# Patient Record
Sex: MALE | Race: WHITE | Employment: PART TIME | URBAN - METROPOLITAN AREA
[De-identification: names, ages, dates, MRNs, and addresses within clinical notes are randomized per-mention and may not be internally consistent; named-entity substitution may affect disease eponyms.]

---

## 2017-01-02 PROBLEM — R07.9 CHEST PAIN: Status: ACTIVE | Noted: 2017-01-02

## 2017-01-02 PROBLEM — R71.8 MICROCYTOSIS: Status: ACTIVE | Noted: 2017-01-02

## 2017-03-17 ENCOUNTER — OFFICE VISIT (OUTPATIENT)
Dept: FAMILY MEDICINE CLINIC | Age: 52
End: 2017-03-17

## 2017-03-17 VITALS
WEIGHT: 199.38 LBS | HEART RATE: 63 BPM | BODY MASS INDEX: 28.54 KG/M2 | DIASTOLIC BLOOD PRESSURE: 76 MMHG | SYSTOLIC BLOOD PRESSURE: 120 MMHG | OXYGEN SATURATION: 98 % | HEIGHT: 70 IN

## 2017-03-17 DIAGNOSIS — I10 ESSENTIAL HYPERTENSION: Primary | ICD-10-CM

## 2017-03-17 DIAGNOSIS — Z12.5 SCREENING PSA (PROSTATE SPECIFIC ANTIGEN): ICD-10-CM

## 2017-03-17 DIAGNOSIS — E78.2 MIXED HYPERLIPIDEMIA: ICD-10-CM

## 2017-03-17 DIAGNOSIS — B35.4 TINEA CORPORIS: ICD-10-CM

## 2017-03-17 DIAGNOSIS — E61.1 IRON DEFICIENCY: ICD-10-CM

## 2017-03-17 DIAGNOSIS — H40.9 ACUTE GLAUCOMA OF RIGHT EYE: ICD-10-CM

## 2017-03-17 DIAGNOSIS — I25.10 CORONARY ARTERY DISEASE INVOLVING NATIVE CORONARY ARTERY OF NATIVE HEART WITHOUT ANGINA PECTORIS: ICD-10-CM

## 2017-03-17 LAB
A/G RATIO: 1.6 (ref 1.1–2.2)
ALBUMIN SERPL-MCNC: 4.5 G/DL (ref 3.4–5)
ALP BLD-CCNC: 117 U/L (ref 40–129)
ALT SERPL-CCNC: 24 U/L (ref 10–40)
ANION GAP SERPL CALCULATED.3IONS-SCNC: 15 MMOL/L (ref 3–16)
AST SERPL-CCNC: 16 U/L (ref 15–37)
BILIRUB SERPL-MCNC: 0.9 MG/DL (ref 0–1)
BUN BLDV-MCNC: 17 MG/DL (ref 7–20)
CALCIUM SERPL-MCNC: 9.9 MG/DL (ref 8.3–10.6)
CHLORIDE BLD-SCNC: 104 MMOL/L (ref 99–110)
CHOLESTEROL, TOTAL: 135 MG/DL (ref 0–199)
CO2: 21 MMOL/L (ref 21–32)
CREAT SERPL-MCNC: 1 MG/DL (ref 0.9–1.3)
FERRITIN: 29.8 NG/ML (ref 30–400)
GFR AFRICAN AMERICAN: >60
GFR NON-AFRICAN AMERICAN: >60
GLOBULIN: 2.8 G/DL
GLUCOSE BLD-MCNC: 111 MG/DL (ref 70–99)
HDLC SERPL-MCNC: 25 MG/DL (ref 40–60)
LDL CHOLESTEROL CALCULATED: 67 MG/DL
POTASSIUM SERPL-SCNC: 4.6 MMOL/L (ref 3.5–5.1)
PROSTATE SPECIFIC ANTIGEN: 0.49 NG/ML (ref 0–4)
SODIUM BLD-SCNC: 140 MMOL/L (ref 136–145)
TOTAL PROTEIN: 7.3 G/DL (ref 6.4–8.2)
TRIGL SERPL-MCNC: 216 MG/DL (ref 0–150)
VLDLC SERPL CALC-MCNC: 43 MG/DL

## 2017-03-17 PROCEDURE — 36415 COLL VENOUS BLD VENIPUNCTURE: CPT | Performed by: FAMILY MEDICINE

## 2017-03-17 PROCEDURE — 99214 OFFICE O/P EST MOD 30 MIN: CPT | Performed by: FAMILY MEDICINE

## 2017-03-17 RX ORDER — ACETAZOLAMIDE 500 MG/1
CAPSULE, EXTENDED RELEASE ORAL
COMMUNITY
Start: 2017-03-14 | End: 2017-06-01

## 2017-03-17 ASSESSMENT — PATIENT HEALTH QUESTIONNAIRE - PHQ9
2. FEELING DOWN, DEPRESSED OR HOPELESS: 0
SUM OF ALL RESPONSES TO PHQ QUESTIONS 1-9: 0
1. LITTLE INTEREST OR PLEASURE IN DOING THINGS: 0
SUM OF ALL RESPONSES TO PHQ9 QUESTIONS 1 & 2: 0

## 2017-03-29 ENCOUNTER — NURSE ONLY (OUTPATIENT)
Dept: FAMILY MEDICINE CLINIC | Age: 52
End: 2017-03-29

## 2017-03-29 DIAGNOSIS — Z12.11 COLON CANCER SCREENING: Primary | ICD-10-CM

## 2017-03-29 LAB
CONTROL: ABNORMAL
HEMOCCULT STL QL: POSITIVE

## 2017-03-29 PROCEDURE — 82274 ASSAY TEST FOR BLOOD FECAL: CPT | Performed by: FAMILY MEDICINE

## 2017-04-03 DIAGNOSIS — R19.5 HEME POSITIVE STOOL: Primary | ICD-10-CM

## 2017-04-03 RX ORDER — LANOLIN ALCOHOL/MO/W.PET/CERES
325 CREAM (GRAM) TOPICAL
Qty: 30 TABLET | Refills: 3 | Status: SHIPPED | OUTPATIENT
Start: 2017-04-03 | End: 2017-06-01

## 2017-04-12 ENCOUNTER — TELEPHONE (OUTPATIENT)
Dept: FAMILY MEDICINE CLINIC | Age: 52
End: 2017-04-12

## 2017-04-20 RX ORDER — ASPIRIN 325 MG
TABLET, DELAYED RELEASE (ENTERIC COATED) ORAL
Qty: 90 TABLET | Refills: 2 | Status: SHIPPED | OUTPATIENT
Start: 2017-04-20 | End: 2017-12-22

## 2017-04-20 RX ORDER — ATORVASTATIN CALCIUM 20 MG/1
TABLET, FILM COATED ORAL
Qty: 90 TABLET | Refills: 2 | Status: SHIPPED | OUTPATIENT
Start: 2017-04-20 | End: 2017-10-10

## 2017-05-10 ENCOUNTER — TELEPHONE (OUTPATIENT)
Dept: FAMILY MEDICINE CLINIC | Age: 52
End: 2017-05-10

## 2017-06-01 ENCOUNTER — HOSPITAL ENCOUNTER (OUTPATIENT)
Dept: OTHER | Age: 52
Discharge: HOME | End: 2017-06-01
Attending: INTERNAL MEDICINE | Admitting: INTERNAL MEDICINE

## 2017-06-01 VITALS
OXYGEN SATURATION: 94 % | SYSTOLIC BLOOD PRESSURE: 116 MMHG | RESPIRATION RATE: 16 BRPM | TEMPERATURE: 97.7 F | BODY MASS INDEX: 27.22 KG/M2 | HEART RATE: 70 BPM | DIASTOLIC BLOOD PRESSURE: 78 MMHG | WEIGHT: 201 LBS | HEIGHT: 72 IN

## 2017-06-01 RX ORDER — SODIUM CHLORIDE, SODIUM LACTATE, POTASSIUM CHLORIDE, CALCIUM CHLORIDE 600; 310; 30; 20 MG/100ML; MG/100ML; MG/100ML; MG/100ML
INJECTION, SOLUTION INTRAVENOUS ONCE
Status: COMPLETED | OUTPATIENT
Start: 2017-06-01 | End: 2017-06-01

## 2017-06-01 RX ADMIN — SODIUM CHLORIDE, SODIUM LACTATE, POTASSIUM CHLORIDE, CALCIUM CHLORIDE: 600; 310; 30; 20 INJECTION, SOLUTION INTRAVENOUS at 08:07

## 2017-06-01 ASSESSMENT — PAIN - FUNCTIONAL ASSESSMENT: PAIN_FUNCTIONAL_ASSESSMENT: 0-10

## 2017-08-08 RX ORDER — FERROUS SULFATE 325(65) MG
TABLET ORAL
Qty: 30 TABLET | Refills: 3 | Status: SHIPPED | OUTPATIENT
Start: 2017-08-08 | End: 2017-10-10

## 2017-12-29 ENCOUNTER — OFFICE VISIT (OUTPATIENT)
Dept: FAMILY MEDICINE CLINIC | Age: 52
End: 2017-12-29

## 2017-12-29 VITALS
WEIGHT: 207.13 LBS | OXYGEN SATURATION: 95 % | BODY MASS INDEX: 28.09 KG/M2 | SYSTOLIC BLOOD PRESSURE: 132 MMHG | DIASTOLIC BLOOD PRESSURE: 88 MMHG | HEART RATE: 77 BPM

## 2017-12-29 DIAGNOSIS — M25.512 CHRONIC LEFT SHOULDER PAIN: ICD-10-CM

## 2017-12-29 DIAGNOSIS — G89.29 CHRONIC LEFT SHOULDER PAIN: ICD-10-CM

## 2017-12-29 DIAGNOSIS — N63.0 BREAST MASS IN MALE: Primary | ICD-10-CM

## 2017-12-29 DIAGNOSIS — I10 ESSENTIAL HYPERTENSION: ICD-10-CM

## 2017-12-29 LAB
BILIRUBIN, POC: NORMAL
BLOOD URINE, POC: NORMAL
CLARITY, POC: CLEAR
COLOR, POC: YELLOW
GLUCOSE URINE, POC: NORMAL
KETONES, POC: NORMAL
LEUKOCYTE EST, POC: NORMAL
NITRITE, POC: NORMAL
PH, POC: 7
PROTEIN, POC: NORMAL
SPECIFIC GRAVITY, POC: 1.01
UROBILINOGEN, POC: 0.2

## 2017-12-29 PROCEDURE — 3017F COLORECTAL CA SCREEN DOC REV: CPT | Performed by: FAMILY MEDICINE

## 2017-12-29 PROCEDURE — 1036F TOBACCO NON-USER: CPT | Performed by: FAMILY MEDICINE

## 2017-12-29 PROCEDURE — G8419 CALC BMI OUT NRM PARAM NOF/U: HCPCS | Performed by: FAMILY MEDICINE

## 2017-12-29 PROCEDURE — 81003 URINALYSIS AUTO W/O SCOPE: CPT | Performed by: FAMILY MEDICINE

## 2017-12-29 PROCEDURE — G8598 ASA/ANTIPLAT THER USED: HCPCS | Performed by: FAMILY MEDICINE

## 2017-12-29 PROCEDURE — G8427 DOCREV CUR MEDS BY ELIG CLIN: HCPCS | Performed by: FAMILY MEDICINE

## 2017-12-29 PROCEDURE — 99214 OFFICE O/P EST MOD 30 MIN: CPT | Performed by: FAMILY MEDICINE

## 2017-12-29 PROCEDURE — G8484 FLU IMMUNIZE NO ADMIN: HCPCS | Performed by: FAMILY MEDICINE

## 2017-12-29 NOTE — PROGRESS NOTES
Subjective:   He presents for follow up of emergency room visit for shoulder issues and chest mass and problems. He states that about 3 months ago he noticed a lump in his left breast but did not think about it and he is progressively had problems with his left shoulder he states he has very limited range of motion of his left shoulder which has significantly interfered with his ability to work he states he can't even hardly put on a seatbelt now because of severe pain in his left shoulder sometimes the pain shoots into his left neck and left chest wall. ER told him may have rotator cuff issues but they apparently did not check out left chest or breast mass. They did the blood work which I reviewed he states his urine has been kind of dark. He has history of hypertension. He has No Known Allergies. He   reports that he has never smoked. He has never used smokeless tobacco. Review of systems negative for vomiting diarrhea but he does have some abdominal discomfort in the middle. No shortness of breath or wheezing  Objective:   /88 (Site: Right Arm)   Pulse 77   Wt 207 lb 2 oz (94 kg)   SpO2 95%   BMI 28.09 kg/m²   BP Readings from Last 3 Encounters:   12/29/17 132/88   12/23/17 123/84   10/10/17 (!) 150/97     Physical Exam   Constitutional: He appears well-developed and well-nourished. HENT:   Head: Normocephalic. Neck: Neck supple. No thyromegaly present. Cardiovascular: Normal rate, regular rhythm and normal heart sounds. No murmur heard. Pulmonary/Chest: Breath sounds normal. No respiratory distress. He has no wheezes. He has no rales. Abdominal: Soft. There is no tenderness. Lymphadenopathy:     He has no cervical adenopathy. Neurological: He is alert. Psychiatric: He has a normal mood and affect. His behavior is normal.   Vitals reviewed. shoulder very uncomfortable with any range of motion very limited range of motion and tenderness.   Patient has a mass which is slightly tender to palpation in the left breast.   Assessment and Plan:  1. Breast mass in male  LIZZ Digital Diagnostic Unilateral   2. Chronic left shoulder pain     3. Essential hypertension  POCT Urinalysis No Micro (Auto)   Am concerned about the left breast mass which needs to be worked up first.  If this is negative we will get MRI of left shoulder. Will take off of work for now since he is very limited with his shoulder and chest.  Urine dip today was normal.  Recent blood work reviewed  The problems listed in the assessment are stable unless otherwise indicated. He  was instructed to continue their current medications and treatment for the above  problems unless otherwise indicated above. Age-specific preventative medicine recommendations were reviewed with patient today and the Healthy Family Handout was given to patient. Avoid tobacco products exposure. Follow up in 2 weeks. Call or return to office for any problems that develop before the next scheduled follow-up appointment. Garrett Broussard M.D. Parts of this note were completed using voice recognition transcription. Every effort was made to ensure accuracy; however, inadvertent transcription errors may be present.

## 2017-12-29 NOTE — PATIENT INSTRUCTIONS
Please read the healthy family handout that you were given and share it with your family. Please compare this printed medication list with your medications at home to be sure they are the same. If you have any medications that are different please contact us immediately at [phone]. Also review your allergies that we have listed, these may also include medications that you have not been able to tolerate, make sure everything listed is correct. If you have any allergies that are different please contact us immediately at [phone].

## 2018-01-03 ENCOUNTER — HOSPITAL ENCOUNTER (OUTPATIENT)
Dept: MAMMOGRAPHY | Age: 53
Discharge: HOME | End: 2018-01-03
Attending: FAMILY MEDICINE | Admitting: FAMILY MEDICINE

## 2018-01-03 DIAGNOSIS — M25.512 CHRONIC LEFT SHOULDER PAIN: Primary | ICD-10-CM

## 2018-01-03 DIAGNOSIS — N63.20 LEFT BREAST MASS: Primary | ICD-10-CM

## 2018-01-03 DIAGNOSIS — G89.29 CHRONIC LEFT SHOULDER PAIN: Primary | ICD-10-CM

## 2018-01-03 DIAGNOSIS — N63.20 LEFT BREAST MASS: ICD-10-CM

## 2018-01-11 ENCOUNTER — OFFICE VISIT (OUTPATIENT)
Dept: FAMILY MEDICINE CLINIC | Age: 53
End: 2018-01-11

## 2018-01-11 VITALS
DIASTOLIC BLOOD PRESSURE: 99 MMHG | SYSTOLIC BLOOD PRESSURE: 147 MMHG | BODY MASS INDEX: 28.55 KG/M2 | OXYGEN SATURATION: 96 % | HEART RATE: 103 BPM | WEIGHT: 210.5 LBS

## 2018-01-11 DIAGNOSIS — I10 ESSENTIAL HYPERTENSION: ICD-10-CM

## 2018-01-11 DIAGNOSIS — N62 GYNECOMASTIA, MALE: ICD-10-CM

## 2018-01-11 DIAGNOSIS — M25.512 ACUTE PAIN OF LEFT SHOULDER: Primary | ICD-10-CM

## 2018-01-11 PROBLEM — E61.1 IRON DEFICIENCY: Status: RESOLVED | Noted: 2017-03-17 | Resolved: 2018-01-11

## 2018-01-11 PROCEDURE — 1036F TOBACCO NON-USER: CPT | Performed by: FAMILY MEDICINE

## 2018-01-11 PROCEDURE — G8427 DOCREV CUR MEDS BY ELIG CLIN: HCPCS | Performed by: FAMILY MEDICINE

## 2018-01-11 PROCEDURE — G8598 ASA/ANTIPLAT THER USED: HCPCS | Performed by: FAMILY MEDICINE

## 2018-01-11 PROCEDURE — 3017F COLORECTAL CA SCREEN DOC REV: CPT | Performed by: FAMILY MEDICINE

## 2018-01-11 PROCEDURE — 99213 OFFICE O/P EST LOW 20 MIN: CPT | Performed by: FAMILY MEDICINE

## 2018-01-11 PROCEDURE — G8419 CALC BMI OUT NRM PARAM NOF/U: HCPCS | Performed by: FAMILY MEDICINE

## 2018-01-11 PROCEDURE — G8484 FLU IMMUNIZE NO ADMIN: HCPCS | Performed by: FAMILY MEDICINE

## 2018-01-11 RX ORDER — ATORVASTATIN CALCIUM 20 MG/1
TABLET, FILM COATED ORAL
Qty: 30 TABLET | Refills: 5 | Status: SHIPPED | OUTPATIENT
Start: 2018-01-11 | End: 2018-06-24 | Stop reason: SDUPTHER

## 2018-01-15 ENCOUNTER — OFFICE VISIT (OUTPATIENT)
Dept: ORTHOPEDIC SURGERY | Age: 53
End: 2018-01-15

## 2018-01-15 VITALS — WEIGHT: 210.54 LBS | HEIGHT: 72 IN | BODY MASS INDEX: 28.52 KG/M2

## 2018-01-15 DIAGNOSIS — M25.512 LEFT SHOULDER PAIN, UNSPECIFIED CHRONICITY: ICD-10-CM

## 2018-01-15 DIAGNOSIS — M75.02 ADHESIVE CAPSULITIS OF LEFT SHOULDER: Primary | ICD-10-CM

## 2018-01-15 PROCEDURE — G8598 ASA/ANTIPLAT THER USED: HCPCS | Performed by: ORTHOPAEDIC SURGERY

## 2018-01-15 PROCEDURE — G8427 DOCREV CUR MEDS BY ELIG CLIN: HCPCS | Performed by: ORTHOPAEDIC SURGERY

## 2018-01-15 PROCEDURE — G8419 CALC BMI OUT NRM PARAM NOF/U: HCPCS | Performed by: ORTHOPAEDIC SURGERY

## 2018-01-15 PROCEDURE — 1036F TOBACCO NON-USER: CPT | Performed by: ORTHOPAEDIC SURGERY

## 2018-01-15 PROCEDURE — G8484 FLU IMMUNIZE NO ADMIN: HCPCS | Performed by: ORTHOPAEDIC SURGERY

## 2018-01-15 PROCEDURE — 73030 X-RAY EXAM OF SHOULDER: CPT | Performed by: ORTHOPAEDIC SURGERY

## 2018-01-15 PROCEDURE — 3017F COLORECTAL CA SCREEN DOC REV: CPT | Performed by: ORTHOPAEDIC SURGERY

## 2018-01-15 PROCEDURE — 99203 OFFICE O/P NEW LOW 30 MIN: CPT | Performed by: ORTHOPAEDIC SURGERY

## 2018-01-15 RX ORDER — MELOXICAM 15 MG/1
15 TABLET ORAL DAILY
Qty: 30 TABLET | Refills: 2 | Status: SHIPPED | OUTPATIENT
Start: 2018-01-15 | End: 2018-04-04 | Stop reason: SDUPTHER

## 2018-01-17 ENCOUNTER — HOSPITAL ENCOUNTER (OUTPATIENT)
Dept: PHYSICAL THERAPY | Age: 53
Discharge: HOME | End: 2018-01-31
Attending: FAMILY MEDICINE | Admitting: FAMILY MEDICINE

## 2018-01-17 DIAGNOSIS — R07.9 CHEST PAIN: ICD-10-CM

## 2018-01-17 NOTE — FLOWSHEET NOTE
3x10 He forgot these 2 HO     Back pocket shrugs 3x10 in reclined \"                       \"   NV check palpation. Therapeutic Exercise:  30 min    Group Therapy:      Home Exercise Program:  Given HO    Therapeutic Activity:      Neuromuscular Re-education:      Gait:      Manual Therapy:  15 min  g-h distraction, inf glide  IR/ER spins. Canalith Repositioning Procedure:       Modalities:      Timed Code Treatment Minutes:  45    Total Treatment Minutes: 75     Medicare Cap Total YTD:  na    Treatment/Activity Tolerance:    [x] Patient tolerated treatment well [] Patient limited by fatigue   [x] Patient limited by pain [] Patient limited by other medical complications   [] Other:     Prognosis: [x] Good [] Fair  [] Poor    Patient Requires Follow-up:  [x] Yes  [] No    Plan: [] Continue per plan of care [] Alter current plan (see comments)   [x] Plan of care initiated [] Hold pending MD visit [] Discharge    Plan for Next Session:  manual tech for mobiliz, check ex, advance HEP  See Progress Note: [] Yes  [x] No       Next due:    18     Electronically signed by:  Guido Mcdaniel, PT [id number]          Date:  2018    Patient Name:  Duglas Thompson      :  1965    Visit# / total visits:  /    Pain level: /10    Progress Note covers period from:  18  To date on this note. Subjective:         Objective:  Observation:  Test measurements:       Functional Outcome Measure    Measure used:  SPADI  Score:  90  % Disability:  69%         Assessment:  G Code:  Summary:   Patient's response to treatment:      Goals:  G code:GOALS  GCode:   CJ  Short Term Goals (  2  weeks) Long Term Goals (4  weeks)   1). Establish HEp 1). (I) HEP   2). incr left arm use 25% 2). incr left arm use 75%   3). 3).AROM to 130 flx  120 abd   4). 4).strength to 4+   5). 5).return to work soon after d/c   6).  6).        · Progress toward

## 2018-01-18 ENCOUNTER — TELEPHONE (OUTPATIENT)
Dept: FAMILY MEDICINE CLINIC | Age: 53
End: 2018-01-18

## 2018-01-18 NOTE — TELEPHONE ENCOUNTER
Patient is wanting to know if you will give him a release to return to work.   He said he is currently doing therapy twice a week

## 2018-01-19 ENCOUNTER — HOSPITAL ENCOUNTER (OUTPATIENT)
Dept: PHYSICAL THERAPY | Age: 53
Discharge: HOME | End: 2018-01-19
Admitting: FAMILY MEDICINE

## 2018-01-19 DIAGNOSIS — R07.9 CHEST PAIN: ICD-10-CM

## 2018-01-19 NOTE — FLOWSHEET NOTE
Outpatient Physical Therapy     [] Daily Treatment Note   [] Progress Note   [] Discharge Note      Date:  2018    Patient Name:  Malka Nair        :  1965    Restrictions/Precautions:      Diagnosis:  Left shoulder adhesive capsulitis    Treatment Diagnosis:  Same, left shoulder pain    Insurance/Certification information:  [facility]    Referring Physician:  Dr. Delma Ruggiero of care signed (Y/N):      Visit# / total visits:  2     Pain level: /10     Subjective:   18 has had left shoulder problems for  3-4 months. No specific injury. He was taken off work around 17 by Dr. Vinay Raymond and also had a mamogram for left breast mass- this was neg. He saw Dr. Gustavo David on 1/15/18 for his shoulder who also referred him to PT. He is  To f/u with Dr. Gustavo David in 4 weeks. X-rAYS REVEALED MILD DEG CHANGES ( AT [facility])     Pain    Patient describes pain to be CONSTNT LEFT SHOULDER PAIN. States he has tingling in his left hand and fingers with diriving that runs up his arm. Has left cervical tightness/pain when he turns left. Has had brief muscle spasm in the left side of his neck for the past several monthsl  Patient reports  2/10 pain at rest, 5-6 /10 pain with movement. - try ing to hook the car seat for grand kids. Worsened by  - use of his left arm about shoulder level. Improved by - avoidance  Current Functional Limitations:  Not working. Not shoveling the snow,   PLOF:  indep and working  Pt. 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     Patient goal for therapy:   Have less pain, have better use of his left arm. Return to work    Objective:  18    palpation:  tender st the bursa,     mild incr laxity at the Right [facility] jt  Cervical:  decr OA with sidebending left.   decr AA rot bilat    Exercises:   Exercise/Equipment Resistance/Repetitions Other comments   18 explained course and role of PT       All ex  3x day      Table slides Hold

## 2018-01-22 ENCOUNTER — HOSPITAL ENCOUNTER (OUTPATIENT)
Dept: PHYSICAL THERAPY | Age: 53
Discharge: HOME | End: 2018-01-22
Admitting: FAMILY MEDICINE

## 2018-01-22 DIAGNOSIS — R07.9 CHEST PAIN: ICD-10-CM

## 2018-01-24 ENCOUNTER — HOSPITAL ENCOUNTER (OUTPATIENT)
Dept: PHYSICAL THERAPY | Age: 53
Discharge: HOME | End: 2018-01-24
Admitting: FAMILY MEDICINE

## 2018-01-24 DIAGNOSIS — R07.9 CHEST PAIN: ICD-10-CM

## 2018-01-24 NOTE — PROGRESS NOTES
Outpatient Physical Therapy     [x] Daily Treatment Note   [x] Progress Note at the bottom of this page  [] Discharge Note      Date:  2018    Patient Name:  Roland Handy        :  1965    Restrictions/Precautions:      Diagnosis:  Left shoulder adhesive capsulitis    Treatment Diagnosis:  Same, left shoulder pain    Insurance/Certification information:  [facility]    Referring Physician:  Dr. Joselyn Colin of care signed (Y/N):      Visit# / total visits:       Pain level: 0-2/10     Subjective:   18 no new reports since last visit- 50% improved. Feels like he can return to work  18 reports 50% imrovement. Able to shovel back porch off. Able to fasten kids in car seats with 2/10 pain. Going to see Dr. Hever Molina later this week and thinks he can return to work soon   18 has had left shoulder problems for  3-4 months. No specific injury. He was taken off work around 17 by Dr. Hever Molina and also had a mamogram for left breast mass- this was neg. He saw Dr. Merlinda Lakes on 1/15/18 for his shoulder who also referred him to PT. He is  To f/u with Dr. Merlinda Lakes in 4 weeks. X-rAYS REVEALED MILD DEG CHANGES ( AT [facility])     Pain    Patient describes pain to be CONSTNT LEFT SHOULDER PAIN. States he has tingling in his left hand and fingers with diriving that runs up his arm. Has left cervical tightness/pain when he turns left. Has had brief muscle spasm in the left side of his neck for the past several monthsl  Patient reports  2/10 pain at rest, 5-6 /10 pain with movement. - try ing to hook the car seat for grand kids. Worsened by  - use of his left arm about shoulder level. Improved by - avoidance  Current Functional Limitations:  Not working. Not shoveling the snow,   PLOF:  indep and working  Pt. 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     Patient goal for therapy:   Have less pain, have better use of his left arm.   Return to work  Objective:  1/19/18  palpation:  tender st the bursa,     mild incr laxity at the Right [facility] jt  Cervical:  decr OA with sidebending left. decr AA rot bilat      Outpatient Physical Therapy  Progress Note        Progress Note covers period from:  1/17/18 - 1/24/18    Subjective: reports he is about 50% improved. Feels he can return to work     Objective:  Observation:  Test measurements:                                                PROM: 147 flx,      [email or web address] ER,       [email or web address] IR,     102 abd all with discomfort     Functional Outcome Measure     Not tested again on 1/24/18  Measure used:  SPADI  Score:  90  % Disability:  69%       Assessment:  G Code:  Summary: improving rom and pain reports  Patient's response to treatment: pain at the  end of available range      Goals:  G code:GOALS  GCode:   CJ  Short Term Goals (  2  weeks) Long Term Goals (4  weeks)   1). Establish HEp 1). (I) HEP   2). incr left arm use 25% 2). incr left arm use 75%   3). 3).AROM to 130 flx  120 abd   4). 4).strength to 4+   5). 5).return to work soon after d/c   6). 6).        · Progress toward previous goals: STGS met. LTG for flx to 120.      Plan: cont PT 2x wk for 2-4 more visits  ·     Electronically Signed by:  Clifford Patel PT [id number]

## 2018-01-24 NOTE — FLOWSHEET NOTE
Outpatient Physical Therapy     [x] Daily Treatment Note   [x] Progress Note   [] Discharge Note      Date:  2018    Patient Name:  Hellen Carbone        :  1965    Restrictions/Precautions:      Diagnosis:  Left shoulder adhesive capsulitis    Treatment Diagnosis:  Same, left shoulder pain    Insurance/Certification information:  [facility]    Referring Physician:  Dr. Devora Fothergill of care signed (Y/N):      Visit# / total visits:       Pain level: 0-2/10     Subjective:   18 no new reports since last visit  18 reports 50% imrovement. Able to shovel back porch off. Able to fasten kids in car seats with 2/10 pain. Going to see Dr. Jean Carlos Hernandez later this week and thinks he can return to work soon   18 has had left shoulder problems for  3-4 months. No specific injury. He was taken off work around 17 by Dr. Jean Carlos Hernandez and also had a mamogram for left breast mass- this was neg. He saw Dr. Hallie Montes on 1/15/18 for his shoulder who also referred him to PT. He is  To f/u with Dr. Hallie Monets in 4 weeks. X-rAYS REVEALED MILD DEG CHANGES ( AT [facility])     Pain    Patient describes pain to be CONSTNT LEFT SHOULDER PAIN. States he has tingling in his left hand and fingers with diriving that runs up his arm. Has left cervical tightness/pain when he turns left. Has had brief muscle spasm in the left side of his neck for the past several monthsl  Patient reports  2/10 pain at rest, 5-6 /10 pain with movement. - try ing to hook the car seat for grand kids. Worsened by  - use of his left arm about shoulder level. Improved by - avoidance  Current Functional Limitations:  Not working. Not shoveling the snow,   PLOF:  indep and working  Pt. 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     Patient goal for therapy:   Have less pain, have better use of his left arm.   Return to work  Objective:  18  palpation:  tender st the bursa,     mild incr laxity at the Right [facility] jt  Cervical:  decr OA with sidebending left. decr AA rot bilat    Exercises:   Exercise/Equipment Resistance/Repetitions Other comments   1/17/18 explained course and role of PT       All ex  3x day      Table slides flex and abd Hold 10 sec  x5-10 reps     in supine: wand flx 3x10                     Wand ER 3x10   Forehead to pillow      Standing: ext w/ wand 3x10      Scapular sqz  center 3x10 He forgot these 2 HO     Back pocket shrugs 3x10 in reclined \"                       \"   1/19/18 cont above ex       1/22/18 stretches: in doorways for flx and ER Hold 10 sec , repeat 3-5 times 5-6 times a day                                                                               Therapeutic Exercise: 4 min. For review       Group Therapy:      Home Exercise Program:  Given HO    Therapeutic Activity:      Neuromuscular Re-education:      Gait:      Manual Therapy:   30 min  g-h distraction, inf glide  IR/ER spins. Resisted IR x 30 reps, then stretch ER. M-F release to left pectoralis,  Nerve gliding with left arm abd and ext: flx and ext of the wrist with fingers extended.      PROM: 147 flx, [email or web address] ER, [email or web address] IR, 102 abd all with discomfort        Modalities:      Timed Code Treatment Minutes:   34    Total Treatment Minutes:  34    Medicare Cap Total YTD:  na    Treatment/Activity Tolerance:    [x] Patient tolerated treatment well [] Patient limited by fatigue   [x] Patient limited by pain [] Patient limited by other medical complications   [] Other:     Prognosis: [x] Good [] Fair  [] Poor    Patient Requires Follow-up:  [x] Yes  [] No    Plan: [x] Continue per plan of care [] Alter current plan (see comments)   [] Plan of care initiated [] Hold pending MD visit [] Discharge    Plan for Next Session:  manual tech for mobiliz, check ex, advance HEP  See Progress Note: [x] Yes  [] No       Next due:    2/16/18     Electronically signed by:  SN [id number]            Progress Note covers

## 2018-01-25 ENCOUNTER — OFFICE VISIT (OUTPATIENT)
Dept: FAMILY MEDICINE CLINIC | Age: 53
End: 2018-01-25

## 2018-01-25 VITALS
WEIGHT: 210.25 LBS | BODY MASS INDEX: 28.51 KG/M2 | DIASTOLIC BLOOD PRESSURE: 78 MMHG | SYSTOLIC BLOOD PRESSURE: 121 MMHG | HEART RATE: 69 BPM

## 2018-01-25 DIAGNOSIS — M75.02 ADHESIVE CAPSULITIS OF LEFT SHOULDER: ICD-10-CM

## 2018-01-25 DIAGNOSIS — I10 ESSENTIAL HYPERTENSION: Primary | ICD-10-CM

## 2018-01-25 PROCEDURE — 1036F TOBACCO NON-USER: CPT | Performed by: FAMILY MEDICINE

## 2018-01-25 PROCEDURE — G8427 DOCREV CUR MEDS BY ELIG CLIN: HCPCS | Performed by: FAMILY MEDICINE

## 2018-01-25 PROCEDURE — 99213 OFFICE O/P EST LOW 20 MIN: CPT | Performed by: FAMILY MEDICINE

## 2018-01-25 PROCEDURE — G8598 ASA/ANTIPLAT THER USED: HCPCS | Performed by: FAMILY MEDICINE

## 2018-01-25 PROCEDURE — G8419 CALC BMI OUT NRM PARAM NOF/U: HCPCS | Performed by: FAMILY MEDICINE

## 2018-01-25 PROCEDURE — G8484 FLU IMMUNIZE NO ADMIN: HCPCS | Performed by: FAMILY MEDICINE

## 2018-01-25 PROCEDURE — 3017F COLORECTAL CA SCREEN DOC REV: CPT | Performed by: FAMILY MEDICINE

## 2018-01-25 NOTE — PATIENT INSTRUCTIONS
Please read the healthy family handout that you were given and share it with your family. Please compare this printed medication list with your medications at home to be sure they are the same. If you have any medications that are different please contact us immediately at [phone]. Also review your allergies that we have listed, these may also include medications that you have not been able to tolerate, make sure everything listed is correct. If you have any allergies that are different please contact us immediately at [phone].

## 2018-01-25 NOTE — PROGRESS NOTES
any problems that develop before the next scheduled follow-up appointment. Renetta Mcarthur M.D. Parts of this note were completed using voice recognition transcription. Every effort was made to ensure accuracy; however, inadvertent transcription errors may be present.

## 2018-02-01 ENCOUNTER — HOSPITAL ENCOUNTER (OUTPATIENT)
Dept: OTHER | Age: 53
Discharge: HOME | End: 2018-02-14
Attending: FAMILY MEDICINE | Admitting: FAMILY MEDICINE

## 2018-02-06 ENCOUNTER — HOSPITAL ENCOUNTER (OUTPATIENT)
Dept: PHYSICAL THERAPY | Age: 53
Discharge: HOME | End: 2018-02-07
Admitting: FAMILY MEDICINE

## 2018-02-06 DIAGNOSIS — R07.9 CHEST PAIN: ICD-10-CM

## 2018-02-13 ENCOUNTER — HOSPITAL ENCOUNTER (OUTPATIENT)
Dept: PHYSICAL THERAPY | Age: 53
Discharge: HOME | End: 2018-02-14
Admitting: FAMILY MEDICINE

## 2018-02-13 DIAGNOSIS — R07.9 CHEST PAIN: ICD-10-CM

## 2018-02-13 NOTE — DISCHARGE SUMMARY
Outpatient Physical Therapy     [x] Daily Treatment Note   [] Progress Note   [x] Discharge Note 18 bottom of this page. Date:  2018    Patient Name:  Jelly Paredes        :  1965    Restrictions/Precautions:      Diagnosis:  Left shoulder adhesive capsulitis    Treatment Diagnosis:  Same, left shoulder pain    Insurance/Certification information:  [facility]    Referring Physician:  Dr. Nallely Mak of care signed (Y/N):      Visit# / total visits:   6         Pain level: 0-2/10     Subjective:  18 doing well with return to work. 18 doing well with his return to work. States he is using his arm mostly normally. States it is still stiff.   18 no new reports since last visit  18 reports 50% imrovement. Able to shovel back porch off. Able to fasten kids in car seats with 2/10 pain. Going to see Dr. Aquiles Garcia later this week and thinks he can return to work soon   18 has had left shoulder problems for  3-4 months. No specific injury. He was taken off work around 17 by Dr. Auqiles Garcia and also had a mamogram for left breast mass- this was neg. He saw Dr. Merritt Lu on 1/15/18 for his shoulder who also referred him to PT. He is  To f/u with Dr. Merritt Lu in 4 weeks. X-rAYS REVEALED MILD DEG CHANGES ( AT [facility])     Pain    Patient describes pain to be CONSTNT LEFT SHOULDER PAIN. States he has tingling in his left hand and fingers with diriving that runs up his arm. Has left cervical tightness/pain when he turns left. Has had brief muscle spasm in the left side of his neck for the past several monthsl  Patient reports  2/10 pain at rest, 5-6 /10 pain with movement. - try ing to hook the car seat for grand kids. Worsened by  - use of his left arm about shoulder level. Improved by - avoidance  Current Functional Limitations:  Not working.   Not shoveling the snow,   PLOF:  indep and working  Pt. 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     Patient goal for therapy:   Have less pain, have better use of his left arm. Return to work  Objective:  1/19/18  palpation:  tender st the bursa,     mild incr laxity at the Right [facility] jt  Cervical:  decr OA with sidebending left. decr AA rot bilat    Exercises:   Exercise/Equipment Resistance/Repetitions Other comments   1/17/18 explained course and role of PT       All ex  3x day      Table slides flex and abd Hold 10 sec  x5-10 reps     in supine: wand flx 3x10                     Wand ER 3x10   Forehead to pillow      Standing: ext w/ wand 3x10      Scapular sqz  center 3x10 He forgot these 2 HO     Back pocket shrugs 3x10 in reclined \"                       \"   1/19/18 cont above ex       1/22/18 stretches: in doorways for flx and ER Hold 10 sec , repeat 3-5 times 5-6 times a day                                                                               Therapeutic Exercise:       Group Therapy:      Home Exercise Program:  Given HO    Therapeutic Activity:      Neuromuscular Re-education:      Gait:      Manual Therapy:    25 min  g-h distraction, inf glide  IR/ER spins. M-F release to left pectoralis,  Nerve gliding with left arm abd and ext: flx and ext of the wrist with fingers extended. PROM: 160 flx,                                                                                                                 Modalities:      Timed Code Treatment Minutes:   25     Doing very well.   Will consider d/c on his next visit    Total Treatment Minutes:   26    Medicare Cap Total YTD:  na    Treatment/Activity Tolerance:    [x] Patient tolerated treatment well [] Patient limited by fatigue   [] Patient limited by pain- end of   range [] Patient limited by other medical complications   [] Other:     Prognosis: [x] Good [] Fair  [] Poor    Patient Requires Follow-up:  [] Yes  [x] No    Plan: [] Continue per plan of care [] Alter current plan (see comments)   [] Plan of care initiated [] Hold pending MD visit [x] Discharge    Plan for Next Session:     See Progress Note: [x] Yes  [] No       Next due:     At d/c     Electronically signed by:  Aris Lei, PT [id number]    Outpatient Physical Therapy  Progress Note/ d/c note        Progress Note covers period from:  1/17/18 -  2/13/18       Objective:  Observation:  Test measurements:     PROM:  160 flx. AROM:  135 flx    132 abd     ER to T1     IR to T12. MMT:  5/5 left shoulder     Functional Outcome Measure    Measure used:  SPADI  Score:     % Disability:          Assessment:  G Code:  Summary: seen x 6 visits. Patient's response to treatment:  good      Goals:  G code:GOALS  GCode:   CJ  Short Term Goals (  2  weeks) Long Term Goals (4  weeks)   1). Establish HEp 1). (I) HEP   2). incr left arm use 25% 2). incr left arm use 75%   3). 3).AROM to 130 flx  120 abd   4). 4).strength to 4+   5). 5).return to work soon after d/c   6). 6).        · Progress toward previous goals: all goals met. Plan:  He will continue with HEP for strength and stretch.   D/c further PT  ·     Electronically Signed by: Aris Lei, [address] [place] [address] Road

## 2018-04-18 ENCOUNTER — TELEPHONE (OUTPATIENT)
Dept: FAMILY MEDICINE CLINIC | Age: 53
End: 2018-04-18

## 2018-04-18 PROBLEM — R07.9 CHEST PAIN: Status: ACTIVE | Noted: 2018-04-18

## 2018-06-24 DIAGNOSIS — I10 ESSENTIAL HYPERTENSION: ICD-10-CM

## 2018-06-26 RX ORDER — ATORVASTATIN CALCIUM 20 MG/1
TABLET, FILM COATED ORAL
Qty: 30 TABLET | Refills: 5 | Status: SHIPPED | OUTPATIENT
Start: 2018-06-26 | End: 2018-12-22 | Stop reason: SDUPTHER

## 2018-10-02 ENCOUNTER — OFFICE VISIT (OUTPATIENT)
Dept: FAMILY MEDICINE CLINIC | Age: 53
End: 2018-10-02
Payer: COMMERCIAL

## 2018-10-02 VITALS
SYSTOLIC BLOOD PRESSURE: 130 MMHG | HEART RATE: 52 BPM | WEIGHT: 209.8 LBS | BODY MASS INDEX: 28.45 KG/M2 | OXYGEN SATURATION: 96 % | DIASTOLIC BLOOD PRESSURE: 70 MMHG

## 2018-10-02 DIAGNOSIS — Z23 NEED FOR IMMUNIZATION AGAINST INFLUENZA: ICD-10-CM

## 2018-10-02 DIAGNOSIS — Z11.4 SCREENING FOR HUMAN IMMUNODEFICIENCY VIRUS WITHOUT PRESENCE OF RISK FACTORS: ICD-10-CM

## 2018-10-02 DIAGNOSIS — Z11.59 ENCOUNTER FOR HCV SCREENING TEST FOR LOW RISK PATIENT: ICD-10-CM

## 2018-10-02 DIAGNOSIS — R10.9 FLANK PAIN: ICD-10-CM

## 2018-10-02 DIAGNOSIS — I10 ESSENTIAL HYPERTENSION: ICD-10-CM

## 2018-10-02 DIAGNOSIS — Z13.1 SCREENING FOR DIABETES MELLITUS (DM): ICD-10-CM

## 2018-10-02 DIAGNOSIS — Z95.1 S/P CABG (CORONARY ARTERY BYPASS GRAFT): ICD-10-CM

## 2018-10-02 DIAGNOSIS — E78.2 MIXED HYPERLIPIDEMIA: ICD-10-CM

## 2018-10-02 DIAGNOSIS — I25.10 CORONARY ARTERY DISEASE INVOLVING NATIVE CORONARY ARTERY OF NATIVE HEART WITHOUT ANGINA PECTORIS: Primary | ICD-10-CM

## 2018-10-02 LAB
BILIRUBIN, POC: NORMAL
BLOOD URINE, POC: NORMAL
CLARITY, POC: CLEAR
COLOR, POC: CLEAR
GLUCOSE URINE, POC: NORMAL
KETONES, POC: NORMAL
LEUKOCYTE EST, POC: NORMAL
NITRITE, POC: NORMAL
PH, POC: 6
PROTEIN, POC: NORMAL
SPECIFIC GRAVITY, POC: 1.02
UROBILINOGEN, POC: 0.2

## 2018-10-02 PROCEDURE — G8484 FLU IMMUNIZE NO ADMIN: HCPCS | Performed by: NURSE PRACTITIONER

## 2018-10-02 PROCEDURE — 81002 URINALYSIS NONAUTO W/O SCOPE: CPT | Performed by: NURSE PRACTITIONER

## 2018-10-02 PROCEDURE — 1036F TOBACCO NON-USER: CPT | Performed by: NURSE PRACTITIONER

## 2018-10-02 PROCEDURE — 3017F COLORECTAL CA SCREEN DOC REV: CPT | Performed by: NURSE PRACTITIONER

## 2018-10-02 PROCEDURE — G8598 ASA/ANTIPLAT THER USED: HCPCS | Performed by: NURSE PRACTITIONER

## 2018-10-02 PROCEDURE — G8419 CALC BMI OUT NRM PARAM NOF/U: HCPCS | Performed by: NURSE PRACTITIONER

## 2018-10-02 PROCEDURE — G8427 DOCREV CUR MEDS BY ELIG CLIN: HCPCS | Performed by: NURSE PRACTITIONER

## 2018-10-02 PROCEDURE — 99214 OFFICE O/P EST MOD 30 MIN: CPT | Performed by: NURSE PRACTITIONER

## 2018-10-02 ASSESSMENT — PATIENT HEALTH QUESTIONNAIRE - PHQ9
2. FEELING DOWN, DEPRESSED OR HOPELESS: 0
SUM OF ALL RESPONSES TO PHQ QUESTIONS 1-9: 0
SUM OF ALL RESPONSES TO PHQ9 QUESTIONS 1 & 2: 0
1. LITTLE INTEREST OR PLEASURE IN DOING THINGS: 0
SUM OF ALL RESPONSES TO PHQ QUESTIONS 1-9: 0

## 2018-10-02 ASSESSMENT — ENCOUNTER SYMPTOMS
ALLERGIC/IMMUNOLOGIC NEGATIVE: 1
GASTROINTESTINAL NEGATIVE: 1
RESPIRATORY NEGATIVE: 1

## 2018-10-02 NOTE — PATIENT INSTRUCTIONS
of fruit each day. A serving is 1 medium-sized piece of fruit, ½ cup chopped or canned fruit, 1/4 cup dried fruit, or 4 ounces (½ cup) of fruit juice. Choose fruit more often than fruit juice. · Eat 4 to 5 servings of vegetables each day. A serving is 1 cup of lettuce or raw leafy vegetables, ½ cup of chopped or cooked vegetables, or 4 ounces (½ cup) of vegetable juice. Choose vegetables more often than vegetable juice. · Get 2 to 3 servings of low-fat and fat-free dairy each day. A serving is 8 ounces of milk, 1 cup of yogurt, or 1 ½ ounces of cheese. · Eat 6 to 8 servings of grains each day. A serving is 1 slice of bread, 1 ounce of dry cereal, or ½ cup of cooked rice, pasta, or cooked cereal. Try to choose whole-grain products as much as possible. · Limit lean meat, poultry, and fish to 2 servings each day. A serving is 3 ounces, about the size of a deck of cards. · Eat 4 to 5 servings of nuts, seeds, and legumes (cooked dried beans, lentils, and split peas) each week. A serving is 1/3 cup of nuts, 2 tablespoons of seeds, or ½ cup of cooked beans or peas. · Limit fats and oils to 2 to 3 servings each day. A serving is 1 teaspoon of vegetable oil or 2 tablespoons of salad dressing. · Limit sweets and added sugars to 5 servings or less a week. A serving is 1 tablespoon jelly or jam, ½ cup sorbet, or 1 cup of lemonade. · Eat less than 2,300 milligrams (mg) of sodium a day. If you limit your sodium to 1,500 mg a day, you can lower your blood pressure even more. Tips for success  · Start small. Do not try to make dramatic changes to your diet all at once. You might feel that you are missing out on your favorite foods and then be more likely to not follow the plan. Make small changes, and stick with them. Once those changes become habit, add a few more changes. · Try some of the following:  ¨ Make it a goal to eat a fruit or vegetable at every meal and at snacks.  This will make it easy to get the recommended 7802-5177, Fluzone Quadrivalent 8701-0618, Fluzone Quadrivalent Intradermal 5538-5320  What is the most important information I should know about this vaccine? The injectable influenza virus vaccine (flu shot) is a \"killed virus\" vaccine. Influenza virus vaccine is also available in a nasal spray form, which is a \"live virus\" vaccine. This medication guide addresses only the injectable form of this vaccine. Becoming infected with influenza is much more dangerous to your health than receiving this vaccine. However, like any medicine, this vaccine can cause side effects but the risk of serious side effects is extremely low. What is influenza virus vaccine? Influenza virus (commonly known as \"the flu\") is a serious disease caused by a virus. Influenza virus can spread from one person to another through small droplets of saliva that are expelled into the air when an infected person coughs or sneezes. The virus can also be passed through contact with objects the infected person has touched, such as a door handle or other surfaces. Influenza virus vaccine is used to prevent infection caused by influenza virus. The vaccine is redeveloped each year to contain specific strains of inactivated (killed) flu virus that are recommended by public health officials for that year. The injectable influenza virus vaccine (flu shot) is a \"killed virus\" vaccine. Influenza virus vaccine is also available in a nasal spray form, which is a \"live virus\" vaccine. Influenza virus vaccine works by exposing you to a small dose of the virus, which helps your body to develop immunity to the disease. Influenza virus vaccine will not treat an active infection that has already developed in the body. Influenza virus vaccine is for use in adults and children who are at least 7 months old. Becoming infected with influenza is much more dangerous to your health than receiving this vaccine.  Influenza causes thousands of deaths each year, and hundreds of thousands of hospitalizations. However, like any medicine, this vaccine can cause side effects but the risk of serious side effects is extremely low. Like any vaccine, influenza virus vaccine may not provide protection from disease in every person. This vaccine will not prevent illness caused by charisma flu (\"bird flu\"). What should I discuss with my healthcare provider before receiving this vaccine? You may not be able to receive this vaccine if you are allergic to eggs, or if you have:  · a history of severe allergic reaction to a flu vaccine; or  · a history of Guillain-Brentwood syndrome (within 6 weeks after receiving a flu vaccine). To make sure this vaccine is safe for you, tell your doctor if you have ever had:  · a bleeding or blood clotting disorder such as hemophilia or easy bruising;  · a neurologic disorder or disease affecting the brain (or if this was a reaction to a previous vaccine);  · seizures;  · a weak immune system caused by disease, bone marrow transplant, or by using certain medicines or receiving cancer treatments; or  · if you are allergic to latex rubber. You can still receive a vaccine if you have a minor cold. If you have a severe illness with a fever or any type of infection, wait until you get better before receiving this vaccine. The Centers for Disease Control and Prevention recommends that pregnant women get a flu shot during any trimester of pregnancy to protect themselves and their  babies from flu. The nasal spray form of influenza vaccine is not recommended for use in pregnant women. It is not known whether influenza virus vaccine passes into breast milk or if it could harm a nursing baby. Do not receive this vaccine without telling your doctor if you are breast-feeding a baby. This vaccine should not be given to a child younger than 7 months old. How is this vaccine given? Some brands of this vaccine are made for use in adults and not in children.  Your foods.  · Choose low-sodium versions of canned goods (such as soups, vegetables, and beans). Limit sugar  · Limit drinks and foods with added sugar. These include candy, desserts, and soda pop. Limit alcohol  · Limit alcohol to no more than 2 drinks a day for men and 1 drink a day for women. Too much alcohol can cause health problems. When should you call for help? Watch closely for changes in your health, and be sure to contact your doctor if:    · You would like help planning heart-healthy meals. Where can you learn more? Go to https://[email or web address].[email or web address]. org and sign in to your [employer] account. Enter V137 in the [employer] box to learn more about \"Heart-Healthy Diet: Care Instructions. \"     If you do not have an account, please click on the \"Sign Up Now\" link. Current as of: December 6, 2017  Content Version: 11.7  © 9409-3850 [employer]. Care instructions adapted under license by [facility] ([facility]). If you have questions about a medical condition or this instruction, always ask your healthcare professional. [place] [address] any warranty or liability for your use of this information. Patient Education        Frequent Urination: Care Instructions  Your Care Instructions  An urge to urinate frequently but usually passing only small amounts of urine is a common symptom of urinary problems, such as urinary tract infections. The bladder may become inflamed. This can cause the urge to urinate. You may try to urinate more often than usual to try to soothe that urge. Frequent urination also may be caused by sexually transmitted infections (STIs) or kidney stones. Or it may happen when something irritates the tube that carries urine from the bladder to the outside of the body (urethra). It may also be a sign of diabetes. The cause may be hard to find. You may need tests. Follow-up care is a key part of your treatment and safety.  Be sure to make and go to

## 2018-10-03 ENCOUNTER — NURSE ONLY (OUTPATIENT)
Dept: FAMILY MEDICINE CLINIC | Age: 53
End: 2018-10-03
Payer: COMMERCIAL

## 2018-10-03 DIAGNOSIS — I10 ESSENTIAL HYPERTENSION: ICD-10-CM

## 2018-10-03 DIAGNOSIS — Z13.1 SCREENING FOR DIABETES MELLITUS (DM): ICD-10-CM

## 2018-10-03 DIAGNOSIS — E78.2 MIXED HYPERLIPIDEMIA: ICD-10-CM

## 2018-10-03 DIAGNOSIS — Z11.4 SCREENING FOR HUMAN IMMUNODEFICIENCY VIRUS WITHOUT PRESENCE OF RISK FACTORS: ICD-10-CM

## 2018-10-03 DIAGNOSIS — Z11.59 ENCOUNTER FOR HCV SCREENING TEST FOR LOW RISK PATIENT: ICD-10-CM

## 2018-10-03 LAB
A/G RATIO: 1.7 (ref 1.1–2.2)
ALBUMIN SERPL-MCNC: 4 G/DL (ref 3.4–5)
ALP BLD-CCNC: 106 U/L (ref 40–129)
ALT SERPL-CCNC: 47 U/L (ref 10–40)
ANION GAP SERPL CALCULATED.3IONS-SCNC: 13 MMOL/L (ref 3–16)
AST SERPL-CCNC: 34 U/L (ref 15–37)
BILIRUB SERPL-MCNC: 0.5 MG/DL (ref 0–1)
BUN BLDV-MCNC: 12 MG/DL (ref 7–20)
CALCIUM SERPL-MCNC: 9.2 MG/DL (ref 8.3–10.6)
CHLORIDE BLD-SCNC: 104 MMOL/L (ref 99–110)
CHOLESTEROL, TOTAL: 113 MG/DL (ref 0–199)
CO2: 25 MMOL/L (ref 21–32)
CREAT SERPL-MCNC: 0.7 MG/DL (ref 0.9–1.3)
GFR AFRICAN AMERICAN: >60
GFR NON-AFRICAN AMERICAN: >60
GLOBULIN: 2.4 G/DL
GLUCOSE BLD-MCNC: 111 MG/DL (ref 70–99)
HDLC SERPL-MCNC: 31 MG/DL (ref 40–60)
HEPATITIS C ANTIBODY INTERPRETATION: NORMAL
LDL CHOLESTEROL CALCULATED: 62 MG/DL
POTASSIUM SERPL-SCNC: 5 MMOL/L (ref 3.5–5.1)
SODIUM BLD-SCNC: 142 MMOL/L (ref 136–145)
TOTAL PROTEIN: 6.4 G/DL (ref 6.4–8.2)
TRIGL SERPL-MCNC: 99 MG/DL (ref 0–150)
VLDLC SERPL CALC-MCNC: 20 MG/DL

## 2018-10-03 PROCEDURE — 36415 COLL VENOUS BLD VENIPUNCTURE: CPT | Performed by: NURSE PRACTITIONER

## 2018-10-04 LAB
ESTIMATED AVERAGE GLUCOSE: 145.6 MG/DL
HBA1C MFR BLD: 6.7 %
HIV AG/AB: NORMAL
HIV ANTIGEN: NORMAL
HIV-1 ANTIBODY: NORMAL
HIV-2 AB: NORMAL
URINE CULTURE, ROUTINE: NORMAL

## 2018-10-06 ENCOUNTER — APPOINTMENT (OUTPATIENT)
Dept: CT IMAGING | Age: 53
End: 2018-10-06
Payer: COMMERCIAL

## 2018-10-06 ENCOUNTER — HOSPITAL ENCOUNTER (EMERGENCY)
Age: 53
Discharge: HOME | End: 2018-10-06
Attending: EMERGENCY MEDICINE
Payer: COMMERCIAL

## 2018-10-06 VITALS
HEART RATE: 69 BPM | WEIGHT: 208 LBS | TEMPERATURE: 98.6 F | DIASTOLIC BLOOD PRESSURE: 93 MMHG | HEIGHT: 72 IN | OXYGEN SATURATION: 95 % | RESPIRATION RATE: 17 BRPM | SYSTOLIC BLOOD PRESSURE: 121 MMHG | BODY MASS INDEX: 28.17 KG/M2

## 2018-10-06 DIAGNOSIS — R10.32 ABDOMINAL PAIN, LEFT LOWER QUADRANT: ICD-10-CM

## 2018-10-06 DIAGNOSIS — K40.90 LEFT INGUINAL HERNIA: ICD-10-CM

## 2018-10-06 DIAGNOSIS — R10.9 LEFT FLANK PAIN: Primary | ICD-10-CM

## 2018-10-06 LAB
A/G RATIO: 1.4 (ref 1.1–2.2)
ALBUMIN SERPL-MCNC: 4.1 G/DL (ref 3.4–5)
ALP BLD-CCNC: 108 U/L (ref 40–129)
ALT SERPL-CCNC: 46 U/L (ref 10–40)
ANION GAP SERPL CALCULATED.3IONS-SCNC: 11 MMOL/L (ref 3–16)
AST SERPL-CCNC: 41 U/L (ref 15–37)
BASOPHILS ABSOLUTE: 0.2 K/UL (ref 0–0.2)
BASOPHILS RELATIVE PERCENT: 1.5 %
BILIRUB SERPL-MCNC: 0.8 MG/DL (ref 0–1)
BILIRUBIN URINE: NEGATIVE
BLOOD, URINE: NEGATIVE
BUN BLDV-MCNC: 14 MG/DL (ref 7–20)
CALCIUM SERPL-MCNC: 9.1 MG/DL (ref 8.3–10.6)
CHLORIDE BLD-SCNC: 101 MMOL/L (ref 99–110)
CLARITY: CLEAR
CO2: 25 MMOL/L (ref 21–32)
COLOR: YELLOW
CREAT SERPL-MCNC: 0.7 MG/DL (ref 0.9–1.3)
EOSINOPHILS ABSOLUTE: 0.5 K/UL (ref 0–0.6)
EOSINOPHILS RELATIVE PERCENT: 4.3 %
GFR AFRICAN AMERICAN: >60
GFR NON-AFRICAN AMERICAN: >60
GLOBULIN: 3 G/DL
GLUCOSE BLD-MCNC: 133 MG/DL (ref 70–99)
GLUCOSE URINE: NEGATIVE MG/DL
HCT VFR BLD CALC: 44.7 % (ref 40.5–52.5)
HEMOGLOBIN: 15.4 G/DL (ref 13.5–17.5)
KETONES, URINE: NEGATIVE MG/DL
LEUKOCYTE ESTERASE, URINE: NEGATIVE
LYMPHOCYTES ABSOLUTE: 2.7 K/UL (ref 1–5.1)
LYMPHOCYTES RELATIVE PERCENT: 25.7 %
MCH RBC QN AUTO: 29.7 PG (ref 26–34)
MCHC RBC AUTO-ENTMCNC: 34.4 G/DL (ref 31–36)
MCV RBC AUTO: 86.5 FL (ref 80–100)
MICROSCOPIC EXAMINATION: NORMAL
MONOCYTES ABSOLUTE: 0.9 K/UL (ref 0–1.3)
MONOCYTES RELATIVE PERCENT: 8.1 %
NEUTROPHILS ABSOLUTE: 6.4 K/UL (ref 1.7–7.7)
NEUTROPHILS RELATIVE PERCENT: 60.4 %
NITRITE, URINE: NEGATIVE
PDW BLD-RTO: 14.7 % (ref 12.4–15.4)
PH UA: 5.5
PLATELET # BLD: 238 K/UL (ref 135–450)
PMV BLD AUTO: 8.6 FL (ref 5–10.5)
POTASSIUM SERPL-SCNC: 4.4 MMOL/L (ref 3.5–5.1)
PROTEIN UA: NEGATIVE MG/DL
RBC # BLD: 5.17 M/UL (ref 4.2–5.9)
SODIUM BLD-SCNC: 137 MMOL/L (ref 136–145)
SPECIFIC GRAVITY UA: >=1.03
TOTAL PROTEIN: 7.1 G/DL (ref 6.4–8.2)
URINE TYPE: NORMAL
UROBILINOGEN, URINE: 0.2 E.U./DL
WBC # BLD: 10.6 K/UL (ref 4–11)

## 2018-10-06 PROCEDURE — 74177 CT ABD & PELVIS W/CONTRAST: CPT

## 2018-10-06 PROCEDURE — 81003 URINALYSIS AUTO W/O SCOPE: CPT

## 2018-10-06 PROCEDURE — 80053 COMPREHEN METABOLIC PANEL: CPT

## 2018-10-06 PROCEDURE — 6360000004 HC RX CONTRAST MEDICATION: Performed by: EMERGENCY MEDICINE

## 2018-10-06 PROCEDURE — 99284 EMERGENCY DEPT VISIT MOD MDM: CPT

## 2018-10-06 PROCEDURE — 85025 COMPLETE CBC W/AUTO DIFF WBC: CPT

## 2018-10-06 PROCEDURE — 96375 TX/PRO/DX INJ NEW DRUG ADDON: CPT

## 2018-10-06 PROCEDURE — 96374 THER/PROPH/DIAG INJ IV PUSH: CPT

## 2018-10-06 PROCEDURE — 6360000002 HC RX W HCPCS: Performed by: EMERGENCY MEDICINE

## 2018-10-06 RX ORDER — METHOCARBAMOL 500 MG/1
500 TABLET, FILM COATED ORAL 3 TIMES DAILY
Qty: 15 TABLET | Refills: 0 | Status: SHIPPED | OUTPATIENT
Start: 2018-10-06 | End: 2018-10-11

## 2018-10-06 RX ORDER — KETOROLAC TROMETHAMINE 30 MG/ML
30 INJECTION, SOLUTION INTRAMUSCULAR; INTRAVENOUS ONCE
Status: COMPLETED | OUTPATIENT
Start: 2018-10-06 | End: 2018-10-06

## 2018-10-06 RX ORDER — DICYCLOMINE HCL 20 MG
20 TABLET ORAL EVERY 6 HOURS PRN
Qty: 12 TABLET | Refills: 0 | Status: SHIPPED | OUTPATIENT
Start: 2018-10-06 | End: 2019-02-22

## 2018-10-06 RX ORDER — ONDANSETRON 2 MG/ML
4 INJECTION INTRAMUSCULAR; INTRAVENOUS ONCE
Status: COMPLETED | OUTPATIENT
Start: 2018-10-06 | End: 2018-10-06

## 2018-10-06 RX ORDER — ONDANSETRON 4 MG/1
4 TABLET, FILM COATED ORAL EVERY 8 HOURS PRN
Qty: 15 TABLET | Refills: 0 | Status: SHIPPED | OUTPATIENT
Start: 2018-10-06 | End: 2019-02-22

## 2018-10-06 RX ADMIN — IOPAMIDOL 75 ML: 755 INJECTION, SOLUTION INTRAVENOUS at 08:55

## 2018-10-06 RX ADMIN — KETOROLAC TROMETHAMINE 30 MG: 30 INJECTION, SOLUTION INTRAMUSCULAR at 08:27

## 2018-10-06 RX ADMIN — ONDANSETRON 4 MG: 2 INJECTION INTRAMUSCULAR; INTRAVENOUS at 08:27

## 2018-10-06 ASSESSMENT — PAIN SCALES - GENERAL
PAINLEVEL_OUTOF10: 5
PAINLEVEL_OUTOF10: 5

## 2018-10-06 ASSESSMENT — PAIN DESCRIPTION - ORIENTATION: ORIENTATION: LEFT

## 2018-10-06 ASSESSMENT — PAIN DESCRIPTION - LOCATION: LOCATION: FLANK

## 2018-10-06 NOTE — LETTER
[facility] PHYSICAL [facility] ED  [address] [address]  Phone: [phone]               October 6, 2018    Patient: Roxana Sanz   YOB: 1965   Date of Visit: 10/6/2018       To Whom It May Concern:    Roxana Sanz was seen and treated in our emergency department on 10/6/2018. He may return to work on 10/8/2018.       Sincerely,       Deyvi Mendoza RN         Signature:__________________________________

## 2018-11-19 ENCOUNTER — HOSPITAL ENCOUNTER (EMERGENCY)
Age: 53
Discharge: HOME | End: 2018-11-19
Attending: EMERGENCY MEDICINE
Payer: COMMERCIAL

## 2018-11-19 VITALS
HEART RATE: 64 BPM | SYSTOLIC BLOOD PRESSURE: 145 MMHG | BODY MASS INDEX: 27.77 KG/M2 | OXYGEN SATURATION: 99 % | HEIGHT: 72 IN | TEMPERATURE: 97.9 F | DIASTOLIC BLOOD PRESSURE: 95 MMHG | RESPIRATION RATE: 16 BRPM | WEIGHT: 205 LBS

## 2018-11-19 DIAGNOSIS — H40.051 RAISED INTRAOCULAR PRESSURE OF RIGHT EYE: Primary | ICD-10-CM

## 2018-11-19 DIAGNOSIS — H57.11 PAIN AROUND RIGHT EYE: ICD-10-CM

## 2018-11-19 PROCEDURE — 6370000000 HC RX 637 (ALT 250 FOR IP): Performed by: EMERGENCY MEDICINE

## 2018-11-19 PROCEDURE — 99283 EMERGENCY DEPT VISIT LOW MDM: CPT

## 2018-11-19 RX ORDER — GLIMEPIRIDE 2 MG/1
1 TABLET ORAL ONCE
Status: COMPLETED | OUTPATIENT
Start: 2018-11-19 | End: 2018-11-19

## 2018-11-19 RX ORDER — BRIMONIDINE TARTRATE, TIMOLOL MALEATE 2; 5 MG/ML; MG/ML
1 SOLUTION/ DROPS OPHTHALMIC EVERY 12 HOURS
Qty: 1 BOTTLE | Refills: 0 | Status: SHIPPED | OUTPATIENT
Start: 2018-11-19 | End: 2019-02-22

## 2018-11-19 RX ORDER — BRIMONIDINE TARTRATE 2 MG/ML
1 SOLUTION/ DROPS OPHTHALMIC ONCE
Status: DISCONTINUED | OUTPATIENT
Start: 2018-11-19 | End: 2018-11-19 | Stop reason: HOSPADM

## 2018-11-19 RX ADMIN — TIMOLOL MALEATE 1 DROP: 2.5 SOLUTION OPHTHALMIC at 09:58

## 2018-11-19 ASSESSMENT — PAIN DESCRIPTION - FREQUENCY
FREQUENCY: INTERMITTENT
FREQUENCY: CONTINUOUS

## 2018-11-19 ASSESSMENT — VISUAL ACUITY
OU: 20/30
OD: 20/70
OS: 20/30

## 2018-11-19 ASSESSMENT — PAIN DESCRIPTION - PAIN TYPE
TYPE: ACUTE PAIN
TYPE: ACUTE PAIN

## 2018-11-19 ASSESSMENT — PAIN DESCRIPTION - LOCATION
LOCATION: EYE
LOCATION: EYE

## 2018-11-19 ASSESSMENT — PAIN SCALES - GENERAL
PAINLEVEL_OUTOF10: 6
PAINLEVEL_OUTOF10: 5

## 2018-11-19 ASSESSMENT — PAIN DESCRIPTION - DESCRIPTORS
DESCRIPTORS: PRESSURE
DESCRIPTORS: ACHING;PRESSURE

## 2018-11-19 ASSESSMENT — PAIN DESCRIPTION - ORIENTATION: ORIENTATION: RIGHT

## 2018-11-19 NOTE — LETTER
[address] [facility] Emergency Department  [address] [place] [address]  Phone: [phone]               November 19, 2018    Patient: Tom Mcdonald   YOB: 1965   Date of Visit: 11/19/2018       To Whom It May Concern:    Tom Mcdonald was seen and treated in our emergency department on 11/19/2018. He may return to work on 11/20/18.       Sincerely,       Evi Escobar RN         Signature:__________________________________

## 2018-11-19 NOTE — ED PROVIDER NOTES
membranes are moist.  Pharynx without erythema or exudates. NECK: Nontender and supple. No cervical adenopathy. CHEST:  Clear to auscultation bilaterally. No rales, rhonchi, or wheezing. HEART:  Regular rate and regular rhythm. No murmurs. Strong and equal pulses in the upper and lower extremities. ABDOMEN: Soft,  nondistended, positive bowel sounds. abdomen is nontender. No rebound. no guarding. MUSCULOSKELETAL: The calves are nontender to palpation. Active range of motion of the upper and lower extremities. No edema. NEUROLOGICAL: Awake, alert and oriented x 3. Power intact in the upper and lower extremities. Sensation is intact to light touch in the upper and lower extremities. Cranial Nerves 2-12 are intact. No truncal ataxia. No dysarthria or aphasia. Normal finger to nose. DERMATOLOGIC: No petechiae, rashes, or ecchymoses. No erythema. PSYCH: normal mood and affect. Normal thought content. ED COURSE AND MEDICAL DECISION MAKING:  Labs:  No results found for this visit on 11/19/18. Treatment in the department:  Patient received the following while in the ED. Medications   timolol (TIMOPTIC) 0.25 % ophthalmic solution 1 drop (1 drop Right Eye Given 11/19/18 0958)         Repeat exam at 11:18 AM shows tonopen right 21/22    Medical decision making:  Discussed case with dr Josue Garcia, [facility] ophthalmology. He would like him restarted on drops and seen in urgent clinic today. We were able to make an appt for him at 130pm today. Patient agrees to go. I estimate there is LOW risk for a CORNEAL or LID FOREIGN BODY or ULCERATION, DEEP SPACE INFECTION (e.g., ORBITAL CELLULITIS OR ABSCESS), GLAUCOMA, MENINGITIS, PENETRATING GLOBE INJURY, or RETINAL DETACHMENT, thus I consider the discharge disposition reasonable. Also, there is no evidence or peritonitis, sepsis, or toxicity.  Ryan Barth and I have discussed the diagnosis and risks, and we agree with discharging home to follow-up with their primary

## 2018-12-18 RX ORDER — BRIMONIDINE TARTRATE/TIMOLOL 0.2%-0.5%
DROPS OPHTHALMIC (EYE)
Qty: 5 ML | Refills: 0 | OUTPATIENT
Start: 2018-12-18

## 2018-12-22 DIAGNOSIS — I10 ESSENTIAL HYPERTENSION: ICD-10-CM

## 2018-12-24 RX ORDER — ATORVASTATIN CALCIUM 20 MG/1
TABLET, FILM COATED ORAL
Qty: 30 TABLET | Refills: 5 | Status: SHIPPED | OUTPATIENT
Start: 2018-12-24 | End: 2019-09-10 | Stop reason: SDUPTHER

## 2019-02-22 ENCOUNTER — APPOINTMENT (OUTPATIENT)
Dept: NUCLEAR MEDICINE | Age: 54
End: 2019-02-22
Payer: COMMERCIAL

## 2019-02-22 ENCOUNTER — HOSPITAL ENCOUNTER (OUTPATIENT)
Age: 54
Setting detail: OBSERVATION
Discharge: HOME | End: 2019-02-22
Attending: EMERGENCY MEDICINE | Admitting: INTERNAL MEDICINE
Payer: COMMERCIAL

## 2019-02-22 ENCOUNTER — APPOINTMENT (OUTPATIENT)
Dept: GENERAL RADIOLOGY | Age: 54
End: 2019-02-22
Payer: COMMERCIAL

## 2019-02-22 VITALS
TEMPERATURE: 98.5 F | BODY MASS INDEX: 27.11 KG/M2 | SYSTOLIC BLOOD PRESSURE: 124 MMHG | WEIGHT: 200.13 LBS | RESPIRATION RATE: 18 BRPM | DIASTOLIC BLOOD PRESSURE: 76 MMHG | HEIGHT: 72 IN | OXYGEN SATURATION: 95 % | HEART RATE: 65 BPM

## 2019-02-22 DIAGNOSIS — R07.9 CHEST PAIN, UNSPECIFIED TYPE: Primary | ICD-10-CM

## 2019-02-22 LAB
A/G RATIO: 1.3 (ref 1.1–2.2)
ALBUMIN SERPL-MCNC: 3.9 G/DL (ref 3.4–5)
ALP BLD-CCNC: 100 U/L (ref 40–129)
ALT SERPL-CCNC: 28 U/L (ref 10–40)
ANION GAP SERPL CALCULATED.3IONS-SCNC: 9 MMOL/L (ref 3–16)
AST SERPL-CCNC: 21 U/L (ref 15–37)
BASOPHILS ABSOLUTE: 0.1 K/UL (ref 0–0.2)
BASOPHILS RELATIVE PERCENT: 0.6 %
BILIRUB SERPL-MCNC: 0.5 MG/DL (ref 0–1)
BUN BLDV-MCNC: 13 MG/DL (ref 7–20)
CALCIUM SERPL-MCNC: 9 MG/DL (ref 8.3–10.6)
CHLORIDE BLD-SCNC: 107 MMOL/L (ref 99–110)
CO2: 25 MMOL/L (ref 21–32)
CREAT SERPL-MCNC: 0.8 MG/DL (ref 0.9–1.3)
EKG ATRIAL RATE: 52 BPM
EKG DIAGNOSIS: NORMAL
EKG P AXIS: 24 DEGREES
EKG P-R INTERVAL: 158 MS
EKG Q-T INTERVAL: 420 MS
EKG QRS DURATION: 146 MS
EKG QTC CALCULATION (BAZETT): 390 MS
EKG R AXIS: -22 DEGREES
EKG T AXIS: 3 DEGREES
EKG VENTRICULAR RATE: 52 BPM
EOSINOPHILS ABSOLUTE: 0.5 K/UL (ref 0–0.6)
EOSINOPHILS RELATIVE PERCENT: 3.9 %
GFR AFRICAN AMERICAN: >60
GFR NON-AFRICAN AMERICAN: >60
GLOBULIN: 3.1 G/DL
GLUCOSE BLD-MCNC: 135 MG/DL (ref 70–99)
HCT VFR BLD CALC: 42.7 % (ref 40.5–52.5)
HEMOGLOBIN: 14.5 G/DL (ref 13.5–17.5)
LV EF: 68 %
LVEF MODALITY: NORMAL
LYMPHOCYTES ABSOLUTE: 3.1 K/UL (ref 1–5.1)
LYMPHOCYTES RELATIVE PERCENT: 26.4 %
MCH RBC QN AUTO: 29.3 PG (ref 26–34)
MCHC RBC AUTO-ENTMCNC: 33.9 G/DL (ref 31–36)
MCV RBC AUTO: 86.3 FL (ref 80–100)
MONOCYTES ABSOLUTE: 0.9 K/UL (ref 0–1.3)
MONOCYTES RELATIVE PERCENT: 7.5 %
NEUTROPHILS ABSOLUTE: 7.3 K/UL (ref 1.7–7.7)
NEUTROPHILS RELATIVE PERCENT: 61.6 %
PDW BLD-RTO: 15.1 % (ref 12.4–15.4)
PLATELET # BLD: 255 K/UL (ref 135–450)
PMV BLD AUTO: 8.1 FL (ref 5–10.5)
POTASSIUM SERPL-SCNC: 4.1 MMOL/L (ref 3.5–5.1)
RBC # BLD: 4.95 M/UL (ref 4.2–5.9)
SODIUM BLD-SCNC: 141 MMOL/L (ref 136–145)
TOTAL PROTEIN: 7 G/DL (ref 6.4–8.2)
TROPONIN: <0.01 NG/ML
WBC # BLD: 11.9 K/UL (ref 4–11)

## 2019-02-22 PROCEDURE — 85025 COMPLETE CBC W/AUTO DIFF WBC: CPT

## 2019-02-22 PROCEDURE — G0378 HOSPITAL OBSERVATION PER HR: HCPCS

## 2019-02-22 PROCEDURE — A9502 TC99M TETROFOSMIN: HCPCS | Performed by: INTERNAL MEDICINE

## 2019-02-22 PROCEDURE — 6370000000 HC RX 637 (ALT 250 FOR IP): Performed by: PHYSICIAN ASSISTANT

## 2019-02-22 PROCEDURE — 93005 ELECTROCARDIOGRAM TRACING: CPT | Performed by: EMERGENCY MEDICINE

## 2019-02-22 PROCEDURE — 3430000000 HC RX DIAGNOSTIC RADIOPHARMACEUTICAL: Performed by: INTERNAL MEDICINE

## 2019-02-22 PROCEDURE — 99244 OFF/OP CNSLTJ NEW/EST MOD 40: CPT | Performed by: INTERNAL MEDICINE

## 2019-02-22 PROCEDURE — 6360000002 HC RX W HCPCS: Performed by: PHYSICIAN ASSISTANT

## 2019-02-22 PROCEDURE — 93010 ELECTROCARDIOGRAM REPORT: CPT | Performed by: INTERNAL MEDICINE

## 2019-02-22 PROCEDURE — 80053 COMPREHEN METABOLIC PANEL: CPT

## 2019-02-22 PROCEDURE — 36415 COLL VENOUS BLD VENIPUNCTURE: CPT

## 2019-02-22 PROCEDURE — 6370000000 HC RX 637 (ALT 250 FOR IP): Performed by: EMERGENCY MEDICINE

## 2019-02-22 PROCEDURE — 71045 X-RAY EXAM CHEST 1 VIEW: CPT

## 2019-02-22 PROCEDURE — 99234 HOSP IP/OBS SM DT SF/LOW 45: CPT | Performed by: INTERNAL MEDICINE

## 2019-02-22 PROCEDURE — 78452 HT MUSCLE IMAGE SPECT MULT: CPT

## 2019-02-22 PROCEDURE — 84484 ASSAY OF TROPONIN QUANT: CPT

## 2019-02-22 PROCEDURE — 96372 THER/PROPH/DIAG INJ SC/IM: CPT

## 2019-02-22 PROCEDURE — 99285 EMERGENCY DEPT VISIT HI MDM: CPT

## 2019-02-22 PROCEDURE — 93017 CV STRESS TEST TRACING ONLY: CPT

## 2019-02-22 RX ORDER — OMEPRAZOLE 20 MG/1
20 CAPSULE, DELAYED RELEASE ORAL DAILY
Qty: 30 CAPSULE | Refills: 0 | Status: SHIPPED | OUTPATIENT
Start: 2019-02-22 | End: 2019-04-11

## 2019-02-22 RX ORDER — NITROGLYCERIN 0.4 MG/1
0.4 TABLET SUBLINGUAL EVERY 5 MIN PRN
Status: DISCONTINUED | OUTPATIENT
Start: 2019-02-22 | End: 2019-02-22

## 2019-02-22 RX ORDER — ACETAMINOPHEN 325 MG/1
650 TABLET ORAL EVERY 4 HOURS PRN
Status: DISCONTINUED | OUTPATIENT
Start: 2019-02-22 | End: 2019-02-22 | Stop reason: HOSPADM

## 2019-02-22 RX ORDER — ASPIRIN 81 MG/1
81 TABLET, CHEWABLE ORAL DAILY
Status: DISCONTINUED | OUTPATIENT
Start: 2019-02-23 | End: 2019-02-22 | Stop reason: HOSPADM

## 2019-02-22 RX ORDER — SODIUM CHLORIDE 0.9 % (FLUSH) 0.9 %
10 SYRINGE (ML) INJECTION EVERY 12 HOURS SCHEDULED
Status: DISCONTINUED | OUTPATIENT
Start: 2019-02-22 | End: 2019-02-22 | Stop reason: HOSPADM

## 2019-02-22 RX ORDER — ONDANSETRON 2 MG/ML
4 INJECTION INTRAMUSCULAR; INTRAVENOUS EVERY 6 HOURS PRN
Status: DISCONTINUED | OUTPATIENT
Start: 2019-02-22 | End: 2019-02-22 | Stop reason: HOSPADM

## 2019-02-22 RX ORDER — ASPIRIN 81 MG/1
324 TABLET, CHEWABLE ORAL ONCE
Status: COMPLETED | OUTPATIENT
Start: 2019-02-22 | End: 2019-02-22

## 2019-02-22 RX ORDER — ATORVASTATIN CALCIUM 10 MG/1
20 TABLET, FILM COATED ORAL NIGHTLY
Status: DISCONTINUED | OUTPATIENT
Start: 2019-02-22 | End: 2019-02-22 | Stop reason: HOSPADM

## 2019-02-22 RX ORDER — SODIUM CHLORIDE 0.9 % (FLUSH) 0.9 %
10 SYRINGE (ML) INJECTION PRN
Status: DISCONTINUED | OUTPATIENT
Start: 2019-02-22 | End: 2019-02-22 | Stop reason: HOSPADM

## 2019-02-22 RX ADMIN — TETROFOSMIN 32.5 MILLICURIE: 0.23 INJECTION, POWDER, LYOPHILIZED, FOR SOLUTION INTRAVENOUS at 11:05

## 2019-02-22 RX ADMIN — ASPIRIN 81 MG 324 MG: 81 TABLET ORAL at 06:36

## 2019-02-22 RX ADMIN — NITROGLYCERIN 0.4 MG: 0.4 TABLET SUBLINGUAL at 06:37

## 2019-02-22 RX ADMIN — METOPROLOL TARTRATE 25 MG: 25 TABLET ORAL at 13:41

## 2019-02-22 RX ADMIN — NITROGLYCERIN 0.4 MG: 0.4 TABLET SUBLINGUAL at 06:46

## 2019-02-22 RX ADMIN — TETROFOSMIN 11.3 MILLICURIE: 0.23 INJECTION, POWDER, LYOPHILIZED, FOR SOLUTION INTRAVENOUS at 10:17

## 2019-02-22 RX ADMIN — ENOXAPARIN SODIUM 40 MG: 40 INJECTION SUBCUTANEOUS at 13:41

## 2019-02-22 ASSESSMENT — HEART SCORE
ECG: 0
ECG: 1

## 2019-02-22 ASSESSMENT — PAIN DESCRIPTION - LOCATION
LOCATION: CHEST
LOCATION: CHEST

## 2019-02-22 ASSESSMENT — PAIN SCALES - GENERAL
PAINLEVEL_OUTOF10: 4
PAINLEVEL_OUTOF10: 4
PAINLEVEL_OUTOF10: 3
PAINLEVEL_OUTOF10: 4
PAINLEVEL_OUTOF10: 4

## 2019-02-22 ASSESSMENT — PAIN DESCRIPTION - PAIN TYPE: TYPE: ACUTE PAIN

## 2019-02-22 ASSESSMENT — PAIN - FUNCTIONAL ASSESSMENT: PAIN_FUNCTIONAL_ASSESSMENT: ACTIVITIES ARE NOT PREVENTED

## 2019-02-25 ENCOUNTER — TELEPHONE (OUTPATIENT)
Dept: FAMILY MEDICINE CLINIC | Age: 54
End: 2019-02-25

## 2019-03-27 ENCOUNTER — APPOINTMENT (OUTPATIENT)
Dept: CT IMAGING | Age: 54
DRG: 284 | End: 2019-03-27
Payer: COMMERCIAL

## 2019-03-27 ENCOUNTER — HOSPITAL ENCOUNTER (INPATIENT)
Age: 54
LOS: 2 days | Discharge: HOME | DRG: 284 | End: 2019-03-29
Attending: EMERGENCY MEDICINE | Admitting: SURGERY
Payer: COMMERCIAL

## 2019-03-27 DIAGNOSIS — K80.00 ACUTE CALCULOUS CHOLECYSTITIS: Primary | ICD-10-CM

## 2019-03-27 PROBLEM — K81.0 ACUTE CHOLECYSTITIS: Status: ACTIVE | Noted: 2019-03-27

## 2019-03-27 LAB
A/G RATIO: 0.9 (ref 1.1–2.2)
ALBUMIN SERPL-MCNC: 3.7 G/DL (ref 3.4–5)
ALP BLD-CCNC: 97 U/L (ref 40–129)
ALT SERPL-CCNC: 26 U/L (ref 10–40)
AMORPHOUS: ABNORMAL /HPF
ANION GAP SERPL CALCULATED.3IONS-SCNC: 13 MMOL/L (ref 3–16)
AST SERPL-CCNC: 22 U/L (ref 15–37)
BACTERIA: ABNORMAL /HPF
BASOPHILS ABSOLUTE: 0.1 K/UL (ref 0–0.2)
BASOPHILS RELATIVE PERCENT: 0.5 %
BILIRUB SERPL-MCNC: 2 MG/DL (ref 0–1)
BILIRUBIN URINE: ABNORMAL
BLOOD, URINE: ABNORMAL
BUN BLDV-MCNC: 20 MG/DL (ref 7–20)
CALCIUM SERPL-MCNC: 9.3 MG/DL (ref 8.3–10.6)
CHLORIDE BLD-SCNC: 98 MMOL/L (ref 99–110)
CLARITY: CLEAR
CO2: 25 MMOL/L (ref 21–32)
COLOR: YELLOW
CREAT SERPL-MCNC: 0.9 MG/DL (ref 0.9–1.3)
EOSINOPHILS ABSOLUTE: 0.2 K/UL (ref 0–0.6)
EOSINOPHILS RELATIVE PERCENT: 0.8 %
EPITHELIAL CELLS, UA: ABNORMAL /HPF
GFR AFRICAN AMERICAN: >60
GFR NON-AFRICAN AMERICAN: >60
GLOBULIN: 4.3 G/DL
GLUCOSE BLD-MCNC: 116 MG/DL (ref 70–99)
GLUCOSE URINE: NEGATIVE MG/DL
HCT VFR BLD CALC: 40.9 % (ref 40.5–52.5)
HEMOGLOBIN: 14.2 G/DL (ref 13.5–17.5)
KETONES, URINE: 15 MG/DL
LEUKOCYTE ESTERASE, URINE: NEGATIVE
LIPASE: 22 U/L (ref 13–60)
LYMPHOCYTES ABSOLUTE: 2.3 K/UL (ref 1–5.1)
LYMPHOCYTES RELATIVE PERCENT: 11.6 %
MCH RBC QN AUTO: 30 PG (ref 26–34)
MCHC RBC AUTO-ENTMCNC: 34.7 G/DL (ref 31–36)
MCV RBC AUTO: 86.3 FL (ref 80–100)
MICROSCOPIC EXAMINATION: YES
MONOCYTES ABSOLUTE: 1.8 K/UL (ref 0–1.3)
MONOCYTES RELATIVE PERCENT: 9.2 %
MUCUS: ABNORMAL /LPF
NEUTROPHILS ABSOLUTE: 15.3 K/UL (ref 1.7–7.7)
NEUTROPHILS RELATIVE PERCENT: 77.9 %
NITRITE, URINE: NEGATIVE
PDW BLD-RTO: 14.4 % (ref 12.4–15.4)
PH UA: 5.5 (ref 5–8)
PLATELET # BLD: 320 K/UL (ref 135–450)
PMV BLD AUTO: 8.4 FL (ref 5–10.5)
POTASSIUM SERPL-SCNC: 4 MMOL/L (ref 3.5–5.1)
PROTEIN UA: 100 MG/DL
RBC # BLD: 4.73 M/UL (ref 4.2–5.9)
RBC UA: ABNORMAL /HPF (ref 0–2)
SODIUM BLD-SCNC: 136 MMOL/L (ref 136–145)
SPECIFIC GRAVITY UA: >=1.03 (ref 1–1.03)
TOTAL PROTEIN: 8 G/DL (ref 6.4–8.2)
URINE TYPE: ABNORMAL
UROBILINOGEN, URINE: >=8 E.U./DL
WBC # BLD: 19.7 K/UL (ref 4–11)
WBC UA: ABNORMAL /HPF (ref 0–5)

## 2019-03-27 PROCEDURE — 1200000000 HC SEMI PRIVATE

## 2019-03-27 PROCEDURE — 80053 COMPREHEN METABOLIC PANEL: CPT

## 2019-03-27 PROCEDURE — 2500000003 HC RX 250 WO HCPCS: Performed by: SURGERY

## 2019-03-27 PROCEDURE — G0378 HOSPITAL OBSERVATION PER HR: HCPCS

## 2019-03-27 PROCEDURE — 81001 URINALYSIS AUTO W/SCOPE: CPT

## 2019-03-27 PROCEDURE — 6370000000 HC RX 637 (ALT 250 FOR IP): Performed by: SURGERY

## 2019-03-27 PROCEDURE — 83690 ASSAY OF LIPASE: CPT

## 2019-03-27 PROCEDURE — 99285 EMERGENCY DEPT VISIT HI MDM: CPT

## 2019-03-27 PROCEDURE — 6360000004 HC RX CONTRAST MEDICATION: Performed by: EMERGENCY MEDICINE

## 2019-03-27 PROCEDURE — 96365 THER/PROPH/DIAG IV INF INIT: CPT

## 2019-03-27 PROCEDURE — 85025 COMPLETE CBC W/AUTO DIFF WBC: CPT

## 2019-03-27 PROCEDURE — 2580000003 HC RX 258: Performed by: SURGERY

## 2019-03-27 PROCEDURE — 96361 HYDRATE IV INFUSION ADD-ON: CPT

## 2019-03-27 PROCEDURE — 96375 TX/PRO/DX INJ NEW DRUG ADDON: CPT

## 2019-03-27 PROCEDURE — 6360000002 HC RX W HCPCS: Performed by: EMERGENCY MEDICINE

## 2019-03-27 PROCEDURE — 74177 CT ABD & PELVIS W/CONTRAST: CPT

## 2019-03-27 PROCEDURE — 2580000003 HC RX 258: Performed by: EMERGENCY MEDICINE

## 2019-03-27 PROCEDURE — 96366 THER/PROPH/DIAG IV INF ADDON: CPT

## 2019-03-27 RX ORDER — SODIUM CHLORIDE 0.9 % (FLUSH) 0.9 %
10 SYRINGE (ML) INJECTION EVERY 12 HOURS SCHEDULED
Status: DISCONTINUED | OUTPATIENT
Start: 2019-03-27 | End: 2019-03-29 | Stop reason: HOSPADM

## 2019-03-27 RX ORDER — MORPHINE SULFATE 4 MG/ML
4 INJECTION, SOLUTION INTRAMUSCULAR; INTRAVENOUS ONCE
Status: COMPLETED | OUTPATIENT
Start: 2019-03-27 | End: 2019-03-27

## 2019-03-27 RX ORDER — ONDANSETRON 2 MG/ML
4 INJECTION INTRAMUSCULAR; INTRAVENOUS EVERY 6 HOURS PRN
Status: DISCONTINUED | OUTPATIENT
Start: 2019-03-27 | End: 2019-03-29 | Stop reason: HOSPADM

## 2019-03-27 RX ORDER — MORPHINE SULFATE 2 MG/ML
4 INJECTION, SOLUTION INTRAMUSCULAR; INTRAVENOUS
Status: DISCONTINUED | OUTPATIENT
Start: 2019-03-27 | End: 2019-03-29 | Stop reason: HOSPADM

## 2019-03-27 RX ORDER — ONDANSETRON 2 MG/ML
4 INJECTION INTRAMUSCULAR; INTRAVENOUS ONCE
Status: COMPLETED | OUTPATIENT
Start: 2019-03-27 | End: 2019-03-27

## 2019-03-27 RX ORDER — SODIUM CHLORIDE 9 MG/ML
INJECTION, SOLUTION INTRAVENOUS CONTINUOUS
Status: DISCONTINUED | OUTPATIENT
Start: 2019-03-27 | End: 2019-03-29 | Stop reason: HOSPADM

## 2019-03-27 RX ORDER — MORPHINE SULFATE 2 MG/ML
2 INJECTION, SOLUTION INTRAMUSCULAR; INTRAVENOUS
Status: DISCONTINUED | OUTPATIENT
Start: 2019-03-27 | End: 2019-03-29 | Stop reason: HOSPADM

## 2019-03-27 RX ORDER — 0.9 % SODIUM CHLORIDE 0.9 %
1000 INTRAVENOUS SOLUTION INTRAVENOUS ONCE
Status: COMPLETED | OUTPATIENT
Start: 2019-03-27 | End: 2019-03-27

## 2019-03-27 RX ORDER — ATORVASTATIN CALCIUM 10 MG/1
20 TABLET, FILM COATED ORAL NIGHTLY
Status: DISCONTINUED | OUTPATIENT
Start: 2019-03-27 | End: 2019-03-29 | Stop reason: HOSPADM

## 2019-03-27 RX ORDER — SODIUM CHLORIDE 0.9 % (FLUSH) 0.9 %
10 SYRINGE (ML) INJECTION PRN
Status: DISCONTINUED | OUTPATIENT
Start: 2019-03-27 | End: 2019-03-29 | Stop reason: HOSPADM

## 2019-03-27 RX ADMIN — SODIUM CHLORIDE 1000 ML: 9 INJECTION, SOLUTION INTRAVENOUS at 16:14

## 2019-03-27 RX ADMIN — PIPERACILLIN SODIUM,TAZOBACTAM SODIUM 4.5 G: 4; .5 INJECTION, POWDER, FOR SOLUTION INTRAVENOUS at 18:27

## 2019-03-27 RX ADMIN — IOPAMIDOL 75 ML: 755 INJECTION, SOLUTION INTRAVENOUS at 16:29

## 2019-03-27 RX ADMIN — ATORVASTATIN CALCIUM 20 MG: 10 TABLET, FILM COATED ORAL at 23:03

## 2019-03-27 RX ADMIN — SODIUM CHLORIDE, PRESERVATIVE FREE 10 ML: 5 INJECTION INTRAVENOUS at 23:03

## 2019-03-27 RX ADMIN — ONDANSETRON 4 MG: 2 INJECTION INTRAMUSCULAR; INTRAVENOUS at 16:14

## 2019-03-27 RX ADMIN — FAMOTIDINE 20 MG: 10 INJECTION, SOLUTION INTRAVENOUS at 23:03

## 2019-03-27 RX ADMIN — SODIUM CHLORIDE 1000 ML: 9 INJECTION, SOLUTION INTRAVENOUS at 18:27

## 2019-03-27 RX ADMIN — MORPHINE SULFATE 4 MG: 4 INJECTION INTRAVENOUS at 16:15

## 2019-03-27 RX ADMIN — SODIUM CHLORIDE: 9 INJECTION, SOLUTION INTRAVENOUS at 23:04

## 2019-03-27 ASSESSMENT — PAIN SCALES - GENERAL
PAINLEVEL_OUTOF10: 3
PAINLEVEL_OUTOF10: 2
PAINLEVEL_OUTOF10: 4

## 2019-03-27 NOTE — ED PROVIDER NOTES
[facility] Emergency Department    CHIEF COMPLAINT  Abdominal Pain (Pt reports abdominal pain and vomiting since friday night, unable to keep anything down. Pt also reports soft stools. ) and Emesis      HISTORY OF PRESENT ILLNESS  Carola Najjar is a 46 y.o. male  presenting to the ER with upper abdominal pain since Friday. He describes it as an aching pain and it does get worse with eating. He has associated nausea with vomiting. No other complaints, modifying factors or associated symptoms. I have reviewed the following from the nursing documentation.     Past Medical History:   Diagnosis Date    CAD (coronary artery disease) 2016    Hyperlipidemia     Hypertension     No history of procedure 06/01/2017    colonoscopy     Past Surgical History:   Procedure Laterality Date    COLONOSCOPY  06/01/2017    Colonic polyp    CORONARY ARTERY BYPASS GRAFT  09/20/2016    x5    EYE SURGERY Right     drain placed behind eye    HERNIA REPAIR  2005     Family History   Problem Relation Age of Onset    Heart Disease Father     Cancer Mother      Social History     Socioeconomic History    Marital status:      Spouse name: Not on file    Number of children: Not on file    Years of education: Not on file    Highest education level: Not on file   Occupational History    Not on file   Social Needs    Financial resource strain: Not on file    Food insecurity:     Worry: Not on file     Inability: Not on file    Transportation needs:     Medical: Not on file     Non-medical: Not on file   Tobacco Use    Smoking status: Never Smoker    Smokeless tobacco: Never Used   Substance and Sexual Activity    Alcohol use: No    Drug use: No    Sexual activity: Yes     Partners: Female   Lifestyle    Physical activity:     Days per week: Not on file     Minutes per session: Not on file    Stress: Not on file   Relationships    Social connections:     Talks on phone: Not on file     Gets ED COURSE/MDM  Patient seen and evaluated. Old records reviewed. Labs and imaging reviewed and results discussed with patient. This is a 50-year-old male presenting with right upper quadrant pain nausea and vomiting found to have acute cholecystitis on CT scan. Patient was admitted to the general surgeon and was given IV antibiotics and pain medication and IV fluid with nausea medication. CLINICAL IMPRESSION  1. Acute calculous cholecystitis        Blood pressure 123/82, pulse 86, temperature 98.8 °F (37.1 °C), temperature source Oral, resp. rate 16, height 6' (1.829 m), weight 195 lb (88.5 kg), SpO2 97 %. DISPOSITION  Gregg Caicedo was admitted in fair condition.          Hiro Joseph DO  03/27/19 2010

## 2019-03-28 PROBLEM — K80.00 ACUTE CALCULOUS CHOLECYSTITIS: Status: ACTIVE | Noted: 2019-03-27

## 2019-03-28 LAB
ANION GAP SERPL CALCULATED.3IONS-SCNC: 10 MMOL/L (ref 3–16)
BASOPHILS ABSOLUTE: 0.1 K/UL (ref 0–0.2)
BASOPHILS RELATIVE PERCENT: 0.5 %
BUN BLDV-MCNC: 18 MG/DL (ref 7–20)
CALCIUM SERPL-MCNC: 8.2 MG/DL (ref 8.3–10.6)
CHLORIDE BLD-SCNC: 106 MMOL/L (ref 99–110)
CO2: 23 MMOL/L (ref 21–32)
CREAT SERPL-MCNC: 0.8 MG/DL (ref 0.9–1.3)
EOSINOPHILS ABSOLUTE: 0.4 K/UL (ref 0–0.6)
EOSINOPHILS RELATIVE PERCENT: 2.5 %
GFR AFRICAN AMERICAN: >60
GFR NON-AFRICAN AMERICAN: >60
GLUCOSE BLD-MCNC: 92 MG/DL (ref 70–99)
HCT VFR BLD CALC: 34.6 % (ref 40.5–52.5)
HEMOGLOBIN: 11.7 G/DL (ref 13.5–17.5)
LYMPHOCYTES ABSOLUTE: 2.2 K/UL (ref 1–5.1)
LYMPHOCYTES RELATIVE PERCENT: 14.6 %
MCH RBC QN AUTO: 29.8 PG (ref 26–34)
MCHC RBC AUTO-ENTMCNC: 33.9 G/DL (ref 31–36)
MCV RBC AUTO: 87.9 FL (ref 80–100)
MONOCYTES ABSOLUTE: 1.3 K/UL (ref 0–1.3)
MONOCYTES RELATIVE PERCENT: 8.3 %
NEUTROPHILS ABSOLUTE: 11.3 K/UL (ref 1.7–7.7)
NEUTROPHILS RELATIVE PERCENT: 74.1 %
PDW BLD-RTO: 14.3 % (ref 12.4–15.4)
PLATELET # BLD: 261 K/UL (ref 135–450)
PMV BLD AUTO: 8.3 FL (ref 5–10.5)
POTASSIUM SERPL-SCNC: 3.8 MMOL/L (ref 3.5–5.1)
RBC # BLD: 3.94 M/UL (ref 4.2–5.9)
SODIUM BLD-SCNC: 139 MMOL/L (ref 136–145)
WBC # BLD: 15.3 K/UL (ref 4–11)

## 2019-03-28 PROCEDURE — 36415 COLL VENOUS BLD VENIPUNCTURE: CPT

## 2019-03-28 PROCEDURE — 6360000002 HC RX W HCPCS: Performed by: SURGERY

## 2019-03-28 PROCEDURE — 96366 THER/PROPH/DIAG IV INF ADDON: CPT

## 2019-03-28 PROCEDURE — 96376 TX/PRO/DX INJ SAME DRUG ADON: CPT

## 2019-03-28 PROCEDURE — 2580000003 HC RX 258: Performed by: SURGERY

## 2019-03-28 PROCEDURE — 80048 BASIC METABOLIC PNL TOTAL CA: CPT

## 2019-03-28 PROCEDURE — 1200000000 HC SEMI PRIVATE

## 2019-03-28 PROCEDURE — 2500000003 HC RX 250 WO HCPCS: Performed by: SURGERY

## 2019-03-28 PROCEDURE — G0378 HOSPITAL OBSERVATION PER HR: HCPCS

## 2019-03-28 PROCEDURE — 6370000000 HC RX 637 (ALT 250 FOR IP): Performed by: SURGERY

## 2019-03-28 PROCEDURE — 99223 1ST HOSP IP/OBS HIGH 75: CPT | Performed by: SURGERY

## 2019-03-28 PROCEDURE — APPSS60 APP SPLIT SHARED TIME 46-60 MINUTES: Performed by: CLINICAL NURSE SPECIALIST

## 2019-03-28 PROCEDURE — 85025 COMPLETE CBC W/AUTO DIFF WBC: CPT

## 2019-03-28 PROCEDURE — 96372 THER/PROPH/DIAG INJ SC/IM: CPT

## 2019-03-28 RX ORDER — 0.9 % SODIUM CHLORIDE 0.9 %
500 INTRAVENOUS SOLUTION INTRAVENOUS ONCE
Status: COMPLETED | OUTPATIENT
Start: 2019-03-28 | End: 2019-03-28

## 2019-03-28 RX ADMIN — FAMOTIDINE 20 MG: 10 INJECTION, SOLUTION INTRAVENOUS at 20:02

## 2019-03-28 RX ADMIN — SODIUM CHLORIDE 500 ML: 9 INJECTION, SOLUTION INTRAVENOUS at 02:21

## 2019-03-28 RX ADMIN — SODIUM CHLORIDE, PRESERVATIVE FREE 10 ML: 5 INJECTION INTRAVENOUS at 08:45

## 2019-03-28 RX ADMIN — PIPERACILLIN SODIUM,TAZOBACTAM SODIUM 3.38 G: 3; .375 INJECTION, POWDER, FOR SOLUTION INTRAVENOUS at 02:28

## 2019-03-28 RX ADMIN — PIPERACILLIN SODIUM,TAZOBACTAM SODIUM 3.38 G: 3; .375 INJECTION, POWDER, FOR SOLUTION INTRAVENOUS at 18:33

## 2019-03-28 RX ADMIN — ENOXAPARIN SODIUM 40 MG: 40 INJECTION SUBCUTANEOUS at 08:45

## 2019-03-28 RX ADMIN — PIPERACILLIN SODIUM,TAZOBACTAM SODIUM 3.38 G: 3; .375 INJECTION, POWDER, FOR SOLUTION INTRAVENOUS at 10:37

## 2019-03-28 RX ADMIN — MORPHINE SULFATE 2 MG: 2 INJECTION, SOLUTION INTRAMUSCULAR; INTRAVENOUS at 06:26

## 2019-03-28 RX ADMIN — ATORVASTATIN CALCIUM 20 MG: 10 TABLET, FILM COATED ORAL at 20:02

## 2019-03-28 RX ADMIN — FAMOTIDINE 20 MG: 10 INJECTION, SOLUTION INTRAVENOUS at 08:45

## 2019-03-28 RX ADMIN — SODIUM CHLORIDE, PRESERVATIVE FREE 10 ML: 5 INJECTION INTRAVENOUS at 20:02

## 2019-03-28 RX ADMIN — SODIUM CHLORIDE: 9 INJECTION, SOLUTION INTRAVENOUS at 18:33

## 2019-03-28 ASSESSMENT — PAIN DESCRIPTION - ORIENTATION
ORIENTATION: RIGHT
ORIENTATION: RIGHT

## 2019-03-28 ASSESSMENT — PAIN DESCRIPTION - DESCRIPTORS
DESCRIPTORS: DULL
DESCRIPTORS: DULL

## 2019-03-28 ASSESSMENT — PAIN SCALES - GENERAL
PAINLEVEL_OUTOF10: 4
PAINLEVEL_OUTOF10: 0
PAINLEVEL_OUTOF10: 3
PAINLEVEL_OUTOF10: 0

## 2019-03-28 ASSESSMENT — PAIN DESCRIPTION - LOCATION
LOCATION: ABDOMEN
LOCATION: ABDOMEN

## 2019-03-28 ASSESSMENT — PAIN DESCRIPTION - PAIN TYPE
TYPE: ACUTE PAIN
TYPE: ACUTE PAIN

## 2019-03-28 ASSESSMENT — PAIN DESCRIPTION - FREQUENCY
FREQUENCY: CONTINUOUS
FREQUENCY: CONTINUOUS

## 2019-03-28 ASSESSMENT — PAIN DESCRIPTION - ONSET
ONSET: ON-GOING
ONSET: ON-GOING

## 2019-03-28 NOTE — H&P
tobacco.  ETOH:   reports that he does not drink alcohol. DRUGS:   reports that he does not use drugs. Family History:       Problem Relation Age of Onset    Heart Disease Father     Cancer Mother        REVIEW OF SYSTEMS:    CONSTITUTIONAL:  negative  HEENT:  Negative  RESPIRATORY:  negative  CARDIOVASCULAR:  negative  GASTROINTESTINAL:  positive for nausea, vomiting and abdominal pain  GENITOURINARY:  negative  HEMATOLOGIC/LYMPHATIC:  negative  ENDOCRINE:  Negative  NEUROLOGICAL:  Negative  * All other ROS reviewed and negative. PHYSICAL EXAM:    VITALS:  BP (!) 95/58   Pulse 69   Temp 98.5 °F (36.9 °C) (Oral)   Resp 14   Ht 6' (1.829 m)   Wt 195 lb (88.5 kg)   SpO2 94%   BMI 26.45 kg/m²   INTAKE/OUTPUT:   No intake/output data recorded. No intake/output data recorded. CONSTITUTIONAL:  awake, alert, no apparent distress and normal weight  ENT:  normocepalic, without obvious abnormality  NECK:  supple, symmetrical, trachea midline   LUNGS:  no crackles or wheezing  CARDIOVASCULAR:  regular rate and rhythm   ABDOMEN:   hypoactive bowel sounds, soft, mild distention, tenderness noted in the right upper quadrant Clark's sign is present,   MUSCULOSKELETAL:  0+ pitting edema lower extremities  NEUROLOGIC:  Mental Status Exam:  Level of Alertness:   awake  Orientation:   person, place, time  SKIN:  no jaundice      DATA:  CBC:   Recent Labs     03/27/19  1340 03/28/19  0555   WBC 19.7* 15.3*   HGB 14.2 11.7*   HCT 40.9 34.6*    261     BMP:    Recent Labs     03/27/19  1340 03/28/19  0555    139   K 4.0 3.8   CL 98* 106   CO2 25 23   BUN 20 18   CREATININE 0.9 0.8*   GLUCOSE 116* 92     Hepatic:   Recent Labs     03/27/19  1340   AST 22   ALT 26   BILITOT 2.0*   ALKPHOS 97     Mag:    No results for input(s): MG in the last 72 hours. Phos:   No results for input(s): PHOS in the last 72 hours. INR: No results for input(s): INR in the last 72 hours.     Radiology Review: Images personally reviewed by me. EXAMINATION:  CT OF THE ABDOMEN AND PELVIS WITH CONTRAST 3/27/2019 4:14 pm    TECHNIQUE:  CT of the abdomen and pelvis was performed with the administration of  intravenous contrast. Multiplanar reformatted images are provided for review. Dose modulation, iterative reconstruction, and/or weight based adjustment of  the mA/kV was utilized to reduce the radiation dose to as low as reasonably  achievable. COMPARISON:  10/06/2018    HISTORY:  ORDERING SYSTEM PROVIDED HISTORY: ABDOMINAL PAIN  TECHNOLOGIST PROVIDED HISTORY:  Additional Contrast?->None  Ordering Physician Provided Reason for Exam: Abd pain and emesis since Friday  night. Acuity: Acute  Type of Exam: Initial  Relevant Medical/Surgical History: Hx hernia repair    FINDINGS:  Lower Chest: Cardiomegaly.  Bibasilar atelectasis    Organs: Calcified stone in the neck of the gallbladder.  Mild gallbladder  distention with mucosal enhancement and pericholecystic stranding.  Bile  ducts are normal in caliber remaining solid organs unremarkable    GI/Bowel: No gastrointestinal abnormality demonstrated. Pelvis: Urinary bladder unremarkable.  No pelvic fluid    Peritoneum/Retroperitoneum: No ascites or pneumoperitoneum.  Aorta normal in  caliber    Bones/Soft Tissues: Status post bilateral inguinal hernia repairs.  No acute  bony abnormality   Impression:     Cholelithiasis with acute cholecystitis           ASSESSMENT AND PLAN:  Acute calculous cholecystitis:     Continue with IV antibiotics, GI rest and IVF. If he shows continued improvement tomorrow, then will trial clear liquids. If he does improve with conservative management, then will anticipate discharge to complete course of oral antibiotics and have pt return for elective cholecystectomy; however, if his symptoms do not continue to improve and/or he is unable to tolerate diet advancement, then would need to perform cholecystectomy during this admission.      Elevated bilirubin: repeat labs in AM.     HTN: on home meds - monitor    H/O CABG x5: CVD status stable    Discussed at length with pt and family in room. PRACTITIONER CERTIFICATION   I certify that Korin Hawthorne is expected to be hospitalized for > 2 days based on the above assessment and plan. Electronically signed by Donte Ramirez, APRN - [address] [facility]  [id number]    Patient seen and agree with above. RUQ pain since Friday with some nausea and emesis. No fevers or chills. Improved overnight and wbc improved.   Continue npo today, iv abx    Kaz Christie MD

## 2019-03-28 NOTE — PLAN OF CARE
Pt instructed to use call light for assistance. Bed in low position, 2/4 side rails up, and call light within reach.

## 2019-03-28 NOTE — PROGRESS NOTES
4 Eyes Skin Assessment     The patient is being assess for   Admission    I agree that 2 RN's have performed a thorough Head to Toe Skin Assessment on the patient. ALL assessment sites listed below have been assessed. Areas assessed by both nurses:   [x]   Head, Face, and Ears   [x]   Shoulders, Back, and Chest, Abdomen  [x]   Arms, Elbows, and Hands   [x]   Coccyx, Sacrum, and Ischium  [x]   Legs, Feet, and Heels            **SHARE this note so that the co-signing nurse is able to place an eSignature**    Co-signer eSignature: Electronically signed by Marco Antonio Montilla RN on 3/28/19 at 3:46 AM    Does the Patient have Skin Breakdown?   No          Meño Prevention initiated:  No   Wound Care Orders initiated: No      [facility] nurse consulted for Pressure Injury (Stage 3,4, Unstageable, DTI, NWPT, Complex wounds)and New or Established Ostomies:  No      Primary Nurse eSignature: Electronically signed by Mariia Maguire RN on 3/28/19 at 3:45 AM

## 2019-03-28 NOTE — CARE COORDINATION
Chart reviewed for possible needs at DC. Per chart, pt appears ambulatory at baseline. He has no PT/OT ordered at this time. Pt being followed by General Surgery for acute cholecystitis. Pt does not have payor source listed. CM contacted CHARY Jackson in financial who states that she will see pt during hospital stay. No immediate DC needs identified at this time. Please consult CM should needs arise.      Kelly River RN

## 2019-03-28 NOTE — PROGRESS NOTES
Nutrition Assessment    Type and Reason for Visit: Initial, Positive Nutrition Screen(decreased appetite, weight loss)    Nutrition Recommendations:   1. Advance as tolerated per general surgery  2. Low fat diet education provided to patient and wife for when solid foods appropriate  3. Will monitor nutritional adequacy, nutrition-related labs, weights, BMs, and clinical progress     Nutrition Assessment: Nutritional status at risk for decline with increased nutrient needs, recent weight loss, and limited access to nutrition. General surgery noted possible initiation of clear liquids on 3/29. Will continue to monitor RD provided patient and wife information on low fat diet once diet progressed. Patient and wife verbalized understanding and did not have any questions at this time. Malnutrition Assessment:  · Malnutrition Status: At risk for malnutrition  · Context:    · Findings of the 6 clinical characteristics of malnutrition (Minimum of 2 out of 6 clinical characteristics is required to make the diagnosis of moderate or severe Protein Calorie Malnutrition based on AND/ASPEN Guidelines):  1. Energy Intake-Less than or equal to 75% of estimated energy requirement, Greater than or equal to 5 days    2. Weight Loss-2% loss or greater, in 1 month  3. Fat Loss-No significant subcutaneous fat loss,    4. Muscle Loss-No significant muscle mass loss,    5. Fluid Accumulation-No significant fluid accumulation, Extremities  6.  Strength-Not measured    Nutrition Risk Level:  Moderate    Nutrient Needs:  · Estimated Daily Total Kcal: 4325-5879  · Estimated Daily Protein (g): 105-121  · Estimated Daily Total Fluid (ml/day): 1 kcal/ml    Nutrition Diagnosis:   · Problem: Inadequate oral intake  · Etiology: related to Increased demand for energy/nutrients, Insufficient energy/nutrient consumption, Lack or limited access to food     Signs and symptoms:  as evidenced by NPO status due to medical condition    Objective Information:  · Nutrition-Focused Physical Findings: BM x 1 on 3/28  · Wound Type: (scattered abrasions)  · Current Nutrition Therapies:  · Oral Diet Orders: NPO   · Oral Diet intake: NPO  · Oral Nutrition Supplement (ONS) Orders: None  · ONS intake: NPO  · Anthropometric Measures:  · Ht: 6' (182.9 cm)   · Current Body Wt: 195 lb (88.5 kg)  · % Weight Change:  ,  Patient reported losing 10 lbs in the past few days from vomiting; EMR shows a 5 lb weight loss in the past month  · Ideal Body Wt: 178 lb (80.7 kg), % Ideal Body    · BMI Classification: BMI 25.0 - 29.9 Overweight    Nutrition Interventions:   Continue NPO  Education Completed, Continued Inpatient Monitoring    Nutrition Evaluation:   · Evaluation: Goals set   · Goals: Patient will eat 50% or greater of meals and supplements without GI distress      · Monitoring: Supplement Intake, Meal Intake, Pertinent Labs      Electronically signed by Mary Reyes RD, LD on 3/28/19 at 3:26 PM    Contact Number: Office: [phone]; [address]: [phone]

## 2019-03-29 VITALS
TEMPERATURE: 98.4 F | RESPIRATION RATE: 14 BRPM | HEIGHT: 72 IN | HEART RATE: 57 BPM | DIASTOLIC BLOOD PRESSURE: 73 MMHG | SYSTOLIC BLOOD PRESSURE: 138 MMHG | BODY MASS INDEX: 26.41 KG/M2 | OXYGEN SATURATION: 94 % | WEIGHT: 195 LBS

## 2019-03-29 LAB
A/G RATIO: 0.9 (ref 1.1–2.2)
ALBUMIN SERPL-MCNC: 2.9 G/DL (ref 3.4–5)
ALP BLD-CCNC: 86 U/L (ref 40–129)
ALT SERPL-CCNC: 25 U/L (ref 10–40)
ANION GAP SERPL CALCULATED.3IONS-SCNC: 13 MMOL/L (ref 3–16)
AST SERPL-CCNC: 24 U/L (ref 15–37)
BASOPHILS ABSOLUTE: 0.1 K/UL (ref 0–0.2)
BASOPHILS RELATIVE PERCENT: 0.8 %
BILIRUB SERPL-MCNC: 0.7 MG/DL (ref 0–1)
BUN BLDV-MCNC: 15 MG/DL (ref 7–20)
CALCIUM SERPL-MCNC: 8.6 MG/DL (ref 8.3–10.6)
CHLORIDE BLD-SCNC: 105 MMOL/L (ref 99–110)
CO2: 22 MMOL/L (ref 21–32)
CREAT SERPL-MCNC: 0.8 MG/DL (ref 0.9–1.3)
EOSINOPHILS ABSOLUTE: 0.5 K/UL (ref 0–0.6)
EOSINOPHILS RELATIVE PERCENT: 3.9 %
GFR AFRICAN AMERICAN: >60
GFR NON-AFRICAN AMERICAN: >60
GLOBULIN: 3.3 G/DL
GLUCOSE BLD-MCNC: 81 MG/DL (ref 70–99)
HCT VFR BLD CALC: 34.9 % (ref 40.5–52.5)
HEMOGLOBIN: 11.9 G/DL (ref 13.5–17.5)
LYMPHOCYTES ABSOLUTE: 2.6 K/UL (ref 1–5.1)
LYMPHOCYTES RELATIVE PERCENT: 19.1 %
MCH RBC QN AUTO: 30 PG (ref 26–34)
MCHC RBC AUTO-ENTMCNC: 34.2 G/DL (ref 31–36)
MCV RBC AUTO: 87.7 FL (ref 80–100)
MONOCYTES ABSOLUTE: 1.1 K/UL (ref 0–1.3)
MONOCYTES RELATIVE PERCENT: 7.8 %
NEUTROPHILS ABSOLUTE: 9.3 K/UL (ref 1.7–7.7)
NEUTROPHILS RELATIVE PERCENT: 68.4 %
PDW BLD-RTO: 14.3 % (ref 12.4–15.4)
PLATELET # BLD: 273 K/UL (ref 135–450)
PMV BLD AUTO: 7.9 FL (ref 5–10.5)
POTASSIUM SERPL-SCNC: 4 MMOL/L (ref 3.5–5.1)
RBC # BLD: 3.98 M/UL (ref 4.2–5.9)
SODIUM BLD-SCNC: 140 MMOL/L (ref 136–145)
TOTAL PROTEIN: 6.2 G/DL (ref 6.4–8.2)
WBC # BLD: 13.6 K/UL (ref 4–11)

## 2019-03-29 PROCEDURE — 80053 COMPREHEN METABOLIC PANEL: CPT

## 2019-03-29 PROCEDURE — 2580000003 HC RX 258: Performed by: SURGERY

## 2019-03-29 PROCEDURE — 85025 COMPLETE CBC W/AUTO DIFF WBC: CPT

## 2019-03-29 PROCEDURE — 6370000000 HC RX 637 (ALT 250 FOR IP): Performed by: SURGERY

## 2019-03-29 PROCEDURE — 36415 COLL VENOUS BLD VENIPUNCTURE: CPT

## 2019-03-29 PROCEDURE — 96372 THER/PROPH/DIAG INJ SC/IM: CPT

## 2019-03-29 PROCEDURE — 99238 HOSP IP/OBS DSCHRG MGMT 30/<: CPT | Performed by: SURGERY

## 2019-03-29 PROCEDURE — 6360000002 HC RX W HCPCS: Performed by: SURGERY

## 2019-03-29 PROCEDURE — 96366 THER/PROPH/DIAG IV INF ADDON: CPT

## 2019-03-29 PROCEDURE — 2500000003 HC RX 250 WO HCPCS: Performed by: SURGERY

## 2019-03-29 PROCEDURE — 96376 TX/PRO/DX INJ SAME DRUG ADON: CPT

## 2019-03-29 PROCEDURE — G0378 HOSPITAL OBSERVATION PER HR: HCPCS

## 2019-03-29 RX ORDER — AMOXICILLIN AND CLAVULANATE POTASSIUM 875; 125 MG/1; MG/1
1 TABLET, FILM COATED ORAL 2 TIMES DAILY
Qty: 20 TABLET | Refills: 0 | Status: SHIPPED | OUTPATIENT
Start: 2019-03-29 | End: 2019-04-08

## 2019-03-29 RX ORDER — OXYCODONE HYDROCHLORIDE AND ACETAMINOPHEN 5; 325 MG/1; MG/1
2 TABLET ORAL EVERY 4 HOURS PRN
Status: DISCONTINUED | OUTPATIENT
Start: 2019-03-29 | End: 2019-03-29 | Stop reason: HOSPADM

## 2019-03-29 RX ORDER — OXYCODONE HYDROCHLORIDE AND ACETAMINOPHEN 5; 325 MG/1; MG/1
1 TABLET ORAL EVERY 4 HOURS PRN
Status: DISCONTINUED | OUTPATIENT
Start: 2019-03-29 | End: 2019-03-29 | Stop reason: HOSPADM

## 2019-03-29 RX ADMIN — PIPERACILLIN SODIUM,TAZOBACTAM SODIUM 3.38 G: 3; .375 INJECTION, POWDER, FOR SOLUTION INTRAVENOUS at 12:04

## 2019-03-29 RX ADMIN — PIPERACILLIN SODIUM,TAZOBACTAM SODIUM 3.38 G: 3; .375 INJECTION, POWDER, FOR SOLUTION INTRAVENOUS at 02:22

## 2019-03-29 RX ADMIN — ENOXAPARIN SODIUM 40 MG: 40 INJECTION SUBCUTANEOUS at 08:28

## 2019-03-29 RX ADMIN — FAMOTIDINE 20 MG: 10 INJECTION, SOLUTION INTRAVENOUS at 08:28

## 2019-03-29 RX ADMIN — METOPROLOL TARTRATE 25 MG: 25 TABLET ORAL at 08:28

## 2019-03-29 NOTE — PLAN OF CARE
Pt A&Ox4 lying in bed. Pt orientated to room and call light. Bed is in lowest position with wheels locked. 2/4 siderails raised. Non-slip socks are on. Room is well lit. Call light within reach, will continue to monitor.

## 2019-03-29 NOTE — PROGRESS NOTES
[facility] SURGERY    PATIENT NAME: Kacy Kirkland     TODAY'S DATE: 3/29/2019    CHIEF COMPLAINT: none    INTERVAL HISTORY/HPI:    Pt without pain or nausea, no confusion this morning, no fevers or chills. REVIEW OF SYSTEMS:  Pertinent positives and negatives as per interval history section    OBJECTIVE:  VITALS:  /74   Pulse 67   Temp 98.6 °F (37 °C) (Oral)   Resp 14   Ht 6' (1.829 m)   Wt 195 lb (88.5 kg)   SpO2 94%   BMI 26.45 kg/m²     INTAKE/OUTPUT:    I/O last 3 completed shifts: In: 1956.3 [I.V.:1956.3]  Out: -   No intake/output data recorded. CONSTITUTIONAL:  awake and alert  LUNGS:  Respirations easy and unlabored, no crackles or wheezing  CARD:  regular rate and rhythm  ABDOMEN:  normal bowel sounds, soft, non-distended, non-tender     Data:  CBC:   Recent Labs     03/27/19  1340 03/28/19  0555 03/29/19  0536   WBC 19.7* 15.3* 13.6*   HGB 14.2 11.7* 11.9*   HCT 40.9 34.6* 34.9*    261 273     BMP:    Recent Labs     03/27/19  1340 03/28/19  0555 03/29/19  0536    139 140   K 4.0 3.8 4.0   CL 98* 106 105   CO2 25 23 22   BUN 20 18 15   CREATININE 0.9 0.8* 0.8*   GLUCOSE 116* 92 81     Hepatic:   Recent Labs     03/27/19  1340 03/29/19  0536   AST 22 24   ALT 26 25   BILITOT 2.0* 0.7   ALKPHOS 97 86     Mag:    No results for input(s): MG in the last 72 hours. Phos:   No results for input(s): PHOS in the last 72 hours. INR: No results for input(s): INR in the last 72 hours. Radiology Review:  *Imaging personally reviewed by me. NGHIA      ASSESSMENT AND PLAN:  47 yo with acute cholecystitis  1. Symptoms resolved and no confusion this morning  2. Advance diet as tolerated  3.   If tolerates diet then possible discharge later today     Electronically signed by Alec Mercado, [id number]

## 2019-03-29 NOTE — PROGRESS NOTES
Pt called out due IV beeping. When stepped into room pt asked \"where am I? \" Pt A&O otherwise, and confusion only lasted a few seconds. Neuro assessment normal and VSS. NIHSS performed and scored a 1 due to baseline blindness in R eye. R pupil non-reactive per baseline due to blindness from glaucoma. Per pt this same \"thing\" happened to him 2 years ago after his CABG and lasted for a few minutes. Message sent to MD on the case to update on moment of confusion. Bed alarm placed on pt in case has a repeat incident, and educated to call with needs. Will continue to monitor.

## 2019-04-01 ENCOUNTER — TELEPHONE (OUTPATIENT)
Dept: FAMILY MEDICINE CLINIC | Age: 54
End: 2019-04-01

## 2019-04-01 NOTE — TELEPHONE ENCOUNTER
[facility] Initial Follow Up Call    Outreach made within 2 business days of discharge: Yes    Patient: Korin Hawthorne Patient : 1965   MRN: [id number]  Reason for Admission: There are no discharge diagnoses documented for the most recent discharge.   Discharge Date: 3/29/19       Spoke with: left a message for the patient to call back (called 3 times)  Marium Arreola MA

## 2019-04-03 ENCOUNTER — OFFICE VISIT (OUTPATIENT)
Dept: FAMILY MEDICINE CLINIC | Age: 54
End: 2019-04-03
Payer: COMMERCIAL

## 2019-04-03 VITALS
HEART RATE: 72 BPM | SYSTOLIC BLOOD PRESSURE: 118 MMHG | BODY MASS INDEX: 26.85 KG/M2 | WEIGHT: 198 LBS | OXYGEN SATURATION: 92 % | DIASTOLIC BLOOD PRESSURE: 80 MMHG

## 2019-04-03 DIAGNOSIS — I25.10 CORONARY ARTERY DISEASE INVOLVING NATIVE CORONARY ARTERY OF NATIVE HEART WITHOUT ANGINA PECTORIS: Primary | ICD-10-CM

## 2019-04-03 DIAGNOSIS — Z95.1 S/P CABG (CORONARY ARTERY BYPASS GRAFT): ICD-10-CM

## 2019-04-03 DIAGNOSIS — E11.9 NEW ONSET TYPE 2 DIABETES MELLITUS (HCC): ICD-10-CM

## 2019-04-03 DIAGNOSIS — E78.2 MIXED HYPERLIPIDEMIA: ICD-10-CM

## 2019-04-03 DIAGNOSIS — I10 BENIGN ESSENTIAL HTN: ICD-10-CM

## 2019-04-03 LAB
A/G RATIO: 1 (ref 1.1–2.2)
ALBUMIN SERPL-MCNC: 3.9 G/DL (ref 3.4–5)
ALP BLD-CCNC: 120 U/L (ref 40–129)
ALT SERPL-CCNC: 75 U/L (ref 10–40)
ANION GAP SERPL CALCULATED.3IONS-SCNC: 12 MMOL/L (ref 3–16)
AST SERPL-CCNC: 41 U/L (ref 15–37)
BILIRUB SERPL-MCNC: 0.3 MG/DL (ref 0–1)
BUN BLDV-MCNC: 12 MG/DL (ref 7–20)
CALCIUM SERPL-MCNC: 9.6 MG/DL (ref 8.3–10.6)
CHLORIDE BLD-SCNC: 100 MMOL/L (ref 99–110)
CO2: 27 MMOL/L (ref 21–32)
CREAT SERPL-MCNC: 0.7 MG/DL (ref 0.9–1.3)
CREATININE URINE POCT: 200
GFR AFRICAN AMERICAN: >60
GFR NON-AFRICAN AMERICAN: >60
GLOBULIN: 3.9 G/DL
GLUCOSE BLD-MCNC: 106 MG/DL (ref 70–99)
HBA1C MFR BLD: 6.7 %
MICROALBUMIN/CREAT 24H UR: 80 MG/G{CREAT}
MICROALBUMIN/CREAT UR-RTO: ABNORMAL
POTASSIUM SERPL-SCNC: 5 MMOL/L (ref 3.5–5.1)
SODIUM BLD-SCNC: 139 MMOL/L (ref 136–145)
TOTAL PROTEIN: 7.8 G/DL (ref 6.4–8.2)
TSH REFLEX: 3.91 UIU/ML (ref 0.27–4.2)

## 2019-04-03 PROCEDURE — 83036 HEMOGLOBIN GLYCOSYLATED A1C: CPT | Performed by: NURSE PRACTITIONER

## 2019-04-03 PROCEDURE — 82044 UR ALBUMIN SEMIQUANTITATIVE: CPT | Performed by: NURSE PRACTITIONER

## 2019-04-03 PROCEDURE — 36415 COLL VENOUS BLD VENIPUNCTURE: CPT | Performed by: NURSE PRACTITIONER

## 2019-04-03 PROCEDURE — 99214 OFFICE O/P EST MOD 30 MIN: CPT | Performed by: NURSE PRACTITIONER

## 2019-04-03 ASSESSMENT — PATIENT HEALTH QUESTIONNAIRE - PHQ9
1. LITTLE INTEREST OR PLEASURE IN DOING THINGS: 0
2. FEELING DOWN, DEPRESSED OR HOPELESS: 0
SUM OF ALL RESPONSES TO PHQ QUESTIONS 1-9: 0
SUM OF ALL RESPONSES TO PHQ9 QUESTIONS 1 & 2: 0
SUM OF ALL RESPONSES TO PHQ QUESTIONS 1-9: 0

## 2019-04-03 ASSESSMENT — ENCOUNTER SYMPTOMS
RESPIRATORY NEGATIVE: 1
ABDOMINAL PAIN: 1
EYES NEGATIVE: 1

## 2019-04-03 NOTE — PROGRESS NOTES
Subjective:      Patient ID: Michael Morris is a 46 y.o. male. HPI    Chief Complaint   Patient presents with    Check-Up     Hypertension:  Home blood pressure monitoring: No.  He is not adherent to a low sodium diet. Patient denies chest pain, shortness of breath, headache, lightheadedness, blurred vision, peripheral edema, palpitations, dry cough and fatigue. Antihypertensive medication side effects: no medication side effects noted. Use of agents associated with hypertension: none. Sodium (mmol/L)   Date Value   03/29/2019 140    BUN (mg/dL)   Date Value   03/29/2019 15    Glucose (mg/dL)   Date Value   03/29/2019 81      Potassium (mmol/L)   Date Value   03/29/2019 4.0     Potassium reflex Magnesium (mmol/L)   Date Value   04/19/2018 4.5    CREATININE (mg/dL)   Date Value   03/29/2019 0.8 (L)         A1C 6.7 last check and again today A1C 6.7. New Onset diabetes. Review of Systems   Constitutional: Negative for appetite change, chills and fever. HENT: Negative. Eyes: Negative. Respiratory: Negative. Cardiovascular: Negative. Gastrointestinal: Positive for abdominal pain (mild - currently being treated for cholecystitis and then will be scheudled for cholecystectomy). Endocrine: Negative. Genitourinary: Negative. Musculoskeletal: Negative. Skin: Negative. Neurological: Negative. Hematological: Negative. Psychiatric/Behavioral: Negative.         Patient Active Problem List   Diagnosis    Family history of early CAD    S/P CABG (coronary artery bypass graft)    Benign essential HTN    Mixed hyperlipidemia    Coronary artery disease involving native heart without angina pectoris    Microcytosis    Acute glaucoma of right eye    Chest pain    Paresthesia    Acute calculous cholecystitis       Outpatient Medications Marked as Taking for the 4/3/19 encounter (Office Visit) with DONALDO Valdez CNP   Medication Sig Assessment/Plan:   1. Coronary artery disease involving native coronary artery of native heart without angina pectoris  Reason today to follow-up on chronic conditions including coronary artery disease, status post CABG, hypertension, hyperlipidemia and new onset of type 2 diabetes. Patient denies any headaches, shortness of breath, chest pain, blurred vision, peripheral edema, palpitations or fatigue. Patient reports he has tolerating medications without any side effects. Patient reports he has not followed up with cardiology as recommended. Advised patient to make a follow-up appointment with Dr. Jah Rhoades. Also discussed importance of tight glycemic control to reduce cardiovascular risk. Patient verbalized understanding and agreeable to plan. Also see patient instructions. 2. S/P CABG (coronary artery bypass graft)  See note above. 3. Benign essential HTN  Blood pressure has been well controlled with current treatment. Recommend repeat labs as below. Advised patient to continue with current medication regimen and follow-up in office in 3 months. - Comprehensive Metabolic Panel    4. Mixed hyperlipidemia  Reviewed recent lipids which indicate good control with current treatment. Advised patient to continue with atorvastatin as ordered as patient is tolerating without side effects. 5. New onset type 2 diabetes mellitus ([facility] [address]  Discussed new onset of diabetes including importance of healthy diet, routine exercise and tight glycemic control. Advised patient to monitor blood sugars at least 3 times weekly, fasting. Foot exam completed today with no sensory deficits noted. Discussed importance of daily foot care/monitoring. Provided patient with written and verbal instructions on diet. At least 25 minutes of face-to-face time with patient/wife and more than 50% of this time was dedicated to counseling patient on new onset of diabetes, heart disease and treatment/management.   Patient is currently on Augmentin for cholecystitis and will have cholecystectomy after completion of antibiotics. We discussed medication options however will hold off for now as A1c is 6.7 today. Recommend patient follow up in office in 3 months or sooner with problems or concerns. Also see patient instructions. Patient verbalized understanding and agreeable to plan.   - POCT glycosylated hemoglobin (Hb A1C)  - POCT microalbumin  - TSH with Reflex  - Diabetic Foot Exam

## 2019-04-03 NOTE — PATIENT INSTRUCTIONS
Please read the healthy family handout that you were given and share it with your family. Please compare this printed medication list with your medications at home to be sure they are the same. If you have any medications that are different please contact us immediately at [phone]. Also review your allergies that we have listed, these may also include medications that you have not been able to tolerate, make sure everything listed is correct. If you have any allergies that are different please contact us immediately at [phone]. Patient Education        Learning About Meal Planning for Diabetes  Why plan your meals? Meal planning can be a key part of managing diabetes. Planning meals and snacks with the right balance of carbohydrate, protein, and fat can help you keep your blood sugar at the target level you set with your doctor. You don't have to eat special foods. You can eat what your family eats, including sweets once in a while. But you do have to pay attention to how often you eat and how much you eat of certain foods. You may want to work with a dietitian or a certified diabetes educator. He or she can give you tips and meal ideas and can answer your questions about meal planning. This health professional can also help you reach a healthy weight if that is one of your goals. What plan is right for you? Your dietitian or diabetes educator may suggest that you start with the plate format or carbohydrate counting. The plate format  The plate format is a simple way to help you manage how you eat. You plan meals by learning how much space each food should take on a plate. Using the plate format helps you spread carbohydrate throughout the day. It can make it easier to keep your blood sugar level within your target range. It also helps you see if you're eating healthy portion sizes. To use the plate format, you put non-starchy vegetables on half your plate.  Add meat or meat substitutes on one-quarter of the plate. Put a grain or starchy vegetable (such as brown rice or a potato) on the final quarter of the plate. You can add a small piece of fruit and some low-fat or fat-free milk or yogurt, depending on your carbohydrate goal for each meal.  Here are some tips for using the plate format:  · Make sure that you are not using an oversized plate. A 9-inch plate is best. Many restaurants use larger plates. · Get used to using the plate format at home. Then you can use it when you eat out. · Write down your questions about using the plate format. Talk to your doctor, a dietitian, or a diabetes educator about your concerns. Carbohydrate counting  With carbohydrate counting, you plan meals based on the amount of carbohydrate in each food. Carbohydrate raises blood sugar higher and more quickly than any other nutrient. It is found in desserts, breads and cereals, and fruit. It's also found in starchy vegetables such as potatoes and corn, grains such as rice and pasta, and milk and yogurt. Spreading carbohydrate throughout the day helps keep your blood sugar levels within your target range. Your daily amount depends on several things, including your weight, how active you are, which diabetes medicines you take, and what your goals are for your blood sugar levels. A registered dietitian or diabetes educator can help you plan how much carbohydrate to include in each meal and snack. A guideline for your daily amount of carbohydrate is:  · 45 to 60 grams at each meal. That's about the same as 3 to 4 carbohydrate servings. · 15 to 20 grams at each snack. That's about the same as 1 carbohydrate serving. The Nutrition Facts label on packaged foods tells you how much carbohydrate is in a serving of the food. First, look at the serving size on the food label. Is that the amount you eat in a serving? All of the nutrition information on a food label is based on that serving size.  So if you eat more or less than that, you'll need to adjust the other numbers. Total carbohydrate is the next thing you need to look for on the label. If you count carbohydrate servings, one serving of carbohydrate is 15 grams. For foods that don't come with labels, such as fresh fruits and vegetables, you'll need a guide that lists carbohydrate in these foods. Ask your doctor, dietitian, or diabetes educator about books or other nutrition guides you can use. If you take insulin, you need to know how many grams of carbohydrate are in a meal. This lets you know how much rapid-acting insulin to take before you eat. If you use an insulin pump, you get a constant rate of insulin during the day. So the pump must be programmed at meals to give you extra insulin to cover the rise in blood sugar after meals. When you know how much carbohydrate you will eat, you can take the right amount of insulin. Or, if you always use the same amount of insulin, you need to make sure that you eat the same amount of carbohydrate at meals. If you need more help to understand carbohydrate counting and food labels, ask your doctor, dietitian, or diabetes educator. How do you get started with meal planning? Here are some tips to get started:  · Plan your meals a week at a time. Don't forget to include snacks too. · Use cookbooks or online recipes to plan several main meals. Plan some quick meals for busy nights. You also can double some recipes that freeze well. Then you can save half for other busy nights when you don't have time to cook. · Make sure you have the ingredients you need for your recipes. If you're running low on basic items, put these items on your shopping list too. · List foods that you use to make breakfasts, lunches, and snacks. List plenty of fruits and vegetables. · Post this list on the refrigerator. Add to it as you think of more things you need. · Take the list to the store to do your weekly shopping.   Follow-up care is a key part of your have 15 grams of carbs in a serving. A serving is 1 small fresh fruit, such as an apple or orange; ½ of a banana; ½ cup of cooked or canned fruit; ½ cup of fruit juice; 1 cup of melon or raspberries; or 2 tablespoons of dried fruit. ? Milk and no-sugar-added yogurt have 15 grams of carbs in a serving. A serving is 1 cup of milk or 2/3 cup of no-sugar-added yogurt. ? Starchy vegetables have 15 grams of carbs in a serving. A serving is ½ cup of mashed potatoes or sweet potato; 1 cup winter squash; ½ of a small baked potato; ½ cup of cooked beans; or ½ cup cooked corn or green peas. · Learn how much carbs to eat each day and at each meal. A dietitian or CDE can teach you how to keep track of the amount of carbs you eat. This is called carbohydrate counting. · If you are not sure how to count carbohydrate grams, use the Plate Method to plan meals. It is a good, quick way to make sure that you have a balanced meal. It also helps you spread carbs throughout the day. ? Divide your plate by types of foods. Put non-starchy vegetables on half the plate, meat or other protein food on one-quarter of the plate, and a grain or starchy vegetable in the final quarter of the plate. To this you can add a small piece of fruit and 1 cup of milk or yogurt, depending on how many carbs you are supposed to eat at a meal.  · Try to eat about the same amount of carbs at each meal. Do not \"save up\" your daily allowance of carbs to eat at one meal.  · Proteins have very little or no carbs per serving. Examples of proteins are beef, chicken, turkey, fish, eggs, tofu, cheese, cottage cheese, and peanut butter. A serving size of meat is 3 ounces, which is about the size of a deck of cards. Examples of meat substitute serving sizes (equal to 1 ounce of meat) are 1/4 cup of cottage cheese, 1 egg, 1 tablespoon of peanut butter, and ½ cup of tofu. How can you eat out and still eat healthy?   · Learn to estimate the serving sizes of foods that have carbohydrate. If you measure food at home, it will be easier to estimate the amount in a serving of restaurant food. · If the meal you order has too much carbohydrate (such as potatoes, corn, or baked beans), ask to have a low-carbohydrate food instead. Ask for a salad or green vegetables. · If you use insulin, check your blood sugar before and after eating out to help you plan how much to eat in the future. · If you eat more carbohydrate at a meal than you had planned, take a walk or do other exercise. This will help lower your blood sugar. What else should you know? · Limit saturated fat, such as the fat from meat and dairy products. This is a healthy choice because people who have diabetes are at higher risk of heart disease. So choose lean cuts of meat and nonfat or low-fat dairy products. Use olive or canola oil instead of butter or shortening when cooking. · Don't skip meals. Your blood sugar may drop too low if you skip meals and take insulin or certain medicines for diabetes. · Check with your doctor before you drink alcohol. Alcohol can cause your blood sugar to drop too low. Alcohol can also cause a bad reaction if you take certain diabetes medicines. Follow-up care is a key part of your treatment and safety. Be sure to make and go to all appointments, and call your doctor if you are having problems. It's also a good idea to know your test results and keep a list of the medicines you take. Where can you learn more? Go to https://Skyhooddanay.CareKinesis. org and sign in to your Candid io account. Enter X391 in the [facility] box to learn more about \"Learning About Diabetes Food Guidelines. \"     If you do not have an account, please click on the \"Sign Up Now\" link. Current as of: July 25, 2018  Content Version: 11.9  © 8758-7983 [employer], Incorporated. Care instructions adapted under license by [facility] ([facility]).  If you have questions about a medical condition or this instruction, always ask your healthcare professional. [place] [address] any warranty or liability for your use of this information. Patient Education        Learning About Gallstones  What are gallstones? Gallstones are stones that form in the gallbladder. The gallbladder is a small sac located just under the liver. It stores bile released by the liver. Bile helps you digest fats. Gallstones can be smaller than a grain of sand or as large as a golf ball. Gallstones form when cholesterol and other things found in bile make stones. They can also form if the gallbladder doesn't empty as it should. Gallstones can also form in the common bile duct or cystic duct. These tubes carry bile from the gallbladder and the liver to the small intestine. What happens when you have gallstones? Gallstones can cause many different problems, such as:  · A blockage in the common bile duct. · Inflammation or infection of the gallbladder (acute cholecystitis). This can happen when a gallstone blocks the cystic duct. · Inflammation or infection of the common bile duct (cholangitis). This can happen when gallstones get stuck in the common bile duct. In rare cases, this can damage the liver or spread infection. · Pancreatitis, an inflammation of the pancreas. What are the symptoms? · Most people who have gallstones don't have symptoms. · When symptoms occur, they can include:  ? Pain in the pit of your stomach or in the upper right part of your belly. It may spread to your right upper back or shoulder blade area. ? Pain that may come and go or be steady. It may get worse when you eat. ? Fever and chills, if a gallstone is blocking a bile duct and causing an infection. ? Yellowing of your skin and the whites of your eyes. How can you prevent gallstones? There is no sure way to prevent gallstones. But you can reduce your risk of forming gallstones that can cause symptoms. · Stay at a healthy weight. If you need to lose weight, do so slowly and sensibly. · Eat regular, balanced meals. · Be active, and exercise regularly. How are gallstones treated? · If you don't have symptoms, you probably don't need treatment. · For mild symptoms, your doctor may have you take pain medicine and wait to see if the pain goes away. · For severe pain or infection, or if you have more than one gallstone attack, your doctor may suggest surgery to have your gallbladder removed. The body works fine without a gallbladder. Follow-up care is a key part of your treatment and safety. Be sure to make and go to all appointments, and call your doctor if you are having problems. It's also a good idea to know your test results and keep a list of the medicines you take. Where can you learn more? Go to https://Rocket Internetpeaamireb.Gotta'go Personal Care Device. org and sign in to your [employer] account. Enter Q729 in the [facility] box to learn more about \"Learning About Gallstones. \"     If you do not have an account, please click on the \"Sign Up Now\" link. Current as of: March 27, 2018  Content Version: 11.9  © 5314-1897 [employer], Incorporated. Care instructions adapted under license by [facility] ([facility]). If you have questions about a medical condition or this instruction, always ask your healthcare professional. [address] any warranty or liability for your use of this information.

## 2019-04-10 ENCOUNTER — TELEPHONE (OUTPATIENT)
Dept: SURGERY | Age: 54
End: 2019-04-10

## 2019-04-11 ENCOUNTER — OFFICE VISIT (OUTPATIENT)
Dept: SURGERY | Age: 54
End: 2019-04-11
Payer: COMMERCIAL

## 2019-04-11 ENCOUNTER — TELEPHONE (OUTPATIENT)
Dept: SURGERY | Age: 54
End: 2019-04-11

## 2019-04-11 ENCOUNTER — TELEPHONE (OUTPATIENT)
Dept: CARDIOLOGY CLINIC | Age: 54
End: 2019-04-11

## 2019-04-11 ENCOUNTER — TELEPHONE (OUTPATIENT)
Dept: FAMILY MEDICINE CLINIC | Age: 54
End: 2019-04-11

## 2019-04-11 VITALS
HEART RATE: 97 BPM | BODY MASS INDEX: 26.41 KG/M2 | SYSTOLIC BLOOD PRESSURE: 121 MMHG | WEIGHT: 195 LBS | HEIGHT: 72 IN | DIASTOLIC BLOOD PRESSURE: 90 MMHG

## 2019-04-11 DIAGNOSIS — K81.0 ACUTE CHOLECYSTITIS: Primary | ICD-10-CM

## 2019-04-11 PROCEDURE — 99212 OFFICE O/P EST SF 10 MIN: CPT | Performed by: SURGERY

## 2019-04-11 NOTE — TELEPHONE ENCOUNTER
Advised patient his gallbladder surgery is scheduled on Wednesday, April 17, 2019 at [facility]. Arrive at 9:30 a.m., surgery at 11:30 a.m.  NPO after midnight. He will get cardiac clearance from Dr. Ernesto Shirley and check about stopping his aspirin 5 days prior to surgery.   Charles Tenorio NP, to do H&P.

## 2019-04-11 NOTE — TELEPHONE ENCOUNTER
LMOM for pt to contact the office, will also need a fax number of where he wants letter sent. Placed letter in chart.

## 2019-04-11 NOTE — TELEPHONE ENCOUNTER
Patient was seen last week for check up appt He is going to have surgery by dr. Yasmeen Church on 4-17-19. Can you do his H&P for surgery from his last visit.

## 2019-04-11 NOTE — TELEPHONE ENCOUNTER
VSP is pt okay to stop aspirin 5 days prior to his gallbladder surgery? Pt said he see's you for his follow ups.

## 2019-04-11 NOTE — LETTER
Ludinlen Yeh are scheduled to have surgery with Dr. Chelsea Koroma     AT:  [facility] (Main Entrance)     ON:  Wednesday, April 17, 2019    ARRIVE AT: 9:30 a.m. YOUR SURGERY WILL BEGIN AT: 11:30 a.m. You need Cardiac Clearance and check with Dr. Osito Kinney about stopping your aspirin 5 day prior to surgery. You should have nothing by mouth after midnight the evening before your  surgery,  No food or water. Otherwise, Your Surgery Will Be Cancelled. You will need a History and Physical prior to your surgery by your family doctor. The hospital will contact you if any testing is needed. You will need someone available to drive you home after the surgery and be with   you the rest of the day. Contact our office if you have a Pacemaker or Defibrillator. If you are allergic to   Latex Gloves,  We need to know prior to surgery. If you have any questions, please feel free to contact Priya at [phone].

## 2019-04-11 NOTE — TELEPHONE ENCOUNTER
Pt stopped by the office and is going to have his galbladder removed sometime next week. Pt stated tht he needs to see VSP for clx before proceeding with surg. No openings next week? VSP okay to add Monday at 7:30?

## 2019-04-12 NOTE — PROGRESS NOTES
HPI: Nursing notes reviewed. Patient without pain or nausea. No fevers. ROS:  10 point review of systems performed with pertinent positives in HPI    Phys:     Abd - soft, nontender, nondistended       Assesment: 45 yo with acute cholecystitis    Plan: 1. No pain or fevers   2. Finished abx   3.   Plan for lap shanelle soon

## 2019-04-16 ENCOUNTER — OFFICE VISIT (OUTPATIENT)
Dept: FAMILY MEDICINE CLINIC | Age: 54
End: 2019-04-16
Payer: COMMERCIAL

## 2019-04-16 ENCOUNTER — ANESTHESIA EVENT (OUTPATIENT)
Dept: OPERATING ROOM | Age: 54
End: 2019-04-16
Payer: COMMERCIAL

## 2019-04-16 VITALS
BODY MASS INDEX: 27.04 KG/M2 | DIASTOLIC BLOOD PRESSURE: 80 MMHG | TEMPERATURE: 98 F | SYSTOLIC BLOOD PRESSURE: 122 MMHG | WEIGHT: 199.4 LBS | OXYGEN SATURATION: 97 % | HEART RATE: 64 BPM

## 2019-04-16 DIAGNOSIS — K80.00 ACUTE CALCULOUS CHOLECYSTITIS: ICD-10-CM

## 2019-04-16 DIAGNOSIS — Z01.818 PREOP EXAMINATION: Primary | ICD-10-CM

## 2019-04-16 PROCEDURE — 99214 OFFICE O/P EST MOD 30 MIN: CPT | Performed by: NURSE PRACTITIONER

## 2019-04-16 NOTE — PROGRESS NOTES
Subjective:    Patient:  Anahi Vivar   YOB: 1965     Patient presents today for preoperative history and physical. Patient is scheduled for cholecystectomy with Dr. Mitzi Ashford at [facility]. There is no significant history of abnormal bleeding or anesthesia complications. Past Medical History:   Diagnosis Date    CAD (coronary artery disease) 2016    Diabetes mellitus ([facility] [address]     Hyperlipidemia     Hypertension     No history of procedure 06/01/2017    colonoscopy        Past Surgical History:   Procedure Laterality Date    COLONOSCOPY  06/01/2017    Colonic polyp    CORONARY ARTERY BYPASS GRAFT  09/20/2016    x5    EYE SURGERY Right     drain placed behind eye    HERNIA REPAIR Bilateral 2005    bilateral inguinal hernia repair        Outpatient Medications Marked as Taking for the 4/16/19 encounter (Office Visit) with Scherrie Gosselin, APRN - CNP   Medication Sig Dispense Refill    atorvastatin (LIPITOR) 20 MG tablet TAKE 1 TABLET BY MOUTH NIGHTLY 30 tablet 5    metoprolol tartrate (LOPRESSOR) 25 MG tablet TAKE 1 TABLET BY MOUTH 2 TIMES DAILY 60 tablet 5    meloxicam (MOBIC) 15 MG tablet TAKE 1 TABLET BY MOUTH DAILY 30 tablet 5    aspirin 81 MG tablet Take 81 mg by mouth daily          No Known Allergies     Family History   Problem Relation Age of Onset    Heart Disease Father     Diabetes type 2  Father     Cancer Mother         Social History     Tobacco Use    Smoking status: Never Smoker    Smokeless tobacco: Never Used   Substance Use Topics    Alcohol use: No         There is no immunization history for the selected administration types on file for this patient.     Review of systems:  Constitutional:     Unexplained weight loss - no     Fever - no  Skin:     Rash - no     Itching - no  ENT:     Head congestion - no     Hearing loss - no  Eye:     Blurred vision - no     Eye pain - no  Cardiac:     Chest pain or discomfort - no     Orthopnea or PND - no  Respiratory     Cough - no     Wheeze - no     Dyspnea on exertion - no  Gastrointestinal     Frequent heartburn - no     Blood in stool - no  Urologic     Dysuria - no     Hematuria - no  Neurologic     Dizziness - no     Syncope - no  Psychiatric     Suicidal ideation - no     Anxiety - no    Objective:  /80   Pulse 64   Temp 98 °F (36.7 °C) (Oral)   Wt 199 lb 6.4 oz (90.4 kg)   SpO2 97%   BMI 27.04 kg/m²   Patient is alert, oriented, and in no acute distress.   Eyes:  Conjunctivae and lids are clear             Pupils are equal, round, and reactive, irises nondeformed  Ears:  External ears and nose unremarkable, canals are clear, TMs clear   Mouth:  Lips, gums, tongue, oral mucosa unremarkable  Throat: Palates unremarkable               Pharynx clear  Nose: clear  Neck:  No masses, trachea midline, phonation normal, thyroid unremarkable              No significant cervical or supraclavicular adenopathy  Chest:  No deformity of thorax              Respirations easy and unlabored with equal breath sounds              Lungs clear  Heart:  Rhythm - regular               Murmurs - none              Gallop - none               JVD - none              Pretibial edema - none  Abdomen:  Soft  with no masses noted                     Hernia - none                    Liver and spleen not palpably enlarged                    Bowel sounds are normally active                    Tenderness - none                    Rebound or rididity - none  Neurologic:  Cranial nerves 2-12 are intact                      No ataxia                     No focal motor deficits found                        Reflexes in upper extremities are intact and symmetrical                      Reflexes in lower extremities are intact and symmetrical  Skin: Warm and dry, turgor normal           No rash            No lesion  Psychiatric: mood and affect intact                     speech and thought processes seem appropriate     Assessment and Plan:   Diagnosis Orders   1. Preop examination  Patient presents today for preoperative history and physical in preparation for cholecystectomy with Dr. Beryle Gutter on 4/17/19. Exam is essentially benign today. Patient with known history of hypertension, hyperlipidemia, diabetes, coronary artery disease, status post CABG. Patient had recent CMP and CBC which were essentially normal.  Patient also had a stress test on 2/22/19 which showed no ischemia and no significant abnormalities with an ejection fraction of 68%. Patient did receive cardiac clearance per Dr. Flavia Naqvi. Most recent A1c was 6.7 on 4/3/19 and blood pressure has been well controlled with current treatment. Patient is in satisfactory condition to proceed with anesthesia as planned. If you have any questions please contact our office at [phone].    2. Acute calculous cholecystitis             Wonda Bound, CNP    The note was completed using Dragon voice recognition transcription. Every effort was made to ensure accuracy; however, inadvertent  transcription errors may be present despite my best efforts to edit errors.

## 2019-04-16 NOTE — PATIENT INSTRUCTIONS
Please read the healthy family handout that you were given and share it with your family. Please compare this printed medication list with your medications at home to be sure they are the same. If you have any medications that are different please contact us immediately at [phone]. Also review your allergies that we have listed, these may also include medications that you have not been able to tolerate, make sure everything listed is correct. If you have any allergies that are different please contact us immediately at [phone]. Patient Education        Cholecystectomy: Before Your Surgery  What is cholecystectomy? Cholecystectomy (es-aoc-ogk-DIASY-tuh-joslyn) is a type of surgery. It removes a diseased gallbladder. This surgery is usually done as a laparoscopic surgery. The doctor puts a lighted tube and other surgical tools through small cuts (incisions) in your belly. The tube is called a scope. It lets your doctor see your organs so he or she can do the surgery. The incisions leave scars that fade with time. Most people go home the same day. You probably will feel better each day. Most people have only a small amount of pain after 1 week. If you have a desk job, you can probably go back to work in 1 to 2 weeks. If you lift heavy objects or have a very active job, it may take up to 4 weeks. In some cases, open surgery is the best choice. Your doctor may choose open surgery in advance. Or he or she may choose it in the middle of laparoscopic surgery. In open surgery, the doctor makes a larger incision in your upper belly. If you have open surgery, you will probably stay in the hospital for 2 to 4 days. And it may take 4 to 6 weeks to get back to your normal routine. Follow-up care is a key part of your treatment and safety. Be sure to make and go to all appointments, and call your doctor if you are having problems.  It's also a good idea to know your test results and keep a list of the medicines you take.  What happens before surgery?   Surgery can be stressful. This information will help you understand what you can expect. And it will help you safely prepare for surgery.   Preparing for surgery    · Understand exactly what surgery is planned, along with the risks, benefits, and other options. · Tell your doctors ALL the medicines, vitamins, supplements, and herbal remedies you take. Some of these can increase the risk of bleeding or interact with anesthesia.     · If you take blood thinners, such as warfarin (Coumadin), clopidogrel (Plavix), or aspirin, be sure to talk to your doctor. He or she will tell you if you should stop taking these medicines before your surgery. Make sure that you understand exactly what your doctor wants you to do.     · Your doctor will tell you which medicines to take or stop before your surgery. You may need to stop taking certain medicines a week or more before surgery. So talk to your doctor as soon as you can.     · If you have an advance directive, let your doctor know. It may include a living will and a durable power of  for health care. Bring a copy to the hospital. If you don't have one, you may want to prepare one. It lets your doctor and loved ones know your health care wishes. Doctors advise that everyone prepare these papers before any type of surgery or procedure.     · You may need to empty your colon with an enema or laxative. Your doctor will tell you how to do this. What happens on the day of surgery? · Follow the instructions exactly about when to stop eating and drinking. If you don't, your surgery may be canceled. If your doctor told you to take your medicines on the day of surgery, take them with only a sip of water.     · Take a bath or shower before you come in for your surgery. Do not apply lotions, perfumes, deodorants, or nail polish.     · Do not shave the surgical site yourself.     · Take off all jewelry and piercings.  And take out contact lenses, if you wear them.    At the hospital or surgery center   · Bring a picture ID.     · The area for surgery is often marked to make sure there are no errors.     · You will be kept comfortable and safe by your anesthesia provider. You will be asleep during the surgery.     · The surgery usually takes 1 to 2 hours. Going home   · Be sure you have someone to drive you home. Anesthesia and pain medicine make it unsafe for you to drive.     · You will be given more specific instructions about recovering from your surgery. They will cover things like diet, wound care, follow-up care, driving, and getting back to your normal routine. When should you call your doctor? · You have questions or concerns.     · You don't understand how to prepare for your surgery.     · You become ill before the surgery (such as fever, flu, or a cold).     · You need to reschedule or have changed your mind about having the surgery. Where can you learn more? Go to [email or web address] org and sign in to your [employer] ([employer]) account. Enter W881 in the [employer] box to learn more about \"Cholecystectomy: Before Your Surgery. \"     If you do not have an account, please click on the \"Sign Up Now\" link. Current as of: March 27, 2018  Content Version: 11.9  © 0226-0933 [employer]. Care instructions adapted under license by [facility] ([facility]). If you have questions about a medical condition or this instruction, always ask your healthcare professional. [place] [address] any warranty or liability for your use of this information.

## 2019-04-17 ENCOUNTER — HOSPITAL ENCOUNTER (OUTPATIENT)
Age: 54
Setting detail: OUTPATIENT SURGERY
Discharge: HOME | End: 2019-04-17
Attending: SURGERY | Admitting: SURGERY
Payer: COMMERCIAL

## 2019-04-17 ENCOUNTER — ANESTHESIA (OUTPATIENT)
Dept: OPERATING ROOM | Age: 54
End: 2019-04-17
Payer: COMMERCIAL

## 2019-04-17 ENCOUNTER — HOSPITAL ENCOUNTER (OUTPATIENT)
Dept: GENERAL RADIOLOGY | Age: 54
Discharge: HOME | End: 2019-04-17
Payer: COMMERCIAL

## 2019-04-17 VITALS
HEIGHT: 72 IN | WEIGHT: 191.5 LBS | TEMPERATURE: 98.6 F | BODY MASS INDEX: 25.94 KG/M2 | RESPIRATION RATE: 12 BRPM | OXYGEN SATURATION: 96 % | HEART RATE: 75 BPM | DIASTOLIC BLOOD PRESSURE: 80 MMHG | SYSTOLIC BLOOD PRESSURE: 143 MMHG

## 2019-04-17 VITALS
RESPIRATION RATE: 22 BRPM | DIASTOLIC BLOOD PRESSURE: 66 MMHG | TEMPERATURE: 97.4 F | SYSTOLIC BLOOD PRESSURE: 109 MMHG | OXYGEN SATURATION: 99 %

## 2019-04-17 DIAGNOSIS — K80.00 CALCULUS OF GALLBLADDER WITH ACUTE CHOLECYSTITIS WITHOUT OBSTRUCTION: ICD-10-CM

## 2019-04-17 DIAGNOSIS — Z90.49 S/P LAPAROSCOPIC CHOLECYSTECTOMY: Primary | ICD-10-CM

## 2019-04-17 DIAGNOSIS — K80.20 GALLSTONES: ICD-10-CM

## 2019-04-17 LAB
GLUCOSE BLD-MCNC: 116 MG/DL (ref 70–99)
GLUCOSE BLD-MCNC: 146 MG/DL (ref 70–99)
PERFORMED ON: ABNORMAL
PERFORMED ON: ABNORMAL

## 2019-04-17 PROCEDURE — 47563 LAPARO CHOLECYSTECTOMY/GRAPH: CPT | Performed by: SURGERY

## 2019-04-17 PROCEDURE — 7100000001 HC PACU RECOVERY - ADDTL 15 MIN: Performed by: SURGERY

## 2019-04-17 PROCEDURE — 6360000002 HC RX W HCPCS: Performed by: NURSE ANESTHETIST, CERTIFIED REGISTERED

## 2019-04-17 PROCEDURE — 6360000002 HC RX W HCPCS: Performed by: SURGERY

## 2019-04-17 PROCEDURE — 7100000010 HC PHASE II RECOVERY - FIRST 15 MIN: Performed by: SURGERY

## 2019-04-17 PROCEDURE — 2580000003 HC RX 258: Performed by: ANESTHESIOLOGY

## 2019-04-17 PROCEDURE — 3600000004 HC SURGERY LEVEL 4 BASE: Performed by: SURGERY

## 2019-04-17 PROCEDURE — 6360000002 HC RX W HCPCS: Performed by: ANESTHESIOLOGY

## 2019-04-17 PROCEDURE — 2500000003 HC RX 250 WO HCPCS: Performed by: ANESTHESIOLOGY

## 2019-04-17 PROCEDURE — 7100000000 HC PACU RECOVERY - FIRST 15 MIN: Performed by: SURGERY

## 2019-04-17 PROCEDURE — 3700000001 HC ADD 15 MINUTES (ANESTHESIA): Performed by: SURGERY

## 2019-04-17 PROCEDURE — 2500000003 HC RX 250 WO HCPCS: Performed by: SURGERY

## 2019-04-17 PROCEDURE — 6360000004 HC RX CONTRAST MEDICATION: Performed by: SURGERY

## 2019-04-17 PROCEDURE — 2709999900 HC NON-CHARGEABLE SUPPLY: Performed by: SURGERY

## 2019-04-17 PROCEDURE — 88304 TISSUE EXAM BY PATHOLOGIST: CPT

## 2019-04-17 PROCEDURE — 2580000003 HC RX 258: Performed by: SURGERY

## 2019-04-17 PROCEDURE — 74300 X-RAY BILE DUCTS/PANCREAS: CPT

## 2019-04-17 PROCEDURE — 2500000003 HC RX 250 WO HCPCS: Performed by: NURSE ANESTHETIST, CERTIFIED REGISTERED

## 2019-04-17 PROCEDURE — 7100000011 HC PHASE II RECOVERY - ADDTL 15 MIN: Performed by: SURGERY

## 2019-04-17 PROCEDURE — 3700000000 HC ANESTHESIA ATTENDED CARE: Performed by: SURGERY

## 2019-04-17 PROCEDURE — 3600000014 HC SURGERY LEVEL 4 ADDTL 15MIN: Performed by: SURGERY

## 2019-04-17 RX ORDER — OXYCODONE HYDROCHLORIDE AND ACETAMINOPHEN 5; 325 MG/1; MG/1
2 TABLET ORAL PRN
Status: DISCONTINUED | OUTPATIENT
Start: 2019-04-17 | End: 2019-04-17 | Stop reason: HOSPADM

## 2019-04-17 RX ORDER — SODIUM CHLORIDE, SODIUM LACTATE, POTASSIUM CHLORIDE, CALCIUM CHLORIDE 600; 310; 30; 20 MG/100ML; MG/100ML; MG/100ML; MG/100ML
INJECTION, SOLUTION INTRAVENOUS CONTINUOUS PRN
Status: COMPLETED | OUTPATIENT
Start: 2019-04-17 | End: 2019-04-17

## 2019-04-17 RX ORDER — MEPERIDINE HYDROCHLORIDE 50 MG/ML
12.5 INJECTION INTRAMUSCULAR; INTRAVENOUS; SUBCUTANEOUS EVERY 5 MIN PRN
Status: DISCONTINUED | OUTPATIENT
Start: 2019-04-17 | End: 2019-04-17 | Stop reason: HOSPADM

## 2019-04-17 RX ORDER — ROCURONIUM BROMIDE 10 MG/ML
INJECTION, SOLUTION INTRAVENOUS PRN
Status: DISCONTINUED | OUTPATIENT
Start: 2019-04-17 | End: 2019-04-17 | Stop reason: SDUPTHER

## 2019-04-17 RX ORDER — HYDRALAZINE HYDROCHLORIDE 20 MG/ML
5 INJECTION INTRAMUSCULAR; INTRAVENOUS EVERY 10 MIN PRN
Status: DISCONTINUED | OUTPATIENT
Start: 2019-04-17 | End: 2019-04-17 | Stop reason: HOSPADM

## 2019-04-17 RX ORDER — ONDANSETRON 2 MG/ML
4 INJECTION INTRAMUSCULAR; INTRAVENOUS EVERY 10 MIN PRN
Status: DISCONTINUED | OUTPATIENT
Start: 2019-04-17 | End: 2019-04-17 | Stop reason: HOSPADM

## 2019-04-17 RX ORDER — SODIUM CHLORIDE 0.9 % (FLUSH) 0.9 %
10 SYRINGE (ML) INJECTION EVERY 12 HOURS SCHEDULED
Status: DISCONTINUED | OUTPATIENT
Start: 2019-04-17 | End: 2019-04-17 | Stop reason: HOSPADM

## 2019-04-17 RX ORDER — PROPOFOL 10 MG/ML
INJECTION, EMULSION INTRAVENOUS PRN
Status: DISCONTINUED | OUTPATIENT
Start: 2019-04-17 | End: 2019-04-17 | Stop reason: SDUPTHER

## 2019-04-17 RX ORDER — SODIUM CHLORIDE 0.9 % (FLUSH) 0.9 %
10 SYRINGE (ML) INJECTION PRN
Status: DISCONTINUED | OUTPATIENT
Start: 2019-04-17 | End: 2019-04-17 | Stop reason: HOSPADM

## 2019-04-17 RX ORDER — FENTANYL CITRATE 50 UG/ML
INJECTION, SOLUTION INTRAMUSCULAR; INTRAVENOUS PRN
Status: DISCONTINUED | OUTPATIENT
Start: 2019-04-17 | End: 2019-04-17 | Stop reason: SDUPTHER

## 2019-04-17 RX ORDER — MIDAZOLAM HYDROCHLORIDE 1 MG/ML
INJECTION INTRAMUSCULAR; INTRAVENOUS PRN
Status: DISCONTINUED | OUTPATIENT
Start: 2019-04-17 | End: 2019-04-17 | Stop reason: SDUPTHER

## 2019-04-17 RX ORDER — OXYCODONE HYDROCHLORIDE AND ACETAMINOPHEN 5; 325 MG/1; MG/1
1 TABLET ORAL PRN
Status: DISCONTINUED | OUTPATIENT
Start: 2019-04-17 | End: 2019-04-17 | Stop reason: HOSPADM

## 2019-04-17 RX ORDER — BUPIVACAINE HYDROCHLORIDE AND EPINEPHRINE 5; 5 MG/ML; UG/ML
INJECTION, SOLUTION PERINEURAL PRN
Status: DISCONTINUED | OUTPATIENT
Start: 2019-04-17 | End: 2019-04-17 | Stop reason: ALTCHOICE

## 2019-04-17 RX ORDER — LABETALOL HYDROCHLORIDE 5 MG/ML
5 INJECTION, SOLUTION INTRAVENOUS EVERY 10 MIN PRN
Status: DISCONTINUED | OUTPATIENT
Start: 2019-04-17 | End: 2019-04-17 | Stop reason: HOSPADM

## 2019-04-17 RX ORDER — LIDOCAINE HYDROCHLORIDE 10 MG/ML
0.3 INJECTION, SOLUTION EPIDURAL; INFILTRATION; INTRACAUDAL; PERINEURAL
Status: COMPLETED | OUTPATIENT
Start: 2019-04-17 | End: 2019-04-17

## 2019-04-17 RX ORDER — ONDANSETRON 2 MG/ML
INJECTION INTRAMUSCULAR; INTRAVENOUS PRN
Status: DISCONTINUED | OUTPATIENT
Start: 2019-04-17 | End: 2019-04-17 | Stop reason: SDUPTHER

## 2019-04-17 RX ORDER — SODIUM CHLORIDE, SODIUM LACTATE, POTASSIUM CHLORIDE, CALCIUM CHLORIDE 600; 310; 30; 20 MG/100ML; MG/100ML; MG/100ML; MG/100ML
INJECTION, SOLUTION INTRAVENOUS CONTINUOUS
Status: DISCONTINUED | OUTPATIENT
Start: 2019-04-17 | End: 2019-04-17 | Stop reason: HOSPADM

## 2019-04-17 RX ORDER — LIDOCAINE HYDROCHLORIDE 20 MG/ML
INJECTION, SOLUTION INFILTRATION; PERINEURAL PRN
Status: DISCONTINUED | OUTPATIENT
Start: 2019-04-17 | End: 2019-04-17 | Stop reason: SDUPTHER

## 2019-04-17 RX ORDER — OXYCODONE HYDROCHLORIDE AND ACETAMINOPHEN 5; 325 MG/1; MG/1
1-2 TABLET ORAL EVERY 6 HOURS PRN
Qty: 28 TABLET | Refills: 0 | Status: SHIPPED | OUTPATIENT
Start: 2019-04-17 | End: 2019-04-24

## 2019-04-17 RX ORDER — DEXAMETHASONE SODIUM PHOSPHATE 4 MG/ML
INJECTION, SOLUTION INTRA-ARTICULAR; INTRALESIONAL; INTRAMUSCULAR; INTRAVENOUS; SOFT TISSUE PRN
Status: DISCONTINUED | OUTPATIENT
Start: 2019-04-17 | End: 2019-04-17 | Stop reason: SDUPTHER

## 2019-04-17 RX ADMIN — HYDROMORPHONE HYDROCHLORIDE 0.5 MG: 1 INJECTION, SOLUTION INTRAMUSCULAR; INTRAVENOUS; SUBCUTANEOUS at 12:46

## 2019-04-17 RX ADMIN — ONDANSETRON 4 MG: 2 INJECTION INTRAMUSCULAR; INTRAVENOUS at 11:57

## 2019-04-17 RX ADMIN — ROCURONIUM BROMIDE 50 MG: 10 SOLUTION INTRAVENOUS at 10:44

## 2019-04-17 RX ADMIN — PROPOFOL 170 MG: 10 INJECTION, EMULSION INTRAVENOUS at 10:44

## 2019-04-17 RX ADMIN — ONDANSETRON 4 MG: 2 INJECTION INTRAMUSCULAR; INTRAVENOUS at 12:27

## 2019-04-17 RX ADMIN — FENTANYL CITRATE 50 MCG: 50 INJECTION INTRAMUSCULAR; INTRAVENOUS at 11:58

## 2019-04-17 RX ADMIN — Medication 100 MCG: at 11:06

## 2019-04-17 RX ADMIN — MIDAZOLAM HYDROCHLORIDE 2 MG: 2 INJECTION, SOLUTION INTRAMUSCULAR; INTRAVENOUS at 10:37

## 2019-04-17 RX ADMIN — SODIUM CHLORIDE, POTASSIUM CHLORIDE, SODIUM LACTATE AND CALCIUM CHLORIDE: 600; 310; 30; 20 INJECTION, SOLUTION INTRAVENOUS at 11:19

## 2019-04-17 RX ADMIN — FENTANYL CITRATE 50 MCG: 50 INJECTION INTRAMUSCULAR; INTRAVENOUS at 10:44

## 2019-04-17 RX ADMIN — ROCURONIUM BROMIDE 10 MG: 10 SOLUTION INTRAVENOUS at 11:43

## 2019-04-17 RX ADMIN — LIDOCAINE HYDROCHLORIDE 0.3 ML: 10 INJECTION, SOLUTION EPIDURAL; INFILTRATION; INTRACAUDAL; PERINEURAL at 09:43

## 2019-04-17 RX ADMIN — ONDANSETRON 4 MG: 2 INJECTION INTRAMUSCULAR; INTRAVENOUS at 10:59

## 2019-04-17 RX ADMIN — Medication 100 MCG: at 10:57

## 2019-04-17 RX ADMIN — SODIUM CHLORIDE, POTASSIUM CHLORIDE, SODIUM LACTATE AND CALCIUM CHLORIDE: 600; 310; 30; 20 INJECTION, SOLUTION INTRAVENOUS at 10:39

## 2019-04-17 RX ADMIN — SUGAMMADEX 200 MG: 100 INJECTION, SOLUTION INTRAVENOUS at 12:03

## 2019-04-17 RX ADMIN — Medication 100 MCG: at 10:54

## 2019-04-17 RX ADMIN — LIDOCAINE HYDROCHLORIDE 60 MG: 20 INJECTION, SOLUTION INFILTRATION; PERINEURAL at 10:44

## 2019-04-17 RX ADMIN — SODIUM CHLORIDE, POTASSIUM CHLORIDE, SODIUM LACTATE AND CALCIUM CHLORIDE: 600; 310; 30; 20 INJECTION, SOLUTION INTRAVENOUS at 09:42

## 2019-04-17 RX ADMIN — DEXAMETHASONE SODIUM PHOSPHATE 8 MG: 4 INJECTION, SOLUTION INTRAMUSCULAR; INTRAVENOUS at 10:59

## 2019-04-17 RX ADMIN — Medication 2 G: at 10:39

## 2019-04-17 ASSESSMENT — PULMONARY FUNCTION TESTS
PIF_VALUE: 31
PIF_VALUE: 30
PIF_VALUE: 3
PIF_VALUE: 31
PIF_VALUE: 24
PIF_VALUE: 32
PIF_VALUE: 20
PIF_VALUE: 29
PIF_VALUE: 31
PIF_VALUE: 21
PIF_VALUE: 24
PIF_VALUE: 31
PIF_VALUE: 3
PIF_VALUE: 24
PIF_VALUE: 22
PIF_VALUE: 23
PIF_VALUE: 1
PIF_VALUE: 32
PIF_VALUE: 23
PIF_VALUE: 22
PIF_VALUE: 21
PIF_VALUE: 32
PIF_VALUE: 32
PIF_VALUE: 11
PIF_VALUE: 4
PIF_VALUE: 30
PIF_VALUE: 31
PIF_VALUE: 1
PIF_VALUE: 21
PIF_VALUE: 20
PIF_VALUE: 21
PIF_VALUE: 1
PIF_VALUE: 6
PIF_VALUE: 32
PIF_VALUE: 24
PIF_VALUE: 19
PIF_VALUE: 31
PIF_VALUE: 22
PIF_VALUE: 29
PIF_VALUE: 31
PIF_VALUE: 29
PIF_VALUE: 30
PIF_VALUE: 21
PIF_VALUE: 31
PIF_VALUE: 32
PIF_VALUE: 32
PIF_VALUE: 31
PIF_VALUE: 33
PIF_VALUE: 21
PIF_VALUE: 3
PIF_VALUE: 27
PIF_VALUE: 21
PIF_VALUE: 32
PIF_VALUE: 6
PIF_VALUE: 31
PIF_VALUE: 30
PIF_VALUE: 24
PIF_VALUE: 0
PIF_VALUE: 0
PIF_VALUE: 32
PIF_VALUE: 21
PIF_VALUE: 20
PIF_VALUE: 30
PIF_VALUE: 31
PIF_VALUE: 33
PIF_VALUE: 23
PIF_VALUE: 22
PIF_VALUE: 21
PIF_VALUE: 21
PIF_VALUE: 31
PIF_VALUE: 26
PIF_VALUE: 31
PIF_VALUE: 22
PIF_VALUE: 23
PIF_VALUE: 18
PIF_VALUE: 29
PIF_VALUE: 29
PIF_VALUE: 2
PIF_VALUE: 29
PIF_VALUE: 29
PIF_VALUE: 21
PIF_VALUE: 25
PIF_VALUE: 21
PIF_VALUE: 20
PIF_VALUE: 21
PIF_VALUE: 23
PIF_VALUE: 31
PIF_VALUE: 20
PIF_VALUE: 0

## 2019-04-17 ASSESSMENT — PAIN DESCRIPTION - LOCATION
LOCATION: ABDOMEN
LOCATION: ABDOMEN

## 2019-04-17 ASSESSMENT — PAIN SCALES - GENERAL
PAINLEVEL_OUTOF10: 4
PAINLEVEL_OUTOF10: 3
PAINLEVEL_OUTOF10: 7
PAINLEVEL_OUTOF10: 4

## 2019-04-17 ASSESSMENT — PAIN DESCRIPTION - PAIN TYPE
TYPE: SURGICAL PAIN
TYPE: SURGICAL PAIN

## 2019-04-17 ASSESSMENT — PAIN - FUNCTIONAL ASSESSMENT: PAIN_FUNCTIONAL_ASSESSMENT: 0-10

## 2019-04-17 ASSESSMENT — PAIN DESCRIPTION - ORIENTATION: ORIENTATION: MID

## 2019-04-17 NOTE — PROGRESS NOTES
Pt arrived to [facility] in stable condition. Pt states pain is tolerable at 4 out of 10 scale. Pt tolerating light snack and beverage. Family at bedside.

## 2019-04-17 NOTE — H&P
I have reviewed the history and physical and examined the patient. I find no relevant changes. I have reviewed with the patient and/or family members, during the preoperative office visit the risks, benefits, and alternatives to the procedure.     Hellen Rash

## 2019-04-17 NOTE — OP NOTE
It was then removed. Two hemolock were placed on the distal cystic duct and it was completely divided. The cystic artery was then circumferentially dissected with two clips being placed proximal and one distal.  It was then divided. A smaller branch was similarly controlled. Using electrocautery the gallbladder was dissected free of the liver bed, placed into an endoretrieval bag, removed from the abdomen, and sent off as specimen. The liver bed was then inspected and made hemostatic with electrocautery. The area was copiously irrigated with all fluid suctioned off. The cystic duct and artery had clips intact without signs of leak of bleeding. Under direct visualization the trocars were removed and without bleeding at their insertion sites. The abdomen was allowed to desufflate. The subxiphoid trocar site had its fascia closed with 0-0 vicryl figure of eight stitch. All of the trocar sites had the epidermis closed with 4-0 monocryl in the subcuticular plane. Sterile dressing placed. All suture, sponge and instrument count correct times two at end of case. Transferred to PACU in stable condition.     Karyle Formosa MD

## 2019-04-17 NOTE — ANESTHESIA PRE PROCEDURE
Department of Anesthesiology  Preprocedure Note       Name:  Sue Gibson   Age:  46 y.o.  :  1965                                          MRN:  [id number]         Date:  2019      Surgeon: Chele Reece):  Sanjuanita Gardner MD    Procedure: LAPAROSCOPIC CHOLECYSTECTOMY WITH INTRAOPERATIVE CHOLANGIOGRAM, POSSIBLE OPEN PROCEDURE (N/A )    Medications prior to admission:   Prior to Admission medications    Medication Sig Start Date End Date Taking? Authorizing Provider   atorvastatin (LIPITOR) 20 MG tablet TAKE 1 TABLET BY MOUTH NIGHTLY 18  Yes Helmetta Batch, APRN - CNP   metoprolol tartrate (LOPRESSOR) 25 MG tablet TAKE 1 TABLET BY MOUTH 2 TIMES DAILY 18  Yes Babs Batch, APRN - CNP   meloxicam (MOBIC) 15 MG tablet TAKE 1 TABLET BY MOUTH DAILY 18  Yes Moraima Partida PA-C   aspirin 81 MG tablet Take 81 mg by mouth daily   Yes Historical Provider, MD       Current medications:    No current facility-administered medications for this encounter.       Current Outpatient Medications   Medication Sig Dispense Refill    atorvastatin (LIPITOR) 20 MG tablet TAKE 1 TABLET BY MOUTH NIGHTLY 30 tablet 5    metoprolol tartrate (LOPRESSOR) 25 MG tablet TAKE 1 TABLET BY MOUTH 2 TIMES DAILY 60 tablet 5    meloxicam (MOBIC) 15 MG tablet TAKE 1 TABLET BY MOUTH DAILY 30 tablet 5    aspirin 81 MG tablet Take 81 mg by mouth daily         Allergies:  No Known Allergies    Problem List:    Patient Active Problem List   Diagnosis Code    Family history of early CAD Z80.55    S/P CABG (coronary artery bypass graft) Z95.1    Benign essential HTN I10    Mixed hyperlipidemia E78.2    Coronary artery disease involving native heart without angina pectoris I25.10    Microcytosis R71.8    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    Chest pain R07.9    Paresthesia R20.2    Acute calculous cholecystitis K80.00    New onset type 2 diabetes mellitus ([facility] [address] E11.9       Past Medical History: Diagnosis Date    CAD (coronary artery disease) 2016    Diabetes mellitus ([facility] [address]     Hyperlipidemia     Hypertension     No history of procedure 06/01/2017    colonoscopy       Past Surgical History:        Procedure Laterality Date    COLONOSCOPY  06/01/2017    Colonic polyp    CORONARY ARTERY BYPASS GRAFT  09/20/2016    x5    EYE SURGERY Right     drain placed behind eye    HERNIA REPAIR Bilateral 2005    bilateral inguinal hernia repair       Social History:    Social History     Tobacco Use    Smoking status: Never Smoker    Smokeless tobacco: Never Used   Substance Use Topics    Alcohol use: No                                Counseling given: Not Answered      Vital Signs (Current):   Vitals:    04/11/19 1558   Weight: 195 lb (88.5 kg)   Height: 6' (1.829 m)                                              BP Readings from Last 3 Encounters:   04/16/19 122/80   04/11/19 (!) 121/90   04/03/19 118/80       NPO Status:                                                                                 BMI:   Wt Readings from Last 3 Encounters:   04/16/19 199 lb 6.4 oz (90.4 kg)   04/11/19 195 lb (88.5 kg)   04/03/19 198 lb (89.8 kg)     Body mass index is 26.45 kg/m².     CBC:   Lab Results   Component Value Date    WBC 13.6 03/29/2019    RBC 3.98 03/29/2019    HGB 11.9 03/29/2019    HCT 34.9 03/29/2019    MCV 87.7 03/29/2019    RDW 14.3 03/29/2019     03/29/2019       CMP:   Lab Results   Component Value Date     04/03/2019    K 5.0 04/03/2019    K 4.5 04/19/2018     04/03/2019    CO2 27 04/03/2019    BUN 12 04/03/2019    CREATININE 0.7 04/03/2019    GFRAA >60 04/03/2019    AGRATIO 1.0 04/03/2019    LABGLOM >60 04/03/2019    GLUCOSE 106 04/03/2019    PROT 7.8 04/03/2019    CALCIUM 9.6 04/03/2019    BILITOT 0.3 04/03/2019    ALKPHOS 120 04/03/2019    AST 41 04/03/2019    ALT 75 04/03/2019       POC Tests: No results for input(s): POCGLU, POCNA, POCK, POCCL, POCBUN, POCHEMO, POCHCT in the last 72 hours. Coags:   Lab Results   Component Value Date    PROTIME 13.0 04/18/2018    INR 1.15 04/18/2018    APTT 28.4 09/20/2016       HCG (If Applicable): No results found for: PREGTESTUR, PREGSERUM, HCG, HCGQUANT     ABGs:   Lab Results   Component Value Date    PHART 7.356 09/20/2016    PO2ART 107 09/20/2016    FDN5XJG 45 09/20/2016    WAF2PTS 25.2 09/20/2016    BEART 0 09/20/2016    O8KWPUIO 98 09/20/2016        Type & Screen (If Applicable):  No results found for: LABABO, [address]    Anesthesia Evaluation  Patient summary reviewed no history of anesthetic complications:   Airway: Mallampati: II  TM distance: >3 FB   Neck ROM: full  Mouth opening: > = 3 FB Dental: normal exam         Pulmonary:Negative Pulmonary ROS                              Cardiovascular:    (+) hypertension:, CAD: no interval change, CABG/stent (CABG): no interval change,                   Neuro/Psych:   Negative Neuro/Psych ROS              GI/Hepatic/Renal: Neg GI/Hepatic/Renal ROS       (-) GERD, liver disease and no renal disease       Endo/Other:    (+) DiabetesType II DM, , .                 Abdominal:           Vascular: negative vascular ROS. Anesthesia Plan      general     ASA 3       Induction: intravenous. MIPS: Postoperative opioids intended and Prophylactic antiemetics administered. Anesthetic plan and risks discussed with patient, spouse and child/children. Plan discussed with CRNA. All questions answered and agrees with plan.         Bailee Sánchez MD   4/16/2019

## 2019-04-17 NOTE — ANESTHESIA POSTPROCEDURE EVALUATION
Department of Anesthesiology  Postprocedure Note    Patient: Sue Gibson  MRN: [id number]  YOB: 1965  Date of evaluation: 4/17/2019  Time:  3:18 PM     Procedure Summary     Date:  04/17/19 Room / Location:  [facility] OR 06 / [facility] OR    Anesthesia Start:  7350 Anesthesia Stop:  8284    Procedure:  LAPAROSCOPIC CHOLECYSTECTOMY WITH INTRAOPERATIVE CHOLANGIOGRAM (N/A ) Diagnosis:       Calculus of gallbladder with acute cholecystitis without obstruction      (ACUTE CHOLECYSTITIS WITH CHOLELITHIASIS)    Surgeon:  Sanjuanita Gardner MD Responsible Provider:  Zaheer Nava MD    Anesthesia Type:  general ASA Status:  3          Anesthesia Type: general    Soco Phase I: Soco Score: 10    Soco Phase II:      Last vitals: Reviewed and per EMR flowsheets.        Anesthesia Post Evaluation    Patient location during evaluation: PACU  Level of consciousness: awake  Airway patency: patent  Nausea & Vomiting: no nausea  Complications: no  Cardiovascular status: blood pressure returned to baseline  Respiratory status: acceptable  Hydration status: euvolemic

## 2019-05-02 ENCOUNTER — OFFICE VISIT (OUTPATIENT)
Dept: SURGERY | Age: 54
End: 2019-05-02

## 2019-05-02 VITALS
SYSTOLIC BLOOD PRESSURE: 138 MMHG | HEIGHT: 72 IN | BODY MASS INDEX: 26.9 KG/M2 | DIASTOLIC BLOOD PRESSURE: 81 MMHG | WEIGHT: 198.6 LBS | HEART RATE: 56 BPM

## 2019-05-02 DIAGNOSIS — Z90.49 S/P LAPAROSCOPIC CHOLECYSTECTOMY: Primary | ICD-10-CM

## 2019-05-02 PROCEDURE — 99024 POSTOP FOLLOW-UP VISIT: CPT | Performed by: SURGERY

## 2019-05-12 ENCOUNTER — APPOINTMENT (OUTPATIENT)
Dept: GENERAL RADIOLOGY | Age: 54
End: 2019-05-12
Payer: COMMERCIAL

## 2019-05-12 ENCOUNTER — HOSPITAL ENCOUNTER (EMERGENCY)
Age: 54
Discharge: LEFT BEFORE DISCHARGE | End: 2019-05-12
Attending: EMERGENCY MEDICINE
Payer: COMMERCIAL

## 2019-05-12 ENCOUNTER — APPOINTMENT (OUTPATIENT)
Dept: ULTRASOUND IMAGING | Age: 54
End: 2019-05-12
Payer: COMMERCIAL

## 2019-05-12 VITALS
BODY MASS INDEX: 26.85 KG/M2 | WEIGHT: 198 LBS | SYSTOLIC BLOOD PRESSURE: 125 MMHG | RESPIRATION RATE: 17 BRPM | HEART RATE: 55 BPM | DIASTOLIC BLOOD PRESSURE: 88 MMHG | TEMPERATURE: 98.7 F | OXYGEN SATURATION: 96 %

## 2019-05-12 DIAGNOSIS — I25.119 CORONARY ARTERY DISEASE INVOLVING NATIVE HEART WITH ANGINA PECTORIS, UNSPECIFIED VESSEL OR LESION TYPE (HCC): ICD-10-CM

## 2019-05-12 DIAGNOSIS — R07.9 CHEST PAIN, UNSPECIFIED TYPE: Primary | ICD-10-CM

## 2019-05-12 LAB
A/G RATIO: 1.2 (ref 1.1–2.2)
ALBUMIN SERPL-MCNC: 4 G/DL (ref 3.4–5)
ALP BLD-CCNC: 99 U/L (ref 40–129)
ALT SERPL-CCNC: 44 U/L (ref 10–40)
ANION GAP SERPL CALCULATED.3IONS-SCNC: 10 MMOL/L (ref 3–16)
AST SERPL-CCNC: 34 U/L (ref 15–37)
BASOPHILS ABSOLUTE: 0.1 K/UL (ref 0–0.2)
BASOPHILS RELATIVE PERCENT: 0.9 %
BILIRUB SERPL-MCNC: 0.5 MG/DL (ref 0–1)
BUN BLDV-MCNC: 12 MG/DL (ref 7–20)
CALCIUM SERPL-MCNC: 9.3 MG/DL (ref 8.3–10.6)
CHLORIDE BLD-SCNC: 100 MMOL/L (ref 99–110)
CO2: 26 MMOL/L (ref 21–32)
CREAT SERPL-MCNC: 0.9 MG/DL (ref 0.9–1.3)
D DIMER: <200 NG/ML DDU (ref 0–229)
EKG ATRIAL RATE: 54 BPM
EKG ATRIAL RATE: 56 BPM
EKG DIAGNOSIS: NORMAL
EKG DIAGNOSIS: NORMAL
EKG P AXIS: 18 DEGREES
EKG P AXIS: 28 DEGREES
EKG P-R INTERVAL: 176 MS
EKG P-R INTERVAL: 186 MS
EKG Q-T INTERVAL: 434 MS
EKG Q-T INTERVAL: 440 MS
EKG QRS DURATION: 140 MS
EKG QRS DURATION: 146 MS
EKG QTC CALCULATION (BAZETT): 417 MS
EKG QTC CALCULATION (BAZETT): 418 MS
EKG R AXIS: -23 DEGREES
EKG R AXIS: -25 DEGREES
EKG T AXIS: -11 DEGREES
EKG T AXIS: -19 DEGREES
EKG VENTRICULAR RATE: 54 BPM
EKG VENTRICULAR RATE: 56 BPM
EOSINOPHILS ABSOLUTE: 0.5 K/UL (ref 0–0.6)
EOSINOPHILS RELATIVE PERCENT: 4.5 %
GFR AFRICAN AMERICAN: >60
GFR NON-AFRICAN AMERICAN: >60
GLOBULIN: 3.3 G/DL
GLUCOSE BLD-MCNC: 152 MG/DL (ref 70–99)
HCT VFR BLD CALC: 42.4 % (ref 40.5–52.5)
HEMOGLOBIN: 14.4 G/DL (ref 13.5–17.5)
LIPASE: 41 U/L (ref 13–60)
LYMPHOCYTES ABSOLUTE: 3.5 K/UL (ref 1–5.1)
LYMPHOCYTES RELATIVE PERCENT: 33.7 %
MCH RBC QN AUTO: 29.2 PG (ref 26–34)
MCHC RBC AUTO-ENTMCNC: 34 G/DL (ref 31–36)
MCV RBC AUTO: 85.8 FL (ref 80–100)
MONOCYTES ABSOLUTE: 1 K/UL (ref 0–1.3)
MONOCYTES RELATIVE PERCENT: 9.7 %
NEUTROPHILS ABSOLUTE: 5.3 K/UL (ref 1.7–7.7)
NEUTROPHILS RELATIVE PERCENT: 51.2 %
PDW BLD-RTO: 15 % (ref 12.4–15.4)
PLATELET # BLD: 275 K/UL (ref 135–450)
PMV BLD AUTO: 7.6 FL (ref 5–10.5)
POTASSIUM REFLEX MAGNESIUM: 4.4 MMOL/L (ref 3.5–5.1)
PRO-BNP: 20 PG/ML (ref 0–124)
RBC # BLD: 4.94 M/UL (ref 4.2–5.9)
SODIUM BLD-SCNC: 136 MMOL/L (ref 136–145)
TOTAL PROTEIN: 7.3 G/DL (ref 6.4–8.2)
TROPONIN: <0.01 NG/ML
TROPONIN: <0.01 NG/ML
WBC # BLD: 10.3 K/UL (ref 4–11)

## 2019-05-12 PROCEDURE — 80053 COMPREHEN METABOLIC PANEL: CPT

## 2019-05-12 PROCEDURE — 93005 ELECTROCARDIOGRAM TRACING: CPT | Performed by: PHYSICIAN ASSISTANT

## 2019-05-12 PROCEDURE — 84484 ASSAY OF TROPONIN QUANT: CPT

## 2019-05-12 PROCEDURE — 83880 ASSAY OF NATRIURETIC PEPTIDE: CPT

## 2019-05-12 PROCEDURE — 83690 ASSAY OF LIPASE: CPT

## 2019-05-12 PROCEDURE — 76705 ECHO EXAM OF ABDOMEN: CPT

## 2019-05-12 PROCEDURE — 71046 X-RAY EXAM CHEST 2 VIEWS: CPT

## 2019-05-12 PROCEDURE — 99285 EMERGENCY DEPT VISIT HI MDM: CPT

## 2019-05-12 PROCEDURE — 6370000000 HC RX 637 (ALT 250 FOR IP): Performed by: PHYSICIAN ASSISTANT

## 2019-05-12 PROCEDURE — 93010 ELECTROCARDIOGRAM REPORT: CPT | Performed by: INTERNAL MEDICINE

## 2019-05-12 PROCEDURE — 85379 FIBRIN DEGRADATION QUANT: CPT

## 2019-05-12 PROCEDURE — 85025 COMPLETE CBC W/AUTO DIFF WBC: CPT

## 2019-05-12 RX ORDER — ASPIRIN 325 MG
325 TABLET ORAL ONCE
Status: COMPLETED | OUTPATIENT
Start: 2019-05-12 | End: 2019-05-12

## 2019-05-12 RX ADMIN — ASPIRIN 325 MG: 325 TABLET, COATED ORAL at 18:38

## 2019-05-12 RX ADMIN — NITROGLYCERIN 0.5 INCH: 20 OINTMENT TOPICAL at 22:41

## 2019-05-12 ASSESSMENT — HEART SCORE: ECG: 1

## 2019-05-12 ASSESSMENT — PAIN SCALES - GENERAL
PAINLEVEL_OUTOF10: 6
PAINLEVEL_OUTOF10: 3
PAINLEVEL_OUTOF10: 1

## 2019-05-12 ASSESSMENT — PAIN DESCRIPTION - LOCATION
LOCATION: CHEST

## 2019-05-12 ASSESSMENT — PAIN DESCRIPTION - ORIENTATION: ORIENTATION: MID

## 2019-05-12 ASSESSMENT — PAIN DESCRIPTION - FREQUENCY: FREQUENCY: CONTINUOUS

## 2019-05-12 ASSESSMENT — PAIN DESCRIPTION - PROGRESSION: CLINICAL_PROGRESSION: NOT CHANGED

## 2019-05-12 ASSESSMENT — PAIN DESCRIPTION - DESCRIPTORS
DESCRIPTORS: TIGHTNESS
DESCRIPTORS: TIGHTNESS

## 2019-05-12 ASSESSMENT — PAIN DESCRIPTION - PAIN TYPE: TYPE: ACUTE PAIN

## 2019-05-12 NOTE — ED PROVIDER NOTES
CHIEFCOMPLAINT   Chest Pain (tightness in chest.  started 2 hours ago while watching tv.  got diaphoretic.  bipass in 16.)      PATIENT INFORMATION  Carrol Bowers is a 46 y.o. male who presents to the ED for evaluation of sudden onset of 6 out of 10 chest tightness midsternal.  No radiation. Accompanied by diaphoresis. And nausea. Patient denies any shortness of breath. Patient sees [facility]. Indicates that he had CABG, two years ago. +DM, +HTN, + hyperlipidemia, patient states that his pain feels today the same as when he had a CABG. S/P cholecystectomy 04/11/2019. Nuclear med stress test 2/22/2019. I have reviewed the following from the nursing documentation, and I have confirmed the past medical history, medications, allergies, social history and family history with the patient.     Past Medical History:   Diagnosis Date    CAD (coronary artery disease) 2016    Diabetes mellitus ([facility] [address]     Hyperlipidemia     Hypertension     MI (myocardial infarction) ([facility] [address] [facility]     No history of procedure 06/01/2017    colonoscopy     Past Surgical History:   Procedure Laterality Date    CHOLECYSTECTOMY, LAPAROSCOPIC N/A 4/17/2019    LAPAROSCOPIC CHOLECYSTECTOMY WITH INTRAOPERATIVE CHOLANGIOGRAM performed by Raquel Dickson MD at [facility]  06/01/2017    Colonic polyp    CORONARY ARTERY BYPASS GRAFT  09/20/2016    LIMA- LAD, Reverse SVG- Rosalba, reverse SVG- PDA, Reverse SVG- OM seq- Diag    EYE SURGERY Right     drain placed behind eye    HERNIA REPAIR Bilateral 2005    bilateral inguinal hernia repair     Family History   Problem Relation Age of Onset    Heart Disease Father     Diabetes type 2  Father     Cancer Mother      Social History     Socioeconomic History    Marital status:      Spouse name: Not on file    Number of children: Not on file    Years of education: Not on file    Highest education level: Not on file   Occupational History    Not on file Social Needs    Financial resource strain: Not on file    Food insecurity:     Worry: Not on file     Inability: Not on file    Transportation needs:     Medical: Not on file     Non-medical: Not on file   Tobacco Use    Smoking status: Never Smoker    Smokeless tobacco: Never Used   Substance and Sexual Activity    Alcohol use: No    Drug use: No    Sexual activity: Yes     Partners: Female   Lifestyle    Physical activity:     Days per week: Not on file     Minutes per session: Not on file    Stress: Not on file   Relationships    Social connections:     Talks on phone: Not on file     Gets together: Not on file     Attends Worship service: Not on file     Active member of club or organization: Not on file     Attends meetings of clubs or organizations: Not on file     Relationship status: Not on file    Intimate partner violence:     Fear of current or ex partner: Not on file     Emotionally abused: Not on file     Physically abused: Not on file     Forced sexual activity: Not on file   Other Topics Concern    Not on file   Social History Narrative    Not on file     No current facility-administered medications for this encounter. No current outpatient medications on file.      Facility-Administered Medications Ordered in Other Encounters   Medication Dose Route Frequency Provider Last Rate Last Dose    aspirin chewable tablet 81 mg  81 mg Oral Daily Bia Rowe MD   81 mg at 05/13/19 0846    atorvastatin (LIPITOR) tablet 20 mg  20 mg Oral Daily Bia Rowe MD   20 mg at 05/13/19 0847    metoprolol tartrate (LOPRESSOR) tablet 25 mg  25 mg Oral BID Bia Rowe MD   25 mg at 05/13/19 2021    magnesium hydroxide (MILK OF MAGNESIA) 400 MG/5ML suspension 30 mL  30 mL Oral Daily PRN Bia Rowe MD        ondansetron (ZOFRAN) injection 4 mg  4 mg Intravenous Q6H PRN Bia Rowe MD        enoxaparin (LOVENOX) injection 40 mg  40 mg Subcutaneous Daily Reena Barber MD   Stopped at 05/13/19 0747    0.9 % sodium chloride infusion   Intravenous Continuous Reena Barber MD 75 mL/hr at 05/13/19 2313      acetaminophen (TYLENOL) tablet 650 mg  650 mg Oral Q4H PRN Reena Barber MD        nitroGLYCERIN (NITROSTAT) SL tablet 0.4 mg  0.4 mg Sublingual Q5 Min PRN Reena Barber MD        morphine (PF) injection 2 mg  2 mg Intravenous Q4H PRN Reena Barber MD   2 mg at 05/13/19 0115    sodium chloride flush 0.9 % injection 10 mL  10 mL Intravenous 2 times per day Premium MD Rosy   10 mL at 05/13/19 0847    sodium chloride flush 0.9 % injection 10 mL  10 mL Intravenous PRN Premium MD Rosy        perflutren lipid microspheres (DEFINITY) injection 1.65 mg  1.5 mL Intravenous ONCE PRN Premium MD Rosy        insulin lispro (HUMALOG) injection vial 0-6 Units  0-6 Units Subcutaneous TID WC Amber Cameron MD        insulin lispro (HUMALOG) injection vial 0-3 Units  0-3 Units Subcutaneous Nightly Amber Cameron MD   Stopped at 05/13/19 2029      No Known Allergies    REVIEW OF SYSTEMS  Review of Systems  10 systems reviewed, pertinent positives per HPI otherwise noted to be negative. PHYSICAL EXAM  /88   Pulse 55   Temp 98.7 °F (37.1 °C) (Oral)   Resp 17   Wt 198 lb (89.8 kg)   SpO2 96%   BMI 26.85 kg/m²  on room air  GENERALAPPEARANCE: Awake and alert. Cooperative. Non toxic, +diaphoresis, obesity  HEAD: Normocephalic. Atraumatic. EYES: PERRL. EOM's grossly intact. No scleral injection or icterus. ENT: Mucous membranes are moist.   NECK: Supple. No tracheal deviation. HEART: RRR. Cell healed midline scar. LUNGS: Respirations unlabored. CTAB. Good air exchange. Speaking comfortably in full sentences. ABDOMEN: Soft. Non-distended. Non-tender. No guarding or rebound. Normal bowel sounds. EXTREMITIES: No peripheral edema. Moves all extremities equally. All extremities neurovascularly intact.    SKIN: Warm and dry. No acute rashes. NEUROLOGICAL: Alert and oriented. No gross facial drooping. Strength 5/5, sensation intact. Normal coordination. Gait is steady. PSYCHIATRIC: Normal mood and affect. LABS  I have reviewed all labs for this visit.    Results for orders placed or performed during the hospital encounter of 05/12/19   CBC Auto Differential   Result Value Ref Range    WBC 10.3 4.0 - 11.0 K/uL    RBC 4.94 4.20 - 5.90 M/uL    Hemoglobin 14.4 13.5 - 17.5 g/dL    Hematocrit 42.4 40.5 - 52.5 %    MCV 85.8 80.0 - 100.0 fL    MCH 29.2 26.0 - 34.0 pg    MCHC 34.0 31.0 - 36.0 g/dL    RDW 15.0 12.4 - 15.4 %    Platelets 617 079 - 916 K/uL    MPV 7.6 5.0 - 10.5 fL    Neutrophils % 51.2 %    Lymphocytes % 33.7 %    Monocytes % 9.7 %    Eosinophils % 4.5 %    Basophils % 0.9 %    Neutrophils # 5.3 1.7 - 7.7 K/uL    Lymphocytes # 3.5 1.0 - 5.1 K/uL    Monocytes # 1.0 0.0 - 1.3 K/uL    Eosinophils # 0.5 0.0 - 0.6 K/uL    Basophils # 0.1 0.0 - 0.2 K/uL   Comprehensive Metabolic Panel w/ Reflex to MG   Result Value Ref Range    Sodium 136 136 - 145 mmol/L    Potassium reflex Magnesium 4.4 3.5 - 5.1 mmol/L    Chloride 100 99 - 110 mmol/L    CO2 26 21 - 32 mmol/L    Anion Gap 10 3 - 16    Glucose 152 (H) 70 - 99 mg/dL    BUN 12 7 - 20 mg/dL    CREATININE 0.9 0.9 - 1.3 mg/dL    GFR Non-African American >60 >60    GFR African American >60 >60    Calcium 9.3 8.3 - 10.6 mg/dL    Total Protein 7.3 6.4 - 8.2 g/dL    Alb 4.0 3.4 - 5.0 g/dL    Albumin/Globulin Ratio 1.2 1.1 - 2.2    Total Bilirubin 0.5 0.0 - 1.0 mg/dL    Alkaline Phosphatase 99 40 - 129 U/L    ALT 44 (H) 10 - 40 U/L    AST 34 15 - 37 U/L    Globulin 3.3 g/dL   Troponin   Result Value Ref Range    Troponin <0.01 <0.01 ng/mL   Brain Natriuretic Peptide   Result Value Ref Range    Pro-BNP 20 0 - 124 pg/mL   Lipase   Result Value Ref Range    Lipase 41.0 13.0 - 60.0 U/L   D-Dimer, Quantitative   Result Value Ref Range    D-Dimer, Quant <200 0 - 229 ng/mL DDU   Troponin Result Value Ref Range    Troponin <0.01 <0.01 ng/mL   EKG 12 Lead   Result Value Ref Range    Ventricular Rate 54 BPM    Atrial Rate 54 BPM    P-R Interval 176 ms    QRS Duration 146 ms    Q-T Interval 440 ms    QTc Calculation (Bazett) 417 ms    P Axis 18 degrees    R Axis -25 degrees    T Axis -19 degrees    Diagnosis       Sinus bradycardiaRight bundle branch blockMinimal voltage criteria for LVH, may be normal variantAbnormal ECGConfirmed by Faiza Santiago MD, [facility] ([id number]) on 5/12/2019 8:29:10 PM   EKG 12 Lead   Result Value Ref Range    Ventricular Rate 56 BPM    Atrial Rate 56 BPM    P-R Interval 186 ms    QRS Duration 140 ms    Q-T Interval 434 ms    QTc Calculation (Bazett) 418 ms    P Axis 28 degrees    R Axis -23 degrees    T Axis -11 degrees    Diagnosis       Sinus bradycardiaRight bundle branch blockMinimal voltage criteria for LVH, may be normal variantAbnormal ECGConfirmed by Faiza Santiago MD, [address] ([id number]) on 5/12/2019 8:36:46 PM       RADIOLOGY     XR CHEST STANDARD (2 VW) (Final result)   Result time 05/12/19 18:59:58   Final result by Miladys Flowers MD (05/12/19 18:59:58)                Impression:    No acute cardiopulmonary disease on this study obtained at low lung volumes. Left basilar chronic atelectasis/fibrosis.             Narrative:    EXAMINATION:  TWO XRAY VIEWS OF THE CHEST    5/12/2019 6:25 pm    COMPARISON:  02/22/2019    HISTORY:  ORDERING SYSTEM PROVIDED HISTORY: other  TECHNOLOGIST PROVIDED HISTORY:  Reason for exam:->other  Ordering Physician Provided Reason for Exam: hx of by-pass, central chest  pain today  Additional signs and symptoms: pt unable to take deeper breath than imaged    FINDINGS:  Study was obtained at low lung volumes.  There is left basilar chronic  atelectasis/fibrosis with blunting of the costophrenic angle.  No acute  infiltrate, pneumothorax or pleural fluid.  Prior sternal splitting  procedure.  The heart size is within normal limits.  No acute bone finding. Thoracic spondylosis.  Right upper quadrant surgical clips.                    CONSULTATIONS  Cardiology, Dr Andrea Briceño hospitalist, indicates she will admit, when seen in [facility] ED and then consult called. [facility] ED attending, dr Jorden Garcia spoke to. Reviewed nuclear stress test from February 2019 which revealed;       Summary    Normal LV size and systolic function. Left ventricular ejection fraction of    68%. Normal wall motion.    There is normal isotope uptake at stress and rest. There is no evidence of    myocardial ischemia or scar.         ED COURSE/MDM; Afebrile, stable, patient presents to the ED for evaluation. Seen in conjunction with attending ED provider who agrees with assessment and plan, Dr Jorden Garcia  Patients PO2 is 96% on room air they are not hypoxic, provided with ASA and nitropaste. Mild improvement in symptoms. Cardiac workup ordered. U/S of abdomen to r/o retained stone from recent cholecystectomy. Negative d-dimer. No elevation in troponin. EKG shows, sinus bradycardia right bundle branch block and no acute changes from past ekg. Labs evaluated reveal a leukocytosis no anemia no electrolyte abnormality. No elevation in troponin her BNP patient is kept for serial troponins due to patient's past history consulted cardiology who was not helpful in obtaining a close follow-up appointment therefore think it is in the patient's best interest with his past history to admit him until he can be seen by cardiology. Consult to the hospitalist [facility] who I spoke with him about admission however patient is indicating at this time and he refuses to be admitted to [facility] can drive himself from our facility to their facility. I discussed this may attending provider we are both in agreement the patient has a capacity to make this change make this decision.   However he both feel it is very dangerous and not a good decision we will have the patient sign out against my medical advice if he is going to drive himself from our facility there is we did take every accommodation possible and suggestion of possible to facilitate a transfer via EMS and patient refused. I verified the patient did arrive at [facility] safely and was admitted in stable condition to telemetry. Patient was given the following medications in the ED:  Medications   aspirin tablet 325 mg (325 mg Oral Given 5/12/19 1558)   nitroglycerin (NITRO-BID) 2 % ointment 0.5 inch (0.5 inches Topical Patch Removed 5/12/19 3870)     At this time, patient is ready for signed out [facility]  1. Chest pain, unspecified type    2. Coronary artery disease involving native heart with angina pectoris, unspecified vessel or lesion type ([facility])        Blood pressure 125/88, pulse 55, temperature 98.7 °F (37.1 °C), temperature source Oral, resp. rate 17, weight 198 lb (89.8 kg), SpO2 96 %. DISPOSITION  Carrol Bowers is in stable condition upon AMA.           Zackery Westfall PA-C  05/14/19 0033       Yaa Tejeda PA-C  05/14/19 9610

## 2019-05-13 ENCOUNTER — HOSPITAL ENCOUNTER (INPATIENT)
Age: 54
LOS: 1 days | Discharge: HOME | DRG: 191 | End: 2019-05-14
Attending: EMERGENCY MEDICINE | Admitting: INTERNAL MEDICINE
Payer: COMMERCIAL

## 2019-05-13 DIAGNOSIS — R07.9 CHEST PAIN, UNSPECIFIED TYPE: Primary | ICD-10-CM

## 2019-05-13 LAB
CHOLESTEROL, TOTAL: 134 MG/DL (ref 0–199)
GLUCOSE BLD-MCNC: 101 MG/DL (ref 70–99)
GLUCOSE BLD-MCNC: 114 MG/DL (ref 70–99)
GLUCOSE BLD-MCNC: 82 MG/DL (ref 70–99)
GLUCOSE BLD-MCNC: 93 MG/DL (ref 70–99)
HDLC SERPL-MCNC: 26 MG/DL (ref 40–60)
LDL CHOLESTEROL CALCULATED: 76 MG/DL
LV EF: 55 %
LVEF MODALITY: NORMAL
PERFORMED ON: ABNORMAL
PERFORMED ON: ABNORMAL
PERFORMED ON: NORMAL
PERFORMED ON: NORMAL
TRIGL SERPL-MCNC: 160 MG/DL (ref 0–150)
TROPONIN: <0.01 NG/ML
TROPONIN: <0.01 NG/ML
VLDLC SERPL CALC-MCNC: 32 MG/DL

## 2019-05-13 PROCEDURE — 6370000000 HC RX 637 (ALT 250 FOR IP): Performed by: INTERNAL MEDICINE

## 2019-05-13 PROCEDURE — 4A023N7 MEASUREMENT OF CARDIAC SAMPLING AND PRESSURE, LEFT HEART, PERCUTANEOUS APPROACH: ICD-10-PCS | Performed by: INTERNAL MEDICINE

## 2019-05-13 PROCEDURE — B2111ZZ FLUOROSCOPY OF MULTIPLE CORONARY ARTERIES USING LOW OSMOLAR CONTRAST: ICD-10-PCS | Performed by: INTERNAL MEDICINE

## 2019-05-13 PROCEDURE — 2580000003 HC RX 258: Performed by: INTERNAL MEDICINE

## 2019-05-13 PROCEDURE — 6370000000 HC RX 637 (ALT 250 FOR IP): Performed by: EMERGENCY MEDICINE

## 2019-05-13 PROCEDURE — 1200000000 HC SEMI PRIVATE

## 2019-05-13 PROCEDURE — 93459 L HRT ART/GRFT ANGIO: CPT

## 2019-05-13 PROCEDURE — 93458 L HRT ARTERY/VENTRICLE ANGIO: CPT | Performed by: INTERNAL MEDICINE

## 2019-05-13 PROCEDURE — 6360000002 HC RX W HCPCS: Performed by: INTERNAL MEDICINE

## 2019-05-13 PROCEDURE — 36415 COLL VENOUS BLD VENIPUNCTURE: CPT

## 2019-05-13 PROCEDURE — 2709999900 HC NON-CHARGEABLE SUPPLY

## 2019-05-13 PROCEDURE — 2500000003 HC RX 250 WO HCPCS

## 2019-05-13 PROCEDURE — 6360000002 HC RX W HCPCS

## 2019-05-13 PROCEDURE — 99285 EMERGENCY DEPT VISIT HI MDM: CPT

## 2019-05-13 PROCEDURE — 93005 ELECTROCARDIOGRAM TRACING: CPT | Performed by: EMERGENCY MEDICINE

## 2019-05-13 PROCEDURE — B2151ZZ FLUOROSCOPY OF LEFT HEART USING LOW OSMOLAR CONTRAST: ICD-10-PCS | Performed by: INTERNAL MEDICINE

## 2019-05-13 PROCEDURE — 6360000004 HC RX CONTRAST MEDICATION

## 2019-05-13 PROCEDURE — 84484 ASSAY OF TROPONIN QUANT: CPT

## 2019-05-13 PROCEDURE — 80061 LIPID PANEL: CPT

## 2019-05-13 PROCEDURE — 83036 HEMOGLOBIN GLYCOSYLATED A1C: CPT

## 2019-05-13 PROCEDURE — B2131ZZ FLUOROSCOPY OF MULTIPLE CORONARY ARTERY BYPASS GRAFTS USING LOW OSMOLAR CONTRAST: ICD-10-PCS | Performed by: INTERNAL MEDICINE

## 2019-05-13 PROCEDURE — 99255 IP/OBS CONSLTJ NEW/EST HI 80: CPT | Performed by: INTERNAL MEDICINE

## 2019-05-13 PROCEDURE — 93306 TTE W/DOPPLER COMPLETE: CPT

## 2019-05-13 PROCEDURE — 99152 MOD SED SAME PHYS/QHP 5/>YRS: CPT

## 2019-05-13 PROCEDURE — C1894 INTRO/SHEATH, NON-LASER: HCPCS

## 2019-05-13 PROCEDURE — 99153 MOD SED SAME PHYS/QHP EA: CPT

## 2019-05-13 PROCEDURE — C1769 GUIDE WIRE: HCPCS

## 2019-05-13 PROCEDURE — 96374 THER/PROPH/DIAG INJ IV PUSH: CPT

## 2019-05-13 RX ORDER — ACETAMINOPHEN 325 MG/1
650 TABLET ORAL EVERY 4 HOURS PRN
Status: DISCONTINUED | OUTPATIENT
Start: 2019-05-13 | End: 2019-05-14 | Stop reason: HOSPADM

## 2019-05-13 RX ORDER — SODIUM CHLORIDE 0.9 % (FLUSH) 0.9 %
10 SYRINGE (ML) INJECTION PRN
Status: DISCONTINUED | OUTPATIENT
Start: 2019-05-13 | End: 2019-05-14 | Stop reason: HOSPADM

## 2019-05-13 RX ORDER — SODIUM CHLORIDE 9 MG/ML
INJECTION, SOLUTION INTRAVENOUS CONTINUOUS
Status: DISCONTINUED | OUTPATIENT
Start: 2019-05-13 | End: 2019-05-13

## 2019-05-13 RX ORDER — SODIUM CHLORIDE 0.9 % (FLUSH) 0.9 %
10 SYRINGE (ML) INJECTION PRN
Status: DISCONTINUED | OUTPATIENT
Start: 2019-05-13 | End: 2019-05-13 | Stop reason: SDUPTHER

## 2019-05-13 RX ORDER — SODIUM CHLORIDE 9 MG/ML
INJECTION, SOLUTION INTRAVENOUS CONTINUOUS
Status: DISCONTINUED | OUTPATIENT
Start: 2019-05-13 | End: 2019-05-14 | Stop reason: HOSPADM

## 2019-05-13 RX ORDER — NITROGLYCERIN 0.4 MG/1
0.4 TABLET SUBLINGUAL EVERY 5 MIN PRN
Status: DISCONTINUED | OUTPATIENT
Start: 2019-05-13 | End: 2019-05-14 | Stop reason: HOSPADM

## 2019-05-13 RX ORDER — FENTANYL CITRATE 50 UG/ML
25 INJECTION, SOLUTION INTRAMUSCULAR; INTRAVENOUS ONCE
Status: COMPLETED | OUTPATIENT
Start: 2019-05-13 | End: 2019-05-13

## 2019-05-13 RX ORDER — SODIUM CHLORIDE 0.9 % (FLUSH) 0.9 %
10 SYRINGE (ML) INJECTION EVERY 12 HOURS SCHEDULED
Status: DISCONTINUED | OUTPATIENT
Start: 2019-05-13 | End: 2019-05-13 | Stop reason: SDUPTHER

## 2019-05-13 RX ORDER — ONDANSETRON 2 MG/ML
4 INJECTION INTRAMUSCULAR; INTRAVENOUS EVERY 6 HOURS PRN
Status: DISCONTINUED | OUTPATIENT
Start: 2019-05-13 | End: 2019-05-14 | Stop reason: HOSPADM

## 2019-05-13 RX ORDER — ASPIRIN 81 MG/1
81 TABLET, CHEWABLE ORAL DAILY
Status: DISCONTINUED | OUTPATIENT
Start: 2019-05-13 | End: 2019-05-14 | Stop reason: HOSPADM

## 2019-05-13 RX ORDER — MIDAZOLAM HYDROCHLORIDE 5 MG/ML
2 INJECTION INTRAMUSCULAR; INTRAVENOUS ONCE
Status: COMPLETED | OUTPATIENT
Start: 2019-05-13 | End: 2019-05-13

## 2019-05-13 RX ORDER — MORPHINE SULFATE 2 MG/ML
2 INJECTION, SOLUTION INTRAMUSCULAR; INTRAVENOUS EVERY 4 HOURS PRN
Status: DISCONTINUED | OUTPATIENT
Start: 2019-05-13 | End: 2019-05-14 | Stop reason: HOSPADM

## 2019-05-13 RX ORDER — ATORVASTATIN CALCIUM 40 MG/1
20 TABLET, FILM COATED ORAL DAILY
Status: DISCONTINUED | OUTPATIENT
Start: 2019-05-13 | End: 2019-05-14 | Stop reason: HOSPADM

## 2019-05-13 RX ORDER — SODIUM CHLORIDE 0.9 % (FLUSH) 0.9 %
10 SYRINGE (ML) INJECTION EVERY 12 HOURS SCHEDULED
Status: DISCONTINUED | OUTPATIENT
Start: 2019-05-13 | End: 2019-05-14 | Stop reason: HOSPADM

## 2019-05-13 RX ADMIN — METOPROLOL TARTRATE 25 MG: 25 TABLET ORAL at 20:21

## 2019-05-13 RX ADMIN — SODIUM CHLORIDE: 9 INJECTION, SOLUTION INTRAVENOUS at 23:13

## 2019-05-13 RX ADMIN — FENTANYL CITRATE 25 MCG: 50 INJECTION INTRAMUSCULAR; INTRAVENOUS at 15:50

## 2019-05-13 RX ADMIN — ATORVASTATIN CALCIUM 20 MG: 40 TABLET, FILM COATED ORAL at 08:47

## 2019-05-13 RX ADMIN — SODIUM CHLORIDE: 9 INJECTION, SOLUTION INTRAVENOUS at 05:17

## 2019-05-13 RX ADMIN — MORPHINE SULFATE 2 MG: 2 INJECTION, SOLUTION INTRAMUSCULAR; INTRAVENOUS at 01:15

## 2019-05-13 RX ADMIN — SODIUM CHLORIDE, PRESERVATIVE FREE 10 ML: 5 INJECTION INTRAVENOUS at 08:47

## 2019-05-13 RX ADMIN — ASPIRIN 81 MG 81 MG: 81 TABLET ORAL at 08:46

## 2019-05-13 RX ADMIN — NITROGLYCERIN 1 INCH: 20 OINTMENT TOPICAL at 01:15

## 2019-05-13 RX ADMIN — MIDAZOLAM HYDROCHLORIDE 2 MG: 5 INJECTION, SOLUTION INTRAMUSCULAR; INTRAVENOUS at 15:50

## 2019-05-13 ASSESSMENT — PAIN - FUNCTIONAL ASSESSMENT: PAIN_FUNCTIONAL_ASSESSMENT: ACTIVITIES ARE NOT PREVENTED

## 2019-05-13 ASSESSMENT — PAIN SCALES - GENERAL
PAINLEVEL_OUTOF10: 0
PAINLEVEL_OUTOF10: 0
PAINLEVEL_OUTOF10: 3
PAINLEVEL_OUTOF10: 0
PAINLEVEL_OUTOF10: 2
PAINLEVEL_OUTOF10: 2
PAINLEVEL_OUTOF10: 0

## 2019-05-13 ASSESSMENT — PAIN DESCRIPTION - PROGRESSION: CLINICAL_PROGRESSION: GRADUALLY IMPROVING

## 2019-05-13 ASSESSMENT — PAIN DESCRIPTION - ORIENTATION: ORIENTATION: MID

## 2019-05-13 ASSESSMENT — PAIN DESCRIPTION - PAIN TYPE
TYPE: ACUTE PAIN
TYPE: ACUTE PAIN

## 2019-05-13 ASSESSMENT — PAIN DESCRIPTION - DESCRIPTORS: DESCRIPTORS: TIGHTNESS

## 2019-05-13 ASSESSMENT — PAIN DESCRIPTION - LOCATION
LOCATION: CHEST
LOCATION: CHEST

## 2019-05-13 ASSESSMENT — PAIN DESCRIPTION - FREQUENCY: FREQUENCY: INTERMITTENT

## 2019-05-13 NOTE — H&P
home    TOBACCO:   reports that he has never smoked. He has never used smokeless tobacco.  ETOH:   reports that he does not drink alcohol. Family History:      Reviewed in detail and  Positive as follows:        Problem Relation Age of Onset    Heart Disease Father     Diabetes type 2  Father     Cancer Mother        REVIEW OF SYSTEMS:   All twelve systems reviewed and negative except for noted in HPI. PHYSICAL EXAM PERFORMED:    /87   Pulse 69   Temp 97.8 °F (36.6 °C) (Oral)   Resp 16   Ht 6' (1.829 m)   Wt 198 lb (89.8 kg)   SpO2 96%   BMI 26.85 kg/m²     General appearance:  No apparent distress, appears stated age and cooperative. HEENT:  Normal cephalic, atraumatic without obvious deformity. Pupils equal, round, and reactive to light. Extra ocular muscles intact. Conjunctivae/corneas clear. Neck: Supple, with full range of motion. No jugular venous distention. Trachea midline. Respiratory:  Normal respiratory effort. Clear to auscultation, bilaterally without Rales/Wheezes/Rhonchi. Cardiovascular:  Regular rate and rhythm with normal S1/S2 without murmurs, rubs or gallops. Abdomen: Soft, non-tender, non-distended with normal bowel sounds. Musculoskeletal: No clubbing, cyanosis or edema bilaterally. Full range of motion without deformity. Peripheral Pulses: +2 palpable, equal bilaterally   Skin: Skin color, texture, turgor normal.  No rashes or lesions. Neurologic:  Neurovascularly intact without any focal sensory/motor deficits.  Cranial nerves: II-XII intact, grossly non-focal.  Psychiatric:  Alert and oriented, thought content appropriate, normal insight  Capillary Refill: Brisk,< 3 seconds         Labs:     Recent Labs     05/12/19  1832   WBC 10.3   HGB 14.4   HCT 42.4        Recent Labs     05/12/19  1832      K 4.4      CO2 26   BUN 12   CREATININE 0.9   CALCIUM 9.3     Recent Labs     05/12/19  1832   AST 34   ALT 44*   BILITOT 0.5   ALKPHOS 99     No results for input(s): INR in the last 72 hours. Recent Labs     05/12/19  1832 05/12/19  2130   TROPONINI <0.01 <0.01       Urinalysis:      Lab Results   Component Value Date    NITRU Negative 03/27/2019    WBCUA 0-2 03/27/2019    BACTERIA 1+ 03/27/2019    RBCUA 3-5 03/27/2019    BLOODU SMALL 03/27/2019    SPECGRAV >=1.030 03/27/2019    GLUCOSEU Negative 03/27/2019       Radiology:     CXR: I have reviewed the CXR with the following interpretation: as below  EKG:  I have reviewed the EKG with the following interpretation:  No ST elevation or Depression    No orders to display       ASSESSMENT:  PLAN:    Chest pain in pt with multiple cardiac risk factors, s/p CABG 2 years . Pt will be transferred to telemetry floor for further management and cardiology consult will be obtained  in AM        DVT Prophylaxis: Lovenox  Diet:  Cardiac , ADA  Code Status:  Full         Electronically signed by Giovanni Vivas MD on 5/13/2019 at 12:49 AM    Thank you DONALDO Enriquez CNP for the opportunity to be involved in this patient's care.  If you have any questions or concerns please feel free to contact me

## 2019-05-13 NOTE — BRIEF OP NOTE
Brief Postoperative Note  ______________________________________________________________    Patient: James Coburn  YOB: 1965  MRN: [id number]      Brief Postoperative Note  Pre-operative Diagnosis: Chest pain  Post-operative Diagnosis: Same  Procedure:   LHC  LVG  Cors  Graft angio x 3  Anesthesia: Moderate Sedation  Surgeons/Assistants: Jessica Cuadra MD, Jarred Patikarin  Estimated Blood Loss: less than 50   Complications: None  Specimens: Was Not Obtained    Findings:     LEFT HEART CATH  LM: calcified,    LAD: calcified  LCX: calcified, prox 80-90%   OM1- small, ostial 80% (too small for PCI)   OM2- tiny vessel   OM3- larger vessel, acute takeoff angle, ostial 90%. OM4- distal 80%  RCA: dominant, moderate diffuse disease. prox 50%, mid 60%, distal 70%   R->Lt collaterals to Lcx   PDA: 100% ostial with L->R collaterals   PLV: luminals    LIMA-LAD: patent with  Touchdown to mid LAD, mid 40-50% in native LAD  SVG-->RT RV Marginal patent with retrograde flow up to RCA  SVG-->Diag: patent. Flow down LAd and up to LCx   - Torsades with injection requiring Defib  No other grafts and no sequential limbs seen      LVEDP: 4  LVEF: 55%      Assessment  1. Severe CAD as above. Patent grafts x3 but unable to find grafts or \"sequentials\" to OM or PDa   - will need to get Cardiac CTA to eval further  2.  May need to consider PCi to LCX but would likely jeopardize OM1-3.              Mookie Romero MD  Date: 5/13/2019  Time: 4:51 PM

## 2019-05-13 NOTE — CONSULTS
[address] [place] [address]  [phone]      Attending Physician: Wisam Carpenter MD  Reason for Consultation/Chief Complaint:  Chest pain, diaphoresis    Subjective   History of Present Illness:  Reagan Jimenez is a 46 y.o. patient who presented to the hospital with complaints of chest pain and sweating that came on suddenly while watching tv yesterday. He came to ER and was admitted to hospital, had troponin that was negative x2. Pt feels better today. He says current symptoms are distinct from prior symptoms of fatigue which prompted cad eval and cabg. He did recently have cholecystitis in march/april 2019 and had shanelle surgery w/ dr Roderick Lomas on 4/17/19 and has done well after that w/ no complications. He has not had recent med changes, he has been compliant with meds. His cv hx dates back to 2016, and had cath for cp/sob/fatigue. He was in [facility] in 9/2016 and had cath w/ dr Rangel Montano and then had cabgx5 on 9/20/16 w/ dr Dia Nye, anatomy is as follows per OP note:    1. LIMA to LAD. 2. Reverse greater saphenous vein graft to acute marginal  3. Reverse greater saphenous vein graft to PDA sequentiall. 4. Reverse greater saphenous vein graft to OM sequential  5. Reverse greater sVG to diagonal sequential.    He chronically has had htn, hchol on metoprolol and statin. In 2/2019, he was admitted w/ cp, had negative stress test at that time. In 4/2018, he was in hosp w/ cp and  negative stress test at that time. Past Medical History:   has a past medical history of CAD (coronary artery disease), Diabetes mellitus ([facility] [address] Hyperlipidemia, Hypertension, and No history of procedure. Surgical History:   has a past surgical history that includes Coronary artery bypass graft (09/20/2016); Eye surgery (Right); Colonoscopy (06/01/2017); hernia repair (Bilateral, 2005); and Cholecystectomy, laparoscopic (N/A, 4/17/2019). Social History:   reports that he has never smoked.  He has never used smokeless tobacco. He reports that he does not drink alcohol or use drugs. Family History:  family history includes Cancer in his mother; Diabetes type 2  in his father; Heart Disease in his father. f had cabg in his 46s      Home Medications:  Were reviewed and are listed in nursing record and/or below  Prior to Admission medications    Medication Sig Start Date End Date Taking? Authorizing Provider   atorvastatin (LIPITOR) 20 MG tablet TAKE 1 TABLET BY MOUTH NIGHTLY 12/24/18  Yes DONALDO Mcneill CNP   metoprolol tartrate (LOPRESSOR) 25 MG tablet TAKE 1 TABLET BY MOUTH 2 TIMES DAILY 12/24/18  Yes DONALDO Mcneill CNP   aspirin 81 MG tablet Take 81 mg by mouth daily   Yes Historical Provider, MD        CURRENT Medications:    aspirin chewable tablet 81 mg Daily   atorvastatin (LIPITOR) tablet 20 mg Daily   metoprolol tartrate (LOPRESSOR) tablet 25 mg BID   sodium chloride flush 0.9 % injection 10 mL 2 times per day   sodium chloride flush 0.9 % injection 10 mL PRN   magnesium hydroxide (MILK OF MAGNESIA) 400 MG/5ML suspension 30 mL Daily PRN   ondansetron (ZOFRAN) injection 4 mg Q6H PRN   enoxaparin (LOVENOX) injection 40 mg Daily   0.9 % sodium chloride infusion Continuous   acetaminophen (TYLENOL) tablet 650 mg Q4H PRN   nitroGLYCERIN (NITROSTAT) SL tablet 0.4 mg Q5 Min PRN   morphine (PF) injection 2 mg Q4H PRN   insulin lispro (HUMALOG) injection vial 0-12 Units TID WC   insulin lispro (HUMALOG) injection vial 0-6 Units Nightly       Allergies:  Patient has no known allergies. Review of Systems:   A 14 point review of symptoms completed. Pertinent positives identified in the HPI, all other review of symptoms negative as below.       Objective   PHYSICAL EXAM:    Vitals:    05/13/19 0711   BP: 103/65   Pulse: 90   Resp: 16   Temp: 98.1 °F (36.7 °C)   SpO2: 93%    Weight: 203 lb 1.6 oz (92.1 kg)         General Appearance:  Alert, cooperative, no distress, appears stated age   Head:  Normocephalic, without obvious abnormality, atraumatic   Eyes:  PERRL, conjunctiva/corneas clear   Nose: Nares normal, no drainage or sinus tenderness   Throat: Lips, mucosa, and tongue normal   Neck: Supple, symmetrical, trachea midline, no adenopathy, thyroid: not enlarged, symmetric, no tenderness/mass/nodules, no carotid bruit or JVD   Lungs:   Clear to auscultation bilaterally, respirations unlabored   Chest Wall:  No deformity or tenderness   Heart:  Regular rate and rhythm, S1, S2 normal, no murmur, rub or gallop   Abdomen:   Soft, non-tender, bowel sounds active all four quadrants,  no masses, no organomegaly   Extremities: Extremities normal, atraumatic, no cyanosis or edema   Pulses: 2+ and symmetric   Skin: Skin color, texture, turgor normal, no rashes or lesions   Pysch: Normal mood and affect   Neurologic: Normal gross motor and sensory exam.         Labs   CBC: Lab Results   Component Value Date    WBC 10.3 05/12/2019    RBC 4.94 05/12/2019    HGB 14.4 05/12/2019    HCT 42.4 05/12/2019    MCV 85.8 05/12/2019    RDW 15.0 05/12/2019     05/12/2019     CMP:  Lab Results   Component Value Date     05/12/2019    K 4.4 05/12/2019     05/12/2019    CO2 26 05/12/2019    BUN 12 05/12/2019    CREATININE 0.9 05/12/2019    GFRAA >60 05/12/2019    AGRATIO 1.2 05/12/2019    LABGLOM >60 05/12/2019    GLUCOSE 152 05/12/2019    PROT 7.3 05/12/2019    CALCIUM 9.3 05/12/2019    BILITOT 0.5 05/12/2019    ALKPHOS 99 05/12/2019    AST 34 05/12/2019    ALT 44 05/12/2019     PT/INR:  No results found for: PTINR  HgBA1c:  Lab Results   Component Value Date    LABA1C 6.7 04/03/2019     Lab Results   Component Value Date    TROPONINI <0.01 05/13/2019         Cardiac Data     Last EKG:     nsr rbbb infer infarct, similar to prev    Echo:    Stress Test:    2/2019    Conclusions        Summary    Normal LV size and systolic function. Left ventricular ejection fraction of    68%. Normal wall motion.  There is normal isotope uptake at stress and rest. There is no evidence of    myocardial ischemia or scar.             Cath:    Studies:     Cxr:       FINDINGS:   Study was obtained at low lung volumes.  There is left basilar chronic   atelectasis/fibrosis with blunting of the costophrenic angle.  No acute   infiltrate, pneumothorax or pleural fluid.  Prior sternal splitting   procedure.  The heart size is within normal limits.  No acute bone finding. Thoracic spondylosis.  Right upper quadrant surgical clips.           Impression   No acute cardiopulmonary disease on this study obtained at low lung volumes.       Left basilar chronic atelectasis/fibrosis. I have reviewed labs and imaging/xray/diagnostic testing in this note. Assessment and Plan        Patient Active Problem List   Diagnosis    Family history of early CAD    S/P CABG (coronary artery bypass graft)    Benign essential HTN    Mixed hyperlipidemia    Coronary artery disease involving native heart without angina pectoris    Microcytosis    Acute glaucoma of right eye    Chest pain    Paresthesia    Acute calculous cholecystitis    New onset type 2 diabetes mellitus ([facility] [address]    S/P laparoscopic cholecystectomy       Chest pain, concern for [place], neg stress test in 2018 and 2019, options d/w pt and family, rec: ht cath, they understand r/b/a/o and wish to proceed      DISCUSSION OF CARDIAC CATHETERIZATION PROCEDURES: The procedures, indications, risks and alternatives have been discussed with the patient and, as appropriate, with the patient's guardian . Risks discussed included, but are not limited to, bleeding, development of blood clots/emboli, damage to blood vessels, renal failure, malignant cardiac arrhythmias, stroke, heart attack, emergent coronary bypass surgery, death, dye allergy. The patient (and guardian as appropriate) expressed understanding of the aforementioned and wished to proceed.     Get echo    On aspirin, bbyvonne, statin      Thank you for allowing us to participate in the care of Buzz Goins. Please call me with any questions [phone].     Tye Haider MD, [facility]   Interventional Cardiologist  [address]  [phone] [facility]  [phone] [address] [place] [address]  5/13/2019 8:09 AM

## 2019-05-13 NOTE — CARE COORDINATION
CASE MANAGEMENT INITIAL ASSESSMENT      Reviewed chart and met with patient today, re: Possible discharge needs. Explained Case Management role/services. yes    Family present: girlfriend at bedside. Primary contact information: Sergio castaneda [phone]    Admit date/status: 5/13/19 IP  Diagnosis: Chest pain    Insurance: [address] [employer] required for SNF - Y        3 night stay required -  N    Living arrangements, Adls, care needs, prior to admission: lives with GF in a one story house, independent and drives. Transportation: private    Emergent One at home: None    Services in the home and/or outpatient, prior to admission: None    Dialysis Facility (if applicable) N/A  · Name:  · Address:  · Dialysis Schedule:  · Phone:  · Fax:    PT/OT recs: None seen at this time. Hospital Exemption Notification (HEN): Needed for snf, not initiated. Barriers to discharge: none    Plan/comments: CM met with pt a bedside for initial assessment. Pt is independent and drives. Plans to discharge home with girlfriend and denies needs from DCP at this time. If any need or concerns should arise please advise.      ECOC on chart for MD signature

## 2019-05-13 NOTE — ED NOTES
Patient did not wish to be transferred to [facility] via squad. Wished to go via private vehicle. AMA papers signed. Patient instructed to go to [facility] [place] ED. IV removed from left forearm, tip intact, gauze pressure dressing applied.  Nitro paste removed from left upper chest.      Cheri Fay RN  05/12/19 [id number]

## 2019-05-13 NOTE — ED PROVIDER NOTES
[facility] Emergency Department    CHIEF COMPLAINT  Chest Pain (was seen at [place] was suppose to get admitted tonight. did not want to come by squad. drove self here to be admitted )      HISTORY OF PRESENT ILLNESS  Syed Merino is a 46 y.o. male  presenting to the ER with chest pain. Chest pain started around 4 PM associated with some shortness of breath and nausea. He initially presented to [facility].  They did recommend that he be admitted to [facility] where I am currently working. They were recommending that he be directly admitted but the patient did not want to travel by ambulance. He was concerned about leaving his wife at the other hospital and he was concerned about leaving his car there. Patient decided to check out of the other hospital and presents here for admission directly to the ER. Chest pain gets worse with exertion. No other complaints, modifying factors or associated symptoms. I have reviewed the following from the nursing documentation.     Past Medical History:   Diagnosis Date    CAD (coronary artery disease) 2016    Diabetes mellitus ([address]     Hyperlipidemia     Hypertension     No history of procedure 06/01/2017    colonoscopy     Past Surgical History:   Procedure Laterality Date    CHOLECYSTECTOMY, LAPAROSCOPIC N/A 4/17/2019    LAPAROSCOPIC CHOLECYSTECTOMY WITH INTRAOPERATIVE CHOLANGIOGRAM performed by Jf Novak MD at [facility]  06/01/2017    Colonic polyp    CORONARY ARTERY BYPASS GRAFT  09/20/2016    x5    EYE SURGERY Right     drain placed behind eye    HERNIA REPAIR Bilateral 2005    bilateral inguinal hernia repair     Family History   Problem Relation Age of Onset    Heart Disease Father     Diabetes type 2  Father     Cancer Mother      Social History     Socioeconomic History    Marital status:      Spouse name: Not on file    Number of children: Not on file    Years of education: Not on file    Highest education level: Not on file   Occupational History    Not on file   Social Needs    Financial resource strain: Not on file    Food insecurity:     Worry: Not on file     Inability: Not on file    Transportation needs:     Medical: Not on file     Non-medical: Not on file   Tobacco Use    Smoking status: Never Smoker    Smokeless tobacco: Never Used   Substance and Sexual Activity    Alcohol use: No    Drug use: No    Sexual activity: Yes     Partners: Female   Lifestyle    Physical activity:     Days per week: Not on file     Minutes per session: Not on file    Stress: Not on file   Relationships    Social connections:     Talks on phone: Not on file     Gets together: Not on file     Attends Scientologist service: Not on file     Active member of club or organization: Not on file     Attends meetings of clubs or organizations: Not on file     Relationship status: Not on file    Intimate partner violence:     Fear of current or ex partner: Not on file     Emotionally abused: Not on file     Physically abused: Not on file     Forced sexual activity: Not on file   Other Topics Concern    Not on file   Social History Narrative    Not on file     Current Facility-Administered Medications   Medication Dose Route Frequency Provider Last Rate Last Dose    aspirin chewable tablet 81 mg  81 mg Oral Daily Sigrid Stephens MD        atorvastatin (LIPITOR) tablet 20 mg  20 mg Oral Daily Sigrid Stephens MD        metoprolol tartrate (LOPRESSOR) tablet 25 mg  25 mg Oral BID Sigrid Stephens MD   Stopped at 05/13/19 0247    sodium chloride flush 0.9 % injection 10 mL  10 mL Intravenous 2 times per day Sigrid Stephens MD        sodium chloride flush 0.9 % injection 10 mL  10 mL Intravenous PRN Sigrid Stephens MD        magnesium hydroxide (MILK OF MAGNESIA) 400 MG/5ML suspension 30 mL  30 mL Oral Daily PRN Sigrid Stephens MD        ondansetron (Kendal See) injection 4 mg  4 mg Intravenous Q6H PRN Cipriano Keane MD        enoxaparin (LOVENOX) injection 40 mg  40 mg Subcutaneous Daily Cipriano Keane MD        0.9 % sodium chloride infusion   Intravenous Continuous Cipriano Keane MD 75 mL/hr at 05/13/19 0517      acetaminophen (TYLENOL) tablet 650 mg  650 mg Oral Q4H PRN Cipriano Keane MD        nitroGLYCERIN (NITROSTAT) SL tablet 0.4 mg  0.4 mg Sublingual Q5 Min PRN Cipriano Keane MD        morphine (PF) injection 2 mg  2 mg Intravenous Q4H PRN Cipriano Keane MD   2 mg at 05/13/19 0115    insulin lispro (HUMALOG) injection vial 0-12 Units  0-12 Units Subcutaneous TID WC Cipriano Keane MD        insulin lispro (HUMALOG) injection vial 0-6 Units  0-6 Units Subcutaneous Nightly Cipriano Keane MD   Stopped at 05/13/19 0159     No Known Allergies    REVIEW OF SYSTEMS  10 systems reviewed, pertinent positives per HPI otherwise noted to be negative. PHYSICAL EXAM  /63   Pulse 77   Temp 98 °F (36.7 °C) (Oral)   Resp 20   Ht 6' (1.829 m)   Wt 203 lb 1.6 oz (92.1 kg)   SpO2 94%   BMI 27.55 kg/m²   GENERAL APPEARANCE: Awake and alert. Cooperative. HEAD: Normocephalic. Atraumatic. EYES: PERRL. EOM's grossly intact. ENT: Mucous membranes are moist.   NECK: Supple. Non-tender  HEART: RRR. LUNGS: Respirations unlabored. CTAB. Good air exchange. Speaking comfortably in full sentences. ABDOMEN: Soft. Non-distended. Non-tender. No masses. No organomegaly. No guarding or rebound. BACK:  No midline Tenderness. EXTREMITIES: No peripheral edema. Moves all extremities equally. All extremities neurovascularly intact. SKIN: Warm and dry. No acute rashes. NEUROLOGICAL: Alert and oriented. CN's 2-12 intact. No gross facial drooping. Strength 5/5, sensation intact. 2 plus DTR's in lower extremity bilaterally. Gait normal.   PSYCHIATRIC: Normal mood and affect. LABS  I have reviewed all labs for this visit.    Results for orders placed or performed during the hospital encounter of 05/13/19   Troponin   Result Value Ref Range    Troponin <0.01 <0.01 ng/mL   EKG 12 Lead   Result Value Ref Range    Ventricular Rate 56 BPM    Atrial Rate 56 BPM    P-R Interval 168 ms    QRS Duration 122 ms    Q-T Interval 434 ms    QTc Calculation (Bazett) 418 ms    P Axis 27 degrees    R Axis -30 degrees    T Axis -12 degrees    Diagnosis       Sinus bradycardiaLeft axis deviationRight bundle branch blockMinimal voltage criteria for LVH, may be normal variantInferior infarct , age undeterminedAbnormal ECGWhen compared with ECG of 12-MAY-2019 20:16,QRS duration has decreasedInferior infarct is now Present       The Ekg interpreted by me shows  sinus bradycardia, rate=56 with a rate of 56  Axis is   Left axis deviation  QTc is  normal  Right bundle-branch block     ST Segments: nonspecific changes        RADIOLOGY    Xr Chest Standard (2 Vw)    Result Date: 5/12/2019  EXAMINATION: TWO XRAY VIEWS OF THE CHEST 5/12/2019 6:25 pm COMPARISON: 02/22/2019 HISTORY: ORDERING SYSTEM PROVIDED HISTORY: other TECHNOLOGIST PROVIDED HISTORY: Reason for exam:->other Ordering Physician Provided Reason for Exam: hx of by-pass, central chest pain today Additional signs and symptoms: pt unable to take deeper breath than imaged FINDINGS: Study was obtained at low lung volumes. There is left basilar chronic atelectasis/fibrosis with blunting of the costophrenic angle. No acute infiltrate, pneumothorax or pleural fluid. Prior sternal splitting procedure. The heart size is within normal limits. No acute bone finding. Thoracic spondylosis. Right upper quadrant surgical clips. No acute cardiopulmonary disease on this study obtained at low lung volumes. Left basilar chronic atelectasis/fibrosis. Us Gallbladder Ruq    Result Date: 5/12/2019  EXAMINATION: RIGHT UPPER QUADRANT ULTRASOUND 5/12/2019 8:33 pm COMPARISON: None.  HISTORY: ORDERING SYSTEM PROVIDED HISTORY: r/o retained stone FINDINGS: LIVER:  The liver demonstrates normal echogenicity without evidence of intrahepatic biliary ductal dilatation. BILIARY SYSTEM:  The gallbladder is surgically absent. Common bile duct is within normal limits measuring 2 mm. RIGHT KIDNEY: The right kidney is grossly unremarkable without evidence of hydronephrosis. PANCREAS:  Visualized portions of the pancreas are unremarkable. OTHER: No evidence of right upper quadrant ascites. Unremarkable right upper quadrant ultrasound status post cholecystectomy. Fl Cholangiogram Or    Result Date: 4/17/2019  EXAMINATION: SPOT IMAGES FROM AN INTRAOPERATIVE CHOLANGIOGRAM 4/17/2019 10:56 am COMPARISON: 03/27/2019 HISTORY: ORDERING SYSTEM PROVIDED HISTORY: Gallstones FLUOROSCOPY DOSE AND TYPE OR TIME AND EXPOSURES: 1 image at 17 seconds FINDINGS: Single fluoroscopic image of the right upper quadrant was obtained intraoperatively after injection of contrast through the cystic duct. Status post cholecystectomy. There is no ductal dilatation or filling defect. There is a small amount of extraluminal contrast.     1. Small amount of extraluminal contrast either due to contrast administration or bile leak. ED COURSE/MDM  Patient seen and evaluated. Old records reviewed. Labs and imaging reviewed and results discussed with patient. This is a 56-year-old male presenting to the ER with chest pain. He was previously evaluated at [facility] and I did review their workup. Patient had another troponin on arrival here an EKG and was admitted for further workup. Their initial plan at [facility] was to admit for chest pain workup        CLINICAL IMPRESSION  1. Chest pain, unspecified type        Blood pressure 133/63, pulse 77, temperature 98 °F (36.7 °C), temperature source Oral, resp. rate 20, height 6' (1.829 m), weight 203 lb 1.6 oz (92.1 kg), SpO2 94 %. DISPOSITION  Vivian Nagy was admitted in fair condition.          Gerardo Garcia, DO  05/13/19 1265

## 2019-05-13 NOTE — ED PROVIDER NOTES
I independently examined and evaluated Emiliano Taylor. In brief, patient presenting for evaluation of chest discomfort that he describes more of a squeezing type discomfort associated with diaphoresis. He has a significant cardiac history and this does feel similar. He follows with Dr Farnaz Peña for his cardiac care. .    Focused exam revealed patient is in no acute distress. Heart is noted to be regular. Lungs grossly clear. Abdomen is rotund but soft and nontender. .    The Ekg interpreted by me shows  sinus bradycardia, rate=54   Axis is   Left axis deviation  QTc is  normal  Intervals and Durations are unremarkable. ST Segments: nonspecific changes. RBBB. No significant change from prior EKG dated 2/22/19    Imaging:  Xr Chest Standard (2 Vw)    Result Date: 5/12/2019  EXAMINATION: TWO XRAY VIEWS OF THE CHEST 5/12/2019 6:25 pm COMPARISON: 02/22/2019 HISTORY: ORDERING SYSTEM PROVIDED HISTORY: other TECHNOLOGIST PROVIDED HISTORY: Reason for exam:->other Ordering Physician Provided Reason for Exam: hx of by-pass, central chest pain today Additional signs and symptoms: pt unable to take deeper breath than imaged FINDINGS: Study was obtained at low lung volumes. There is left basilar chronic atelectasis/fibrosis with blunting of the costophrenic angle. No acute infiltrate, pneumothorax or pleural fluid. Prior sternal splitting procedure. The heart size is within normal limits. No acute bone finding. Thoracic spondylosis. Right upper quadrant surgical clips. No acute cardiopulmonary disease on this study obtained at low lung volumes. Left basilar chronic atelectasis/fibrosis. Us Gallbladder Ruq    Result Date: 5/12/2019  EXAMINATION: RIGHT UPPER QUADRANT ULTRASOUND 5/12/2019 8:33 pm COMPARISON: None. HISTORY: ORDERING SYSTEM PROVIDED HISTORY: r/o retained stone FINDINGS: LIVER:  The liver demonstrates normal echogenicity without evidence of intrahepatic biliary ductal dilatation.  BILIARY SYSTEM:

## 2019-05-13 NOTE — PROCEDURES
Brief Pre-Op Note/Sedation Assessment      Kandi Younger  1965  [id number]  [id number]  3:12 PM    Planned Procedure: Cardiac Catheterization Procedure    Post Procedure Plan: Return to same level of care    Consent: I have discussed with the patient and/or the patient representative the indication, alternatives, and the possible risks and/or complications of the planned procedure and the anesthesia methods. The patient and/or patient representative appear to understand and agree to proceed.     Chief Complaint: Chest Pain/Pressure      Indications for the Procedure:   CAD Presentation:  ACS > 24 hrs  Anginal Classification within 2 weeks:  CCS IV - Inability to perform any activity without angina or angina at rest, i.e., severe limitation  NYHA Heart Failure Class within 2 weeks: No symptoms      Anti- Anginal Meds within 2 weeks:   ANTI-ANGINAL MEDS: Yes: Beta Blockers, Aspirin and Statin (Any)      Stress or Imaging Studies Performed:  Stress Test with SPECT Result: Negative Risk/Extent of Ischemia:  Low     Vital Signs:  /69   Pulse 70   Temp 98.7 °F (37.1 °C) (Oral)   Resp 16   Ht 6' (1.829 m)   Wt 203 lb 1.6 oz (92.1 kg)   SpO2 93%   BMI 27.55 kg/m²     Allergies:  No Known Allergies    Past Medical History:  Past Medical History:   Diagnosis Date    CAD (coronary artery disease) 2016    Diabetes mellitus ([facility] [address]     Hyperlipidemia     Hypertension     MI (myocardial infarction) ([facility] [address]     No history of procedure 06/01/2017    colonoscopy         Surgical History:  Past Surgical History:   Procedure Laterality Date    CHOLECYSTECTOMY, LAPAROSCOPIC N/A 4/17/2019    LAPAROSCOPIC CHOLECYSTECTOMY WITH INTRAOPERATIVE CHOLANGIOGRAM performed by Percy Moyer MD at [address] [place] [address]  06/01/2017    Colonic polyp    CORONARY ARTERY BYPASS GRAFT  09/20/2016    LIMA- LAD, Reverse SVG- [place], reverse SVG- PDA, Reverse SVG- OM seq- Diag    EYE SURGERY Right     drain placed

## 2019-05-13 NOTE — PROGRESS NOTES
[facility] notified of patient transferring to Cath lab. Will notified Esperanza Alcala when patient returns.

## 2019-05-14 ENCOUNTER — APPOINTMENT (OUTPATIENT)
Dept: CT IMAGING | Age: 54
DRG: 191 | End: 2019-05-14
Payer: COMMERCIAL

## 2019-05-14 VITALS
HEART RATE: 61 BPM | OXYGEN SATURATION: 95 % | WEIGHT: 203.1 LBS | BODY MASS INDEX: 27.51 KG/M2 | TEMPERATURE: 98.9 F | DIASTOLIC BLOOD PRESSURE: 85 MMHG | SYSTOLIC BLOOD PRESSURE: 129 MMHG | RESPIRATION RATE: 16 BRPM | HEIGHT: 72 IN

## 2019-05-14 PROBLEM — E11.9 DM (DIABETES MELLITUS) (HCC): Status: ACTIVE | Noted: 2019-05-14

## 2019-05-14 LAB
ANION GAP SERPL CALCULATED.3IONS-SCNC: 9 MMOL/L (ref 3–16)
BUN BLDV-MCNC: 11 MG/DL (ref 7–20)
CALCIUM SERPL-MCNC: 9 MG/DL (ref 8.3–10.6)
CHLORIDE BLD-SCNC: 106 MMOL/L (ref 99–110)
CO2: 28 MMOL/L (ref 21–32)
CREAT SERPL-MCNC: 0.8 MG/DL (ref 0.9–1.3)
EKG ATRIAL RATE: 56 BPM
EKG DIAGNOSIS: NORMAL
EKG P AXIS: 27 DEGREES
EKG P-R INTERVAL: 168 MS
EKG Q-T INTERVAL: 434 MS
EKG QRS DURATION: 122 MS
EKG QTC CALCULATION (BAZETT): 418 MS
EKG R AXIS: -30 DEGREES
EKG T AXIS: -12 DEGREES
EKG VENTRICULAR RATE: 56 BPM
ESTIMATED AVERAGE GLUCOSE: 151.3 MG/DL
GFR AFRICAN AMERICAN: >60
GFR NON-AFRICAN AMERICAN: >60
GLUCOSE BLD-MCNC: 102 MG/DL (ref 70–99)
GLUCOSE BLD-MCNC: 82 MG/DL (ref 70–99)
GLUCOSE BLD-MCNC: 93 MG/DL (ref 70–99)
HBA1C MFR BLD: 6.9 %
PERFORMED ON: ABNORMAL
PERFORMED ON: NORMAL
POTASSIUM SERPL-SCNC: 4.5 MMOL/L (ref 3.5–5.1)
SODIUM BLD-SCNC: 143 MMOL/L (ref 136–145)

## 2019-05-14 PROCEDURE — 75574 CT ANGIO HRT W/3D IMAGE: CPT

## 2019-05-14 PROCEDURE — 93010 ELECTROCARDIOGRAM REPORT: CPT | Performed by: INTERNAL MEDICINE

## 2019-05-14 PROCEDURE — 80048 BASIC METABOLIC PNL TOTAL CA: CPT

## 2019-05-14 PROCEDURE — 6370000000 HC RX 637 (ALT 250 FOR IP): Performed by: INTERNAL MEDICINE

## 2019-05-14 PROCEDURE — 99233 SBSQ HOSP IP/OBS HIGH 50: CPT | Performed by: INTERNAL MEDICINE

## 2019-05-14 PROCEDURE — 36415 COLL VENOUS BLD VENIPUNCTURE: CPT

## 2019-05-14 PROCEDURE — 6360000004 HC RX CONTRAST MEDICATION: Performed by: INTERNAL MEDICINE

## 2019-05-14 RX ORDER — NITROGLYCERIN 0.4 MG/1
TABLET SUBLINGUAL
Qty: 25 TABLET | Refills: 0 | Status: SHIPPED | OUTPATIENT
Start: 2019-05-14 | End: 2019-06-11 | Stop reason: SDUPTHER

## 2019-05-14 RX ADMIN — ATORVASTATIN CALCIUM 20 MG: 40 TABLET, FILM COATED ORAL at 14:06

## 2019-05-14 RX ADMIN — ASPIRIN 81 MG 81 MG: 81 TABLET ORAL at 14:06

## 2019-05-14 RX ADMIN — IOPAMIDOL 100 ML: 755 INJECTION, SOLUTION INTRAVENOUS at 12:32

## 2019-05-14 RX ADMIN — METOPROLOL TARTRATE 25 MG: 25 TABLET ORAL at 09:07

## 2019-05-14 ASSESSMENT — PAIN SCALES - GENERAL: PAINLEVEL_OUTOF10: 0

## 2019-05-14 NOTE — PROGRESS NOTES
Pt ok for discharge per MD. Discharge instructions and script given. IV removed. No questions or concerns at this time. Patient ambulated to personal car with spouse.

## 2019-05-14 NOTE — DISCHARGE SUMMARY
glasses  Neck: Supple, with full range of motion. No jugular venous distention. Trachea midline. Respiratory:  Normal respiratory effort. Clear to auscultation, bilaterally without Rales/Wheezes/Rhonchi. Easy and nonlabored  Cardiovascular:  Regular rate and rhythm with normal S1/S2 without murmurs, rubs or gallops. Cardiac cath site clean dry and intact. No bleeding or ecchymosis. Pulse+2, and easily palpable  Abdomen: Soft, non-tender, non-distended with normal bowel sounds. Musculoskeletal:  No clubbing, cyanosis or edema bilaterally. Full range of motion without deformity. Skin: Skin color, texture, turgor normal.  No rashes or lesions. Neurologic:  Neurovascularly intact without any focal sensory/motor deficits. Cranial nerves: II-XII intact, grossly non-focal.  Psychiatric:  Alert and oriented, thought content appropriate, normal insight  Capillary Refill: Brisk,< 3 seconds   Peripheral Pulses: +2 palpable, equal bilaterally       Labs: For convenience and continuity at follow-up the following most recent labs are provided:      CBC:    Lab Results   Component Value Date    WBC 10.3 05/12/2019    HGB 14.4 05/12/2019    HCT 42.4 05/12/2019     05/12/2019       Renal:    Lab Results   Component Value Date     05/14/2019    K 4.5 05/14/2019    K 4.4 05/12/2019     05/14/2019    CO2 28 05/14/2019    BUN 11 05/14/2019    CREATININE 0.8 05/14/2019    CALCIUM 9.0 05/14/2019         Significant Diagnostic Studies    Radiology:   CTA CARDIAC W C STRC MORP W CONTRAST   Final Result   Only 3 bypass grafts can be visualized. The insertion sites of the left internal mammary branch is poorly visualized   secondary to phase misregistration artifact. The graft appears patent. A 2nd bypass graft terminates in the region of a diagonal branch. Graft   appears patent.   Insertion site is poorly visualized secondary to artifact      A 3rd bypass graft appears small distally, terminating in the region of the   acute marginal.            Chest pain, UNstable Angina, in the setting of known coronary artery disease status post CABG 2 years prior in setting of known essential hypertension and hyperlipidemia: Hemodynamically stable and pain has resolved  -Cardiology consultation completed. Plan is to follow up CT cardiac in the office.  -Left heart cath 5/13/2019: Difficulty visualizing grafts according to dictation of cardiothoracic surgical bypass. Decision to undergo CTA cardiac to further visualize graft sites. Cardiology recommending to follow-up in the office for further evaluation.  -Medications: Aspirin, Lipitor, metoprolol  -Labs: Cholesterol testing  -Diet cardiac heart healthy  -No activity restrictions  -Cardiac teaching and education completed in the hospital    Diabetes Type II: Stable and diet controlled. HGBA1C 6.9  -Diet: Cardiac heart healthy and low carb  -Patient to follow-up with PCP with close monitoring of hemoglobin A1c    Consults:     IP CONSULT TO HOSPITALIST  IP CONSULT TO CARDIOLOGY  IP CONSULT TO CARDIAC REHAB    Disposition:  Home    Condition at Discharge: Stable    Discharge Instructions/Follow-up:  Cardiology 2 weeks    Code Status:  Prior full    Activity: activity as tolerated    Diet: cardiac diet      Discharge Medications:     Discharge Medication List as of 5/14/2019  3:19 PM           Details   nitroGLYCERIN (NITROSTAT) 0.4 MG SL tablet up to max of 3 total doses.  If no relief after 1 dose, call 911., Disp-25 tablet, R-0Print              Details   atorvastatin (LIPITOR) 20 MG tablet TAKE 1 TABLET BY MOUTH NIGHTLY, Disp-30 tablet, R-5Normal      metoprolol tartrate (LOPRESSOR) 25 MG tablet TAKE 1 TABLET BY MOUTH 2 TIMES DAILY, Disp-60 tablet, R-5Normal      aspirin 81 MG tablet Take 81 mg by mouth dailyHistorical Med             Time Spent on discharge is more than 45 minutes in the examination, evaluation, counseling and review of medications and discharge plan.      Signed:    Latesha Avalos MD   5/14/2019      Thank you DONALDO Sofia CNP for the opportunity to be involved in this patient's care. If you have any questions or concerns please feel free to contact me at [phone].

## 2019-05-14 NOTE — DISCHARGE INSTR - COC
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    Chest pain R07.9    Paresthesia R20.2    Acute calculous cholecystitis K80.00    New onset type 2 diabetes mellitus (HCC) E11.9    S/P laparoscopic cholecystectomy Z90.49    DM (diabetes mellitus) (HCC) E11.9       Isolation/Infection:   Isolation          No Isolation            Nurse Assessment:  Last Vital Signs: /85   Pulse 61   Temp 98.9 °F (37.2 °C) (Oral)   Resp 16   Ht 6' (1.829 m)   Wt 203 lb 1.6 oz (92.1 kg)   SpO2 95%   BMI 27.55 kg/m²     Last documented pain score (0-10 scale): Pain Level: 0  Last Weight:   Wt Readings from Last 1 Encounters:   05/13/19 203 lb 1.6 oz (92.1 kg)     Mental Status:  {IP PT MENTAL STATUS:34543}    IV Access:  { EARL IV ACCESS:129846212}    Nursing Mobility/ADLs:  Walking   {P DME RUFV:449283113}  Transfer  {[facility] DME FQGC:762670598}  Bathing  {P DME JYBC:989828448}  Dressing  {P DME CLLP:513843923}  Toileting  {P DME BQAY:223309162}  Feeding  {[facility] DME MZFE:490984165}  Med Admin  {P DME VENN:196935182}  Med Delivery   { EARL MED Delivery:555806730}    Wound Care Documentation and Therapy:        Elimination:  Continence:   · Bowel: {YES / HN:43525}  · Bladder: {YES / SW:91025}  Urinary Catheter: {Urinary Catheter:489578205}   Colostomy/Ileostomy/Ileal Conduit: {YES / XK:48510}       Date of Last BM: ***    Intake/Output Summary (Last 24 hours) at 5/14/2019 1519  Last data filed at 5/14/2019 0848  Gross per 24 hour   Intake 0 ml   Output 450 ml   Net -450 ml     I/O last 3 completed shifts:   In: 0   Out: 450 [Urine:450]    Safety Concerns:     [employer] Safety Concerns:720292707}    Impairments/Disabilities:      [employer] Impairments/Disabilities:468179971}    Nutrition Therapy:  Current Nutrition Therapy:   [address] Diet List:367075195}    Routes of Feeding: {CHP DME Other Feedings:391194094}  Liquids: {Slp liquid thickness:40463}  Daily Fluid Restriction: {P DME Yes amt example:715325017}  Last Modified Barium Swallow with Video (Video Swallowing Test): {Done Not Done DQPY:247076065}    Treatments at the Time of Hospital Discharge:   Respiratory Treatments: ***  Oxygen Therapy:  {Therapy; copd oxygen:72830}  Ventilator:    {[facility] Vent KAKO:874901875}    Rehab Therapies: {THERAPEUTIC INTERVENTION:0147278726}  Weight Bearing Status/Restrictions: {[facility] Weight Bearin}  Other Medical Equipment (for information only, NOT a DME order):  {EQUIPMENT:146451705}  Other Treatments: ***    Patient's personal belongings (please select all that are sent with patient):  {[facility] DME Belongings:555762643}    RN SIGNATURE:  {Esignature:931630365}    CASE MANAGEMENT/SOCIAL WORK SECTION    Inpatient Status Date: ***    Readmission Risk Assessment Score:  Readmission Risk              Risk of Unplanned Readmission:        13           Discharging to Facility/ Agency   · Name:   · Address:  · Phone:  · Fax:    Dialysis Facility (if applicable)   · Name:  · Address:  · Dialysis Schedule:  · Phone:  · Fax:    / signature: {Esignature:306208552}    PHYSICIAN SECTION    Prognosis: {Prognosis:4754020036}    Condition at Discharge: [address] [place] [address] Patient Condition:780330990}    Rehab Potential (if transferring to Rehab): {Prognosis:6197884972}    Recommended Labs or Other Treatments After Discharge: ***    Physician Certification: I certify the above information and transfer of Xochitl Smith  is necessary for the continuing treatment of the diagnosis listed and that he requires {Admit to Appropriate Level of Care:39656} for {GREATER/LESS:800661417} 30 days.      Update Admission H&P: {CHP DME Changes in RBBDU:052852162}    PHYSICIAN SIGNATURE:  {Esignature:996730594}

## 2019-05-14 NOTE — PLAN OF CARE
Problem: Activity:  Goal: Risk for activity intolerance will decrease  Description  Risk for activity intolerance will decrease  Outcome: Ongoing   Pt taken off of bedrest at 10pm. Able to ambulate to bathroom.

## 2019-05-14 NOTE — PROGRESS NOTES
Hospitalist Progress Note      PCP: Cami Broussard, APRN - CNP    Date of Admission: 5/13/2019    Chief Complaint on Admission: Chest pain       History Of Present Illness: 46 y.o. male with multiple cardiac risk factors, s/p CABG 2 years ago  who [facility] to [facility] for [facility] of chest pain with some SOB .     Initially, he was seen at [facility] , was suppose to get admitted tonight. did not want to come by  [address] . But  drove self to our ER  to be admitted       Currently , pt is chest pain free.       Pt will be transferred to telemetry floor for further management and cardiology consult will be obtained  in AM        Pt Seen/Examined and Chart Reviewed. Admitting dx ***    SUBJECTIVE: ***    OBJECTIVE:     Allergies  Patient has no known allergies. Medications      Scheduled Meds:   aspirin  81 mg Oral Daily    atorvastatin  20 mg Oral Daily    metoprolol tartrate  25 mg Oral BID    enoxaparin  40 mg Subcutaneous Daily    sodium chloride flush  10 mL Intravenous 2 times per day    insulin lispro  0-6 Units Subcutaneous TID WC    insulin lispro  0-3 Units Subcutaneous Nightly       Infusions:   sodium chloride 75 mL/hr at 05/13/19 2313       PRN Meds:  magnesium hydroxide, ondansetron, acetaminophen, nitroGLYCERIN, morphine, sodium chloride flush, perflutren lipid microspheres    Intake and Output     Intake/Output Summary (Last 24 hours) at 5/14/2019 0901  Last data filed at 5/14/2019 0848  Gross per 24 hour   Intake 0 ml   Output 450 ml   Net -450 ml       Vitals    BP (!) 139/91   Pulse 69   Temp 98.2 °F (36.8 °C) (Oral)   Resp 14   Ht 6' (1.829 m)   Wt 203 lb 1.6 oz (92.1 kg)   SpO2 97%   BMI 27.55 kg/m²     Exam:  General appearance:  No apparent distress, appears stated age and cooperative. HEENT:  Normal cephalic, atraumatic without obvious deformity. Pupils equal, round, and reactive to light. Extra ocular muscles intact.  Conjunctivae/corneas clear.  Neck: Supple, with full range of motion. No jugular venous distention. Trachea midline. Respiratory:  Normal respiratory effort. Clear to auscultation, bilaterally without Rales/Wheezes/Rhonchi. Cardiovascular:  Regular rate and rhythm with normal S1/S2 without murmurs, rubs or gallops. Abdomen: Soft, non-tender, non-distended with normal bowel sounds. Musculoskeletal: No clubbing, cyanosis or edema bilaterally. Full range of motion without deformity. Peripheral Pulses: +2 palpable, equal bilaterally   Skin: Skin color, texture, turgor normal.  No rashes or lesions. Neurologic:  Neurovascularly intact without any focal sensory/motor deficits. Cranial nerves: II-XII intact, grossly non-focal.  Psychiatric:  Alert and oriented, thought content appropriate, normal insight  Capillary Refill: Brisk,< 3 seconds         Data    Recent Labs     05/12/19  1832   WBC 10.3   HGB 14.4   HCT 42.4         Recent Labs     05/12/19  1832 05/14/19  0625    143   K 4.4 4.5    106   CO2 26 28   BUN 12 11   CREATININE 0.9 0.8*     Recent Labs     05/12/19  1832   AST 34   ALT 44*   BILITOT 0.5   ALKPHOS 99     No results for input(s): INR in the last 72 hours. Recent Labs     05/12/19  2130 05/13/19  0146 05/13/19  1033   TROPONINI <0.01 <0.01 <0.01       Consults:     IP CONSULT TO HOSPITALIST  IP CONSULT TO CARDIOLOGY  IP CONSULT TO CARDIAC REHAB    ASSESSMENT AND PLAN      Active Hospital Problems    Diagnosis Date Noted    DM (diabetes mellitus) ([facility] [E11.9] 05/14/2019    Chest pain [R07.9] 04/18/2018    Benign essential HTN [I10]     Mixed hyperlipidemia [E78.2]     S/P CABG (coronary artery bypass graft) [Z95.1]            ASSESSMENT:  PLAN:     Chest pain in pt with multiple cardiac risk factors, s/p CABG 2 years .   Pt will be transferred to telemetry floor for further management and cardiology consult will be obtained  in AM           DVT Prophylaxis: Lovenox  Diet:  Cardiac , ADA  Code Status:  Full               Ammon Ferguson MD

## 2019-05-14 NOTE — CARE COORDINATION
CM notes discharge order reviewed pt's chart, and spoke to [employer] [facility]. There are no DCP needs at this time. If any needs should arise please notify.

## 2019-05-14 NOTE — PROGRESS NOTES
[address]   Daily Cardiovascular Progress Note    Admit Date: 5/13/2019    Chief complaint: CP  HPI: resolved      Medications/Labs all Reviewed:  Patient Active Problem List   Diagnosis    Family history of early CAD    S/P CABG (coronary artery bypass graft)    Benign essential HTN    Mixed hyperlipidemia    Coronary artery disease involving native heart without angina pectoris    Microcytosis    Acute glaucoma of right eye    Chest pain    Paresthesia    Acute calculous cholecystitis    New onset type 2 diabetes mellitus ([facility]    S/P laparoscopic cholecystectomy    DM (diabetes mellitus) ([facility]       Medications:    aspirin chewable tablet 81 mg Daily   atorvastatin (LIPITOR) tablet 20 mg Daily   metoprolol tartrate (LOPRESSOR) tablet 25 mg BID   magnesium hydroxide (MILK OF MAGNESIA) 400 MG/5ML suspension 30 mL Daily PRN   ondansetron (ZOFRAN) injection 4 mg Q6H PRN   enoxaparin (LOVENOX) injection 40 mg Daily   0.9 % sodium chloride infusion Continuous   acetaminophen (TYLENOL) tablet 650 mg Q4H PRN   nitroGLYCERIN (NITROSTAT) SL tablet 0.4 mg Q5 Min PRN   morphine (PF) injection 2 mg Q4H PRN   sodium chloride flush 0.9 % injection 10 mL 2 times per day   sodium chloride flush 0.9 % injection 10 mL PRN   perflutren lipid microspheres (DEFINITY) injection 1.65 mg ONCE PRN   insulin lispro (HUMALOG) injection vial 0-6 Units TID WC   insulin lispro (HUMALOG) injection vial 0-3 Units Nightly          PHYSICAL EXAM   BP (!) 139/91   Pulse 69   Temp 98.2 °F (36.8 °C) (Oral)   Resp 14   Ht 6' (1.829 m)   Wt 203 lb 1.6 oz (92.1 kg)   SpO2 97%   BMI 27.55 kg/m²    Vitals:    05/13/19 2015 05/13/19 2251 05/14/19 0330 05/14/19 0746   BP: 119/75 131/76 125/81 (!) 139/91   Pulse: 79 74 69 69   Resp: 16 18 16 14   Temp:  99.4 °F (37.4 °C) 98.3 °F (36.8 °C) 98.2 °F (36.8 °C)   TempSrc:  Oral Oral Oral   SpO2: 95% 93% 95% 97%   Weight:       Height:             Intake/Output Summary (Last 24 hours) at 5/14/2019 0903  Last data filed at 5/14/2019 0848  Gross per 24 hour   Intake 0 ml   Output 450 ml   Net -450 ml     Wt Readings from Last 3 Encounters:   05/13/19 203 lb 1.6 oz (92.1 kg)   05/12/19 198 lb (89.8 kg)   05/02/19 198 lb 9.6 oz (90.1 kg)         Gen: Patient in NAD, resting comfortably  Neck: No JVD or bruits  Respiratory: CTAB no WRR  Chest: normal without deformity  Cardiovascular:RRR, S1S2, no mrg, normal PMI  Abdomen: Soft, NTND, Normal BS  Extremities: No clubbing, cyanosis, or edema, rt groin c/d/i  Neurological/Psychiatric: AxO x4, No gross motors/sensory deficits  Skin:  Warm and dry      Labs:  CBC: Recent Labs     05/12/19  1832   WBC 10.3   HGB 14.4   HCT 42.4   MCV 85.8        BMP: Recent Labs     05/12/19  1832 05/14/19  0625    143   K 4.4 4.5    106   CO2 26 28   BUN 12 11   CREATININE 0.9 0.8*     MG:  No results for input(s): MG in the last 72 hours. PT/INR: No results for input(s): PROTIME, INR in the last 72 hours. APTT: No results for input(s): APTT in the last 72 hours. Cardiac Enzymes: Recent Labs     05/12/19  2130 05/13/19  0146 05/13/19  1033   TROPONINI <0.01 <0.01 <0.01       Cardiac Studies:    ECHO     Normal left ventricle systolic function with an estimated ejection fraction of 55%.   No regional wall motion abnormalities are seen.   Normal left ventricular diastolic filling pressure.   Systolic pulmonary artery pressure (SPAP) is normal and estimated at 26 mmHg   (right atrial pressure 3 mmHg). Assessment and Plan   1. Chest pain: c/w Unstable angina  2. CAD   -s/p CABGx5 with Dr. Ulysses Duane 9/20/2016  3. HTN: controlled  4. HLD: controlled      PLAN  1. Cardiac CTA to delineate graft anatomy and course as surgical OP note is not clear   - Grafts to PDA and OM not seen and LCx is likely ischemic if grafts are down.   2. Pt ok for d/c home with NTG and will d/w him as outpt      Patient Active Problem List   Diagnosis    Family

## 2019-05-15 ENCOUNTER — TELEPHONE (OUTPATIENT)
Dept: FAMILY MEDICINE CLINIC | Age: 54
End: 2019-05-15

## 2019-05-21 ENCOUNTER — OFFICE VISIT (OUTPATIENT)
Dept: FAMILY MEDICINE CLINIC | Age: 54
End: 2019-05-21
Payer: COMMERCIAL

## 2019-05-21 VITALS
BODY MASS INDEX: 27.34 KG/M2 | DIASTOLIC BLOOD PRESSURE: 84 MMHG | OXYGEN SATURATION: 97 % | HEART RATE: 56 BPM | WEIGHT: 201.6 LBS | SYSTOLIC BLOOD PRESSURE: 128 MMHG

## 2019-05-21 DIAGNOSIS — R07.9 CHEST PAIN, UNSPECIFIED TYPE: Primary | ICD-10-CM

## 2019-05-21 DIAGNOSIS — E11.59 TYPE 2 DIABETES MELLITUS WITH OTHER CIRCULATORY COMPLICATION, WITHOUT LONG-TERM CURRENT USE OF INSULIN (HCC): ICD-10-CM

## 2019-05-21 LAB
A/G RATIO: 1.6 (ref 1.1–2.2)
ALBUMIN SERPL-MCNC: 4.7 G/DL (ref 3.4–5)
ALP BLD-CCNC: 112 U/L (ref 40–129)
ALT SERPL-CCNC: 39 U/L (ref 10–40)
ANION GAP SERPL CALCULATED.3IONS-SCNC: 13 MMOL/L (ref 3–16)
AST SERPL-CCNC: 25 U/L (ref 15–37)
BILIRUB SERPL-MCNC: 0.7 MG/DL (ref 0–1)
BUN BLDV-MCNC: 13 MG/DL (ref 7–20)
CALCIUM SERPL-MCNC: 9.8 MG/DL (ref 8.3–10.6)
CHLORIDE BLD-SCNC: 101 MMOL/L (ref 99–110)
CO2: 26 MMOL/L (ref 21–32)
CREAT SERPL-MCNC: 1 MG/DL (ref 0.9–1.3)
GFR AFRICAN AMERICAN: >60
GFR NON-AFRICAN AMERICAN: >60
GLOBULIN: 2.9 G/DL
GLUCOSE BLD-MCNC: 114 MG/DL (ref 70–99)
POTASSIUM SERPL-SCNC: 4.7 MMOL/L (ref 3.5–5.1)
SODIUM BLD-SCNC: 140 MMOL/L (ref 136–145)
TOTAL PROTEIN: 7.6 G/DL (ref 6.4–8.2)

## 2019-05-21 PROCEDURE — 36415 COLL VENOUS BLD VENIPUNCTURE: CPT | Performed by: NURSE PRACTITIONER

## 2019-05-21 PROCEDURE — 1111F DSCHRG MED/CURRENT MED MERGE: CPT | Performed by: NURSE PRACTITIONER

## 2019-05-21 PROCEDURE — 99215 OFFICE O/P EST HI 40 MIN: CPT | Performed by: NURSE PRACTITIONER

## 2019-05-21 RX ORDER — LANCETS 28 GAUGE
1 EACH MISCELLANEOUS DAILY
Qty: 100 EACH | Refills: 3 | Status: SHIPPED | OUTPATIENT
Start: 2019-05-21 | End: 2022-09-14

## 2019-05-21 RX ORDER — BLOOD-GLUCOSE METER
1 KIT MISCELLANEOUS DAILY
Qty: 1 KIT | Refills: 0 | Status: SHIPPED | OUTPATIENT
Start: 2019-05-21 | End: 2022-09-14

## 2019-05-21 ASSESSMENT — ENCOUNTER SYMPTOMS
SHORTNESS OF BREATH: 0
EYES NEGATIVE: 1
ALLERGIC/IMMUNOLOGIC NEGATIVE: 1
RESPIRATORY NEGATIVE: 1
GASTROINTESTINAL NEGATIVE: 1

## 2019-05-21 NOTE — PROGRESS NOTES
doses. If no relief after 1 dose, call 911. 25 tablet 0    atorvastatin (LIPITOR) 20 MG tablet TAKE 1 TABLET BY MOUTH NIGHTLY 30 tablet 5    metoprolol tartrate (LOPRESSOR) 25 MG tablet TAKE 1 TABLET BY MOUTH 2 TIMES DAILY 60 tablet 5    aspirin 81 MG tablet Take 81 mg by mouth daily          Medications patient taking as of now reconciled against medications ordered at time of hospital discharge: Yes    Chief Complaint   Patient presents with    Follow-Up from Hospital     chest pain       HPI    Inpatient course: Discharge summary reviewed- see chart. Interval history/Current status: Patient presents today post hospital stay for unstable angina. Patient had angiogram with no blockages found. Patient reports no chest pain since discharged from the hospital. Patient states he has been active and feels well. Patient denies sob, dizziness, orthopnea, peripheral edema, arm pain or palpitations. Patient is scheduled to follow up with Dr. Aniceto Garcia on June 4, 2019. Reviewed medical record including hospital stay, labs, CTA cardiac etc. A1C 6.9 which indicates diabetes thus increasing cardiovascular risks. Review of Systems   Constitutional: Negative for appetite change, chills and fever. HENT: Negative. Eyes: Negative. Respiratory: Negative. Negative for shortness of breath. Cardiovascular: Negative. Negative for chest pain, palpitations and leg swelling. Gastrointestinal: Negative. Endocrine: Negative. Genitourinary: Negative. Musculoskeletal: Negative. Skin: Negative. Allergic/Immunologic: Negative. Neurological: Negative. Negative for dizziness. Hematological: Negative. Psychiatric/Behavioral: Negative. Vitals:    05/21/19 0945   BP: 128/84   Pulse: 56   SpO2: 97%   Weight: 201 lb 9.6 oz (91.4 kg)     Body mass index is 27.34 kg/m².    Wt Readings from Last 3 Encounters:   05/21/19 201 lb 9.6 oz (91.4 kg)   05/13/19 203 lb 1.6 oz (92.1 kg)   05/12/19 198 lb (89.8 kg)     BP Readings from Last 3 Encounters:   05/21/19 128/84   05/14/19 129/85   05/12/19 125/88       Physical Exam   Constitutional: He is oriented to person, place, and time. He appears well-developed and well-nourished. HENT:   Head: Normocephalic and atraumatic. Eyes: Conjunctivae and EOM are normal.   Neck: Neck supple. Cardiovascular: Normal rate, regular rhythm, normal heart sounds and intact distal pulses. Pulmonary/Chest: Effort normal and breath sounds normal.   Abdominal: Soft. Bowel sounds are normal.   Neurological: He is alert and oriented to person, place, and time. Skin: Skin is warm and dry. No rash noted. Psychiatric: He has a normal mood and affect. Assessment/Plan:  1. Chest pain, unspecified type  Patient presents today to follow-up post hospital stay. Patient was hospitalized from 5/13/19 through 5/14/19. This was admitted for chest pain. Patient with a well known cardiac history including coronary artery disease with status post CABG approximately 2 years ago. During the hospital stay patient had a cardiac CTA and angiogram which showed no blockages. Patient reports since discharge from the hospital he has had no further chest pain and denies shortness of breath, palpitations, orthopnea, peripheral edema, arm pain etc.  Patient reports he feels well and is back to his typical activities. Exam is essentially benign. I reviewed hospital medical records including labs and imaging. Noted A1c of 6.9. Discussed new onset of type 2 diabetes with patient/spouse and discussed starting metformin 500 mg by mouth daily. Also discussed importance of healthy diet and routine exercise. Patient is scheduled to follow up with cardiology June 4, 2019. I recommend patient attend the diabetic education classes, monitor fasting blood sugar daily, follow-up with cardiology as recommended in follow-up with PCP in 3 months or sooner with problems or concerns.   Patient verbalized understanding and agreeable to plan. Also see patient instructions.  - OK DISCHARGE MEDS RECONCILED W/ CURRENT OUTPATIENT MED LIST    2. Type 2 diabetes mellitus with other circulatory complication, without long-term current use of insulin ([facility] [address]  See note above. - metFORMIN (GLUCOPHAGE) 500 MG tablet; Take 1 tablet by mouth daily (with breakfast)  Dispense: 30 tablet; Refill: 5  - Comprehensive Metabolic Panel  - glucose monitoring kit (FREESTYLE) monitoring kit; 1 kit by Does not apply route daily Monitor glucose daily. Diagnosis: diabetes mellitus Type 2  Dispense: 1 kit; Refill: 0  - FREESTYLE LANCETS MISC; 1 each by Does not apply route daily  Dispense: 100 each; Refill: 3  - blood glucose test strips (FREESTYLE TEST STRIPS) strip; 1 each by In Vitro route daily As needed. Dispense: 100 each;  Refill: 3        Medical Decision Making: moderate complexity

## 2019-05-21 NOTE — PATIENT INSTRUCTIONS
Please read the healthy family handout that you were given and share it with your family. Please compare this printed medication list with your medications at home to be sure they are the same. If you have any medications that are different please contact us immediately at [phone]. Also review your allergies that we have listed, these may also include medications that you have not been able to tolerate, make sure everything listed is correct. If you have any allergies that are different please contact us immediately at [phone]. Patient Education        Learning About Diabetes Food Guidelines  Your Care Instructions    Meal planning is important to manage diabetes. It helps keep your blood sugar at a target level (which you set with your doctor). You don't have to eat special foods. You can eat what your family eats, including sweets once in a while. But you do have to pay attention to how often you eat and how much you eat of certain foods. You may want to work with a dietitian or a certified diabetes educator (CDE) to help you plan meals and snacks. A dietitian or CDE can also help you lose weight if that is one of your goals. What should you know about eating carbs? Managing the amount of carbohydrate (carbs) you eat is an important part of healthy meals when you have diabetes. Carbohydrate is found in many foods. · Learn which foods have carbs. And learn the amounts of carbs in different foods. ? Bread, cereal, pasta, and rice have about 15 grams of carbs in a serving. A serving is 1 slice of bread (1 ounce), ½ cup of cooked cereal, or 1/3 cup of cooked pasta or rice. ? Fruits have 15 grams of carbs in a serving. A serving is 1 small fresh fruit, such as an apple or orange; ½ of a banana; ½ cup of cooked or canned fruit; ½ cup of fruit juice; 1 cup of melon or raspberries; or 2 tablespoons of dried fruit. ? Milk and no-sugar-added yogurt have 15 grams of carbs in a serving.  A serving is 1 cup of milk or 2/3 cup of no-sugar-added yogurt. ? Starchy vegetables have 15 grams of carbs in a serving. A serving is ½ cup of mashed potatoes or sweet potato; 1 cup winter squash; ½ of a small baked potato; ½ cup of cooked beans; or ½ cup cooked corn or green peas. · Learn how much carbs to eat each day and at each meal. A dietitian or CDE can teach you how to keep track of the amount of carbs you eat. This is called carbohydrate counting. · If you are not sure how to count carbohydrate grams, use the Plate Method to plan meals. It is a good, quick way to make sure that you have a balanced meal. It also helps you spread carbs throughout the day. ? Divide your plate by types of foods. Put non-starchy vegetables on half the plate, meat or other protein food on one-quarter of the plate, and a grain or starchy vegetable in the final quarter of the plate. To this you can add a small piece of fruit and 1 cup of milk or yogurt, depending on how many carbs you are supposed to eat at a meal.  · Try to eat about the same amount of carbs at each meal. Do not \"save up\" your daily allowance of carbs to eat at one meal.  · Proteins have very little or no carbs per serving. Examples of proteins are beef, chicken, turkey, fish, eggs, tofu, cheese, cottage cheese, and peanut butter. A serving size of meat is 3 ounces, which is about the size of a deck of cards. Examples of meat substitute serving sizes (equal to 1 ounce of meat) are 1/4 cup of cottage cheese, 1 egg, 1 tablespoon of peanut butter, and ½ cup of tofu. How can you eat out and still eat healthy? · Learn to estimate the serving sizes of foods that have carbohydrate. If you measure food at home, it will be easier to estimate the amount in a serving of restaurant food. · If the meal you order has too much carbohydrate (such as potatoes, corn, or baked beans), ask to have a low-carbohydrate food instead. Ask for a salad or green vegetables.   · If you use insulin, check your blood sugar before and after eating out to help you plan how much to eat in the future. · If you eat more carbohydrate at a meal than you had planned, take a walk or do other exercise. This will help lower your blood sugar. What else should you know? · Limit saturated fat, such as the fat from meat and dairy products. This is a healthy choice because people who have diabetes are at higher risk of heart disease. So choose lean cuts of meat and nonfat or low-fat dairy products. Use olive or canola oil instead of butter or shortening when cooking. · Don't skip meals. Your blood sugar may drop too low if you skip meals and take insulin or certain medicines for diabetes. · Check with your doctor before you drink alcohol. Alcohol can cause your blood sugar to drop too low. Alcohol can also cause a bad reaction if you take certain diabetes medicines. Follow-up care is a key part of your treatment and safety. Be sure to make and go to all appointments, and call your doctor if you are having problems. It's also a good idea to know your test results and keep a list of the medicines you take. Where can you learn more? Go to [email or web address]://[email or web address].[email or web address].Sophir[employer] [email or web address] org and sign in to your [employer] account. Enter Y578 in the [facility] box to learn more about \"Learning About Diabetes Food Guidelines. \"     If you do not have an account, please click on the \"Sign Up Now\" link. Current as of: July 25, 2018  Content Version: 12.0  © 8273-6620 Healthwise, [employer]. Care instructions adapted under license by [facility] ([facility]). If you have questions about a medical condition or this instruction, always ask your healthcare professional. [place] [address] any warranty or liability for your use of this information. Patient Education        Diabetes Foot Health: Care Instructions  Your Care Instructions    When you have diabetes, your feet need extra care and attention.  Diabetes can damage the nerve endings and blood vessels in your feet, making you less likely to notice when your feet are injured. Diabetes also limits your body's ability to fight infection and get blood to areas that need it. If you get a minor foot injury, it could become an ulcer or a serious infection. With good foot care, you can prevent most of these problems. Caring for your feet can be quick and easy. Most of the care can be done when you are bathing or getting ready for bed. Follow-up care is a key part of your treatment and safety. Be sure to make and go to all appointments, and call your doctor if you are having problems. It's also a good idea to know your test results and keep a list of the medicines you take. How can you care for yourself at home? · Keep your blood sugar close to normal by watching what and how much you eat, monitoring blood sugar, taking medicines if prescribed, and getting regular exercise. · Do not smoke. Smoking affects blood flow and can make foot problems worse. If you need help quitting, talk to your doctor about stop-smoking programs and medicines. These can increase your chances of quitting for good. · Eat a diet that is low in fats. High fat intake can cause fat to build up in your blood vessels and decrease blood flow. · Inspect your feet daily for blisters, cuts, cracks, or sores. If you cannot see well, use a mirror or have someone help you. · Take care of your feet:  ? Wash your feet every day. Use warm (not hot) water. Check the water temperature with your wrists or other part of your body, not your feet. ? Dry your feet well. Pat them dry. Do not rub the skin on your feet too hard. Dry well between your toes. If the skin on your feet stays moist, bacteria or a fungus can grow, which can lead to infection. ? Keep your skin soft. Use moisturizing skin cream to keep the skin on your feet soft and prevent calluses and cracks.  But do not put the cream between your toes, and stop using any cream that causes a rash. ? Clean underneath your toenails carefully. Do not use a sharp object to clean underneath your toenails. Use the blunt end of a nail file or other rounded tool. ? Trim and file your toenails straight across to prevent ingrown toenails. Use a nail clipper, not scissors. Use an emery board to smooth the edges. · Change socks daily. Socks without seams are best, because seams often rub the feet. You can find socks for people with diabetes from specialty catalogs. · Look inside your shoes every day for things like gravel or torn linings, which could cause blisters or sores. · Buy shoes that fit well:  ? Look for shoes that have plenty of space around the toes. This helps prevent bunions and blisters. ? Try on shoes while wearing the kind of socks you will usually wear with the shoes. ? Avoid plastic shoes. They may rub your feet and cause blisters. Good shoes should be made of materials that are flexible and breathable, such as leather or cloth. ? Break in new shoes slowly by wearing them for no more than an hour a day for several days. Take extra time to check your feet for red areas, blisters, or other problems after you wear new shoes. · Do not go barefoot. Do not wear sandals, and do not wear shoes with very thin soles. Thin soles are easy to puncture. They also do not protect your feet from hot pavement or cold weather. · Have your doctor check your feet during each visit. If you have a foot problem, see your doctor. Do not try to treat an early foot problem at home. Home remedies or treatments that you can buy without a prescription (such as corn removers) can be harmful. · Always get early treatment for foot problems. A minor irritation can lead to a major problem if not properly cared for early. When should you call for help?   Call your doctor now or seek immediate medical care if:    · You have a foot sore, an ulcer or break in the skin that is not healing stop-smoking programs and medicines. These can increase your chances of quitting for good. · Your doctor may talk with you about taking medicines for your heart. For example, your doctor may suggest taking a statin or daily aspirin. Where can you learn more? Go to https://danyel.JDLab. org and sign in to your [employer] account. Enter Z229 in the [employer] box to learn more about \"Learning About Diabetes and Coronary Artery Disease. \"     If you do not have an account, please click on the \"Sign Up Now\" link. Current as of: July 25, 2018  Content Version: 12.0  © 6114-9386 [employer]. Care instructions adapted under license by [facility] ([facility]). If you have questions about a medical condition or this instruction, always ask your healthcare professional. [address] any warranty or liability for your use of this information. Patient Education        metformin  Pronunciation:  met FOR min  Brand:  Fortamet, Glucophage, Glucophage XR, Glumetza, Riomet  What is the most important information I should know about metformin? You should not use this medicine if you have severe kidney disease, metabolic acidosis, or diabetic ketoacidosis (call your doctor for treatment). If you need to have any type of x-ray or CT scan using a dye that is injected into your veins, you will need to temporarily stop taking metformin. You may develop lactic acidosis, a dangerous build-up of lactic acid in your blood. Call your doctor or get emergency medical help if you have unusual muscle pain, trouble breathing, stomach pain, dizziness, feeling cold, or feeling very weak or tired. What is metformin? Metformin is used together with diet and exercise to improve blood sugar control in adults with type 2 diabetes mellitus. Metformin is sometimes used together with insulin or other medications, but metformin is not for treating type 1 diabetes.   Metformin may also be used for purposes not listed in this medication guide. What should I discuss with my healthcare provider before taking metformin? You should not use metformin if you are allergic to it, or if you have:  · severe kidney disease; or  · metabolic acidosis or diabetic ketoacidosis (call your doctor for treatment). If you need to have surgery or any type of x-ray or CT scan using a dye that is injected into your veins, you will need to temporarily stop taking metformin. Be sure your caregivers know ahead of time that you are using this medication. Tell your doctor if you have ever had:  · kidney disease (your kidney function may need to be checked before you take this medicine);  · high ketone levels in your blood or urine;  · heart disease, congestive heart failure;  · liver disease; or  · if you also use insulin, or other oral diabetes medications. You may develop lactic acidosis, a dangerous build-up of lactic acid in your blood. This may be more likely if you have other medical conditions, a severe infection, chronic alcoholism, or if you are 72 or older. Ask your doctor about your risk. Follow your doctor's instructions about using this medicine if you are pregnant. Blood sugar control is very important during pregnancy, and your dose needs may be different during each trimester of pregnancy. Tell your doctor if you become pregnant while taking metformin. Metformin may stimulate ovulation in a premenopausal woman and may increase the risk of unintended pregnancy. Talk to your doctor about your risk. You should not breast-feed while using this medicine. Metformin should not be given to a child younger than 8years old. Some forms of metformin are not approved for use by anyone younger than 25years old. How should I take metformin? Follow all directions on your prescription label and read all medication guides or instruction sheets. Your doctor may occasionally change your dose.  Use the medicine exactly as directed. Take metformin with a meal, unless your doctor tells you otherwise. Some forms of metformin are taken only once daily with the evening meal. Follow your doctor's instructions. Do not crush, chew, or break an extended-release tablet. Swallow it whole. Measure liquid medicine carefully. Use the dosing syringe provided, or use a medicine dose-measuring device (not a kitchen spoon). Some tablets are made with a shell that is not absorbed or melted in the body. Part of this shell may appear in your stool. This is normal and will not make the medicine less effective. Low blood sugar (hypoglycemia) can happen to everyone who has diabetes. Symptoms include headache, hunger, sweating, irritability, dizziness, nausea, fast heart rate, and feeling anxious or shaky. To quickly treat low blood sugar, always keep a fast-acting source of sugar with you such as fruit juice, hard candy, crackers, raisins, or non-diet soda. Your doctor can prescribe a glucagon emergency injection kit to use in case you have severe hypoglycemia and cannot eat or drink. Be sure your family and close friends know how to give you this injection in an emergency. Blood sugar levels can be affected by stress, illness, surgery, exercise, alcohol use, or skipping meals. Ask your doctor before changing your dose or medication schedule. Metformin is only part of a complete treatment program that may also include diet, exercise, weight control, regular blood sugar testing, and special medical care. Follow your doctor's instructions very closely. Your doctor may have you take extra vitamin B12 while you are taking metformin. Take only the amount of vitamin B12 that your doctor has prescribed. Store at room temperature away from moisture, heat, and light. What happens if I miss a dose? Take the medicine as soon as you can, but skip the missed dose if it is almost time for your next dose. Do not take two doses at one time.   What happens if I overdose? Seek emergency medical attention or call the Poison Help line at 1-243.677.6567. An overdose can cause severe hypoglycemia or lactic acidosis. What should I avoid while taking metformin? Avoid drinking alcohol. It lowers blood sugar and may increase your risk of lactic acidosis. What are the possible side effects of metformin? Get emergency medical help if you have signs of an allergic reaction:  hives; difficult breathing; swelling of your face, lips, tongue, or throat. Some people using metformin develop lactic acidosis, which can be fatal. Get emergency medical help if you have even mild symptoms such as:  · unusual muscle pain;  · feeling cold;  · trouble breathing;  · feeling dizzy, light-headed, tired, or very weak;  · stomach pain, vomiting; or  · slow or irregular heart rate. Common side effects may include:  · low blood sugar;  · nausea, upset stomach; or  · diarrhea. This is not a complete list of side effects and others may occur. Call your doctor for medical advice about side effects. You may report side effects to FDA at 0-692-ZNW-7242. What other drugs will affect metformin? Many drugs can affect metformin, making this medicine less effective or increasing your risk of lactic acidosis. This includes prescription and over-the-counter medicines, vitamins, and herbal products. Not all possible interactions are listed here. Tell your doctor about all your current medicines and any medicine you start or stop using. Where can I get more information? Your pharmacist can provide more information about metformin. Remember, keep this and all other medicines out of the reach of children, never share your medicines with others, and use this medication only for the indication prescribed. Every effort has been made to ensure that the information provided by [address] is accurate, up-to-date, and complete, but no guarantee is made to that effect.  Drug information contained herein may be time sensitive. [employer] information has been compiled for use by healthcare practitioners and consumers in the [place] and therefore [employer] does not warrant that uses outside of the [place] are appropriate, unless specifically indicated otherwise. [employer] drug information does not endorse drugs, diagnose patients or recommend therapy. [employer] drug information is an informational resource designed to assist licensed healthcare practitioners in caring for their patients and/or to serve consumers viewing this service as a supplement to, and not a substitute for, the expertise, skill, knowledge and judgment of healthcare practitioners. The absence of a warning for a given drug or drug combination in no way should be construed to indicate that the drug or drug combination is safe, effective or appropriate for any given patient. [employer] does not assume any responsibility for any aspect of healthcare administered with the aid of information [employer] provides. The information contained herein is not intended to cover all possible uses, directions, precautions, warnings, drug interactions, allergic reactions, or adverse effects. If you have questions about the drugs you are taking, check with your doctor, nurse or pharmacist.  Copyright 6114-3076 [address] Version: 16.01. Revision date: 11/13/2018. Care instructions adapted under license by [facility] ([facility]). If you have questions about a medical condition or this instruction, always ask your healthcare professional. [place] [address] any warranty or liability for your use of this information. Patient Education        Chest Pain: Care Instructions  Your Care Instructions    There are many things that can cause chest pain. Some are not serious and will get better on their own in a few days. But some kinds of chest pain need more testing and treatment.  Your doctor may have recommended a follow-up visit in the next 8 to 12

## 2019-05-28 ENCOUNTER — TELEPHONE (OUTPATIENT)
Dept: FAMILY MEDICINE CLINIC | Age: 54
End: 2019-05-28

## 2019-05-28 NOTE — TELEPHONE ENCOUNTER
Patient was seen last week and started on Metformin. He started having vomiting and diarrhea on Friday. He said the vomiting was better after a few days. Still has some diarrhea but it is a little better than it was.   He wanted to know if you thought this was side effect of medicine and if you think symptoms will improve

## 2019-05-28 NOTE — TELEPHONE ENCOUNTER
Yes it is side effect, do not increase dose until symptoms improve. Call with any problems or concerns.

## 2019-06-03 NOTE — PROGRESS NOTES
[address] [facility]   Cardiovascular Evaluation    PATIENT: Korin Hawthorne  DATE: 2019  MRN: [id number]  CSN: [id number]  : 1965      Primary Care Doctor: DONALDO Bills - CNP  Reason for evaluation:   Coronary Artery Disease      Subjective:   History of present illness on initial date of evaluation:   Korin Hawthorne is a 46 y.o. patient who presents for hospital follow up, s/p [facility] 19. He presented to the ED on 19 w/ complaints of chest pain and sweating. PMH: CAD, CABG x 5 (). Today he reports he only has chest pain when coughing or taking a deep breath. States any type of exertion or physical activity does not bother him. He is taking his medications as prescribed. Patient denies sob, palpitations, dizziness or syncope. Patient Active Problem List   Diagnosis    Family history of early CAD    S/P CABG (coronary artery bypass graft)    Benign essential HTN    Mixed hyperlipidemia    Coronary artery disease involving native coronary artery of native heart with angina pectoris ([address]    Microcytosis    Acute glaucoma of right eye    Chest pain    Paresthesia    Acute calculous cholecystitis    New onset type 2 diabetes mellitus ([address]    S/P laparoscopic cholecystectomy    DM (diabetes mellitus) ([address]         Past Medical History:   has a past medical history of CAD (coronary artery disease), Diabetes mellitus ([address] Hyperlipidemia, Hypertension, MI (myocardial infarction) ([address] and No history of procedure. Surgical History:   has a past surgical history that includes Coronary artery bypass graft (2016); Eye surgery (Right); Colonoscopy (2017); hernia repair (Bilateral, ); and Cholecystectomy, laparoscopic (N/A, 2019). Social History:   reports that he has never smoked. He has never used smokeless tobacco. He reports that he does not drink alcohol or use drugs.      Family History:  No evidence for sudden cardiac death or premature CAD    Home Medications:  Reviewed and are listed in nursing record. and/or listed below  Current Outpatient Medications   Medication Sig Dispense Refill    metFORMIN (GLUCOPHAGE) 500 MG tablet Take 1 tablet by mouth daily (with breakfast) 30 tablet 5    glucose monitoring kit (FREESTYLE) monitoring kit 1 kit by Does not apply route daily Monitor glucose daily. Diagnosis: diabetes mellitus Type 2 1 kit 0    FREESTYLE LANCETS MISC 1 each by Does not apply route daily 100 each 3    blood glucose test strips (FREESTYLE TEST STRIPS) strip 1 each by In Vitro route daily As needed. 100 each 3    nitroGLYCERIN (NITROSTAT) 0.4 MG SL tablet up to max of 3 total doses. If no relief after 1 dose, call 911. 25 tablet 0    atorvastatin (LIPITOR) 20 MG tablet TAKE 1 TABLET BY MOUTH NIGHTLY 30 tablet 5    metoprolol tartrate (LOPRESSOR) 25 MG tablet TAKE 1 TABLET BY MOUTH 2 TIMES DAILY 60 tablet 5    aspirin 81 MG tablet Take 81 mg by mouth daily       No current facility-administered medications for this visit. Allergies:  Patient has no known allergies. Review of Systems:   A 14 point review of symptoms completed. Pertinent positives identified in the HPI, all other review of symptoms negative as below.     Objective:   PHYSICAL EXAM:    Vitals:    06/04/19 0854   BP: 112/68   Pulse: 65   SpO2: 98%    Weight: 201 lb (91.2 kg)     Wt Readings from Last 3 Encounters:   06/04/19 201 lb (91.2 kg)   05/21/19 201 lb 9.6 oz (91.4 kg)   05/13/19 203 lb 1.6 oz (92.1 kg)         General Appearance:  Alert, cooperative, no distress, appears stated age   Head:  Normocephalic, atraumatic   Eyes:  PERRL, conjunctiva/corneas clear   Nose: Nares normal, no drainage or sinus tenderness   Throat: Lips, mucosa, and tongue normal   Neck: Supple, symmetrical, trachea midline, NL thyroid no carotid bruit or JVD   Lungs:   CTAB, respirations unlabored   Chest Wall:  No tenderness or deformity   Heart:  Regular rhythm heavy calcification burden. Mid 70%  OM1- small, moderate dz, ostial 80%  OM2- moderate in size, mild diffuse disease  OM3- large, 90% ostial PLOM  RCA: Dominant, moderate diffuse disease, prox 60%, mid 70%, distal 90%  Grade 3 L-->R collaterals  LVEDP: 10  LVEF: 65%  PLAN  1. 3 vessel CAD with preserved LVEF  - refer to CT surgery for CABG  - check Hemoglobin A1c and TSH  2. Start ASA, and statin. Holding BB for bradycardia and pauses seen at [address] CTA 5/14/19  Only 3 bypass grafts can be visualized. The insertion sites of the left internal mammary branch is poorly visualized secondary to phase misregistration artifact. The graft appears patent. A 2nd bypass graft terminates in the region of a diagonal branch. Graft appears patent. Insertion site is poorly visualized secondary to artifact   A 3rd bypass graft appears small distally, terminating in the region of the acute marginal     VASCULAR/OTHER IMAGING:      Assessment and Plan   Carrol Bowers is a 46 y.o. male who presents today for the following problems:      1. Chest pain: greatly improved   - used push mower 4 hr yeterday w/o issues  2. CAD              -s/p CABGx5 with Dr. Laxmi France 9/20/2016  3. HTN: controlled  4. HLD: controlled        MD PLAN  1. PT surgical report for graft anatomy confusing and not see on cath per se. Cardiac CTA and cath bascialy showing pt has LIMA-LAD, SVG-Diag, SVG-rit RV marginal. Feel jump grafts closed and currently no grafts to LCX. depsite this pt now doing well on meds so suspect collaterals forming to rerout blodd flow   - suggest cardiac rehab to encourage collaterals  2. Cont current meds   - education on NTG and call if any CP returning   - ok to hold cath for now  3.  Of not, pt only with brief VT during cath graft injection     Patient Active Problem List   Diagnosis    Family history of early CAD    S/P CABG (coronary artery bypass graft)    Benign essential HTN    Mixed hyperlipidemia    Coronary artery disease involving native coronary artery of native heart with angina pectoris (HCC)    Microcytosis    Acute glaucoma of right eye    Chest pain    Paresthesia    Acute calculous cholecystitis    New onset type 2 diabetes mellitus ([facility]    S/P laparoscopic cholecystectomy    DM (diabetes mellitus) ([facility]       Patient Plan:  1. Recommend Cardiac Rehab  2. Continue current medications - no changes for now  3. No cardiac testing at this time  4. If your chest pain returns call our office  5. The proper use and anticipated side effects of nitroglycerine has been carefully explained. If a single episode of chest pain is not relieved by one tablet, the patient will try another within 5 minutes; and if this doesn't relieve the pain, the patient is instructed to call 911 for transportation to an emergency department. 6. Follow up in 3 months        It is a pleasure to assist in the care of Cathy Mcwilliams. Please call with any questions. This note was scribed in the presence of Sadia Carcamo MD by Sarahi Miranda RN. The scribes documentation has been prepared under my direction and personally reviewed by me in its entirety. I confirm that the note above accurately reflects all work, treatment, procedures, and medical decision making performed by me. I, Dr. Emperatriz Styles MD, personally performed the services described in this documentation as scribed by Arthur Mcdonald in my presence, and it is both accurate and complete to the best of our ability.        Emperatriz Styles MD, [facility]  [address]  [phone] [facility]  [phone] [address] [place] [address]

## 2019-06-04 ENCOUNTER — OFFICE VISIT (OUTPATIENT)
Dept: CARDIOLOGY CLINIC | Age: 54
End: 2019-06-04
Payer: COMMERCIAL

## 2019-06-04 VITALS
SYSTOLIC BLOOD PRESSURE: 112 MMHG | BODY MASS INDEX: 27.22 KG/M2 | HEART RATE: 65 BPM | HEIGHT: 72 IN | DIASTOLIC BLOOD PRESSURE: 68 MMHG | WEIGHT: 201 LBS | OXYGEN SATURATION: 98 %

## 2019-06-04 DIAGNOSIS — I25.119 CORONARY ARTERY DISEASE INVOLVING NATIVE CORONARY ARTERY OF NATIVE HEART WITH ANGINA PECTORIS (HCC): ICD-10-CM

## 2019-06-04 DIAGNOSIS — E78.01 FAMILIAL HYPERCHOLESTEROLEMIA: ICD-10-CM

## 2019-06-04 DIAGNOSIS — Z95.1 S/P CABG (CORONARY ARTERY BYPASS GRAFT): ICD-10-CM

## 2019-06-04 DIAGNOSIS — I10 ESSENTIAL HYPERTENSION: ICD-10-CM

## 2019-06-04 DIAGNOSIS — I25.9 CHEST PAIN DUE TO MYOCARDIAL ISCHEMIA, UNSPECIFIED ISCHEMIC CHEST PAIN TYPE: Primary | ICD-10-CM

## 2019-06-04 PROCEDURE — 1036F TOBACCO NON-USER: CPT | Performed by: INTERNAL MEDICINE

## 2019-06-04 PROCEDURE — G8427 DOCREV CUR MEDS BY ELIG CLIN: HCPCS | Performed by: INTERNAL MEDICINE

## 2019-06-04 PROCEDURE — 1111F DSCHRG MED/CURRENT MED MERGE: CPT | Performed by: INTERNAL MEDICINE

## 2019-06-04 PROCEDURE — G8419 CALC BMI OUT NRM PARAM NOF/U: HCPCS | Performed by: INTERNAL MEDICINE

## 2019-06-04 PROCEDURE — 99214 OFFICE O/P EST MOD 30 MIN: CPT | Performed by: INTERNAL MEDICINE

## 2019-06-04 PROCEDURE — G8598 ASA/ANTIPLAT THER USED: HCPCS | Performed by: INTERNAL MEDICINE

## 2019-06-04 PROCEDURE — 3017F COLORECTAL CA SCREEN DOC REV: CPT | Performed by: INTERNAL MEDICINE

## 2019-06-05 ENCOUNTER — TELEPHONE (OUTPATIENT)
Dept: CARDIOLOGY CLINIC | Age: 54
End: 2019-06-05

## 2019-06-06 RX ORDER — MELOXICAM 15 MG/1
15 TABLET ORAL DAILY PRN
Qty: 90 TABLET | Refills: 3 | Status: ON HOLD | OUTPATIENT
Start: 2019-06-06 | End: 2019-08-06 | Stop reason: HOSPADM

## 2019-06-11 RX ORDER — NITROGLYCERIN 0.4 MG/1
TABLET SUBLINGUAL
Qty: 25 TABLET | Refills: 0 | Status: SHIPPED | OUTPATIENT
Start: 2019-06-11 | End: 2019-07-12 | Stop reason: SDUPTHER

## 2019-06-15 ENCOUNTER — APPOINTMENT (OUTPATIENT)
Dept: GENERAL RADIOLOGY | Age: 54
End: 2019-06-15
Payer: COMMERCIAL

## 2019-06-15 ENCOUNTER — HOSPITAL ENCOUNTER (EMERGENCY)
Age: 54
Discharge: HOME | End: 2019-06-15
Attending: EMERGENCY MEDICINE | Admitting: INTERNAL MEDICINE
Payer: COMMERCIAL

## 2019-06-15 VITALS
WEIGHT: 201 LBS | TEMPERATURE: 98.2 F | SYSTOLIC BLOOD PRESSURE: 93 MMHG | RESPIRATION RATE: 16 BRPM | OXYGEN SATURATION: 96 % | HEIGHT: 72 IN | BODY MASS INDEX: 27.22 KG/M2 | HEART RATE: 66 BPM | DIASTOLIC BLOOD PRESSURE: 61 MMHG

## 2019-06-15 DIAGNOSIS — R07.9 CHEST PAIN, UNSPECIFIED TYPE: Primary | ICD-10-CM

## 2019-06-15 LAB
A/G RATIO: 1.3 (ref 1.1–2.2)
ALBUMIN SERPL-MCNC: 4.3 G/DL (ref 3.4–5)
ALP BLD-CCNC: 104 U/L (ref 40–129)
ALT SERPL-CCNC: 31 U/L (ref 10–40)
ANION GAP SERPL CALCULATED.3IONS-SCNC: 14 MMOL/L (ref 3–16)
AST SERPL-CCNC: 25 U/L (ref 15–37)
BASOPHILS ABSOLUTE: 0.2 K/UL (ref 0–0.2)
BASOPHILS RELATIVE PERCENT: 2.4 %
BILIRUB SERPL-MCNC: 0.6 MG/DL (ref 0–1)
BUN BLDV-MCNC: 11 MG/DL (ref 7–20)
CALCIUM SERPL-MCNC: 9.8 MG/DL (ref 8.3–10.6)
CHLORIDE BLD-SCNC: 102 MMOL/L (ref 99–110)
CO2: 24 MMOL/L (ref 21–32)
CREAT SERPL-MCNC: 0.8 MG/DL (ref 0.9–1.3)
D DIMER: <200 NG/ML DDU (ref 0–229)
EKG ATRIAL RATE: 57 BPM
EKG DIAGNOSIS: NORMAL
EKG P AXIS: 15 DEGREES
EKG P-R INTERVAL: 164 MS
EKG Q-T INTERVAL: 420 MS
EKG QRS DURATION: 144 MS
EKG QTC CALCULATION (BAZETT): 408 MS
EKG R AXIS: -21 DEGREES
EKG T AXIS: -7 DEGREES
EKG VENTRICULAR RATE: 57 BPM
EOSINOPHILS ABSOLUTE: 0.7 K/UL (ref 0–0.6)
EOSINOPHILS RELATIVE PERCENT: 6.5 %
GFR AFRICAN AMERICAN: >60
GFR NON-AFRICAN AMERICAN: >60
GLOBULIN: 3.3 G/DL
GLUCOSE BLD-MCNC: 136 MG/DL (ref 70–99)
HCT VFR BLD CALC: 45.3 % (ref 40.5–52.5)
HEMOGLOBIN: 15.2 G/DL (ref 13.5–17.5)
LYMPHOCYTES ABSOLUTE: 3.1 K/UL (ref 1–5.1)
LYMPHOCYTES RELATIVE PERCENT: 30.7 %
MCH RBC QN AUTO: 29 PG (ref 26–34)
MCHC RBC AUTO-ENTMCNC: 33.7 G/DL (ref 31–36)
MCV RBC AUTO: 86.2 FL (ref 80–100)
MONOCYTES ABSOLUTE: 0.8 K/UL (ref 0–1.3)
MONOCYTES RELATIVE PERCENT: 8.3 %
NEUTROPHILS ABSOLUTE: 5.2 K/UL (ref 1.7–7.7)
NEUTROPHILS RELATIVE PERCENT: 52.1 %
PDW BLD-RTO: 15.5 % (ref 12.4–15.4)
PLATELET # BLD: 301 K/UL (ref 135–450)
PMV BLD AUTO: 7.8 FL (ref 5–10.5)
POTASSIUM SERPL-SCNC: 4.3 MMOL/L (ref 3.5–5.1)
RBC # BLD: 5.25 M/UL (ref 4.2–5.9)
SODIUM BLD-SCNC: 140 MMOL/L (ref 136–145)
TOTAL PROTEIN: 7.6 G/DL (ref 6.4–8.2)
TROPONIN: <0.01 NG/ML
TROPONIN: <0.01 NG/ML
WBC # BLD: 10.1 K/UL (ref 4–11)

## 2019-06-15 PROCEDURE — 99284 EMERGENCY DEPT VISIT MOD MDM: CPT | Performed by: INTERNAL MEDICINE

## 2019-06-15 PROCEDURE — 96374 THER/PROPH/DIAG INJ IV PUSH: CPT

## 2019-06-15 PROCEDURE — 85025 COMPLETE CBC W/AUTO DIFF WBC: CPT

## 2019-06-15 PROCEDURE — 85379 FIBRIN DEGRADATION QUANT: CPT

## 2019-06-15 PROCEDURE — 71046 X-RAY EXAM CHEST 2 VIEWS: CPT

## 2019-06-15 PROCEDURE — 93010 ELECTROCARDIOGRAM REPORT: CPT | Performed by: INTERNAL MEDICINE

## 2019-06-15 PROCEDURE — 99285 EMERGENCY DEPT VISIT HI MDM: CPT

## 2019-06-15 PROCEDURE — 36415 COLL VENOUS BLD VENIPUNCTURE: CPT

## 2019-06-15 PROCEDURE — 80053 COMPREHEN METABOLIC PANEL: CPT

## 2019-06-15 PROCEDURE — 6360000002 HC RX W HCPCS: Performed by: NURSE PRACTITIONER

## 2019-06-15 PROCEDURE — 84484 ASSAY OF TROPONIN QUANT: CPT

## 2019-06-15 PROCEDURE — 6370000000 HC RX 637 (ALT 250 FOR IP): Performed by: NURSE PRACTITIONER

## 2019-06-15 PROCEDURE — 93005 ELECTROCARDIOGRAM TRACING: CPT | Performed by: EMERGENCY MEDICINE

## 2019-06-15 RX ORDER — ASPIRIN 81 MG/1
324 TABLET, CHEWABLE ORAL ONCE
Status: COMPLETED | OUTPATIENT
Start: 2019-06-15 | End: 2019-06-15

## 2019-06-15 RX ORDER — ACETAMINOPHEN 500 MG
1000 TABLET ORAL ONCE
Status: COMPLETED | OUTPATIENT
Start: 2019-06-15 | End: 2019-06-15

## 2019-06-15 RX ORDER — NAPROXEN 500 MG/1
500 TABLET ORAL 2 TIMES DAILY
Qty: 20 TABLET | Refills: 0 | Status: ON HOLD | OUTPATIENT
Start: 2019-06-15 | End: 2019-08-06 | Stop reason: HOSPADM

## 2019-06-15 RX ORDER — ISOSORBIDE MONONITRATE 30 MG/1
30 TABLET, EXTENDED RELEASE ORAL DAILY
Qty: 30 TABLET | Refills: 0 | Status: SHIPPED | OUTPATIENT
Start: 2019-06-15 | End: 2019-12-31 | Stop reason: ALTCHOICE

## 2019-06-15 RX ORDER — ATROPINE SULFATE 0.1 MG/ML
0.5 INJECTION INTRAVENOUS ONCE
Status: DISCONTINUED | OUTPATIENT
Start: 2019-06-15 | End: 2019-06-15

## 2019-06-15 RX ORDER — KETOROLAC TROMETHAMINE 30 MG/ML
30 INJECTION, SOLUTION INTRAMUSCULAR; INTRAVENOUS ONCE
Status: COMPLETED | OUTPATIENT
Start: 2019-06-15 | End: 2019-06-15

## 2019-06-15 RX ADMIN — NITROGLYCERIN 0.5 INCH: 20 OINTMENT TOPICAL at 09:00

## 2019-06-15 RX ADMIN — ASPIRIN 81 MG 324 MG: 81 TABLET ORAL at 08:59

## 2019-06-15 RX ADMIN — ACETAMINOPHEN 1000 MG: 500 TABLET ORAL at 09:00

## 2019-06-15 RX ADMIN — KETOROLAC TROMETHAMINE 30 MG: 30 INJECTION, SOLUTION INTRAMUSCULAR at 11:11

## 2019-06-15 ASSESSMENT — PAIN DESCRIPTION - LOCATION
LOCATION: CHEST
LOCATION: CHEST

## 2019-06-15 ASSESSMENT — PAIN SCALES - GENERAL
PAINLEVEL_OUTOF10: 4
PAINLEVEL_OUTOF10: 2
PAINLEVEL_OUTOF10: 4
PAINLEVEL_OUTOF10: 4
PAINLEVEL_OUTOF10: 3

## 2019-06-15 ASSESSMENT — PAIN DESCRIPTION - DESCRIPTORS: DESCRIPTORS: TIGHTNESS

## 2019-06-15 ASSESSMENT — PAIN DESCRIPTION - PAIN TYPE
TYPE: ACUTE PAIN
TYPE: ACUTE PAIN

## 2019-06-15 ASSESSMENT — PAIN DESCRIPTION - ORIENTATION: ORIENTATION: LEFT

## 2019-06-15 NOTE — ED NOTES
Second troponin drawn and sent to lab. Pt medicated per order. Pt in bed resting. Pt denies further needs at this time. Pt remains on cardiac monitor.       Suzy Christianson RN  06/15/19 2307

## 2019-06-15 NOTE — CONSULTS
[address] [facility]   Cardiovascular Evaluation    PATIENT: Xochitl Smith  DATE: 6/15/2019  MRN: [id number]  CSN: [id number]  : 1965    Primary Care Doctor/Referring provider: DONALDO Martines CNP    Reason for evaluation/Chief complaint:   Chest Pain (Began about 0200 this morning. Pt has cardiac hx from . Pt also c/o SOB. Pt had a cardiac cath in May. Pt saw cardiologist about a week ago and was told to take 3 nitro if pain came back. Pt tok 3 nitro today, denies relief. )      Subjective:    History of present illness on initial date of evaluation:   Xochitl Smith is a 46 y.o. patient who presents with onset of new chest pain. The patient states that they have had a mild pain located left side of their chest. The pain started 1 days ago and has been resolved since onset. The pain did not radiate to the left arm or jaw area. The pain is not associated with exertion. The pain resolved with nothing. The pain was not associated with diaphoresis or co-existent SOB. Previously the patient had more typical angina with central location. Todays pain is worse with palpation and inspiration. The patient was and evaluated in the ER. Patient Active Problem List   Diagnosis    Family history of early CAD    S/P CABG (coronary artery bypass graft)    Benign essential HTN    Mixed hyperlipidemia    Coronary artery disease involving native coronary artery of native heart with angina pectoris ([address]    Microcytosis    Acute glaucoma of right eye    Chest pain    Paresthesia    Acute calculous cholecystitis    New onset type 2 diabetes mellitus ([address]    S/P laparoscopic cholecystectomy    DM (diabetes mellitus) ([address]         Cardiac Testing: I have reviewed the findings below.   EKG:  ECHO:   STRESS TEST:  CATH:  BYPASS:  VASCULAR:    Past Medical History:   has a past medical history of CAD (coronary artery disease), Diabetes mellitus ([address] Hyperlipidemia, Hypertension, MI (myocardial infarction) ([facility] and No history of procedure. Surgical History:   has a past surgical history that includes Coronary artery bypass graft (09/20/2016); Eye surgery (Right); Colonoscopy (06/01/2017); hernia repair (Bilateral, 2005); and Cholecystectomy, laparoscopic (N/A, 4/17/2019). Social History:   reports that he has never smoked. He has never used smokeless tobacco. He reports that he does not drink alcohol or use drugs. Family History:  No evidence for sudden cardiac death or premature CAD    Medications:  Reviewed and are listed in nursing record. and/or listed below  Outpatient Medications:  Prior to Admission medications    Medication Sig Start Date End Date Taking? Authorizing Provider   nitroGLYCERIN (NITROSTAT) 0.4 MG SL tablet PLACE 1 TABLET UNDER THE TONGUE AS NEEDED, MAX OF 3 DOSES, IF NO RELIEF CALL 911 6/11/19   DONALDO Martines CNP   meloxicam (MOBIC) 15 MG tablet Take 1 tablet by mouth daily as needed for Pain 6/6/19   Cherri Trevino PA-C   metFORMIN (GLUCOPHAGE) 500 MG tablet Take 1 tablet by mouth daily (with breakfast) 5/21/19   DONALDO Martines CNP   glucose monitoring kit (FREESTYLE) monitoring kit 1 kit by Does not apply route daily Monitor glucose daily. Diagnosis: diabetes mellitus Type 2 5/21/19   DONALDO Martines CNP   FREESTYLE LANCETS MISC 1 each by Does not apply route daily 5/21/19   DONALDO Martines CNP   blood glucose test strips (FREESTYLE TEST STRIPS) strip 1 each by In Vitro route daily As needed.  5/21/19   DONALDO Martines CNP   atorvastatin (LIPITOR) 20 MG tablet TAKE 1 TABLET BY MOUTH NIGHTLY 12/24/18   DONALDO Martines CNP   metoprolol tartrate (LOPRESSOR) 25 MG tablet TAKE 1 TABLET BY MOUTH 2 TIMES DAILY 12/24/18   DONALDO Martines CNP   aspirin 81 MG tablet Take 81 mg by mouth daily    Historical Provider, MD       In-patient schedule medications:        Infusion Medications: Allergies:  Patient has no known allergies. Review of Systems:   All 14 point review of symptoms completed. Pertinent positives identified in the HPI, all other review of symptoms findings as below.      Review of Systems - History obtained from the patient  General ROS: negative for - chills, fever or night sweats  Psychological ROS: negative for - disorientation or hallucinations  Ophthalmic ROS: negative for - dry eyes, eye pain or loss of vision  ENT ROS: negative for - nasal discharge or sore throat  Allergy and Immunology ROS: negative for - hives or itchy/watery eyes  Hematological and Lymphatic ROS: negative for - jaundice or night sweats  Endocrine ROS: negative for - mood swings or temperature intolerance  Breast ROS: deferred  Respiratory ROS: negative for - hemoptysis or stridor  Cardiovascular ROS: negative for - chest pain, dyspnea on exertion or palpitations  Gastrointestinal ROS: no abdominal pain, change in bowel habits, or black or bloody stools  Genito-Urinary ROS: no dysuria, trouble voiding, or hematuria  Musculoskeletal ROS: negative for - gait disturbance, joint pain or joint stiffness  Neurological ROS: negative for - seizures or speech problems  Dermatological ROS: negative for - rash or skin lesion changes      Physical Examination:    [unfilled]  /75   Pulse 68   Temp 98.2 °F (36.8 °C)   Resp 15   Ht 6' (1.829 m)   Wt 201 lb (91.2 kg)   SpO2 95%   BMI 27.26 kg/m²    Weight: 201 lb (91.2 kg)     Wt Readings from Last 3 Encounters:   06/15/19 201 lb (91.2 kg)   06/04/19 201 lb (91.2 kg)   05/21/19 201 lb 9.6 oz (91.4 kg)     No intake or output data in the 24 hours ending 06/15/19 1232    General Appearance:  Alert, cooperative, no distress, appears stated age   Head:  Normocephalic, without obvious abnormality, atraumatic   Eyes:  PERRL, conjunctiva/corneas clear       Nose: Nares normal, no drainage or sinus tenderness   Throat: Lips, mucosa, and tongue normal   Neck: Supple, symmetrical, trachea midline, no adenopathy, thyroid: not enlarged, symmetric, no tenderness/mass/nodules, no carotid bruit or JVD       Lungs:   Clear to auscultation bilaterally, respirations unlabored   Chest Wall:  tenderness to palpation. Able to reproduce patient's pain   Heart:  Regular rhythm and normal rate; S1, S2 are normal; no murmur noted; no rub or gallop   Abdomen:   Soft, non-tender, bowel sounds active all four quadrants,  no masses, no organomegaly           Extremities: Extremities normal, atraumatic, no cyanosis or edema   Pulses: 2+ and symmetric   Skin: Skin color, texture, turgor normal, no rashes or lesions   Pysch: Normal mood and affect   Neurologic: Normal gross motor and sensory exam.         Labs  Recent Labs     06/15/19  0818   WBC 10.1   HGB 15.2   HCT 45.3   MCV 86.2        Recent Labs     06/15/19  0818   CREATININE 0.8*   BUN 11      K 4.3      CO2 24     No results for input(s): INR, PROTIME in the last 72 hours. Recent Labs     06/15/19  0818 06/15/19  1108   TROPONINI <0.01 <0.01     Invalid input(s): PRO-BNP  No results for input(s): CHOL, HDL in the last 72 hours. Invalid input(s): LDL, TG      Imaging:  I have reviewed the below testing personally and my interpretation is below. EKG:  CXR:      Assessment:  46 y.o. patient with:  Problem List Items Addressed This Visit     Chest pain - Primary          Plan:  1. Atypical chest pain without clear clinical pattern for angina  2. Normal EKG and troponin   ~repeat at 3 hour  3. If normal, okay for DC  4. Add Imdur 30mg daily  5. Can see our office next 1-2 weeks    All questions and concerns were addressed to the patient/family. Alternatives to my treatment were discussed. The note was completed using EMR. Every effort was made to ensure accuracy; however, inadvertent computerized transcription errors may be present.     Esteban Craven MD, DEJA, [facility] [place], [place]  [phone] [facility] office  [phone] [facility]  6/15/2019  12:32 PM

## 2019-06-15 NOTE — ED PROVIDER NOTES
[address] [place] [address]  ED      CHIEF COMPLAINT  Chest Pain (Began about 0200 this morning. Pt has cardiac hx from 2016. Pt also c/o SOB. Pt had a cardiac cath in May. Pt saw cardiologist about a week ago and was told to take 3 nitro if pain came back. Pt tok 3 nitro today, denies relief. )    HISTORY OF PRESENT ILLNESS  Glenn Barth is a 46 y.o. male who presents to the ED complaining of CP. Started around 2 am, woke him from sleep. Took 3 SL NTG without relief. Reports SOB, nausea, dizziness/light headedness. Denies associated diaphoresis, vomiting. Reports numbness into the left arm. Pain does radiate into the left side of the neck. Reports a HA but states the nitro did not tingle. Shortness of breath, denies abdominal pain, nausea, vomiting, diarrhea. Patient denies any fever or chills. Denies alleviating or aggravating factors. The patient is currently rating their pain as 4/10 and describes it as a tightness type of pain. Was in here a month ago, had an angio and two of his bypasses weren't there. MD said part of his heart is not getting good blood flow. Was told if he developed CP take the nitro and if they did not work to come to ED. Risk factors include: +CABG    The patient denies other complaints, modifying factors or associated symptoms. The patient arrived to the ED via private car. Patient is accompanied by family who is bedside for the visit.     PAST MEDICAL HISTORY    Past Medical History:   Diagnosis Date    CAD (coronary artery disease) 2016    [facility] mellitus [facility] [address] [facility]     Hyperlipidemia     Hypertension     MI (myocardial infarction) ([facility] [address] [facility]     No history of procedure 06/01/2017    colonoscopy       SURGICAL HISTORY    Past Surgical History:   Procedure Laterality Date    CHOLECYSTECTOMY, LAPAROSCOPIC N/A 4/17/2019    LAPAROSCOPIC CHOLECYSTECTOMY WITH INTRAOPERATIVE CHOLANGIOGRAM performed by Tashi Sullivan MD at [facility] COLONOSCOPY  06/01/2017    Colonic polyp    CORONARY ARTERY BYPASS GRAFT  09/20/2016    LIMA- LAD, Reverse SVG- [place], reverse SVG- PDA, Reverse SVG- OM seq- Diag    EYE SURGERY Right     drain placed behind eye    HERNIA REPAIR Bilateral 2005    bilateral inguinal hernia repair       CURRENT MEDICATIONS    Current Outpatient Rx   Medication Sig Dispense Refill    nitroGLYCERIN (NITROSTAT) 0.4 MG SL tablet PLACE 1 TABLET UNDER THE TONGUE AS NEEDED, MAX OF 3 DOSES, IF NO RELIEF CALL 911 25 tablet 0    meloxicam (MOBIC) 15 MG tablet Take 1 tablet by mouth daily as needed for Pain 90 tablet 3    metFORMIN (GLUCOPHAGE) 500 MG tablet Take 1 tablet by mouth daily (with breakfast) 30 tablet 5    glucose monitoring kit (FREESTYLE) monitoring kit 1 kit by Does not apply route daily Monitor glucose daily. Diagnosis: diabetes mellitus Type 2 1 kit 0    FREESTYLE LANCETS MISC 1 each by Does not apply route daily 100 each 3    blood glucose test strips (FREESTYLE TEST STRIPS) strip 1 each by In Vitro route daily As needed.  100 each 3    atorvastatin (LIPITOR) 20 MG tablet TAKE 1 TABLET BY MOUTH NIGHTLY 30 tablet 5    metoprolol tartrate (LOPRESSOR) 25 MG tablet TAKE 1 TABLET BY MOUTH 2 TIMES DAILY 60 tablet 5    aspirin 81 MG tablet Take 81 mg by mouth daily         ALLERGIES    No Known Allergies    FAMILY HISTORY    Family History   Problem Relation Age of Onset    Heart Disease Father     Diabetes type 2  Father     Cancer Mother        SOCIAL HISTORY    Social History     Socioeconomic History    Marital status:      Spouse name: None    Number of children: None    Years of education: None    Highest education level: None   Occupational History    None   Social Needs    Financial resource strain: None    Food insecurity:     Worry: None     Inability: None    Transportation needs:     Medical: None     Non-medical: None   Tobacco Use    Smoking status: Never Smoker    Smokeless tobacco: Never Used   Substance and Sexual Activity    Alcohol use: No    Drug use: No    Sexual activity: Yes     Partners: Female   Lifestyle    Physical activity:     Days per week: None     Minutes per session: None    Stress: None   Relationships    Social connections:     Talks on phone: None     Gets together: None     Attends Mu-ism service: None     Active member of club or organization: None     Attends meetings of clubs or organizations: None     Relationship status: None    Intimate partner violence:     Fear of current or ex partner: None     Emotionally abused: None     Physically abused: None     Forced sexual activity: None   Other Topics Concern    None   Social History Narrative    None       REVIEW OF SYSTEMS    10 systems reviewed, pertinent positives per HPI otherwise noted to be negative    PHYSICAL EXAM  Vitals:    06/15/19 0903   BP:    Pulse: 61   Resp: 15   Temp:    SpO2: 97%       GENERAL: Patient is well-developed, well-nourished. Awake and alert. Cooperative. Resting in bed. No apparent distress. HEENT:  Normocephalic, atraumatic. Conjunctiva appear normal. Sclera is non-icteric. External ears are normal.    NECK: Supple with normal ROM. Trachea midline. LUNGS: Equal and symmetric chest rise. Breathing is unlabored. Speaking comfortably in full sentences. Lungs are clear bilaterally to auscultation. Without wheezing, rales, or rhonchi. There is some mild reproducible tenderness to palpation of the left chest wall. CADIOVASCULAR:  Regular rate and rhythm. Normal S1-S2 sounds. No murmurs, rubs, or gallops. Capillary refill is brisk in all 4 extremities. Bilateral lower extremities are equal in size, there is no swelling observed. There is no tenderness to palpation. There is no erythema observed or warmth palpated. GI: Soft, nontender, nondistended with positive bowel sounds. No rebound tenderness, guarding or any peritoneal signs.   No masses or hepatosplenomegaly MUSCULOSKELETAL:  No gross deformities or trauma noted. Moving all extremities equally and appropriately. Normal ROM. SKIN: Warm/dry. Skin is intact. No rashes/lesions noted. PSYCHIATRIC: Mood and affect appropriate. Speech is clear and articulate. NEUROLOGIC: Alert and oriented. No focal motor or sensory deficits. Gait was observed and is normal.    LABS  I have reviewed all labs for this visit.    Results for orders placed or performed during the hospital encounter of 06/15/19   CBC auto differential   Result Value Ref Range    WBC 10.1 4.0 - 11.0 K/uL    RBC 5.25 4.20 - 5.90 M/uL    Hemoglobin 15.2 13.5 - 17.5 g/dL    Hematocrit 45.3 40.5 - 52.5 %    MCV 86.2 80.0 - 100.0 fL    MCH 29.0 26.0 - 34.0 pg    MCHC 33.7 31.0 - 36.0 g/dL    RDW 15.5 (H) 12.4 - 15.4 %    Platelets 126 030 - 802 K/uL    MPV 7.8 5.0 - 10.5 fL    Neutrophils % 52.1 %    Lymphocytes % 30.7 %    Monocytes % 8.3 %    Eosinophils % 6.5 %    Basophils % 2.4 %    Neutrophils # 5.2 1.7 - 7.7 K/uL    Lymphocytes # 3.1 1.0 - 5.1 K/uL    Monocytes # 0.8 0.0 - 1.3 K/uL    Eosinophils # 0.7 (H) 0.0 - 0.6 K/uL    Basophils # 0.2 0.0 - 0.2 K/uL   Comprehensive metabolic panel   Result Value Ref Range    Sodium 140 136 - 145 mmol/L    Potassium 4.3 3.5 - 5.1 mmol/L    Chloride 102 99 - 110 mmol/L    CO2 24 21 - 32 mmol/L    Anion Gap 14 3 - 16    Glucose 136 (H) 70 - 99 mg/dL    BUN 11 7 - 20 mg/dL    CREATININE 0.8 (L) 0.9 - 1.3 mg/dL    GFR Non-African American >60 >60    GFR African American >60 >60    Calcium 9.8 8.3 - 10.6 mg/dL    Total Protein 7.6 6.4 - 8.2 g/dL    Alb 4.3 3.4 - 5.0 g/dL    Albumin/Globulin Ratio 1.3 1.1 - 2.2    Total Bilirubin 0.6 0.0 - 1.0 mg/dL    Alkaline Phosphatase 104 40 - 129 U/L    ALT 31 10 - 40 U/L    AST 25 15 - 37 U/L    Globulin 3.3 g/dL   Troponin   Result Value Ref Range    Troponin <0.01 <0.01 ng/mL   EKG 12 Lead   Result Value Ref Range    Ventricular Rate 57 BPM    Atrial Rate 57 BPM    P-R Interval 164 ms QRS Duration 144 ms    Q-T Interval 420 ms    QTc Calculation (Bazett) 408 ms    P Axis 15 degrees    R Axis -21 degrees    T Axis -7 degrees    Diagnosis       Sinus bradycardia with sinus arrhythmiaRight bundle branch blockAbnormal ECGWhen compared with ECG of 13-MAY-2019 00:24,No significant change was found       RADIOLOGY    Xr Chest Standard (2 Vw)    Result Date: 6/15/2019  EXAMINATION: TWO XRAY VIEWS OF THE CHEST 6/15/2019 8:26 am COMPARISON: 05/12/2019. HISTORY: ORDERING SYSTEM PROVIDED HISTORY: CP TECHNOLOGIST PROVIDED HISTORY: Reason for exam:->CP Ordering Physician Provided Reason for Exam: cp FINDINGS: The heart size is within normal limits. The pulmonary vasculature is also within normal limits. No acute infiltrates are seen. The costophrenic angles are sharp bilaterally. No pneumothoraces are noted. There are postsurgical changes of median sternotomy. 1. No active pulmonary disease. HEART SCORE:    History: +1 for moderate suspicion  EKG: +1 for nonspecific changes   Age: +1 for age 44-72 years  Risk factors (includes HLD, HTN, DM, tobacco use, obesity, and +FHx): +2 for known CAD or 3+ risk factors  Initial troponin: +0 for negative troponin    Heart score: 5. This falls under the following category: Score of 4-6, which indicates low/moderate risk for major adverse cardiac event and supports observation with repeated troponins and/or non-invasive testing    ED COURSE/MDM  Patient seen and evaluated. Old records reviewed. Diagnostic testing reviewed and results discussed. I have seen and evaluated this patient with supervising physician: Annabelle Solano MD. We thoroughly discussed the history, physical exam, diagnostic testing, emergency department course, plan and disposition. Kandi Younger presented to the ED today with above noted complaints. Physical exam reveals mild reproducible tenderness palpation of the left chest wall. Otherwise patient's physical exam is unremarkable.   He does have an elevated heart score of 5. He does have a known history of CAD. CBC does not show a leukocytosis, absolute eosinophils elevated at 0.7. No further differential shift is seen. There is no anemia. CMP is without electrolyte abnormality. No evidence of kidney or liver dysfunction. Troponin is negative. EKG shows sinus bradycardia without acute findings. Chest x-ray is without acute findings. Patient has not taken any of his morning medications, he was given 325 mg of aspirin and placed on half an inch of Nitropaste. Upon reevaluation by ED attending, nursing staff, and myself patient did not get much improvement of his pain with the nitroglycerin, he was given Toradol and upon reevaluation his pain did decrease from 4/10 to 2/10. Pt was given the following medications or treatments in the ED:   Medications   nitroglycerin (NITRO-BID) 2 % ointment 0.5 inch (0.5 inches Topical Given 6/15/19 0900)   aspirin chewable tablet 324 mg (324 mg Oral Given 6/15/19 0859)   acetaminophen (TYLENOL) tablet 1,000 mg (1,000 mg Oral Given 6/15/19 0900)     I consulted hospitalist who wanted patient discussed with cardiology prior to being admitted. I did consult cardiology and speak with Dr. Leila Bales. He stated that given the patient's history and report of the chest pain he felt that delta Troponin and discharge if patient was pain-free is reasonable and follow-up in the office this week. Patient's pain remained consistent while in the ED. Dr. Leila Bales actually did come to the ER to evaluate the patient and spoke with him. After the evaluation Dr. Leila Bales stated to me that he felt the patient's pain was not consistent with his prior cardiac pain and that his pain is reproducible with palpation and with deep breaths that he felt it was less likely due to cardiac ischemia. D-dimer was obtained and this was negative. Second troponin was also negative.   I discussed all the above with the patient and he is comfortable at this time with discharge. He is being discharged with Imdur as well as anti-inflammatory. Patient was advised to follow-up with Dr. Pat Kirkpatrick, call office on Monday to be seen and return to the ED immediately if he develops any new or worsening symptoms. I estimate there is LOW risk for PULMONARY EMBOLISM, ACUTE CORONARY SYNDROME, OR THORACIC AORTIC DISSECTION, thus I consider the discharge disposition reasonable. Imani Serrato and I have discussed the diagnosis and risks, and we agree with discharging home to follow-up with their primary doctor. We also discussed returning to the Emergency Department immediately if new or worsening symptoms occur. We have discussed the symptoms which are most concerning (e.g., bloody sputum, fever, worsening pain or shortness of breath, vomiting) that necessitate immediate return. CLINICAL IMPRESSION    1. Chest pain, unspecified type       Please refer to AVS for further details regarding discharge instructions. A discussion was had with the patient regarding diagnosis, diagnostic testing results, treatment/ plan of care, and follow up. All questions were answered. Patient will follow up as directed for further evaluation/treatment. The patient was given strict return precautions as we discussed symptoms that would necessitate return to the ED. Patient will return to ED for new/worsening symptoms. The patient verbalized their understanding and agreement with the above plan. Patient was sent home with a prescription for below medication/s. I did [place] patient on appropriate use of these medication.   Discharge Medication List as of 6/15/2019 12:50 PM      START taking these medications    Details   isosorbide mononitrate (IMDUR) 30 MG extended release tablet Take 1 tablet by mouth daily, Disp-30 tablet, R-0Print      naproxen (NAPROSYN) 500 MG tablet Take 1 tablet by mouth 2 times daily for 20 doses, Disp-20 tablet, R-0Print             FOLLOW

## 2019-06-15 NOTE — ED PROVIDER NOTES
I independently performed a history and physical on Emiliano Taylor. All diagnostic, treatment, and disposition decisions were made by myself in conjunction with the advanced practice provider.     -Emiliano Taylor is a 46 y.o. male presents to ED for chest pain. Patient reportss chest pain started at 2am, tightness to left side with shortness of breath and nausea. Denies chills, diaphoresis. Took 3 nitroglycerin tablets without improvement. Currently reports ongoing chest pain  -of note patient has extensive cardiac history including   -cardiologist is sean. -per cardiac cath (5/13/2019): LCX (80-90%) RCA (proximal 50%/mid60%/distal 70%). Patient grafts x 3. Need to consider PCI to LCx but would likely jeopardize OM 1-3  -PE: CTAB/l, tolerated rhythm, abdomen soft, nondistended, nontender to palpation alert oriented speaking full sentences, not in acute distress  -lab workup was wnl  -The Ekg interpreted by me shows  sinus bradycardia, rate=57   Axis is   Normal  QTc is  408  Intervals and Durations are unremarkable. ST Segments: no acute change  No significant change from prior EKG dated 5/13/2019  -patient given nitroglycerin patch, aspirin, tylenol in ED   -Consulted cardiology spoke with Dr. Farnaz Peña who came down and evaluated patient. After speaking with the patient felt that chest pain was noncardiac in nature. Recommended the patient have repeat troponin and if negative can be discharged home to follow-up with Dr. Luis A Brown on Monday.  -Well appearing, non toxic, alert, oriented speaking in full sentences and hemodynamically stable upon discharge      For further details of [facility] emergency department encounter, please see JOSE ANTONIO Hoang documentation.         Janae Drew MD  06/15/19 1642       Janae Drew MD  06/15/19 [id number]

## 2019-06-15 NOTE — ED NOTES
Discharge instructions reviewed with Pt. Pt verbalizes understanding at this time. Prescriptions/medications reviewed with pt at this time. VS as noted. Pt condition stable at this time. No concerns voiced.       German Kirk RN  06/15/19 [id number]

## 2019-07-12 RX ORDER — NITROGLYCERIN 0.4 MG/1
TABLET SUBLINGUAL
Qty: 25 TABLET | Refills: 0 | Status: SHIPPED | OUTPATIENT
Start: 2019-07-12 | End: 2021-11-01 | Stop reason: SDUPTHER

## 2019-07-12 NOTE — TELEPHONE ENCOUNTER
Future appt scheduled 8/5/19  Last appt 5/21/19 hospital f/u    This was filled on 6/11/19 - does pt need this many?   Thank you

## 2019-08-03 ENCOUNTER — HOSPITAL ENCOUNTER (INPATIENT)
Age: 54
LOS: 3 days | Discharge: HOME | DRG: 198 | End: 2019-08-06
Attending: EMERGENCY MEDICINE | Admitting: INTERNAL MEDICINE
Payer: COMMERCIAL

## 2019-08-03 ENCOUNTER — APPOINTMENT (OUTPATIENT)
Dept: GENERAL RADIOLOGY | Age: 54
DRG: 198 | End: 2019-08-03
Payer: COMMERCIAL

## 2019-08-03 DIAGNOSIS — J18.9 PNEUMONIA DUE TO ORGANISM: Primary | ICD-10-CM

## 2019-08-03 LAB
A/G RATIO: 1.4 (ref 1.1–2.2)
ALBUMIN SERPL-MCNC: 4.2 G/DL (ref 3.4–5)
ALP BLD-CCNC: 178 U/L (ref 40–129)
ALT SERPL-CCNC: 108 U/L (ref 10–40)
ANION GAP SERPL CALCULATED.3IONS-SCNC: 12 MMOL/L (ref 3–16)
AST SERPL-CCNC: 31 U/L (ref 15–37)
BASOPHILS ABSOLUTE: 0.1 K/UL (ref 0–0.2)
BASOPHILS RELATIVE PERCENT: 1.2 %
BILIRUB SERPL-MCNC: 0.6 MG/DL (ref 0–1)
BUN BLDV-MCNC: 6 MG/DL (ref 7–20)
CALCIUM SERPL-MCNC: 9 MG/DL (ref 8.3–10.6)
CHLORIDE BLD-SCNC: 100 MMOL/L (ref 99–110)
CO2: 26 MMOL/L (ref 21–32)
CREAT SERPL-MCNC: 0.8 MG/DL (ref 0.9–1.3)
EKG ATRIAL RATE: 70 BPM
EKG DIAGNOSIS: NORMAL
EKG P AXIS: 41 DEGREES
EKG P-R INTERVAL: 166 MS
EKG Q-T INTERVAL: 390 MS
EKG QRS DURATION: 134 MS
EKG QTC CALCULATION (BAZETT): 421 MS
EKG R AXIS: -13 DEGREES
EKG T AXIS: 5 DEGREES
EKG VENTRICULAR RATE: 70 BPM
EOSINOPHILS ABSOLUTE: 0.3 K/UL (ref 0–0.6)
EOSINOPHILS RELATIVE PERCENT: 2.9 %
GFR AFRICAN AMERICAN: >60
GFR NON-AFRICAN AMERICAN: >60
GLOBULIN: 3 G/DL
GLUCOSE BLD-MCNC: 172 MG/DL (ref 70–99)
GLUCOSE BLD-MCNC: 182 MG/DL (ref 70–99)
HCT VFR BLD CALC: 44.9 % (ref 40.5–52.5)
HEMOGLOBIN: 15.1 G/DL (ref 13.5–17.5)
LIPASE: 39 U/L (ref 13–60)
LYMPHOCYTES ABSOLUTE: 2.5 K/UL (ref 1–5.1)
LYMPHOCYTES RELATIVE PERCENT: 23.7 %
MCH RBC QN AUTO: 29.3 PG (ref 26–34)
MCHC RBC AUTO-ENTMCNC: 33.6 G/DL (ref 31–36)
MCV RBC AUTO: 87.1 FL (ref 80–100)
MONOCYTES ABSOLUTE: 0.8 K/UL (ref 0–1.3)
MONOCYTES RELATIVE PERCENT: 7.6 %
NEUTROPHILS ABSOLUTE: 6.7 K/UL (ref 1.7–7.7)
NEUTROPHILS RELATIVE PERCENT: 64.6 %
PDW BLD-RTO: 15 % (ref 12.4–15.4)
PERFORMED ON: ABNORMAL
PLATELET # BLD: 261 K/UL (ref 135–450)
PMV BLD AUTO: 8 FL (ref 5–10.5)
POTASSIUM SERPL-SCNC: 4.1 MMOL/L (ref 3.5–5.1)
PROCALCITONIN: 0.14 NG/ML (ref 0–0.15)
RBC # BLD: 5.15 M/UL (ref 4.2–5.9)
SODIUM BLD-SCNC: 138 MMOL/L (ref 136–145)
TOTAL PROTEIN: 7.2 G/DL (ref 6.4–8.2)
TROPONIN: <0.01 NG/ML
TROPONIN: <0.01 NG/ML
WBC # BLD: 10.4 K/UL (ref 4–11)

## 2019-08-03 PROCEDURE — 84484 ASSAY OF TROPONIN QUANT: CPT

## 2019-08-03 PROCEDURE — 2580000003 HC RX 258: Performed by: INTERNAL MEDICINE

## 2019-08-03 PROCEDURE — 99285 EMERGENCY DEPT VISIT HI MDM: CPT

## 2019-08-03 PROCEDURE — 84145 PROCALCITONIN (PCT): CPT

## 2019-08-03 PROCEDURE — 6360000002 HC RX W HCPCS: Performed by: EMERGENCY MEDICINE

## 2019-08-03 PROCEDURE — 87449 NOS EACH ORGANISM AG IA: CPT

## 2019-08-03 PROCEDURE — 6370000000 HC RX 637 (ALT 250 FOR IP): Performed by: EMERGENCY MEDICINE

## 2019-08-03 PROCEDURE — 6360000002 HC RX W HCPCS: Performed by: INTERNAL MEDICINE

## 2019-08-03 PROCEDURE — 83690 ASSAY OF LIPASE: CPT

## 2019-08-03 PROCEDURE — 36415 COLL VENOUS BLD VENIPUNCTURE: CPT

## 2019-08-03 PROCEDURE — 2580000003 HC RX 258: Performed by: EMERGENCY MEDICINE

## 2019-08-03 PROCEDURE — 93010 ELECTROCARDIOGRAM REPORT: CPT | Performed by: INTERNAL MEDICINE

## 2019-08-03 PROCEDURE — 94640 AIRWAY INHALATION TREATMENT: CPT

## 2019-08-03 PROCEDURE — 94150 VITAL CAPACITY TEST: CPT

## 2019-08-03 PROCEDURE — 83036 HEMOGLOBIN GLYCOSYLATED A1C: CPT

## 2019-08-03 PROCEDURE — 71045 X-RAY EXAM CHEST 1 VIEW: CPT

## 2019-08-03 PROCEDURE — 94761 N-INVAS EAR/PLS OXIMETRY MLT: CPT

## 2019-08-03 PROCEDURE — 80053 COMPREHEN METABOLIC PANEL: CPT

## 2019-08-03 PROCEDURE — 93005 ELECTROCARDIOGRAM TRACING: CPT | Performed by: EMERGENCY MEDICINE

## 2019-08-03 PROCEDURE — 1200000000 HC SEMI PRIVATE

## 2019-08-03 PROCEDURE — 85025 COMPLETE CBC W/AUTO DIFF WBC: CPT

## 2019-08-03 PROCEDURE — 96365 THER/PROPH/DIAG IV INF INIT: CPT

## 2019-08-03 PROCEDURE — 6370000000 HC RX 637 (ALT 250 FOR IP): Performed by: INTERNAL MEDICINE

## 2019-08-03 RX ORDER — SODIUM CHLORIDE 0.9 % (FLUSH) 0.9 %
10 SYRINGE (ML) INJECTION EVERY 12 HOURS SCHEDULED
Status: DISCONTINUED | OUTPATIENT
Start: 2019-08-03 | End: 2019-08-06 | Stop reason: HOSPADM

## 2019-08-03 RX ORDER — NITROGLYCERIN 0.4 MG/1
0.4 TABLET SUBLINGUAL EVERY 5 MIN PRN
Status: DISCONTINUED | OUTPATIENT
Start: 2019-08-03 | End: 2019-08-06 | Stop reason: HOSPADM

## 2019-08-03 RX ORDER — ATORVASTATIN CALCIUM 10 MG/1
20 TABLET, FILM COATED ORAL NIGHTLY
Status: DISCONTINUED | OUTPATIENT
Start: 2019-08-03 | End: 2019-08-06 | Stop reason: HOSPADM

## 2019-08-03 RX ORDER — DEXTROSE MONOHYDRATE 50 MG/ML
100 INJECTION, SOLUTION INTRAVENOUS PRN
Status: DISCONTINUED | OUTPATIENT
Start: 2019-08-03 | End: 2019-08-06 | Stop reason: HOSPADM

## 2019-08-03 RX ORDER — SODIUM CHLORIDE 0.9 % (FLUSH) 0.9 %
10 SYRINGE (ML) INJECTION PRN
Status: DISCONTINUED | OUTPATIENT
Start: 2019-08-03 | End: 2019-08-06 | Stop reason: HOSPADM

## 2019-08-03 RX ORDER — ALBUTEROL SULFATE 2.5 MG/3ML
2.5 SOLUTION RESPIRATORY (INHALATION) EVERY 6 HOURS PRN
Status: DISCONTINUED | OUTPATIENT
Start: 2019-08-03 | End: 2019-08-06 | Stop reason: HOSPADM

## 2019-08-03 RX ORDER — ONDANSETRON 2 MG/ML
4 INJECTION INTRAMUSCULAR; INTRAVENOUS EVERY 6 HOURS PRN
Status: DISCONTINUED | OUTPATIENT
Start: 2019-08-03 | End: 2019-08-06 | Stop reason: HOSPADM

## 2019-08-03 RX ORDER — ACETAMINOPHEN 325 MG/1
650 TABLET ORAL EVERY 4 HOURS PRN
Status: DISCONTINUED | OUTPATIENT
Start: 2019-08-03 | End: 2019-08-06 | Stop reason: HOSPADM

## 2019-08-03 RX ORDER — ASPIRIN 81 MG/1
81 TABLET, CHEWABLE ORAL ONCE
Status: COMPLETED | OUTPATIENT
Start: 2019-08-03 | End: 2019-08-03

## 2019-08-03 RX ORDER — DOXYCYCLINE HYCLATE 100 MG
100 TABLET ORAL ONCE
Status: COMPLETED | OUTPATIENT
Start: 2019-08-03 | End: 2019-08-03

## 2019-08-03 RX ORDER — ISOSORBIDE MONONITRATE 30 MG/1
30 TABLET, EXTENDED RELEASE ORAL DAILY
Status: DISCONTINUED | OUTPATIENT
Start: 2019-08-04 | End: 2019-08-06 | Stop reason: HOSPADM

## 2019-08-03 RX ORDER — NITROGLYCERIN 0.4 MG/1
0.4 TABLET SUBLINGUAL ONCE
Status: COMPLETED | OUTPATIENT
Start: 2019-08-03 | End: 2019-08-03

## 2019-08-03 RX ORDER — DEXTROSE MONOHYDRATE 25 G/50ML
12.5 INJECTION, SOLUTION INTRAVENOUS PRN
Status: DISCONTINUED | OUTPATIENT
Start: 2019-08-03 | End: 2019-08-06 | Stop reason: HOSPADM

## 2019-08-03 RX ORDER — ALBUTEROL SULFATE 2.5 MG/3ML
2.5 SOLUTION RESPIRATORY (INHALATION)
Status: DISCONTINUED | OUTPATIENT
Start: 2019-08-03 | End: 2019-08-04

## 2019-08-03 RX ORDER — SODIUM CHLORIDE 9 MG/ML
INJECTION, SOLUTION INTRAVENOUS CONTINUOUS
Status: DISCONTINUED | OUTPATIENT
Start: 2019-08-03 | End: 2019-08-04

## 2019-08-03 RX ORDER — MORPHINE SULFATE 4 MG/ML
2 INJECTION, SOLUTION INTRAMUSCULAR; INTRAVENOUS
Status: DISCONTINUED | OUTPATIENT
Start: 2019-08-03 | End: 2019-08-06 | Stop reason: HOSPADM

## 2019-08-03 RX ORDER — NICOTINE POLACRILEX 4 MG
15 LOZENGE BUCCAL PRN
Status: DISCONTINUED | OUTPATIENT
Start: 2019-08-03 | End: 2019-08-06 | Stop reason: HOSPADM

## 2019-08-03 RX ADMIN — NITROGLYCERIN 0.4 MG: 0.4 TABLET SUBLINGUAL at 14:50

## 2019-08-03 RX ADMIN — DOXYCYCLINE HYCLATE 100 MG: 100 TABLET, COATED ORAL at 16:25

## 2019-08-03 RX ADMIN — INSULIN LISPRO 1 UNITS: 100 INJECTION, SOLUTION INTRAVENOUS; SUBCUTANEOUS at 23:24

## 2019-08-03 RX ADMIN — SODIUM CHLORIDE: 9 INJECTION, SOLUTION INTRAVENOUS at 18:21

## 2019-08-03 RX ADMIN — ALBUTEROL SULFATE 2.5 MG: 2.5 SOLUTION RESPIRATORY (INHALATION) at 23:05

## 2019-08-03 RX ADMIN — ALBUTEROL SULFATE 2.5 MG: 2.5 SOLUTION RESPIRATORY (INHALATION) at 19:59

## 2019-08-03 RX ADMIN — METOPROLOL TARTRATE 25 MG: 25 TABLET ORAL at 23:24

## 2019-08-03 RX ADMIN — CEFTRIAXONE SODIUM 1 G: 1 INJECTION, POWDER, FOR SOLUTION INTRAMUSCULAR; INTRAVENOUS at 16:24

## 2019-08-03 RX ADMIN — ASPIRIN 81 MG 81 MG: 81 TABLET ORAL at 14:50

## 2019-08-03 RX ADMIN — ENOXAPARIN SODIUM 40 MG: 40 INJECTION SUBCUTANEOUS at 18:21

## 2019-08-03 RX ADMIN — ATORVASTATIN CALCIUM 20 MG: 10 TABLET, FILM COATED ORAL at 23:24

## 2019-08-03 ASSESSMENT — PAIN SCALES - GENERAL: PAINLEVEL_OUTOF10: 4

## 2019-08-03 ASSESSMENT — PAIN DESCRIPTION - ORIENTATION
ORIENTATION: MID
ORIENTATION: MID

## 2019-08-03 ASSESSMENT — PAIN DESCRIPTION - DESCRIPTORS
DESCRIPTORS: SQUEEZING
DESCRIPTORS: BURNING;TIGHTNESS

## 2019-08-03 ASSESSMENT — PAIN DESCRIPTION - LOCATION
LOCATION: CHEST
LOCATION: CHEST

## 2019-08-03 ASSESSMENT — PAIN DESCRIPTION - PAIN TYPE
TYPE: ACUTE PAIN
TYPE: ACUTE PAIN

## 2019-08-03 NOTE — LETTER
[id number]  Phone: [phone]             August 6, 2019    Patient: Carey Parisi   YOB: 1965   Date of Visit: 8/3/2019       To Whom It May Concern:    Carey Parisi was seen and treated in our facility  beginning 8/3/2019 until 8/6/19.      Sincerely,       Mitzi Snyder RN         Signature:__________________________________

## 2019-08-03 NOTE — ED PROVIDER NOTES
Cancer Mother      Social History     Socioeconomic History    Marital status:      Spouse name: Not on file    Number of children: Not on file    Years of education: Not on file    Highest education level: Not on file   Occupational History    Not on file   Social Needs    Financial resource strain: Not on file    Food insecurity:     Worry: Not on file     Inability: Not on file    Transportation needs:     Medical: Not on file     Non-medical: Not on file   Tobacco Use    Smoking status: Never Smoker    Smokeless tobacco: Never Used   Substance and Sexual Activity    Alcohol use: No    Drug use: No    Sexual activity: Yes     Partners: Female   Lifestyle    Physical activity:     Days per week: Not on file     Minutes per session: Not on file    Stress: Not on file   Relationships    Social connections:     Talks on phone: Not on file     Gets together: Not on file     Attends Mormonism service: Not on file     Active member of club or organization: Not on file     Attends meetings of clubs or organizations: Not on file     Relationship status: Not on file    Intimate partner violence:     Fear of current or ex partner: Not on file     Emotionally abused: Not on file     Physically abused: Not on file     Forced sexual activity: Not on file   Other Topics Concern    Not on file   Social History Narrative    Not on file     Current Facility-Administered Medications   Medication Dose Route Frequency Provider Last Rate Last Dose    sodium chloride flush 0.9 % injection 10 mL  10 mL Intravenous 2 times per day Es Jordan MD        sodium chloride flush 0.9 % injection 10 mL  10 mL Intravenous PRN Es Jordan MD        magnesium hydroxide (MILK OF MAGNESIA) 400 MG/5ML suspension 30 mL  30 mL Oral Daily PRN Es Jordan MD        ondansetron [facility] injection 4 mg  4 mg Intravenous Q6H PRN Es Jordan MD        enoxaparin (LOVENOX) injection 40 mg  40 34.0 pg    MCHC 33.6 31.0 - 36.0 g/dL    RDW 15.0 12.4 - 15.4 %    Platelets 797 818 - 440 K/uL    MPV 8.0 5.0 - 10.5 fL    Neutrophils % 64.6 %    Lymphocytes % 23.7 %    Monocytes % 7.6 %    Eosinophils % 2.9 %    Basophils % 1.2 %    Neutrophils # 6.7 1.7 - 7.7 K/uL    Lymphocytes # 2.5 1.0 - 5.1 K/uL    Monocytes # 0.8 0.0 - 1.3 K/uL    Eosinophils # 0.3 0.0 - 0.6 K/uL    Basophils # 0.1 0.0 - 0.2 K/uL   Comprehensive Metabolic Panel   Result Value Ref Range    Sodium 138 136 - 145 mmol/L    Potassium 4.1 3.5 - 5.1 mmol/L    Chloride 100 99 - 110 mmol/L    CO2 26 21 - 32 mmol/L    Anion Gap 12 3 - 16    Glucose 182 (H) 70 - 99 mg/dL    BUN 6 (L) 7 - 20 mg/dL    CREATININE 0.8 (L) 0.9 - 1.3 mg/dL    GFR Non-African American >60 >60    GFR African American >60 >60    Calcium 9.0 8.3 - 10.6 mg/dL    Total Protein 7.2 6.4 - 8.2 g/dL    Alb 4.2 3.4 - 5.0 g/dL    Albumin/Globulin Ratio 1.4 1.1 - 2.2    Total Bilirubin 0.6 0.0 - 1.0 mg/dL    Alkaline Phosphatase 178 (H) 40 - 129 U/L     (H) 10 - 40 U/L    AST 31 15 - 37 U/L    Globulin 3.0 g/dL   Troponin   Result Value Ref Range    Troponin <0.01 <0.01 ng/mL   Lipase   Result Value Ref Range    Lipase 39.0 13.0 - 60.0 U/L   EKG 12 Lead   Result Value Ref Range    Ventricular Rate 70 BPM    Atrial Rate 70 BPM    P-R Interval 166 ms    QRS Duration 134 ms    Q-T Interval 390 ms    QTc Calculation (Bazett) 421 ms    P Axis 41 degrees    R Axis -13 degrees    T Axis 5 degrees    Diagnosis       Normal sinus rhythmRight bundle branch blockAbnormal ECGConfirmed by Waylon Goins MD, Shaji Lee ([id number]) on 8/3/2019 5:37:41 PM       ECG  The Ekg interpreted by me shows  normal sinus rhythm with a rate of 70  Axis is   Normal  QTc is  normal  Intervals and Durations are unremarkable.       ST Segments: no acute change  No significant change from prior EKG dated 6/15/19    RADIOLOGY  Xr Chest Portable    Result Date: 8/3/2019  EXAMINATION: ONE XRAY VIEW OF THE CHEST 8/3/2019 1:24 pm

## 2019-08-04 PROBLEM — E11.9 NEW ONSET TYPE 2 DIABETES MELLITUS (HCC): Chronic | Status: ACTIVE | Noted: 2019-04-03

## 2019-08-04 LAB
A/G RATIO: 1.2 (ref 1.1–2.2)
ALBUMIN SERPL-MCNC: 3.6 G/DL (ref 3.4–5)
ALP BLD-CCNC: 158 U/L (ref 40–129)
ALT SERPL-CCNC: 75 U/L (ref 10–40)
ANION GAP SERPL CALCULATED.3IONS-SCNC: 12 MMOL/L (ref 3–16)
AST SERPL-CCNC: 20 U/L (ref 15–37)
BASOPHILS ABSOLUTE: 0.1 K/UL (ref 0–0.2)
BASOPHILS RELATIVE PERCENT: 0.7 %
BILIRUB SERPL-MCNC: 0.4 MG/DL (ref 0–1)
BUN BLDV-MCNC: 8 MG/DL (ref 7–20)
CALCIUM SERPL-MCNC: 8.9 MG/DL (ref 8.3–10.6)
CHLORIDE BLD-SCNC: 105 MMOL/L (ref 99–110)
CHOLESTEROL, TOTAL: 91 MG/DL (ref 0–199)
CO2: 24 MMOL/L (ref 21–32)
CREAT SERPL-MCNC: 0.9 MG/DL (ref 0.9–1.3)
EOSINOPHILS ABSOLUTE: 0.4 K/UL (ref 0–0.6)
EOSINOPHILS RELATIVE PERCENT: 4.3 %
ESTIMATED AVERAGE GLUCOSE: 134.1 MG/DL
GFR AFRICAN AMERICAN: >60
GFR NON-AFRICAN AMERICAN: >60
GLOBULIN: 3.1 G/DL
GLUCOSE BLD-MCNC: 113 MG/DL (ref 70–99)
GLUCOSE BLD-MCNC: 117 MG/DL (ref 70–99)
GLUCOSE BLD-MCNC: 123 MG/DL (ref 70–99)
GLUCOSE BLD-MCNC: 192 MG/DL (ref 70–99)
GLUCOSE BLD-MCNC: 95 MG/DL (ref 70–99)
HBA1C MFR BLD: 6.3 %
HCT VFR BLD CALC: 42 % (ref 40.5–52.5)
HDLC SERPL-MCNC: 24 MG/DL (ref 40–60)
HEMOGLOBIN: 14 G/DL (ref 13.5–17.5)
L. PNEUMOPHILA SEROGP 1 UR AG: NORMAL
LDL CHOLESTEROL CALCULATED: 46 MG/DL
LYMPHOCYTES ABSOLUTE: 2.8 K/UL (ref 1–5.1)
LYMPHOCYTES RELATIVE PERCENT: 29.1 %
MCH RBC QN AUTO: 29.2 PG (ref 26–34)
MCHC RBC AUTO-ENTMCNC: 33.3 G/DL (ref 31–36)
MCV RBC AUTO: 87.5 FL (ref 80–100)
MONOCYTES ABSOLUTE: 0.9 K/UL (ref 0–1.3)
MONOCYTES RELATIVE PERCENT: 9.2 %
NEUTROPHILS ABSOLUTE: 5.5 K/UL (ref 1.7–7.7)
NEUTROPHILS RELATIVE PERCENT: 56.7 %
PDW BLD-RTO: 14.9 % (ref 12.4–15.4)
PERFORMED ON: ABNORMAL
PERFORMED ON: NORMAL
PLATELET # BLD: 230 K/UL (ref 135–450)
PMV BLD AUTO: 8.3 FL (ref 5–10.5)
POTASSIUM REFLEX MAGNESIUM: 4 MMOL/L (ref 3.5–5.1)
PRO-BNP: 22 PG/ML (ref 0–124)
RBC # BLD: 4.8 M/UL (ref 4.2–5.9)
SODIUM BLD-SCNC: 141 MMOL/L (ref 136–145)
STREP PNEUMONIAE ANTIGEN, URINE: NORMAL
TOTAL PROTEIN: 6.7 G/DL (ref 6.4–8.2)
TRIGL SERPL-MCNC: 105 MG/DL (ref 0–150)
TROPONIN: <0.01 NG/ML
VLDLC SERPL CALC-MCNC: 21 MG/DL
WBC # BLD: 9.7 K/UL (ref 4–11)

## 2019-08-04 PROCEDURE — 94150 VITAL CAPACITY TEST: CPT

## 2019-08-04 PROCEDURE — 83880 ASSAY OF NATRIURETIC PEPTIDE: CPT

## 2019-08-04 PROCEDURE — 80053 COMPREHEN METABOLIC PANEL: CPT

## 2019-08-04 PROCEDURE — 36415 COLL VENOUS BLD VENIPUNCTURE: CPT

## 2019-08-04 PROCEDURE — 94640 AIRWAY INHALATION TREATMENT: CPT

## 2019-08-04 PROCEDURE — 6360000002 HC RX W HCPCS: Performed by: INTERNAL MEDICINE

## 2019-08-04 PROCEDURE — 2500000003 HC RX 250 WO HCPCS: Performed by: INTERNAL MEDICINE

## 2019-08-04 PROCEDURE — 6370000000 HC RX 637 (ALT 250 FOR IP): Performed by: INTERNAL MEDICINE

## 2019-08-04 PROCEDURE — 94761 N-INVAS EAR/PLS OXIMETRY MLT: CPT

## 2019-08-04 PROCEDURE — 2580000003 HC RX 258: Performed by: INTERNAL MEDICINE

## 2019-08-04 PROCEDURE — 85025 COMPLETE CBC W/AUTO DIFF WBC: CPT

## 2019-08-04 PROCEDURE — 99223 1ST HOSP IP/OBS HIGH 75: CPT | Performed by: INTERNAL MEDICINE

## 2019-08-04 PROCEDURE — 80061 LIPID PANEL: CPT

## 2019-08-04 PROCEDURE — 84484 ASSAY OF TROPONIN QUANT: CPT

## 2019-08-04 PROCEDURE — 1200000000 HC SEMI PRIVATE

## 2019-08-04 PROCEDURE — 99232 SBSQ HOSP IP/OBS MODERATE 35: CPT | Performed by: INTERNAL MEDICINE

## 2019-08-04 RX ADMIN — CEFTRIAXONE SODIUM 1 G: 1 INJECTION, POWDER, FOR SOLUTION INTRAMUSCULAR; INTRAVENOUS at 07:45

## 2019-08-04 RX ADMIN — Medication 10 ML: at 08:57

## 2019-08-04 RX ADMIN — METOPROLOL TARTRATE 25 MG: 25 TABLET ORAL at 08:56

## 2019-08-04 RX ADMIN — ALBUTEROL SULFATE 2.5 MG: 2.5 SOLUTION RESPIRATORY (INHALATION) at 07:01

## 2019-08-04 RX ADMIN — ENOXAPARIN SODIUM 40 MG: 40 INJECTION SUBCUTANEOUS at 08:56

## 2019-08-04 RX ADMIN — ATORVASTATIN CALCIUM 20 MG: 10 TABLET, FILM COATED ORAL at 21:12

## 2019-08-04 RX ADMIN — ISOSORBIDE MONONITRATE 30 MG: 30 TABLET ORAL at 08:56

## 2019-08-04 RX ADMIN — INSULIN LISPRO 1 UNITS: 100 INJECTION, SOLUTION INTRAVENOUS; SUBCUTANEOUS at 21:09

## 2019-08-04 RX ADMIN — Medication 10 ML: at 21:12

## 2019-08-04 RX ADMIN — METOPROLOL TARTRATE 25 MG: 25 TABLET ORAL at 23:04

## 2019-08-04 RX ADMIN — DOXYCYCLINE 100 MG: 100 INJECTION, POWDER, LYOPHILIZED, FOR SOLUTION INTRAVENOUS at 08:57

## 2019-08-04 NOTE — PLAN OF CARE
Problem: Infection:  Goal: Will remain free from infection  Description  Will remain free from infection  8/4/2019 0923 by Oskar Weldon RN  Outcome: Ongoing     Problem: Safety:  Goal: Free from accidental physical injury  Description  Free from accidental physical injury  8/4/2019 0923 by Oskar Weldon RN  Outcome: Ongoing     Problem: Pain:  Goal: Patient's pain/discomfort is manageable  Description  Patient's pain/discomfort is manageable  8/4/2019 0923 by Oskar Weldon RN  Outcome: Ongoing     Problem: Skin Integrity:  Goal: Skin integrity will stabilize  Description  Skin integrity will stabilize  8/4/2019 0923 by Oskar Weldon RN  Outcome: Ongoing

## 2019-08-04 NOTE — PROGRESS NOTES
PM assessment completed, see flow sheet. Pt is alert and oriented. Respirations are even & easy at rest. NPC noted. No complaints voiced. Provided cup and urinal for sample. Pt denies needs at this time. SR up x 2, and bed in low position. Call light is within reach.

## 2019-08-04 NOTE — CONSULTS
or wheezing, no sputum production. No hematemesis. · Gastrointestinal: No abdominal pain, appetite loss, blood in stools. No change in bowel or bladder habits. · Genitourinary: No dysuria, trouble voiding, or hematuria. · Musculoskeletal:  No gait disturbance, weakness or joint complaints. · Integumentary: No rash or pruritis. · Neurological: No headache, diplopia, change in muscle strength, numbness or tingling. No change in gait, balance, coordination, mood, affect, memory, mentation, behavior. · Psychiatric: No anxiety, no depression. · Endocrine: No malaise, fatigue or temperature intolerance. No excessive thirst, fluid intake, or urination. No tremor. · Hematologic/Lymphatic: No abnormal bruising or bleeding, blood clots or swollen lymph nodes. · Allergic/Immunologic: No nasal congestion or hives. Physical Examination:    Vitals:    08/03/19 2321 08/04/19 0242 08/04/19 0702 08/04/19 0820   BP: 128/77 103/69  112/72   Pulse: 107 63  85   Resp: 20 20  18   Temp: 97.4 °F (36.3 °C) 97.3 °F (36.3 °C)  98 °F (36.7 °C)   TempSrc: Oral Oral  Oral   SpO2: 95% 95% 96% 96%   Weight:       Height:         Body mass index is 26.45 kg/m².      Wt Readings from Last 3 Encounters:   08/03/19 195 lb (88.5 kg)   06/15/19 201 lb (91.2 kg)   06/04/19 201 lb (91.2 kg)     BP Readings from Last 3 Encounters:   08/04/19 112/72   06/15/19 93/61   06/04/19 112/68     Constitutional and General Appearance:   WD/WN in NAD  HEENT:  NC/AT  RINKU  No problems with hearing  Skin:  Warm, dry  Respiratory:  · Normal excursion and expansion without use of accessory muscles  · Resp Auscultation: Normal breath sounds without dullness  Cardiovascular:  · The apical impulses not displaced  · Heart tones are crisp and normal  · Cervical veins are not engorged  · The carotid upstroke is normal in amplitude and contour without delay or bruit  · JVP less than 8 cm H2O  RRR with nl S1 and S2 without m,r,g  · Peripheral pulses are symmetrical and full  · There is no clubbing, cyanosis of the extremities. · No edema  · Femoral Arteries: 2+ and equal  · Pedal Pulses: 2+ and equal   Neck:  · No thyromegaly  Abdomen:  · No masses or tenderness  · Liver/Spleen: No Abnormalities Noted  Neurological/Psychiatric:  · Alert and oriented in all spheres  · Moves all extremities well  · Exhibits normal gait balance and coordination  · No abnormalities of mood, affect, memory, mentation, or behavior are noted    Labs were reviewed including labs from other hospital systems through [facility]. Cardiac testing was reviewed including echos, nuclear scans, cardiac catheterization, including from other hospital systems through [facility]. Assessment:    1. Pneumonia due to organism     2. Chest pain similar to his chest pain prior to his MI and CABG  3.  CAD s/p CABG  4. Hyperlipidemia  5. Hypertension  6. DM    Plan:  1. His EKG is unremarkable for ischemia and his troponins are negative despite continuous chest pain. However, we know he has CAD and possibly some unbypassed territories. 2.  I suggest we get a stress Myoview tomorrow since the [address] in Feb was normal and nothing was fixed on cath 5/2019.  3.  Continue aspirin, Imdur, metoprolol, atorvastatin  4. If the stress Myoview is negative, he can be discharged and follow up with Dr. Heather Jones. I appreciate the opportunity of cooperating in the care of this patient.     Lara Menon M.D., [facility]

## 2019-08-04 NOTE — H&P
Hospital Medicine History & Physical      PCP: DONALDO Marie CNP    Date of Service: Pt seen/examined on 8/3/19 and admitted on 8/3/19 to Inpatient    Chief Complaint   Patient presents with    Chest Pain     chest pain since last night/squeezing feeling in chest       History Of Present Illness: The patient is a 53 y.o. male with PMH below, presents with chest pain. Pt reports that he has had substernal chest pressure, non-radiating, mild moderate, exacerbated exertion and deep inspiration. Multiple evaluations for CP over the last several months, including a diagnostic cath in May. Pt last seen in ER in June for CP. He was evaluated by Dr. Lieutenant Rodriguez in ED at that time and was DC home after repeat troponin. Past Medical History:        Diagnosis Date    CAD (coronary artery disease) 2016    [facility] mellitus ([facility] [address]     Hyperlipidemia     Hypertension     [facility]ocardial infarction) ([facility] [address]     No history of procedure 06/01/2017    colonoscopy       Past Surgical History:        Procedure Laterality Date    CHOLECYSTECTOMY, LAPAROSCOPIC N/A 4/17/2019    LAPAROSCOPIC CHOLECYSTECTOMY WITH INTRAOPERATIVE CHOLANGIOGRAM performed by Kamaljit Vázquez MD at [facility] COLONOSCOPY  06/01/2017    Colonic polyp    CORONARY ARTERY BYPASS GRAFT  09/20/2016    LIMA- LAD, Reverse SVG- Rosalba, reverse SVG- PDA, Reverse SVG- OM seq- Diag    EYE SURGERY Right     drain placed behind eye    HERNIA REPAIR Bilateral 2005    bilateral inguinal hernia repair       Medications Prior to Admission:    Prior to Admission medications    Medication Sig Start Date End Date Taking?  Authorizing Provider   nitroGLYCERIN (NITROSTAT) 0.4 MG SL tablet PLACE 1 TABLET UNDER THE TONGUE AS NEEDED, MAX OF 3 DOSES, IF NO RELIEF CALL 911 7/12/19  Yes DONALDO Marie CNP   isosorbide mononitrate (IMDUR) 30 MG extended release tablet Take 1 tablet by mouth daily 6/15/19  Yes DONALDO Harrell CNP NC/AT,EOMI, MMM, no erythema/exudates or visible masses. CVS:  Normal S1,S2. RRR. Without M/G/R.   LUNG:   CTA-B. no wheezes, rales or rhonchi  ABD:  Soft, ND/NT, BS+ x4. Without G/R.  EXT: 2+ pulses, no c/c/e. Brisk cap refill. PSY:  Thought process intact, affect appropriate. ALMAS:  CN III-XII intact, moves all 4 spontaneously, sensory grossly intact. SKIN: No rash or lesions on visible skin. Chart review shows recent radiographs:  Xr Chest Portable    Result Date: 8/3/2019  EXAMINATION: ONE XRAY VIEW OF THE CHEST 8/3/2019 1:24 pm COMPARISON: 06/15/2019. HISTORY: ORDERING SYSTEM PROVIDED HISTORY: chest pain TECHNOLOGIST PROVIDED HISTORY: Reason for exam:->chest pain Reason for Exam: hx of 5 bypasses, sudden fatigue and burning tightness in medial anterior portion of chest starting uesterday, hx of heart burn as well Acuity: Acute Type of Exam: Initial FINDINGS: Lung volumes are mildly diminished. The cardiomediastinal silhouette is stable with post sternotomy changes noted. Dependent left basilar opacification with small effusion. The lungs otherwise are clear. No overt failure or significant pneumothorax. Left basilar opacification, atelectasis versus infiltrate. There is suggestion of a small effusion. ______________________________________________________________________________________________________  This section copied from Dr. Noah Ledesma note dated 6/4/19  CARDIAC DATA      ECHO/MUGA: 5/13/19   Summary   Normal left ventricle systolic function with an estimated ejection fraction   of 55%.   No regional wall motion abnormalities are seen.   Normal left ventricular diastolic filling pressure.   Systolic pulmonary artery pressure (SPAP) is normal and estimated at 26 mmHg   (right atrial pressure 3 mmHg).     STRESS TEST: 2/22/19  Summary  Normal LV size and systolic function. Left ventricular ejection fraction of  68%.  Normal wall motion.  There is normal isotope uptake at stress and rest. There is no evidence of  myocardial ischemia or scar      CARDIAC CATH: 5/13/19  LEFT HEART CATH  LM: calcified,    LAD: calcified  LCX: calcified, prox 80-90%                OM1- small, ostial 80% (too small for PCI)                OM2- tiny vessel                OM3- larger vessel, acute takeoff angle, ostial 90%.               OM4- distal 80%  RCA: dominant, moderate diffuse disease. prox 50%, mid 60%, distal 70%                R->Lt collaterals to Lcx                PDA: 100% ostial with L->R collaterals                PLV: luminals     LIMA-LAD: patent with  Touchdown to mid LAD, mid 40-50% in native LAD  SVG-->RT RV Marginal patent with retrograde flow up to RCA  SVG-->Diag: patent. Flow down LAd and up to LCx                - Torsades with injection requiring Defib  No other grafts and no sequential limbs seen  LVEDP: 4  LVEF: 55%  Assessment  1. Severe CAD as above. Patent grafts x3 but unable to find grafts or \"sequentials\" to OM or PDa                - will need to get Cardiac CTA to eval further  2. May need to consider PCi to LCX but would likely jeopardize OM1-3.        BYPASS: 09/20/16  VESSELS BYPASSED:  1. LIMA to LAD. 2. Reverse greater saphenous vein graft to acute marginal  3. Reverse greater saphenous vein graft to PDA sequentiall. 4. Reverse greater saphenous vein graft to OM sequential  5. Reverse greater sVG to diagonal sequential.     CATH: 09/19/16  LEFT HEART CATH  LM: luminals  LAD: heavy calcification burden, prox eccentric 75%- mid 90% around large septal, mid/distal 80%  LCX: heavy calcification burden. Mid 70%  OM1- small, moderate dz, ostial 80%  OM2- moderate in size, mild diffuse disease  OM3- large, 90% ostial PLOM  RCA: Dominant, moderate diffuse disease, prox 60%, mid 70%, distal 90%  Grade 3 L-->R collaterals  LVEDP: 10  LVEF: 65%  PLAN  1. 3 vessel CAD with preserved LVEF  - refer to CT surgery for CABG  - check Hemoglobin A1c and TSH  2. Start ASA, and statin.

## 2019-08-04 NOTE — PROGRESS NOTES
sounds  Some reproducible chest pain noted  Cardiovascular:  RRR S1S2 heard, no murmurs or gallops  Abdomen:  Soft, undistended, non tender, no organomegaly, BS present  Extremities: No edema or cyanosis. Distal pulses well felt  Neurological : grossly normal              Scheduled Meds:   sodium chloride flush  10 mL Intravenous 2 times per day    enoxaparin  40 mg Subcutaneous Daily    albuterol  2.5 mg Nebulization Q4H WA    cefTRIAXone (ROCEPHIN) IV  1 g Intravenous Q24H    And    doxycycline (VIBRAMYCIN) IV  100 mg Intravenous Q12H    atorvastatin  20 mg Oral Nightly    isosorbide mononitrate  30 mg Oral Daily    metoprolol tartrate  25 mg Oral BID    insulin lispro  0-6 Units Subcutaneous TID WC    insulin lispro  0-3 Units Subcutaneous Nightly       Continuous Infusions:   sodium chloride 100 mL/hr at 08/03/19 1821    dextrose         PRN Meds:  sodium chloride flush, magnesium hydroxide, ondansetron, albuterol, nitroGLYCERIN, glucose, dextrose, glucagon (rDNA), dextrose, morphine, acetaminophen      Data:  CBC:   Recent Labs     08/03/19  1310 08/04/19  0535   WBC 10.4 9.7   HGB 15.1 14.0   HCT 44.9 42.0   MCV 87.1 87.5    230     BMP:   Recent Labs     08/03/19  1310 08/04/19  0535    141   K 4.1 4.0    105   CO2 26 24   BUN 6* 8   CREATININE 0.8* 0.9     LIVER PROFILE:   Recent Labs     08/03/19  1310 08/04/19  0535   AST 31 20   * 75*   LIPASE 39.0  --    BILITOT 0.6 0.4   ALKPHOS 178* 158*     CULTURES  Strep Pneumoniae: pending   Legionella: pending   Resp Cx: pending      RADIOLOGY  XR CHEST PORTABLE   Final Result   Left basilar opacification, atelectasis versus infiltrate. There is   suggestion of a small effusion. Assessment/Plan:      Pneumonia vs  Atelectasis  - Suspect atelectasis--no WBC, PCT wnl, no cough or fevers.    - IV doxy and ceftriaxone started, will discontinue with low concerns for acute infection   -  respiratory culture still pending

## 2019-08-05 ENCOUNTER — APPOINTMENT (OUTPATIENT)
Dept: NUCLEAR MEDICINE | Age: 54
DRG: 198 | End: 2019-08-05
Payer: COMMERCIAL

## 2019-08-05 ENCOUNTER — APPOINTMENT (OUTPATIENT)
Dept: GENERAL RADIOLOGY | Age: 54
DRG: 198 | End: 2019-08-05
Payer: COMMERCIAL

## 2019-08-05 ENCOUNTER — TELEPHONE (OUTPATIENT)
Dept: FAMILY MEDICINE CLINIC | Age: 54
End: 2019-08-05

## 2019-08-05 LAB
GLUCOSE BLD-MCNC: 104 MG/DL (ref 70–99)
GLUCOSE BLD-MCNC: 107 MG/DL (ref 70–99)
GLUCOSE BLD-MCNC: 139 MG/DL (ref 70–99)
GLUCOSE BLD-MCNC: 85 MG/DL (ref 70–99)
LV EF: 63 %
LVEF MODALITY: NORMAL
PERFORMED ON: ABNORMAL
PERFORMED ON: NORMAL

## 2019-08-05 PROCEDURE — 99238 HOSP IP/OBS DSCHRG MGMT 30/<: CPT | Performed by: INTERNAL MEDICINE

## 2019-08-05 PROCEDURE — 6370000000 HC RX 637 (ALT 250 FOR IP): Performed by: INTERNAL MEDICINE

## 2019-08-05 PROCEDURE — A9502 TC99M TETROFOSMIN: HCPCS | Performed by: INTERNAL MEDICINE

## 2019-08-05 PROCEDURE — 2580000003 HC RX 258: Performed by: INTERNAL MEDICINE

## 2019-08-05 PROCEDURE — 3430000000 HC RX DIAGNOSTIC RADIOPHARMACEUTICAL: Performed by: INTERNAL MEDICINE

## 2019-08-05 PROCEDURE — 1200000000 HC SEMI PRIVATE

## 2019-08-05 PROCEDURE — 93017 CV STRESS TEST TRACING ONLY: CPT

## 2019-08-05 PROCEDURE — 99232 SBSQ HOSP IP/OBS MODERATE 35: CPT | Performed by: INTERNAL MEDICINE

## 2019-08-05 PROCEDURE — 78452 HT MUSCLE IMAGE SPECT MULT: CPT

## 2019-08-05 PROCEDURE — 72052 X-RAY EXAM NECK SPINE 6/>VWS: CPT

## 2019-08-05 RX ORDER — AMLODIPINE BESYLATE 2.5 MG/1
2.5 TABLET ORAL DAILY
Status: DISCONTINUED | OUTPATIENT
Start: 2019-08-05 | End: 2019-08-06 | Stop reason: HOSPADM

## 2019-08-05 RX ORDER — ASPIRIN 81 MG/1
81 TABLET ORAL DAILY
Status: DISCONTINUED | OUTPATIENT
Start: 2019-08-05 | End: 2019-08-06 | Stop reason: HOSPADM

## 2019-08-05 RX ADMIN — AMLODIPINE BESYLATE 2.5 MG: 2.5 TABLET ORAL at 17:08

## 2019-08-05 RX ADMIN — TETROFOSMIN 10.2 MILLICURIE: 1.38 INJECTION, POWDER, LYOPHILIZED, FOR SOLUTION INTRAVENOUS at 09:43

## 2019-08-05 RX ADMIN — ATORVASTATIN CALCIUM 20 MG: 10 TABLET, FILM COATED ORAL at 22:08

## 2019-08-05 RX ADMIN — METOPROLOL TARTRATE 25 MG: 25 TABLET ORAL at 22:08

## 2019-08-05 RX ADMIN — TETROFOSMIN 32 MILLICURIE: 1.38 INJECTION, POWDER, LYOPHILIZED, FOR SOLUTION INTRAVENOUS at 10:56

## 2019-08-05 RX ADMIN — Medication 10 ML: at 08:42

## 2019-08-05 RX ADMIN — Medication 10 ML: at 22:08

## 2019-08-05 NOTE — PLAN OF CARE
Problem: Infection:  Goal: Will remain free from infection  Description  Will remain free from infection  8/5/2019 0828 by Clair Ugarte RN  Outcome: Ongoing     Problem: Safety:  Goal: Free from accidental physical injury  Description  Free from accidental physical injury  8/5/2019 0828 by Clair Ugarte RN  Outcome: Ongoing     Problem: Daily Care:  Goal: Daily care needs are met  Description  Daily care needs are met  8/5/2019 0828 by Clair Ugarte RN  Outcome: Ongoing     Problem: Pain:  Goal: Patient's pain/discomfort is manageable  Description  Patient's pain/discomfort is manageable  8/5/2019 0828 by Clair Ugarte RN  Outcome: Ongoing     Problem: Skin Integrity:  Goal: Skin integrity will stabilize  Description  Skin integrity will stabilize  8/5/2019 0828 by Clair Ugarte RN  Outcome: Ongoing

## 2019-08-05 NOTE — PLAN OF CARE
Problem: Infection:  Goal: Will remain free from infection  Description  Will remain free from infection  Outcome: Ongoing     Problem: Safety:  Goal: Free from accidental physical injury  Description  Free from accidental physical injury  Outcome: Ongoing  Goal: Free from intentional harm  Description  Free from intentional harm  Outcome: Ongoing     Problem: Daily Care:  Goal: Daily care needs are met  Description  Daily care needs are met  Outcome: Ongoing     Problem: Pain:  Goal: Patient's pain/discomfort is manageable  Description  Patient's pain/discomfort is manageable  Outcome: Ongoing     Problem: Skin Integrity:  Goal: Skin integrity will stabilize  Description  Skin integrity will stabilize  Outcome: Ongoing     Problem: Discharge Planning:  Goal: Patients continuum of care needs are met  Description  Patients continuum of care needs are met  Outcome: Ongoing     Problem: Breathing Pattern - Ineffective:  Goal: Ability to achieve and maintain a regular respiratory rate will improve  Description  Ability to achieve and maintain a regular respiratory rate will improve  Outcome: Ongoing

## 2019-08-05 NOTE — PROGRESS NOTES
[facility] Daily Progress Note      Admit Date:  8/3/2019    Subjective:  Mr. Darrell Carter is admitted with neck pain substernal chest pain  Chest pain is burning type constant. Sometimes exertion makes pain worse. No dysphagia or odynophagia. When he turns to left side his pain gets worse. History of Present Illness: as per initial consult by Dr Mathew Jose is a 52 yo male who presents to the ED with PMH of CAD, diabetes, hyperlipidemia, and hypertension who presented to the ED last evening with chest pain. She pain was a squeezing chest pain. He says that it started while he was watching TV and has continued. He tried taking nitroglycerin at home without much relief. He also had associated shortness of breath and fatigue which were similar symptoms that he had prior to his heart attack. Denies fever, chills, cough. Pain does not radiate. Denies diaphoresis. Denies dizziness, paresthesia, or focal deficit.       His cardiologist is Dr. Bandar Fan. He had CABG 2016 and had repeat cardiac cath 5/2019 as below. Could not find 2 of his grafts at that time.       We are consulted for the chest pain. troponins are negative despite almost constant chest pain since last evening. He seems relatively comfortable at this time. He had a stress test Feb, 2019 that did not show any ischemia. There was initial concern based upon his chest Xray that he might have pneumonia.   However, the hospital team has now stopped     ROS:  12 point ROS negative in all areas as listed below except as in [address], EENT, Cardiovascular, pulmonary, GI, , Musculoskeletal, skin, neurological, hematological, endocrine, Psychiatric    Past Medical History:   Diagnosis Date    CAD (coronary artery disease) 2016    [facility] [address]     Hyperlipidemia     Hypertension     [facility] infarction) [facility] [address]     No history of procedure 06/01/2017    colonoscopy     Past Surgical History:   Procedure Laterality Date    CHOLECYSTECTOMY, LAPAROSCOPIC N/A 4/17/2019    LAPAROSCOPIC CHOLECYSTECTOMY WITH INTRAOPERATIVE CHOLANGIOGRAM performed by Vic Hanna MD at [facility] COLONOSCOPY  06/01/2017    Colonic polyp    CORONARY ARTERY BYPASS GRAFT  09/20/2016    LIMA- LAD, Reverse SVG- Rosalba, reverse SVG- PDA, Reverse SVG- OM seq- Diag    EYE SURGERY Right     drain placed behind eye    HERNIA REPAIR Bilateral 2005    bilateral inguinal hernia repair       Objective:   /72   Pulse 62   Temp 97 °F (36.1 °C) (Oral)   Resp 18   Ht 6' (1.829 m)   Wt 195 lb (88.5 kg)   SpO2 94%   BMI 26.45 kg/m²       Intake/Output Summary (Last 24 hours) at 8/5/2019 1733  Last data filed at 8/5/2019 5134  Gross per 24 hour   Intake 930 ml   Output --   Net 930 ml       Physical Exam:  General: No Respiratory distress, appears well developed and well nourished. Eyes:  Sclera nonicteric  Nose/Sinuses:  negative findings: nose shows no deformity, asymmetry, or inflammation, nasal mucosa normal, septum midline with no perforation or bleeding  Back:  no pain to palpation  Joint:  no active joint inflammation  Musculoskeletal:  negative  Skin:  Warm and dry  Neck:  Negative for JVD and Carotid Bruits. Chest:  Clear to auscultation, respiration easy  Cardiovascular:  RRR, S1S2 normal, no murmur, no rub or thrill.   Abdomen:  Soft normal liver and spleen  Extremities:   No edema, clubbing, cyanosis,  Neuro: intact    Medications:    aspirin  81 mg Oral Daily    sodium chloride flush  10 mL Intravenous 2 times per day    enoxaparin  40 mg Subcutaneous Daily    atorvastatin  20 mg Oral Nightly    isosorbide mononitrate  30 mg Oral Daily    metoprolol tartrate  25 mg Oral BID    insulin lispro  0-6 Units Subcutaneous TID WC    insulin lispro  0-3 Units Subcutaneous Nightly      dextrose       sodium chloride flush, magnesium hydroxide, ondansetron, albuterol, nitroGLYCERIN, glucose, dextrose, glucagon (rDNA),

## 2019-08-06 VITALS
BODY MASS INDEX: 26.41 KG/M2 | HEART RATE: 83 BPM | WEIGHT: 195 LBS | DIASTOLIC BLOOD PRESSURE: 85 MMHG | RESPIRATION RATE: 16 BRPM | OXYGEN SATURATION: 95 % | TEMPERATURE: 97.9 F | HEIGHT: 72 IN | SYSTOLIC BLOOD PRESSURE: 123 MMHG

## 2019-08-06 LAB
GLUCOSE BLD-MCNC: 106 MG/DL (ref 70–99)
GLUCOSE BLD-MCNC: 120 MG/DL (ref 70–99)
PERFORMED ON: ABNORMAL
PERFORMED ON: ABNORMAL

## 2019-08-06 PROCEDURE — 2580000003 HC RX 258: Performed by: INTERNAL MEDICINE

## 2019-08-06 PROCEDURE — 6360000002 HC RX W HCPCS: Performed by: INTERNAL MEDICINE

## 2019-08-06 PROCEDURE — 6370000000 HC RX 637 (ALT 250 FOR IP): Performed by: INTERNAL MEDICINE

## 2019-08-06 PROCEDURE — 99238 HOSP IP/OBS DSCHRG MGMT 30/<: CPT | Performed by: INTERNAL MEDICINE

## 2019-08-06 PROCEDURE — 99232 SBSQ HOSP IP/OBS MODERATE 35: CPT | Performed by: INTERNAL MEDICINE

## 2019-08-06 RX ORDER — AMLODIPINE BESYLATE 2.5 MG/1
2.5 TABLET ORAL DAILY
Qty: 30 TABLET | Refills: 0 | Status: SHIPPED | OUTPATIENT
Start: 2019-08-07 | End: 2019-09-06 | Stop reason: SDUPTHER

## 2019-08-06 RX ORDER — SOY ISOFLAVONE 40 MG
500 TABLET ORAL DAILY
Qty: 30 CAPSULE | Refills: 0 | Status: SHIPPED | OUTPATIENT
Start: 2019-08-06 | End: 2019-09-06 | Stop reason: SDUPTHER

## 2019-08-06 RX ADMIN — ENOXAPARIN SODIUM 40 MG: 40 INJECTION SUBCUTANEOUS at 09:14

## 2019-08-06 RX ADMIN — ASPIRIN 81 MG: 81 TABLET ORAL at 09:14

## 2019-08-06 RX ADMIN — METOPROLOL TARTRATE 25 MG: 25 TABLET ORAL at 09:14

## 2019-08-06 RX ADMIN — Medication 10 ML: at 09:14

## 2019-08-06 RX ADMIN — ISOSORBIDE MONONITRATE 30 MG: 30 TABLET ORAL at 09:13

## 2019-08-06 RX ADMIN — AMLODIPINE BESYLATE 2.5 MG: 2.5 TABLET ORAL at 09:13

## 2019-08-06 NOTE — PROGRESS NOTES
4.1 4.0    105   CO2 26 24   BUN 6* 8   CREATININE 0.8* 0.9     LIVER PROFILE:   Recent Labs     08/03/19  1310 08/04/19  0535   AST 31 20   * 75*   LIPASE 39.0  --    BILITOT 0.6 0.4   ALKPHOS 178* 158*     CULTURES  Strep Pneumoniae: pending   Legionella: pending   Resp Cx: pending      RADIOLOGY  XR Cervical Spine W Obliques Flexion and Extension   Preliminary Result   Left worse than right foraminal stenosis from C5-C7. Normal alignment without evidence of dynamic instability from C1-C6. Incomplete imaging of C7 and T1 during flexion and extension. NM Cardiac Stress Test Nuclear Imaging   Final Result      XR CHEST PORTABLE   Final Result   Left basilar opacification, atelectasis versus infiltrate. There is   suggestion of a small effusion. Assessment/Plan:      Pneumonia vs  Atelectasis  - Suspect atelectasis--no WBC, PCT wnl, no cough or fevers. - IV doxy and ceftriaxone started, will discontinue with low concerns for acute infection   -  respiratory culture still pending   -  strep, legionella pending     Chest pain  CAD   - exertional, atypical, has had multiple recent cardiac evaluations and has has known severe CAD.   - Diagnostic cath in May w/o intervention,  - Last admit to [facility] for CP on 6/15, cont home ASA, BB and Imdur.   -Tele, chk serial troponins--negative    - Cardiology consult   Stress test - mildly positive fro reversible ischemia. CArdiology has recommended medical management  Low dose norvasc added     HLD  - cont statin      DM2  - initial   - hold oral Rx  - add low SSI     HTN  - controlled  - cont home regimen.      D/c home   Gianna Bhagat MD 8/6/2019 10:07 AM

## 2019-08-06 NOTE — PROGRESS NOTES
[address] Daily Progress Note      Admit Date:  8/3/2019    Subjective:  Mr. Britney Montanez is admitted with neck pain substernal chest pain  Chest pain is burning type constant. Sometimes exertion makes pain worse. No dysphagia or odynophagia. When he turns to left side his pain gets worse. Overnight slept well and has no more chest discomfort and feels much better today. I started him on amlodipine last evening and he feels that helped his chest discomfort. Denies chest pain, shortness of breath, edema, dizziness, palpitations and syncope. History of Present Illness: as per initial consult by Dr Griselda Esteban is a 54 yo male who presents to the ED with PMH of CAD, diabetes, hyperlipidemia, and hypertension who presented to the ED last evening with chest pain. She pain was a squeezing chest pain. He says that it started while he was watching TV and has continued. He tried taking nitroglycerin at home without much relief. He also had associated shortness of breath and fatigue which were similar symptoms that he had prior to his heart attack. Denies fever, chills, cough. Pain does not radiate. Denies diaphoresis. Denies dizziness, paresthesia, or focal deficit.       His cardiologist is Dr. Kyara Waite. He had CABG 2016 and had repeat cardiac cath 5/2019 as below. Could not find 2 of his grafts at that time.       We are consulted for the chest pain. troponins are negative despite almost constant chest pain since last evening. He seems relatively comfortable at this time. He had a stress test Feb, 2019 that did not show any ischemia. There was initial concern based upon his chest Xray that he might have pneumonia.   However, the hospital team has now stopped     ROS:  12 point ROS negative in all areas as listed below except as in [address], EENT, Cardiovascular, pulmonary, GI, , Musculoskeletal, skin, neurological, hematological, endocrine, Psychiatric    Past Medical History:   Diagnosis Date    CAD (coronary artery disease) 2016    [facility]     Hyperlipidemia     Hypertension     MI [facility]ocardial infarction) [facility]     No history of procedure 06/01/2017    colonoscopy     Past Surgical History:   Procedure Laterality Date    CHOLECYSTECTOMY, LAPAROSCOPIC N/A 4/17/2019    LAPAROSCOPIC CHOLECYSTECTOMY WITH INTRAOPERATIVE CHOLANGIOGRAM performed by Romayne Bunkers, MD at [facility]  06/01/2017    Colonic polyp    CORONARY ARTERY BYPASS GRAFT  09/20/2016    LIMA- LAD, Reverse SVG- [place]ba, reverse SVG- PDA, Reverse SVG- OM seq- Diag    EYE SURGERY Right     drain placed behind eye    HERNIA REPAIR Bilateral 2005    bilateral inguinal hernia repair       Objective:   /81   Pulse 78   Temp 98 °F (36.7 °C) (Oral)   Resp 18   Ht 6' (1.829 m)   Wt 195 lb (88.5 kg)   SpO2 95%   BMI 26.45 kg/m²       Intake/Output Summary (Last 24 hours) at 8/6/2019 0503  Last data filed at 8/5/2019 1127  Gross per 24 hour   Intake 0 ml   Output --   Net 0 ml       Physical Exam:  General: No Respiratory distress, appears well developed and well nourished. Eyes:  Sclera nonicteric  Nose/Sinuses:  negative findings: nose shows no deformity, asymmetry, or inflammation, nasal mucosa normal, septum midline with no perforation or bleeding  Back:  no pain to palpation  Joint:  no active joint inflammation  Musculoskeletal:  negative  Skin:  Warm and dry  Neck:  Negative for JVD and Carotid Bruits. Chest:  Clear to auscultation, respiration easy  Cardiovascular:  RRR, S1S2 normal, no murmur, no rub or thrill.   Abdomen:  Soft normal liver and spleen  Extremities:   No edema, clubbing, cyanosis,  Neuro: intact    Medications:    aspirin  81 mg Oral Daily    amLODIPine  2.5 mg Oral Daily    sodium chloride flush  10 mL Intravenous 2 times per day    enoxaparin  40 mg Subcutaneous Daily    atorvastatin  20 mg Oral Nightly    isosorbide mononitrate  30 mg Oral Daily    metoprolol tartrate  25 mg Oral BID    insulin lispro  0-6 Units Subcutaneous TID WC    insulin lispro  0-3 Units Subcutaneous Nightly      dextrose       sodium chloride flush, magnesium hydroxide, ondansetron, albuterol, nitroGLYCERIN, glucose, dextrose, glucagon (rDNA), dextrose, morphine, acetaminophen    Lab Data:  CBC:   Recent Labs     08/03/19  1310 08/04/19  0535   WBC 10.4 9.7   HGB 15.1 14.0   HCT 44.9 42.0   MCV 87.1 87.5    230     BMP:   Recent Labs     08/03/19  1310 08/04/19  0535    141   K 4.1 4.0    105   CO2 26 24   BUN 6* 8   CREATININE 0.8* 0.9     LIVER PROFILE:   Recent Labs     08/03/19 1310 08/04/19  0535   AST 31 20   * 75*   LIPASE 39.0  --    BILITOT 0.6 0.4   ALKPHOS 178* 158*     PT/INR: No results for input(s): PROTIME, INR in the last 72 hours. APTT: No results for input(s): APTT in the last 72 hours. BNP:  No results for input(s): BNP in the last 72 hours. IMAGING:   Lexiscan Stress test 8/5/19  Summary  Small sized, mild intensity basal lateral partial reversibility defect  consistent with mild ischemia and infarction in the territory of the distal  LCx and/or RCA. LV function is normal with and ejection fraction of 63%. Lower risk abnormal study. Cervical spine xrays 8/5/19  Left worse than right foraminal stenosis from C5-C7. Normal alignment without evidence of dynamic instability from C1-C6. Incomplete imaging of C7 and T1 during flexion and extension. EKG:  NSR, RBBB, unchanged from previous     Echo:  5/13/19:   Normal left ventricle systolic function with an estimated ejection fraction   of 55%.   No regional wall motion abnormalities are seen.   Normal left ventricular diastolic filling pressure.   Systolic pulmonary artery pressure (SPAP) is normal and estimated at 26 mmHg   (right atrial pressure 3 mmHg).      CARDIAC CATH: 5/13/19  LEFT HEART CATH  LM: calcified,    LAD: calcified  LCX: calcified, prox

## 2019-08-07 ENCOUNTER — TELEPHONE (OUTPATIENT)
Dept: FAMILY MEDICINE CLINIC | Age: 54
End: 2019-08-07

## 2019-08-07 NOTE — TELEPHONE ENCOUNTER
[address] Transitions Initial Follow Up Call    Outreach made within 2 business days of discharge: Yes    Patient: Dario Muñoz Patient : 1965   MRN: [id number]  Reason for Admission: There are no discharge diagnoses documented for the most recent discharge. Discharge Date: 19       Spoke with: Kera Atwood    Discharge department/facility: 2019    TCM Interactive Patient Contact:  Was patient able to fill all prescriptions: Yes  Was patient instructed to bring all medications to the follow-up visit: Yes  Is patient taking all medications as directed in the discharge summary?  Yes  Does patient understand their discharge instructions: Yes  Does patient have questions or concerns that need addressed prior to 7-14 day follow up office visit: no    Scheduled appointment with PCP within 7-14 days    Follow Up  Future Appointments   Date Time Provider Cy Acuna   2019 12:00 PM DONALDO Askew - CNP [facility]   2019  1:15 PM Gregory Lagunas MD [facility]       Shikha Chau MA

## 2019-08-12 ENCOUNTER — OFFICE VISIT (OUTPATIENT)
Dept: FAMILY MEDICINE CLINIC | Age: 54
End: 2019-08-12
Payer: COMMERCIAL

## 2019-08-12 VITALS
WEIGHT: 198.4 LBS | BODY MASS INDEX: 26.91 KG/M2 | SYSTOLIC BLOOD PRESSURE: 124 MMHG | OXYGEN SATURATION: 95 % | DIASTOLIC BLOOD PRESSURE: 79 MMHG | HEART RATE: 73 BPM

## 2019-08-12 DIAGNOSIS — I25.10 CORONARY ARTERY DISEASE INVOLVING NATIVE CORONARY ARTERY OF NATIVE HEART WITHOUT ANGINA PECTORIS: Primary | Chronic | ICD-10-CM

## 2019-08-12 DIAGNOSIS — I10 ESSENTIAL HYPERTENSION: Chronic | ICD-10-CM

## 2019-08-12 DIAGNOSIS — E11.9 TYPE 2 DIABETES MELLITUS WITHOUT COMPLICATION, WITHOUT LONG-TERM CURRENT USE OF INSULIN (HCC): Chronic | ICD-10-CM

## 2019-08-12 LAB
BASOPHILS ABSOLUTE: 0.1 K/UL (ref 0–0.2)
BASOPHILS RELATIVE PERCENT: 0.8 %
EOSINOPHILS ABSOLUTE: 0.3 K/UL (ref 0–0.6)
EOSINOPHILS RELATIVE PERCENT: 3.2 %
HBA1C MFR BLD: 6.2 %
HCT VFR BLD CALC: 44 % (ref 40.5–52.5)
HEMOGLOBIN: 14.5 G/DL (ref 13.5–17.5)
LYMPHOCYTES ABSOLUTE: 2.9 K/UL (ref 1–5.1)
LYMPHOCYTES RELATIVE PERCENT: 32.2 %
MCH RBC QN AUTO: 28.8 PG (ref 26–34)
MCHC RBC AUTO-ENTMCNC: 32.9 G/DL (ref 31–36)
MCV RBC AUTO: 87.4 FL (ref 80–100)
MONOCYTES ABSOLUTE: 0.7 K/UL (ref 0–1.3)
MONOCYTES RELATIVE PERCENT: 7.2 %
NEUTROPHILS ABSOLUTE: 5.1 K/UL (ref 1.7–7.7)
NEUTROPHILS RELATIVE PERCENT: 56.6 %
PDW BLD-RTO: 14.9 % (ref 12.4–15.4)
PLATELET # BLD: 281 K/UL (ref 135–450)
PMV BLD AUTO: 8.2 FL (ref 5–10.5)
RBC # BLD: 5.03 M/UL (ref 4.2–5.9)
WBC # BLD: 9.1 K/UL (ref 4–11)

## 2019-08-12 PROCEDURE — 99214 OFFICE O/P EST MOD 30 MIN: CPT | Performed by: NURSE PRACTITIONER

## 2019-08-12 PROCEDURE — 1111F DSCHRG MED/CURRENT MED MERGE: CPT | Performed by: NURSE PRACTITIONER

## 2019-08-12 PROCEDURE — 83036 HEMOGLOBIN GLYCOSYLATED A1C: CPT | Performed by: NURSE PRACTITIONER

## 2019-08-12 ASSESSMENT — ENCOUNTER SYMPTOMS
EYES NEGATIVE: 1
GASTROINTESTINAL NEGATIVE: 1
ALLERGIC/IMMUNOLOGIC NEGATIVE: 1
RESPIRATORY NEGATIVE: 1

## 2019-08-12 NOTE — PROGRESS NOTES
isosorbide mononitrate (IMDUR) 30 MG extended release tablet  Take 1 tablet by mouth daily             l-arginine 500 MG capsule  Take 1 capsule by mouth daily             metFORMIN (GLUCOPHAGE) 500 MG tablet  Take 1 tablet by mouth daily (with breakfast)             metoprolol tartrate (LOPRESSOR) 25 MG tablet  TAKE 1 TABLET BY MOUTH 2 TIMES DAILY             nitroGLYCERIN (NITROSTAT) 0.4 MG SL tablet  PLACE 1 TABLET UNDER THE TONGUE AS NEEDED, MAX OF 3 DOSES, IF NO RELIEF CALL 911                   Medications marked \"taking\" at this time  Outpatient Medications Marked as Taking for the 8/12/19 encounter (Office Visit) with Mikaela Spears, DONALDO - CNP   Medication Sig Dispense Refill    amLODIPine (NORVASC) 2.5 MG tablet Take 1 tablet by mouth daily 30 tablet 0    l-arginine 500 MG capsule Take 1 capsule by mouth daily 30 capsule 0    nitroGLYCERIN (NITROSTAT) 0.4 MG SL tablet PLACE 1 TABLET UNDER THE TONGUE AS NEEDED, MAX OF 3 DOSES, IF NO RELIEF CALL 911 25 tablet 0    isosorbide mononitrate (IMDUR) 30 MG extended release tablet Take 1 tablet by mouth daily 30 tablet 0    metFORMIN (GLUCOPHAGE) 500 MG tablet Take 1 tablet by mouth daily (with breakfast) 30 tablet 5    glucose monitoring kit (FREESTYLE) monitoring kit 1 kit by Does not apply route daily Monitor glucose daily. Diagnosis: diabetes mellitus Type 2 1 kit 0    FREESTYLE LANCETS MISC 1 each by Does not apply route daily 100 each 3    blood glucose test strips (FREESTYLE TEST STRIPS) strip 1 each by In Vitro route daily As needed.  100 each 3    atorvastatin (LIPITOR) 20 MG tablet TAKE 1 TABLET BY MOUTH NIGHTLY 30 tablet 5    metoprolol tartrate (LOPRESSOR) 25 MG tablet TAKE 1 TABLET BY MOUTH 2 TIMES DAILY 60 tablet 5    aspirin 81 MG tablet Take 81 mg by mouth daily          Medications patient taking as of now reconciled against medications ordered at time of hospital discharge: Yes    Chief Complaint   Patient presents with   [place]

## 2019-08-12 NOTE — PATIENT INSTRUCTIONS
[facility] disclaims any warranty or liability for your use of this information. Patient Education        amlodipine  Pronunciation:  amalia RABIA pandya  Brand:  Norvasc  What is the most important information I should know about amlodipine? Follow all directions on your medicine label and package. Tell each of your healthcare providers about all your medical conditions, allergies, and all medicines you use. What is amlodipine? Amlodipine is a calcium channel blocker that dilates (widens) blood vessels and improves blood flow. Amlodipine is used to treat chest pain (angina) and other conditions caused by coronary artery disease. Amlodipine is also used to treat high blood pressure (hypertension). Lowering blood pressure may lower your risk of a stroke or heart attack. Amlodipine is for use in adults and children who are at least 10years old. Amlodipine may also be used for purposes not listed in this medication guide. What should I discuss with my healthcare provider before taking amlodipine? You should not take amlodipine if you are allergic to it. To make sure amlodipine is safe for you, tell your doctor if you have:  · liver disease; or  · a heart valve problem called aortic stenosis. It is not known whether this medicine will harm an unborn baby. Tell your doctor if you are pregnant or plan to become pregnant. Amlodipine can pass into breast milk, but effects on the nursing baby are not known. Tell your doctor if you are breast-feeding. Amlodipine is not approved for use by anyone younger than 10years old. How should I take amlodipine? Follow all directions on your prescription label. Your doctor may occasionally change your dose. Do not use this medicine in larger or smaller amounts or for longer than recommended. You may take amlodipine with or without food. Take the medicine at the same time each day. Your blood pressure will need to be checked often.   Your chest pain may become worse when you first start taking amlodipine or when your dose is increased. Call your doctor if your chest pain is severe or ongoing. If you are being treated for high blood pressure, keep using amlodipine even if you feel well. High blood pressure often has no symptoms. You may need to use blood pressure medicine for the rest of your life. Your hypertension or heart condition may be treated with a combination of drugs. Use all medications as directed by your doctor. Read the medication guide or patient instructions provided with each medication. Do not change your doses or stop taking any of your medications without your doctor's advice. This is especially important if you also take nitroglycerin. Amlodipine is only part of a complete program of treatment that may also include diet, exercise, weight control, and other medications. Follow your diet, medication, and exercise routines very closely. Store at room temperature away from moisture, heat, and light. What happens if I miss a dose? Take the missed dose as soon as you remember. If you are more than 12 hours late, skip the missed dose. Do not  take extra medicine to make up the missed dose. What happens if I overdose? Seek emergency medical attention or call the Poison Help line at 1-424.496.4977. Overdose symptoms may include rapid heartbeats, redness or warmth in your arms or legs, or fainting. What should I avoid while taking amlodipine? Avoid getting up too fast from a sitting or lying position, or you may feel dizzy. Get up slowly and steady yourself to prevent a fall. What are the possible side effects of amlodipine? Get emergency medical help if you have signs of an allergic reaction:  hives; difficulty breathing; swelling of your face, lips, tongue, or throat. In rare cases, when you first start taking amlodipine, your angina may get worse or you could have a heart attack.  Seek emergency medical attention or call your doctor right away if you have symptoms such as: chest pain or pressure, pain spreading to your jaw or shoulder, nausea, sweating. Call your doctor at once if you have:  · pounding heartbeats or fluttering in your chest;  · worsening chest pain;  · swelling in your feet or ankles;  · severe drowsiness; or  · a light-headed feeling, like you might pass out. Common side effects may include:  · dizziness;  · feeling tired;  · stomach pain, nausea; or  · flushing (warmth, redness, or tingly feeling). This is not a complete list of side effects and others may occur. Call your doctor for medical advice about side effects. You may report side effects to FDA at 9-119-GUS-9332. What other drugs will affect amlodipine? Tell your doctor about all your current medicines and any you start or stop using, especially:  · nitroglycerin;  · simvastatin (Zocor, Simcor, Vytorin); or  · any other heart or blood pressure medications. This list is not complete. Other drugs may interact with amlodipine, including prescription and over-the-counter medicines, vitamins, and herbal products. Not all possible interactions are listed in this medication guide. Where can I get more information? Your pharmacist can provide more information about amlodipine. Remember, keep this and all other medicines out of the reach of children, never share your medicines with others, and use this medication only for the indication prescribed. Every effort has been made to ensure that the information provided by [address] [place], [place] [address]can [address] is accurate, up-to-date, and complete, but no guarantee is made to that effect. Drug information contained herein may be time sensitive. [facility] information has been compiled for use by healthcare practitioners and consumers in the [place] and therefore [facility] does not warrant that uses outside of the [place] are appropriate, unless specifically indicated otherwise.  [facility]'s drug information does not endorse drugs, diagnose

## 2019-08-13 LAB
A/G RATIO: 1.9 (ref 1.1–2.2)
ALBUMIN SERPL-MCNC: 4.5 G/DL (ref 3.4–5)
ALP BLD-CCNC: 114 U/L (ref 40–129)
ALT SERPL-CCNC: 28 U/L (ref 10–40)
ANION GAP SERPL CALCULATED.3IONS-SCNC: 14 MMOL/L (ref 3–16)
AST SERPL-CCNC: 22 U/L (ref 15–37)
BILIRUB SERPL-MCNC: 0.8 MG/DL (ref 0–1)
BUN BLDV-MCNC: 13 MG/DL (ref 7–20)
CALCIUM SERPL-MCNC: 9.7 MG/DL (ref 8.3–10.6)
CHLORIDE BLD-SCNC: 98 MMOL/L (ref 99–110)
CO2: 25 MMOL/L (ref 21–32)
CREAT SERPL-MCNC: 0.8 MG/DL (ref 0.9–1.3)
GFR AFRICAN AMERICAN: >60
GFR NON-AFRICAN AMERICAN: >60
GLOBULIN: 2.4 G/DL
GLUCOSE BLD-MCNC: 154 MG/DL (ref 70–99)
POTASSIUM SERPL-SCNC: 4.2 MMOL/L (ref 3.5–5.1)
SODIUM BLD-SCNC: 137 MMOL/L (ref 136–145)
TOTAL PROTEIN: 6.9 G/DL (ref 6.4–8.2)

## 2019-09-06 RX ORDER — AMLODIPINE BESYLATE 2.5 MG/1
2.5 TABLET ORAL DAILY
Qty: 30 TABLET | Refills: 5 | Status: ON HOLD | OUTPATIENT
Start: 2019-09-06 | End: 2020-01-26 | Stop reason: HOSPADM

## 2019-09-06 RX ORDER — SOY ISOFLAVONE 40 MG
500 TABLET ORAL DAILY
Qty: 30 CAPSULE | Refills: 0 | Status: SHIPPED | OUTPATIENT
Start: 2019-09-06 | End: 2019-10-07 | Stop reason: SDUPTHER

## 2019-09-06 NOTE — TELEPHONE ENCOUNTER
I believe this was initiated by cardiology. This type of medication should never be stopped abruptly. Please to his allergy list as an intolerance and advise patient to also let cardiology know he has stopped the metoprolol.  Thank you

## 2019-09-06 NOTE — TELEPHONE ENCOUNTER
I did not order this. Cardiology likely ordered for CAD/Angina. I would recommend he discuss with cardiology.

## 2019-09-06 NOTE — TELEPHONE ENCOUNTER
Pt is having side effects with the metoprolol. Vomiting. Diarrhea    He wants to know if he should still take it? I asked him if he discussed this with you when he was in the office. He said yes. So I told him to do what you wanted him to. Amarjit Callaway He was trying to get me to say if he should take them or not. I told him I could not do that. He stated he is not taking them. .    Please advise.     Thank you

## 2019-10-07 RX ORDER — SOY ISOFLAVONE 40 MG
TABLET ORAL
Qty: 30 CAPSULE | Refills: 0 | Status: SHIPPED | OUTPATIENT
Start: 2019-10-07 | End: 2019-11-08 | Stop reason: SDUPTHER

## 2019-10-08 ENCOUNTER — APPOINTMENT (OUTPATIENT)
Dept: GENERAL RADIOLOGY | Age: 54
End: 2019-10-08
Payer: COMMERCIAL

## 2019-10-08 ENCOUNTER — HOSPITAL ENCOUNTER (EMERGENCY)
Age: 54
Discharge: HOME | End: 2019-10-08
Payer: COMMERCIAL

## 2019-10-08 VITALS
HEART RATE: 67 BPM | SYSTOLIC BLOOD PRESSURE: 121 MMHG | WEIGHT: 198 LBS | HEIGHT: 72 IN | BODY MASS INDEX: 26.82 KG/M2 | TEMPERATURE: 98.4 F | DIASTOLIC BLOOD PRESSURE: 70 MMHG | OXYGEN SATURATION: 98 % | RESPIRATION RATE: 16 BRPM

## 2019-10-08 DIAGNOSIS — M54.50 LUMBAR BACK PAIN: Primary | ICD-10-CM

## 2019-10-08 LAB
BILIRUBIN URINE: NEGATIVE
BLOOD, URINE: NEGATIVE
CLARITY: CLEAR
COLOR: NORMAL
GLUCOSE URINE: NEGATIVE MG/DL
KETONES, URINE: NEGATIVE MG/DL
LEUKOCYTE ESTERASE, URINE: NEGATIVE
MICROSCOPIC EXAMINATION: NORMAL
NITRITE, URINE: NEGATIVE
PH UA: 5.5 (ref 5–8)
PROTEIN UA: NEGATIVE MG/DL
SPECIFIC GRAVITY UA: 1.02 (ref 1–1.03)
URINE TYPE: NORMAL
UROBILINOGEN, URINE: 0.2 E.U./DL

## 2019-10-08 PROCEDURE — 6370000000 HC RX 637 (ALT 250 FOR IP): Performed by: PHYSICIAN ASSISTANT

## 2019-10-08 PROCEDURE — 99283 EMERGENCY DEPT VISIT LOW MDM: CPT

## 2019-10-08 PROCEDURE — 71046 X-RAY EXAM CHEST 2 VIEWS: CPT

## 2019-10-08 PROCEDURE — 72100 X-RAY EXAM L-S SPINE 2/3 VWS: CPT

## 2019-10-08 PROCEDURE — 81003 URINALYSIS AUTO W/O SCOPE: CPT

## 2019-10-08 RX ORDER — NAPROXEN 500 MG/1
500 TABLET ORAL ONCE
Status: COMPLETED | OUTPATIENT
Start: 2019-10-08 | End: 2019-10-08

## 2019-10-08 RX ORDER — CYCLOBENZAPRINE HCL 10 MG
10 TABLET ORAL 3 TIMES DAILY PRN
Qty: 20 TABLET | Refills: 0 | Status: SHIPPED | OUTPATIENT
Start: 2019-10-08 | End: 2019-10-15

## 2019-10-08 RX ORDER — LIDOCAINE 50 MG/G
1 PATCH TOPICAL DAILY
Qty: 30 PATCH | Refills: 0 | Status: SHIPPED | OUTPATIENT
Start: 2019-10-08 | End: 2019-12-31 | Stop reason: ALTCHOICE

## 2019-10-08 RX ORDER — NAPROXEN 500 MG/1
500 TABLET ORAL 2 TIMES DAILY
Qty: 20 TABLET | Refills: 0 | Status: ON HOLD | OUTPATIENT
Start: 2019-10-08 | End: 2019-12-26 | Stop reason: ALTCHOICE

## 2019-10-08 RX ORDER — LIDOCAINE 4 G/G
1 PATCH TOPICAL ONCE
Status: DISCONTINUED | OUTPATIENT
Start: 2019-10-08 | End: 2019-10-09 | Stop reason: HOSPADM

## 2019-10-08 RX ADMIN — NAPROXEN 500 MG: 500 TABLET ORAL at 22:52

## 2019-10-08 ASSESSMENT — PAIN SCALES - GENERAL
PAINLEVEL_OUTOF10: 0
PAINLEVEL_OUTOF10: 5

## 2019-10-08 ASSESSMENT — ENCOUNTER SYMPTOMS
GASTROINTESTINAL NEGATIVE: 1
RESPIRATORY NEGATIVE: 1
BACK PAIN: 1

## 2019-10-08 ASSESSMENT — PAIN DESCRIPTION - FREQUENCY: FREQUENCY: CONTINUOUS

## 2019-10-08 ASSESSMENT — PAIN DESCRIPTION - ORIENTATION: ORIENTATION: RIGHT

## 2019-10-08 ASSESSMENT — PAIN DESCRIPTION - ONSET: ONSET: GRADUAL

## 2019-10-08 ASSESSMENT — PAIN DESCRIPTION - PROGRESSION: CLINICAL_PROGRESSION: GRADUALLY WORSENING

## 2019-10-08 ASSESSMENT — PAIN DESCRIPTION - DESCRIPTORS: DESCRIPTORS: ACHING

## 2019-10-08 ASSESSMENT — PAIN DESCRIPTION - PAIN TYPE: TYPE: ACUTE PAIN

## 2019-10-08 ASSESSMENT — PAIN DESCRIPTION - LOCATION: LOCATION: RIB CAGE

## 2019-11-01 ENCOUNTER — HOSPITAL ENCOUNTER (EMERGENCY)
Age: 54
Discharge: HOME | End: 2019-11-01
Attending: EMERGENCY MEDICINE
Payer: COMMERCIAL

## 2019-11-01 ENCOUNTER — APPOINTMENT (OUTPATIENT)
Dept: GENERAL RADIOLOGY | Age: 54
End: 2019-11-01
Payer: COMMERCIAL

## 2019-11-01 VITALS
BODY MASS INDEX: 26.82 KG/M2 | WEIGHT: 198 LBS | HEART RATE: 77 BPM | RESPIRATION RATE: 16 BRPM | DIASTOLIC BLOOD PRESSURE: 121 MMHG | TEMPERATURE: 98.2 F | OXYGEN SATURATION: 98 % | SYSTOLIC BLOOD PRESSURE: 156 MMHG | HEIGHT: 72 IN

## 2019-11-01 DIAGNOSIS — R07.9 CHEST PAIN, UNSPECIFIED TYPE: Primary | ICD-10-CM

## 2019-11-01 LAB
A/G RATIO: 1.4 (ref 1.1–2.2)
ALBUMIN SERPL-MCNC: 4.5 G/DL (ref 3.4–5)
ALP BLD-CCNC: 101 U/L (ref 40–129)
ALT SERPL-CCNC: 20 U/L (ref 10–40)
ANION GAP SERPL CALCULATED.3IONS-SCNC: 11 MMOL/L (ref 3–16)
AST SERPL-CCNC: 20 U/L (ref 15–37)
BASOPHILS ABSOLUTE: 0.1 K/UL (ref 0–0.2)
BASOPHILS RELATIVE PERCENT: 1.2 %
BILIRUB SERPL-MCNC: 0.7 MG/DL (ref 0–1)
BILIRUBIN URINE: NEGATIVE
BLOOD, URINE: NEGATIVE
BUN BLDV-MCNC: 11 MG/DL (ref 7–20)
CALCIUM SERPL-MCNC: 9.6 MG/DL (ref 8.3–10.6)
CHLORIDE BLD-SCNC: 101 MMOL/L (ref 99–110)
CLARITY: CLEAR
CO2: 28 MMOL/L (ref 21–32)
COLOR: YELLOW
CREAT SERPL-MCNC: 0.8 MG/DL (ref 0.9–1.3)
D DIMER: <200 NG/ML DDU (ref 0–229)
EKG ATRIAL RATE: 78 BPM
EKG DIAGNOSIS: NORMAL
EKG P AXIS: 35 DEGREES
EKG P-R INTERVAL: 172 MS
EKG Q-T INTERVAL: 384 MS
EKG QRS DURATION: 128 MS
EKG QTC CALCULATION (BAZETT): 437 MS
EKG R AXIS: -25 DEGREES
EKG T AXIS: -4 DEGREES
EKG VENTRICULAR RATE: 78 BPM
EOSINOPHILS ABSOLUTE: 0.3 K/UL (ref 0–0.6)
EOSINOPHILS RELATIVE PERCENT: 2.8 %
GFR AFRICAN AMERICAN: >60
GFR NON-AFRICAN AMERICAN: >60
GLOBULIN: 3.2 G/DL
GLUCOSE BLD-MCNC: 147 MG/DL (ref 70–99)
GLUCOSE URINE: NEGATIVE MG/DL
HCT VFR BLD CALC: 47.9 % (ref 40.5–52.5)
HEMOGLOBIN: 16.2 G/DL (ref 13.5–17.5)
KETONES, URINE: NEGATIVE MG/DL
LEUKOCYTE ESTERASE, URINE: NEGATIVE
LYMPHOCYTES ABSOLUTE: 2.2 K/UL (ref 1–5.1)
LYMPHOCYTES RELATIVE PERCENT: 19.9 %
MCH RBC QN AUTO: 29.1 PG (ref 26–34)
MCHC RBC AUTO-ENTMCNC: 33.9 G/DL (ref 31–36)
MCV RBC AUTO: 86 FL (ref 80–100)
MICROSCOPIC EXAMINATION: NORMAL
MONOCYTES ABSOLUTE: 0.7 K/UL (ref 0–1.3)
MONOCYTES RELATIVE PERCENT: 6.4 %
NEUTROPHILS ABSOLUTE: 7.6 K/UL (ref 1.7–7.7)
NEUTROPHILS RELATIVE PERCENT: 69.7 %
NITRITE, URINE: NEGATIVE
PDW BLD-RTO: 15.1 % (ref 12.4–15.4)
PH UA: 7.5 (ref 5–8)
PLATELET # BLD: 263 K/UL (ref 135–450)
PMV BLD AUTO: 7.7 FL (ref 5–10.5)
POTASSIUM SERPL-SCNC: 4.5 MMOL/L (ref 3.5–5.1)
PROTEIN UA: NEGATIVE MG/DL
RBC # BLD: 5.57 M/UL (ref 4.2–5.9)
SODIUM BLD-SCNC: 140 MMOL/L (ref 136–145)
SPECIFIC GRAVITY UA: 1.01 (ref 1–1.03)
TOTAL PROTEIN: 7.7 G/DL (ref 6.4–8.2)
TROPONIN: <0.01 NG/ML
TROPONIN: <0.01 NG/ML
URINE TYPE: NORMAL
UROBILINOGEN, URINE: 0.2 E.U./DL
WBC # BLD: 10.9 K/UL (ref 4–11)

## 2019-11-01 PROCEDURE — 84484 ASSAY OF TROPONIN QUANT: CPT

## 2019-11-01 PROCEDURE — 71046 X-RAY EXAM CHEST 2 VIEWS: CPT

## 2019-11-01 PROCEDURE — 96374 THER/PROPH/DIAG INJ IV PUSH: CPT

## 2019-11-01 PROCEDURE — 6370000000 HC RX 637 (ALT 250 FOR IP): Performed by: EMERGENCY MEDICINE

## 2019-11-01 PROCEDURE — 85379 FIBRIN DEGRADATION QUANT: CPT

## 2019-11-01 PROCEDURE — 93005 ELECTROCARDIOGRAM TRACING: CPT | Performed by: EMERGENCY MEDICINE

## 2019-11-01 PROCEDURE — 81003 URINALYSIS AUTO W/O SCOPE: CPT

## 2019-11-01 PROCEDURE — 93010 ELECTROCARDIOGRAM REPORT: CPT | Performed by: INTERNAL MEDICINE

## 2019-11-01 PROCEDURE — 36415 COLL VENOUS BLD VENIPUNCTURE: CPT

## 2019-11-01 PROCEDURE — 99285 EMERGENCY DEPT VISIT HI MDM: CPT

## 2019-11-01 PROCEDURE — 85025 COMPLETE CBC W/AUTO DIFF WBC: CPT

## 2019-11-01 PROCEDURE — 80053 COMPREHEN METABOLIC PANEL: CPT

## 2019-11-01 PROCEDURE — 6360000002 HC RX W HCPCS: Performed by: EMERGENCY MEDICINE

## 2019-11-01 RX ORDER — KETOROLAC TROMETHAMINE 30 MG/ML
15 INJECTION, SOLUTION INTRAMUSCULAR; INTRAVENOUS ONCE
Status: COMPLETED | OUTPATIENT
Start: 2019-11-01 | End: 2019-11-01

## 2019-11-01 RX ORDER — ASPIRIN 81 MG/1
324 TABLET, CHEWABLE ORAL ONCE
Status: COMPLETED | OUTPATIENT
Start: 2019-11-01 | End: 2019-11-01

## 2019-11-01 RX ADMIN — KETOROLAC TROMETHAMINE 15 MG: 30 INJECTION, SOLUTION INTRAMUSCULAR at 11:52

## 2019-11-01 RX ADMIN — NITROGLYCERIN 1 INCH: 20 OINTMENT TOPICAL at 11:52

## 2019-11-01 RX ADMIN — ASPIRIN 81 MG 324 MG: 81 TABLET ORAL at 11:52

## 2019-11-01 ASSESSMENT — PAIN DESCRIPTION - FREQUENCY: FREQUENCY: CONTINUOUS

## 2019-11-01 ASSESSMENT — PAIN SCALES - GENERAL
PAINLEVEL_OUTOF10: 8
PAINLEVEL_OUTOF10: 8
PAINLEVEL_OUTOF10: 5

## 2019-11-01 ASSESSMENT — PAIN DESCRIPTION - ONSET: ONSET: ON-GOING

## 2019-11-01 ASSESSMENT — PAIN DESCRIPTION - PROGRESSION: CLINICAL_PROGRESSION: NOT CHANGED

## 2019-11-01 ASSESSMENT — PAIN DESCRIPTION - PAIN TYPE: TYPE: ACUTE PAIN

## 2019-11-01 ASSESSMENT — PAIN DESCRIPTION - LOCATION: LOCATION: CHEST

## 2019-11-05 ENCOUNTER — TELEPHONE (OUTPATIENT)
Dept: FAMILY MEDICINE CLINIC | Age: 54
End: 2019-11-05

## 2019-11-08 RX ORDER — ARGININE 500 MG
TABLET ORAL
Qty: 30 TABLET | Refills: 0 | Status: SHIPPED | OUTPATIENT
Start: 2019-11-08 | End: 2019-12-27

## 2019-11-13 ENCOUNTER — OFFICE VISIT (OUTPATIENT)
Dept: FAMILY MEDICINE CLINIC | Age: 54
End: 2019-11-13
Payer: COMMERCIAL

## 2019-11-13 VITALS
HEIGHT: 72 IN | OXYGEN SATURATION: 97 % | BODY MASS INDEX: 25.52 KG/M2 | SYSTOLIC BLOOD PRESSURE: 122 MMHG | DIASTOLIC BLOOD PRESSURE: 80 MMHG | RESPIRATION RATE: 16 BRPM | HEART RATE: 77 BPM | WEIGHT: 188.4 LBS

## 2019-11-13 DIAGNOSIS — I10 ESSENTIAL HYPERTENSION: Chronic | ICD-10-CM

## 2019-11-13 DIAGNOSIS — E11.9 TYPE 2 DIABETES MELLITUS WITHOUT COMPLICATION, WITHOUT LONG-TERM CURRENT USE OF INSULIN (HCC): Primary | Chronic | ICD-10-CM

## 2019-11-13 DIAGNOSIS — Z23 NEED FOR IMMUNIZATION AGAINST INFLUENZA: ICD-10-CM

## 2019-11-13 DIAGNOSIS — Z23 NEED FOR VACCINATION FOR PNEUMOCOCCUS: ICD-10-CM

## 2019-11-13 DIAGNOSIS — E78.2 MIXED HYPERLIPIDEMIA: Chronic | ICD-10-CM

## 2019-11-13 DIAGNOSIS — I25.10 CORONARY ARTERY DISEASE INVOLVING NATIVE CORONARY ARTERY OF NATIVE HEART WITHOUT ANGINA PECTORIS: Chronic | ICD-10-CM

## 2019-11-13 LAB — HBA1C MFR BLD: 5.9 %

## 2019-11-13 PROCEDURE — 99214 OFFICE O/P EST MOD 30 MIN: CPT | Performed by: NURSE PRACTITIONER

## 2019-11-13 PROCEDURE — G8419 CALC BMI OUT NRM PARAM NOF/U: HCPCS | Performed by: NURSE PRACTITIONER

## 2019-11-13 PROCEDURE — 90472 IMMUNIZATION ADMIN EACH ADD: CPT | Performed by: NURSE PRACTITIONER

## 2019-11-13 PROCEDURE — 90688 IIV4 VACCINE SPLT 0.5 ML IM: CPT | Performed by: NURSE PRACTITIONER

## 2019-11-13 PROCEDURE — G8598 ASA/ANTIPLAT THER USED: HCPCS | Performed by: NURSE PRACTITIONER

## 2019-11-13 PROCEDURE — 2022F DILAT RTA XM EVC RTNOPTHY: CPT | Performed by: NURSE PRACTITIONER

## 2019-11-13 PROCEDURE — 1036F TOBACCO NON-USER: CPT | Performed by: NURSE PRACTITIONER

## 2019-11-13 PROCEDURE — 83036 HEMOGLOBIN GLYCOSYLATED A1C: CPT | Performed by: NURSE PRACTITIONER

## 2019-11-13 PROCEDURE — G8482 FLU IMMUNIZE ORDER/ADMIN: HCPCS | Performed by: NURSE PRACTITIONER

## 2019-11-13 PROCEDURE — G8427 DOCREV CUR MEDS BY ELIG CLIN: HCPCS | Performed by: NURSE PRACTITIONER

## 2019-11-13 PROCEDURE — 90732 PPSV23 VACC 2 YRS+ SUBQ/IM: CPT | Performed by: NURSE PRACTITIONER

## 2019-11-13 PROCEDURE — 3017F COLORECTAL CA SCREEN DOC REV: CPT | Performed by: NURSE PRACTITIONER

## 2019-11-13 PROCEDURE — 90471 IMMUNIZATION ADMIN: CPT | Performed by: NURSE PRACTITIONER

## 2019-11-13 PROCEDURE — 3044F HG A1C LEVEL LT 7.0%: CPT | Performed by: NURSE PRACTITIONER

## 2019-11-13 RX ORDER — MELOXICAM 15 MG/1
TABLET ORAL
Refills: 3 | Status: ON HOLD | COMMUNITY
Start: 2019-10-28 | End: 2020-07-09 | Stop reason: HOSPADM

## 2019-11-13 ASSESSMENT — ENCOUNTER SYMPTOMS
RESPIRATORY NEGATIVE: 1
EYES NEGATIVE: 1
ALLERGIC/IMMUNOLOGIC NEGATIVE: 1
GASTROINTESTINAL NEGATIVE: 1

## 2019-11-25 DIAGNOSIS — E11.59 TYPE 2 DIABETES MELLITUS WITH OTHER CIRCULATORY COMPLICATION, WITHOUT LONG-TERM CURRENT USE OF INSULIN (HCC): ICD-10-CM

## 2019-12-26 ENCOUNTER — HOSPITAL ENCOUNTER (OUTPATIENT)
Age: 54
Setting detail: OBSERVATION
Discharge: HOME | End: 2019-12-27
Attending: EMERGENCY MEDICINE | Admitting: INTERNAL MEDICINE
Payer: COMMERCIAL

## 2019-12-26 ENCOUNTER — APPOINTMENT (OUTPATIENT)
Dept: GENERAL RADIOLOGY | Age: 54
End: 2019-12-26
Payer: COMMERCIAL

## 2019-12-26 DIAGNOSIS — R07.9 ACUTE CHEST PAIN: Primary | ICD-10-CM

## 2019-12-26 LAB
A/G RATIO: 1.1 (ref 1.1–2.2)
ALBUMIN SERPL-MCNC: 4.1 G/DL (ref 3.4–5)
ALP BLD-CCNC: 104 U/L (ref 40–129)
ALT SERPL-CCNC: 11 U/L (ref 10–40)
ANION GAP SERPL CALCULATED.3IONS-SCNC: 10 MMOL/L (ref 3–16)
AST SERPL-CCNC: 12 U/L (ref 15–37)
BASOPHILS ABSOLUTE: 0.2 K/UL (ref 0–0.2)
BASOPHILS RELATIVE PERCENT: 1.2 %
BILIRUB SERPL-MCNC: 0.6 MG/DL (ref 0–1)
BILIRUBIN URINE: NEGATIVE
BLOOD, URINE: NEGATIVE
BUN BLDV-MCNC: 16 MG/DL (ref 7–20)
CALCIUM SERPL-MCNC: 9 MG/DL (ref 8.3–10.6)
CHLORIDE BLD-SCNC: 98 MMOL/L (ref 99–110)
CLARITY: CLEAR
CO2: 26 MMOL/L (ref 21–32)
COLOR: YELLOW
CREAT SERPL-MCNC: 0.8 MG/DL (ref 0.9–1.3)
EKG ATRIAL RATE: 78 BPM
EKG DIAGNOSIS: NORMAL
EKG P AXIS: 34 DEGREES
EKG P-R INTERVAL: 152 MS
EKG Q-T INTERVAL: 364 MS
EKG QRS DURATION: 124 MS
EKG QTC CALCULATION (BAZETT): 414 MS
EKG R AXIS: -5 DEGREES
EKG T AXIS: 25 DEGREES
EKG VENTRICULAR RATE: 78 BPM
EOSINOPHILS ABSOLUTE: 0.4 K/UL (ref 0–0.6)
EOSINOPHILS RELATIVE PERCENT: 2.6 %
GFR AFRICAN AMERICAN: >60
GFR NON-AFRICAN AMERICAN: >60
GLOBULIN: 3.8 G/DL
GLUCOSE BLD-MCNC: 112 MG/DL (ref 70–99)
GLUCOSE BLD-MCNC: 134 MG/DL (ref 70–99)
GLUCOSE BLD-MCNC: 143 MG/DL (ref 70–99)
GLUCOSE BLD-MCNC: 90 MG/DL (ref 70–99)
GLUCOSE URINE: NEGATIVE MG/DL
HCT VFR BLD CALC: 44.3 % (ref 40.5–52.5)
HEMOGLOBIN: 14.7 G/DL (ref 13.5–17.5)
KETONES, URINE: NEGATIVE MG/DL
LEUKOCYTE ESTERASE, URINE: NEGATIVE
LYMPHOCYTES ABSOLUTE: 2.4 K/UL (ref 1–5.1)
LYMPHOCYTES RELATIVE PERCENT: 14.8 %
MCH RBC QN AUTO: 28.7 PG (ref 26–34)
MCHC RBC AUTO-ENTMCNC: 33.2 G/DL (ref 31–36)
MCV RBC AUTO: 86.4 FL (ref 80–100)
MICROSCOPIC EXAMINATION: NORMAL
MONOCYTES ABSOLUTE: 1.5 K/UL (ref 0–1.3)
MONOCYTES RELATIVE PERCENT: 9.4 %
NEUTROPHILS ABSOLUTE: 11.8 K/UL (ref 1.7–7.7)
NEUTROPHILS RELATIVE PERCENT: 72 %
NITRITE, URINE: NEGATIVE
PDW BLD-RTO: 14.5 % (ref 12.4–15.4)
PERFORMED ON: ABNORMAL
PERFORMED ON: ABNORMAL
PERFORMED ON: NORMAL
PH UA: 6 (ref 5–8)
PLATELET # BLD: 263 K/UL (ref 135–450)
PMV BLD AUTO: 7.9 FL (ref 5–10.5)
POTASSIUM SERPL-SCNC: 4.4 MMOL/L (ref 3.5–5.1)
PROTEIN UA: NEGATIVE MG/DL
RAPID INFLUENZA  B AGN: NEGATIVE
RAPID INFLUENZA A AGN: NEGATIVE
RBC # BLD: 5.12 M/UL (ref 4.2–5.9)
SODIUM BLD-SCNC: 134 MMOL/L (ref 136–145)
SPECIFIC GRAVITY UA: 1.01 (ref 1–1.03)
TOTAL PROTEIN: 7.9 G/DL (ref 6.4–8.2)
TROPONIN: <0.01 NG/ML
URINE REFLEX TO CULTURE: NORMAL
URINE TYPE: NORMAL
UROBILINOGEN, URINE: 1 E.U./DL
WBC # BLD: 16.3 K/UL (ref 4–11)

## 2019-12-26 PROCEDURE — 36415 COLL VENOUS BLD VENIPUNCTURE: CPT

## 2019-12-26 PROCEDURE — 6370000000 HC RX 637 (ALT 250 FOR IP): Performed by: INTERNAL MEDICINE

## 2019-12-26 PROCEDURE — 99234 HOSP IP/OBS SM DT SF/LOW 45: CPT | Performed by: INTERNAL MEDICINE

## 2019-12-26 PROCEDURE — 80053 COMPREHEN METABOLIC PANEL: CPT

## 2019-12-26 PROCEDURE — 2580000003 HC RX 258: Performed by: NURSE PRACTITIONER

## 2019-12-26 PROCEDURE — 84484 ASSAY OF TROPONIN QUANT: CPT

## 2019-12-26 PROCEDURE — G0378 HOSPITAL OBSERVATION PER HR: HCPCS

## 2019-12-26 PROCEDURE — 81003 URINALYSIS AUTO W/O SCOPE: CPT

## 2019-12-26 PROCEDURE — 93010 ELECTROCARDIOGRAM REPORT: CPT | Performed by: INTERNAL MEDICINE

## 2019-12-26 PROCEDURE — 6360000002 HC RX W HCPCS: Performed by: NURSE PRACTITIONER

## 2019-12-26 PROCEDURE — 87804 INFLUENZA ASSAY W/OPTIC: CPT

## 2019-12-26 PROCEDURE — 99244 OFF/OP CNSLTJ NEW/EST MOD 40: CPT | Performed by: INTERNAL MEDICINE

## 2019-12-26 PROCEDURE — 99285 EMERGENCY DEPT VISIT HI MDM: CPT

## 2019-12-26 PROCEDURE — 85025 COMPLETE CBC W/AUTO DIFF WBC: CPT

## 2019-12-26 PROCEDURE — 2580000003 HC RX 258: Performed by: EMERGENCY MEDICINE

## 2019-12-26 PROCEDURE — 6370000000 HC RX 637 (ALT 250 FOR IP): Performed by: NURSE PRACTITIONER

## 2019-12-26 PROCEDURE — 96360 HYDRATION IV INFUSION INIT: CPT

## 2019-12-26 PROCEDURE — 71045 X-RAY EXAM CHEST 1 VIEW: CPT

## 2019-12-26 PROCEDURE — 96372 THER/PROPH/DIAG INJ SC/IM: CPT

## 2019-12-26 PROCEDURE — 93005 ELECTROCARDIOGRAM TRACING: CPT | Performed by: EMERGENCY MEDICINE

## 2019-12-26 PROCEDURE — 6370000000 HC RX 637 (ALT 250 FOR IP): Performed by: EMERGENCY MEDICINE

## 2019-12-26 PROCEDURE — 83036 HEMOGLOBIN GLYCOSYLATED A1C: CPT

## 2019-12-26 PROCEDURE — 96374 THER/PROPH/DIAG INJ IV PUSH: CPT

## 2019-12-26 RX ORDER — 0.9 % SODIUM CHLORIDE 0.9 %
500 INTRAVENOUS SOLUTION INTRAVENOUS ONCE
Status: COMPLETED | OUTPATIENT
Start: 2019-12-26 | End: 2019-12-26

## 2019-12-26 RX ORDER — NICOTINE POLACRILEX 4 MG
15 LOZENGE BUCCAL PRN
Status: DISCONTINUED | OUTPATIENT
Start: 2019-12-26 | End: 2019-12-27 | Stop reason: HOSPADM

## 2019-12-26 RX ORDER — ARGININE 500 MG
1 TABLET ORAL DAILY
Status: DISCONTINUED | OUTPATIENT
Start: 2019-12-26 | End: 2019-12-27 | Stop reason: HOSPADM

## 2019-12-26 RX ORDER — POTASSIUM CHLORIDE 20 MEQ/1
40 TABLET, EXTENDED RELEASE ORAL PRN
Status: DISCONTINUED | OUTPATIENT
Start: 2019-12-26 | End: 2019-12-27 | Stop reason: HOSPADM

## 2019-12-26 RX ORDER — AMLODIPINE BESYLATE 2.5 MG/1
2.5 TABLET ORAL DAILY
Status: DISCONTINUED | OUTPATIENT
Start: 2019-12-26 | End: 2019-12-27 | Stop reason: HOSPADM

## 2019-12-26 RX ORDER — SODIUM CHLORIDE 0.9 % (FLUSH) 0.9 %
10 SYRINGE (ML) INJECTION PRN
Status: DISCONTINUED | OUTPATIENT
Start: 2019-12-26 | End: 2019-12-27 | Stop reason: HOSPADM

## 2019-12-26 RX ORDER — DEXTROSE MONOHYDRATE 50 MG/ML
100 INJECTION, SOLUTION INTRAVENOUS PRN
Status: DISCONTINUED | OUTPATIENT
Start: 2019-12-26 | End: 2019-12-27 | Stop reason: HOSPADM

## 2019-12-26 RX ORDER — DEXTROSE MONOHYDRATE 25 G/50ML
12.5 INJECTION, SOLUTION INTRAVENOUS PRN
Status: DISCONTINUED | OUTPATIENT
Start: 2019-12-26 | End: 2019-12-27 | Stop reason: HOSPADM

## 2019-12-26 RX ORDER — ISOSORBIDE MONONITRATE 30 MG/1
30 TABLET, EXTENDED RELEASE ORAL ONCE
Status: COMPLETED | OUTPATIENT
Start: 2019-12-26 | End: 2019-12-26

## 2019-12-26 RX ORDER — SODIUM CHLORIDE 0.9 % (FLUSH) 0.9 %
10 SYRINGE (ML) INJECTION EVERY 12 HOURS SCHEDULED
Status: DISCONTINUED | OUTPATIENT
Start: 2019-12-26 | End: 2019-12-27 | Stop reason: HOSPADM

## 2019-12-26 RX ORDER — ACETAMINOPHEN 325 MG/1
650 TABLET ORAL EVERY 4 HOURS PRN
Status: DISCONTINUED | OUTPATIENT
Start: 2019-12-26 | End: 2019-12-27 | Stop reason: HOSPADM

## 2019-12-26 RX ORDER — POTASSIUM CHLORIDE 7.45 MG/ML
10 INJECTION INTRAVENOUS PRN
Status: DISCONTINUED | OUTPATIENT
Start: 2019-12-26 | End: 2019-12-27 | Stop reason: HOSPADM

## 2019-12-26 RX ORDER — 0.9 % SODIUM CHLORIDE 0.9 %
500 INTRAVENOUS SOLUTION INTRAVENOUS ONCE
Status: COMPLETED | OUTPATIENT
Start: 2019-12-27 | End: 2019-12-27

## 2019-12-26 RX ORDER — POLYETHYLENE GLYCOL 3350 17 G/17G
17 POWDER, FOR SOLUTION ORAL DAILY PRN
Status: DISCONTINUED | OUTPATIENT
Start: 2019-12-26 | End: 2019-12-27 | Stop reason: HOSPADM

## 2019-12-26 RX ORDER — ASPIRIN 81 MG/1
324 TABLET, CHEWABLE ORAL ONCE
Status: COMPLETED | OUTPATIENT
Start: 2019-12-26 | End: 2019-12-26

## 2019-12-26 RX ORDER — ISOSORBIDE MONONITRATE 60 MG/1
60 TABLET, EXTENDED RELEASE ORAL DAILY
Status: DISCONTINUED | OUTPATIENT
Start: 2019-12-27 | End: 2019-12-27

## 2019-12-26 RX ORDER — ISOSORBIDE MONONITRATE 30 MG/1
30 TABLET, EXTENDED RELEASE ORAL DAILY
Status: DISCONTINUED | OUTPATIENT
Start: 2019-12-26 | End: 2019-12-26

## 2019-12-26 RX ORDER — ATORVASTATIN CALCIUM 10 MG/1
20 TABLET, FILM COATED ORAL NIGHTLY
Status: DISCONTINUED | OUTPATIENT
Start: 2019-12-26 | End: 2019-12-27 | Stop reason: HOSPADM

## 2019-12-26 RX ORDER — ASPIRIN 81 MG/1
81 TABLET, CHEWABLE ORAL DAILY
Status: DISCONTINUED | OUTPATIENT
Start: 2019-12-26 | End: 2019-12-27 | Stop reason: HOSPADM

## 2019-12-26 RX ORDER — ONDANSETRON 2 MG/ML
4 INJECTION INTRAMUSCULAR; INTRAVENOUS EVERY 6 HOURS PRN
Status: DISCONTINUED | OUTPATIENT
Start: 2019-12-26 | End: 2019-12-27 | Stop reason: HOSPADM

## 2019-12-26 RX ADMIN — ONDANSETRON HYDROCHLORIDE 4 MG: 2 INJECTION, SOLUTION INTRAMUSCULAR; INTRAVENOUS at 18:04

## 2019-12-26 RX ADMIN — METOPROLOL TARTRATE 25 MG: 25 TABLET ORAL at 21:11

## 2019-12-26 RX ADMIN — ATORVASTATIN CALCIUM 20 MG: 10 TABLET, FILM COATED ORAL at 21:11

## 2019-12-26 RX ADMIN — ISOSORBIDE MONONITRATE 30 MG: 30 TABLET ORAL at 13:54

## 2019-12-26 RX ADMIN — ENOXAPARIN SODIUM 40 MG: 40 INJECTION SUBCUTANEOUS at 13:54

## 2019-12-26 RX ADMIN — Medication 10 ML: at 12:41

## 2019-12-26 RX ADMIN — ACETAMINOPHEN 650 MG: 325 TABLET ORAL at 19:25

## 2019-12-26 RX ADMIN — METOPROLOL TARTRATE 25 MG: 25 TABLET ORAL at 12:40

## 2019-12-26 RX ADMIN — Medication 10 ML: at 21:11

## 2019-12-26 RX ADMIN — ASPIRIN 81 MG 324 MG: 81 TABLET ORAL at 08:41

## 2019-12-26 RX ADMIN — AMLODIPINE BESYLATE 2.5 MG: 2.5 TABLET ORAL at 12:40

## 2019-12-26 RX ADMIN — ISOSORBIDE MONONITRATE 30 MG: 30 TABLET ORAL at 12:41

## 2019-12-26 RX ADMIN — SODIUM CHLORIDE 500 ML: 9 INJECTION, SOLUTION INTRAVENOUS at 08:41

## 2019-12-26 ASSESSMENT — PAIN SCALES - GENERAL
PAINLEVEL_OUTOF10: 4
PAINLEVEL_OUTOF10: 2

## 2019-12-26 ASSESSMENT — PAIN DESCRIPTION - ORIENTATION: ORIENTATION: LEFT;UPPER;MID

## 2019-12-26 ASSESSMENT — PAIN DESCRIPTION - DESCRIPTORS: DESCRIPTORS: SORE

## 2019-12-26 ASSESSMENT — PAIN DESCRIPTION - LOCATION: LOCATION: CHEST

## 2019-12-26 ASSESSMENT — PAIN DESCRIPTION - PAIN TYPE: TYPE: ACUTE PAIN

## 2019-12-27 VITALS
TEMPERATURE: 98.2 F | SYSTOLIC BLOOD PRESSURE: 104 MMHG | WEIGHT: 183.3 LBS | OXYGEN SATURATION: 94 % | DIASTOLIC BLOOD PRESSURE: 64 MMHG | BODY MASS INDEX: 24.85 KG/M2 | RESPIRATION RATE: 16 BRPM | HEART RATE: 71 BPM

## 2019-12-27 LAB
ANION GAP SERPL CALCULATED.3IONS-SCNC: 7 MMOL/L (ref 3–16)
BASOPHILS ABSOLUTE: 0.1 K/UL (ref 0–0.2)
BASOPHILS RELATIVE PERCENT: 0.9 %
BUN BLDV-MCNC: 15 MG/DL (ref 7–20)
CALCIUM SERPL-MCNC: 9 MG/DL (ref 8.3–10.6)
CHLORIDE BLD-SCNC: 99 MMOL/L (ref 99–110)
CHOLESTEROL, TOTAL: 92 MG/DL (ref 0–199)
CO2: 26 MMOL/L (ref 21–32)
CREAT SERPL-MCNC: 0.9 MG/DL (ref 0.9–1.3)
EOSINOPHILS ABSOLUTE: 0.6 K/UL (ref 0–0.6)
EOSINOPHILS RELATIVE PERCENT: 4.4 %
ESTIMATED AVERAGE GLUCOSE: 122.6 MG/DL
GFR AFRICAN AMERICAN: >60
GFR NON-AFRICAN AMERICAN: >60
GLUCOSE BLD-MCNC: 105 MG/DL (ref 70–99)
GLUCOSE BLD-MCNC: 95 MG/DL (ref 70–99)
GLUCOSE BLD-MCNC: 99 MG/DL (ref 70–99)
HBA1C MFR BLD: 5.9 %
HCT VFR BLD CALC: 40.6 % (ref 40.5–52.5)
HDLC SERPL-MCNC: 24 MG/DL (ref 40–60)
HEMOGLOBIN: 13.3 G/DL (ref 13.5–17.5)
LDL CHOLESTEROL CALCULATED: 53 MG/DL
LYMPHOCYTES ABSOLUTE: 3.1 K/UL (ref 1–5.1)
LYMPHOCYTES RELATIVE PERCENT: 20.9 %
MCH RBC QN AUTO: 28.6 PG (ref 26–34)
MCHC RBC AUTO-ENTMCNC: 32.7 G/DL (ref 31–36)
MCV RBC AUTO: 87.6 FL (ref 80–100)
MONOCYTES ABSOLUTE: 1.2 K/UL (ref 0–1.3)
MONOCYTES RELATIVE PERCENT: 8.3 %
NEUTROPHILS ABSOLUTE: 9.6 K/UL (ref 1.7–7.7)
NEUTROPHILS RELATIVE PERCENT: 65.5 %
PDW BLD-RTO: 14.9 % (ref 12.4–15.4)
PERFORMED ON: NORMAL
PERFORMED ON: NORMAL
PLATELET # BLD: 263 K/UL (ref 135–450)
PMV BLD AUTO: 8.1 FL (ref 5–10.5)
POTASSIUM REFLEX MAGNESIUM: 4.6 MMOL/L (ref 3.5–5.1)
RBC # BLD: 4.64 M/UL (ref 4.2–5.9)
SODIUM BLD-SCNC: 132 MMOL/L (ref 136–145)
TRIGL SERPL-MCNC: 77 MG/DL (ref 0–150)
VLDLC SERPL CALC-MCNC: 15 MG/DL
WBC # BLD: 14.7 K/UL (ref 4–11)

## 2019-12-27 PROCEDURE — 2580000003 HC RX 258: Performed by: NURSE PRACTITIONER

## 2019-12-27 PROCEDURE — 36415 COLL VENOUS BLD VENIPUNCTURE: CPT

## 2019-12-27 PROCEDURE — 2580000003 HC RX 258: Performed by: INTERNAL MEDICINE

## 2019-12-27 PROCEDURE — G0378 HOSPITAL OBSERVATION PER HR: HCPCS

## 2019-12-27 PROCEDURE — 6360000002 HC RX W HCPCS: Performed by: NURSE PRACTITIONER

## 2019-12-27 PROCEDURE — 85025 COMPLETE CBC W/AUTO DIFF WBC: CPT

## 2019-12-27 PROCEDURE — 80061 LIPID PANEL: CPT

## 2019-12-27 PROCEDURE — 99217 PR OBSERVATION CARE DISCHARGE MANAGEMENT: CPT | Performed by: INTERNAL MEDICINE

## 2019-12-27 PROCEDURE — 96372 THER/PROPH/DIAG INJ SC/IM: CPT

## 2019-12-27 PROCEDURE — 6370000000 HC RX 637 (ALT 250 FOR IP): Performed by: NURSE PRACTITIONER

## 2019-12-27 PROCEDURE — 80048 BASIC METABOLIC PNL TOTAL CA: CPT

## 2019-12-27 PROCEDURE — 99214 OFFICE O/P EST MOD 30 MIN: CPT | Performed by: INTERNAL MEDICINE

## 2019-12-27 RX ORDER — ARGININE 500 MG
TABLET ORAL
Qty: 30 TABLET | Refills: 1 | Status: SHIPPED | OUTPATIENT
Start: 2019-12-27 | End: 2020-01-27 | Stop reason: ALTCHOICE

## 2019-12-27 RX ORDER — ISOSORBIDE MONONITRATE 30 MG/1
30 TABLET, EXTENDED RELEASE ORAL DAILY
Status: DISCONTINUED | OUTPATIENT
Start: 2019-12-28 | End: 2019-12-27 | Stop reason: HOSPADM

## 2019-12-27 RX ADMIN — SODIUM CHLORIDE 500 ML: 9 INJECTION, SOLUTION INTRAVENOUS at 00:04

## 2019-12-27 RX ADMIN — Medication 10 ML: at 09:43

## 2019-12-27 RX ADMIN — ASPIRIN 81 MG 81 MG: 81 TABLET ORAL at 09:42

## 2019-12-27 RX ADMIN — ENOXAPARIN SODIUM 40 MG: 40 INJECTION SUBCUTANEOUS at 09:42

## 2019-12-28 ENCOUNTER — APPOINTMENT (OUTPATIENT)
Dept: CT IMAGING | Age: 54
End: 2019-12-28
Payer: COMMERCIAL

## 2019-12-28 ENCOUNTER — HOSPITAL ENCOUNTER (EMERGENCY)
Age: 54
Discharge: HOME | End: 2019-12-29
Payer: COMMERCIAL

## 2019-12-28 DIAGNOSIS — R11.2 NON-INTRACTABLE VOMITING WITH NAUSEA, UNSPECIFIED VOMITING TYPE: ICD-10-CM

## 2019-12-28 DIAGNOSIS — J01.00 ACUTE NON-RECURRENT MAXILLARY SINUSITIS: Primary | ICD-10-CM

## 2019-12-28 DIAGNOSIS — R51.9 ACUTE NONINTRACTABLE HEADACHE, UNSPECIFIED HEADACHE TYPE: ICD-10-CM

## 2019-12-28 PROCEDURE — 2580000003 HC RX 258: Performed by: NURSE PRACTITIONER

## 2019-12-28 PROCEDURE — 70450 CT HEAD/BRAIN W/O DYE: CPT

## 2019-12-28 PROCEDURE — 96360 HYDRATION IV INFUSION INIT: CPT

## 2019-12-28 PROCEDURE — 99284 EMERGENCY DEPT VISIT MOD MDM: CPT

## 2019-12-28 PROCEDURE — 6370000000 HC RX 637 (ALT 250 FOR IP): Performed by: NURSE PRACTITIONER

## 2019-12-28 RX ORDER — ACETAMINOPHEN 500 MG
1000 TABLET ORAL ONCE
Status: COMPLETED | OUTPATIENT
Start: 2019-12-28 | End: 2019-12-28

## 2019-12-28 RX ORDER — 0.9 % SODIUM CHLORIDE 0.9 %
1000 INTRAVENOUS SOLUTION INTRAVENOUS ONCE
Status: COMPLETED | OUTPATIENT
Start: 2019-12-28 | End: 2019-12-29

## 2019-12-28 RX ADMIN — ACETAMINOPHEN 1000 MG: 500 TABLET ORAL at 23:46

## 2019-12-28 RX ADMIN — SODIUM CHLORIDE 1000 ML: 9 INJECTION, SOLUTION INTRAVENOUS at 23:47

## 2019-12-28 ASSESSMENT — ENCOUNTER SYMPTOMS
PHOTOPHOBIA: 1
ALLERGIC/IMMUNOLOGIC NEGATIVE: 1
SHORTNESS OF BREATH: 0
COUGH: 0
ABDOMINAL DISTENTION: 0
SINUS PAIN: 1
EYE REDNESS: 1
EYE PAIN: 1
NAUSEA: 1
VOMITING: 1
BACK PAIN: 0
DIARRHEA: 0
ABDOMINAL PAIN: 0
SINUS PRESSURE: 1

## 2019-12-28 ASSESSMENT — VISUAL ACUITY
OU: 20/25
OD: 20/70
OS: 20/20

## 2019-12-28 ASSESSMENT — TONOMETRY
OS_IOP_MMHG: 17
IOP_AUTOMATED: 1
OD_IOP_MMHG: 26

## 2019-12-28 ASSESSMENT — PAIN SCALES - GENERAL
PAINLEVEL_OUTOF10: 2
PAINLEVEL_OUTOF10: 3
PAINLEVEL_OUTOF10: 2

## 2019-12-29 VITALS
TEMPERATURE: 99.6 F | OXYGEN SATURATION: 97 % | HEART RATE: 67 BPM | SYSTOLIC BLOOD PRESSURE: 122 MMHG | DIASTOLIC BLOOD PRESSURE: 83 MMHG | RESPIRATION RATE: 16 BRPM | HEIGHT: 72 IN | BODY MASS INDEX: 25.19 KG/M2 | WEIGHT: 186 LBS

## 2019-12-29 PROCEDURE — 6370000000 HC RX 637 (ALT 250 FOR IP): Performed by: NURSE PRACTITIONER

## 2019-12-29 RX ORDER — AMOXICILLIN AND CLAVULANATE POTASSIUM 875; 125 MG/1; MG/1
1 TABLET, FILM COATED ORAL ONCE
Status: COMPLETED | OUTPATIENT
Start: 2019-12-29 | End: 2019-12-29

## 2019-12-29 RX ORDER — AMOXICILLIN AND CLAVULANATE POTASSIUM 875; 125 MG/1; MG/1
1 TABLET, FILM COATED ORAL 2 TIMES DAILY
Qty: 20 TABLET | Refills: 0 | Status: SHIPPED | OUTPATIENT
Start: 2019-12-29 | End: 2020-01-08

## 2019-12-29 RX ORDER — ONDANSETRON 4 MG/1
4 TABLET, ORALLY DISINTEGRATING ORAL EVERY 8 HOURS PRN
Qty: 20 TABLET | Refills: 0 | Status: SHIPPED | OUTPATIENT
Start: 2019-12-29 | End: 2020-02-05 | Stop reason: ALTCHOICE

## 2019-12-29 RX ORDER — BUTALBITAL, ACETAMINOPHEN AND CAFFEINE 300; 40; 50 MG/1; MG/1; MG/1
1 CAPSULE ORAL EVERY 4 HOURS PRN
Qty: 8 CAPSULE | Refills: 0 | Status: SHIPPED | OUTPATIENT
Start: 2019-12-29 | End: 2020-03-18

## 2019-12-29 RX ADMIN — AMOXICILLIN AND CLAVULANATE POTASSIUM 1 TABLET: 875; 125 TABLET, FILM COATED ORAL at 00:43

## 2019-12-29 ASSESSMENT — PAIN SCALES - GENERAL: PAINLEVEL_OUTOF10: 1

## 2019-12-30 ENCOUNTER — TELEPHONE (OUTPATIENT)
Dept: FAMILY MEDICINE CLINIC | Age: 54
End: 2019-12-30

## 2019-12-31 ENCOUNTER — OFFICE VISIT (OUTPATIENT)
Dept: FAMILY MEDICINE CLINIC | Age: 54
End: 2019-12-31
Payer: COMMERCIAL

## 2019-12-31 VITALS
HEART RATE: 76 BPM | SYSTOLIC BLOOD PRESSURE: 123 MMHG | WEIGHT: 188.2 LBS | DIASTOLIC BLOOD PRESSURE: 84 MMHG | BODY MASS INDEX: 25.52 KG/M2 | OXYGEN SATURATION: 94 %

## 2019-12-31 DIAGNOSIS — I25.10 CORONARY ARTERY DISEASE INVOLVING NATIVE CORONARY ARTERY OF NATIVE HEART WITHOUT ANGINA PECTORIS: Primary | Chronic | ICD-10-CM

## 2019-12-31 DIAGNOSIS — J01.90 ACUTE BACTERIAL SINUSITIS: ICD-10-CM

## 2019-12-31 DIAGNOSIS — B96.89 ACUTE BACTERIAL SINUSITIS: ICD-10-CM

## 2019-12-31 PROCEDURE — 1111F DSCHRG MED/CURRENT MED MERGE: CPT | Performed by: NURSE PRACTITIONER

## 2019-12-31 PROCEDURE — 99214 OFFICE O/P EST MOD 30 MIN: CPT | Performed by: NURSE PRACTITIONER

## 2020-01-04 ENCOUNTER — APPOINTMENT (OUTPATIENT)
Dept: GENERAL RADIOLOGY | Age: 55
End: 2020-01-04
Payer: MEDICARE

## 2020-01-04 ENCOUNTER — HOSPITAL ENCOUNTER (EMERGENCY)
Age: 55
Discharge: HOME | End: 2020-01-04
Payer: MEDICARE

## 2020-01-04 VITALS
DIASTOLIC BLOOD PRESSURE: 81 MMHG | WEIGHT: 185 LBS | TEMPERATURE: 97.5 F | RESPIRATION RATE: 15 BRPM | BODY MASS INDEX: 25.06 KG/M2 | HEIGHT: 72 IN | HEART RATE: 69 BPM | SYSTOLIC BLOOD PRESSURE: 113 MMHG | OXYGEN SATURATION: 95 %

## 2020-01-04 LAB
A/G RATIO: 1.2 (ref 1.1–2.2)
ALBUMIN SERPL-MCNC: 3.9 G/DL (ref 3.4–5)
ALP BLD-CCNC: 97 U/L (ref 40–129)
ALT SERPL-CCNC: 21 U/L (ref 10–40)
ANION GAP SERPL CALCULATED.3IONS-SCNC: 13 MMOL/L (ref 3–16)
AST SERPL-CCNC: 19 U/L (ref 15–37)
BASOPHILS ABSOLUTE: 0.1 K/UL (ref 0–0.2)
BASOPHILS RELATIVE PERCENT: 1.3 %
BILIRUB SERPL-MCNC: 0.3 MG/DL (ref 0–1)
BUN BLDV-MCNC: 12 MG/DL (ref 7–20)
CALCIUM SERPL-MCNC: 9 MG/DL (ref 8.3–10.6)
CHLORIDE BLD-SCNC: 101 MMOL/L (ref 99–110)
CO2: 27 MMOL/L (ref 21–32)
CREAT SERPL-MCNC: 0.7 MG/DL (ref 0.9–1.3)
EOSINOPHILS ABSOLUTE: 0.5 K/UL (ref 0–0.6)
EOSINOPHILS RELATIVE PERCENT: 5.3 %
GFR AFRICAN AMERICAN: >60
GFR NON-AFRICAN AMERICAN: >60
GLOBULIN: 3.3 G/DL
GLUCOSE BLD-MCNC: 145 MG/DL (ref 70–99)
HCT VFR BLD CALC: 45.9 % (ref 40.5–52.5)
HEMOGLOBIN: 15.5 G/DL (ref 13.5–17.5)
LYMPHOCYTES ABSOLUTE: 2.7 K/UL (ref 1–5.1)
LYMPHOCYTES RELATIVE PERCENT: 26.5 %
MCH RBC QN AUTO: 29.1 PG (ref 26–34)
MCHC RBC AUTO-ENTMCNC: 33.8 G/DL (ref 31–36)
MCV RBC AUTO: 86 FL (ref 80–100)
MONOCYTES ABSOLUTE: 0.8 K/UL (ref 0–1.3)
MONOCYTES RELATIVE PERCENT: 7.5 %
NEUTROPHILS ABSOLUTE: 6.1 K/UL (ref 1.7–7.7)
NEUTROPHILS RELATIVE PERCENT: 59.4 %
PDW BLD-RTO: 14.8 % (ref 12.4–15.4)
PLATELET # BLD: 312 K/UL (ref 135–450)
PMV BLD AUTO: 7.3 FL (ref 5–10.5)
POTASSIUM REFLEX MAGNESIUM: 3.8 MMOL/L (ref 3.5–5.1)
RAPID INFLUENZA  B AGN: NEGATIVE
RAPID INFLUENZA A AGN: NEGATIVE
RBC # BLD: 5.33 M/UL (ref 4.2–5.9)
SODIUM BLD-SCNC: 141 MMOL/L (ref 136–145)
TOTAL PROTEIN: 7.2 G/DL (ref 6.4–8.2)
TROPONIN: <0.01 NG/ML
WBC # BLD: 10.2 K/UL (ref 4–11)

## 2020-01-04 PROCEDURE — 87804 INFLUENZA ASSAY W/OPTIC: CPT

## 2020-01-04 PROCEDURE — 71046 X-RAY EXAM CHEST 2 VIEWS: CPT

## 2020-01-04 PROCEDURE — 93005 ELECTROCARDIOGRAM TRACING: CPT

## 2020-01-04 PROCEDURE — 80053 COMPREHEN METABOLIC PANEL: CPT

## 2020-01-04 PROCEDURE — 99285 EMERGENCY DEPT VISIT HI MDM: CPT

## 2020-01-04 PROCEDURE — 84484 ASSAY OF TROPONIN QUANT: CPT

## 2020-01-04 PROCEDURE — 85025 COMPLETE CBC W/AUTO DIFF WBC: CPT

## 2020-01-04 ASSESSMENT — PAIN SCALES - GENERAL: PAINLEVEL_OUTOF10: 4

## 2020-01-04 NOTE — ED PROVIDER NOTES
Evaluated by [id number] [facility] Provider    [facility]  ED      CHIEF COMPLAINT  Chest Pain (Pt reports chest pain on and off for past 2 weeks; pt reports fatigue over the past 2 days ) and Fatigue    HISTORY OF PRESENT ILLNESS  Sammy Camarena is a 53 y.o. male who presents to the ED complaining of CP. Has had CP for a couple weeks. Today he feels very tired, has not had the fatigue prior to today. Was in [facility] on the recently for same symptoms. Was not feeling tired then. He was also seen on 12/28 here for sinus infection, reports he has had a bad cold. He is still taking the antibiotics. Followed up with PCP on the 31st, at that time was not having CP. CP is no different than what he has been having. Reports his concerning symptom is fatigue. Denies abdominal pain, nausea, vomiting, diarrhea. The patient is currently rating their pain as 4/10 and describes it as a aching type of pain. No treatments have been tried prior to arrival in the ED. The patient denies other complaints, modifying factors or associated symptoms. The patient arrived to the ED via private car. Patient is accompanied by family who is bedside for the visit.     PAST MEDICAL HISTORY    Past Medical History:   Diagnosis Date    CAD (coronary artery disease) 2016    Diabetes mellitus ([facility] [address] [facility]     Hyperlipidemia     Hypertension     MI (myocardial infarction) ([facility] [address] [facility]     No history of procedure 06/01/2017    colonoscopy       SURGICAL HISTORY    Past Surgical History:   Procedure Laterality Date    CHOLECYSTECTOMY, LAPAROSCOPIC N/A 4/17/2019    LAPAROSCOPIC CHOLECYSTECTOMY WITH INTRAOPERATIVE CHOLANGIOGRAM performed by Doni Young MD at [facility] COLONOSCOPY  06/01/2017    Colonic polyp    CORONARY ARTERY BYPASS GRAFT  09/20/2016    LIMA- LAD, Reverse SVG- Rosalba, reverse SVG- PDA, Reverse SVG- OM seq- Diag    EYE SURGERY Right     drain placed behind eye    HERNIA REPAIR Bilateral 2005    bilateral gallops. Capillary refill is brisk in all 4 extremities. Bilateral lower extremities are equal in size, there is no swelling observed. There is no tenderness to palpation. There is no erythema observed or warmth palpated. GI: Soft, nontender, nondistended with positive bowel sounds. No rebound tenderness, guarding or any peritoneal signs. No masses or hepatosplenomegaly    MUSCULOSKELETAL:  No gross deformities or trauma noted. Moving all extremities equally and appropriately. Normal ROM. SKIN: Warm/dry. Skin is intact. No rashes/lesions noted. PSYCHIATRIC: Mood and affect appropriate. Speech is clear and articulate. NEUROLOGIC: Alert and oriented. No focal motor or sensory deficits. Gait was observed and is normal.    LABS  I have reviewed all labs for this visit.    Results for orders placed or performed during the hospital encounter of 01/04/20   Rapid influenza A/B antigens   Result Value Ref Range    Rapid Influenza A Ag Negative Negative    Rapid Influenza B Ag Negative Negative   CBC Auto Differential   Result Value Ref Range    WBC 10.2 4.0 - 11.0 K/uL    RBC 5.33 4.20 - 5.90 M/uL    Hemoglobin 15.5 13.5 - 17.5 g/dL    Hematocrit 45.9 40.5 - 52.5 %    MCV 86.0 80.0 - 100.0 fL    MCH 29.1 26.0 - 34.0 pg    MCHC 33.8 31.0 - 36.0 g/dL    RDW 14.8 12.4 - 15.4 %    Platelets 908 302 - 764 K/uL    MPV 7.3 5.0 - 10.5 fL    Neutrophils % 59.4 %    Lymphocytes % 26.5 %    Monocytes % 7.5 %    Eosinophils % 5.3 %    Basophils % 1.3 %    Neutrophils Absolute 6.1 1.7 - 7.7 K/uL    Lymphocytes Absolute 2.7 1.0 - 5.1 K/uL    Monocytes Absolute 0.8 0.0 - 1.3 K/uL    Eosinophils Absolute 0.5 0.0 - 0.6 K/uL    Basophils Absolute 0.1 0.0 - 0.2 K/uL   Comprehensive Metabolic Panel w/ Reflex to MG   Result Value Ref Range    Sodium 141 136 - 145 mmol/L    Potassium reflex Magnesium 3.8 3.5 - 5.1 mmol/L    Chloride 101 99 - 110 mmol/L    CO2 27 21 - 32 mmol/L    Anion Gap 13 3 - 16    Glucose 145 (H) 70 - 99 mg/dL    BUN called? ->No Reason for Exam: headache x 3 days, eye pain and redness Acuity: Acute Type of Exam: Initial Relevant Medical/Surgical History: no hx of seizures, stroke or surgery. FINDINGS: BRAIN/VENTRICLES: There is no acute intracranial hemorrhage, mass effect or midline shift. No abnormal extra-axial fluid collection. The gray-white differentiation is maintained without evidence of an acute infarct. There is no evidence of hydrocephalus. Intracranial atherosclerotic vascular calcification. ORBITS: The visualized portion of the orbits demonstrate no acute abnormality. SINUSES: Near complete opacification of the left maxillary sinus. Significant mucosal thickening in the left ethmoid air cells. Mild mucosal thickening in the right maxillary sinus and ethmoid air cells. The mastoid air cells are clear. SOFT TISSUES/SKULL:  No acute abnormality of the visualized skull or soft tissues. No acute intracranial abnormality. Paranasal sinus disease with extensive opacification of the left maxillary sinus and ethmoid air cells. Xr Chest Portable    Result Date: 12/26/2019  EXAMINATION: ONE XRAY VIEW OF THE CHEST 12/26/2019 7:49 am COMPARISON: 11/01/2019 HISTORY: Reason for Exam: chest pains. s/p bipass 3 years ago Acuity: Acute Type of Exam: Initial FINDINGS: Low lung volumes. Left lower lung field subsegmental atelectasis. Possible trace left effusion. There is no effusion or pneumothorax. The cardiomediastinal silhouette is stable. Postsurgical changes of CABG. Stable degenerative change of the mid to lower thoracic spine. Low lung volumes with left lower lung field subsegmental atelectasis and possible trace effusion. HEART SCORE:    History: +0 for low suspicion  EKG: +1 for nonspecific changes   Age: +1 for age 44-72 years  Risk factors (includes HLD, HTN, DM, tobacco use, obesity, and +FHx): +2 for known CAD or 3+ risk factors  Initial troponin: +0 for negative troponin    Heart score: 4. This falls under the following category: Score of 4-6, which indicates low/moderate risk for major adverse cardiac event and supports observation with repeated troponins and/or non-invasive testing    REVIEW OF RECORDS  Plan:  1. Stress test abnormal and ischemia in territory of first obtuse marginal which is not amenable to angioplasty. 2. Xray neck shows cervical spondylosis  3. Asa statin beta blockers nitrates and amlodipine  4. Prescribed over the counter L arginine  5. Case discussed with Dr Abel Hsu yesterday   See my note from yesterday   Medical therapy only recommended  Core Measures:  · Discharge instructions:   · LVEF documented: 63  · ACEI for LV dysfunction: NA  · Smoking Cessation:  I have spent 25   minutes of face to face time with the patient with more than 50% spent counseling and coordinating care for this patient. Discussed with hospital team     Jasmyne Bautista MD 8/6/2019 5:03 AM      Plan:  1. Continue nitrates lower dose  2. Continue amlodipine   3. Continue statin and beta blockers  4. OK to discharge today. follow up in 4 weeks with Dr Abel Hsu     I have spent 25   minutes of face to face time with the patient with more than 50% spent counseling and coordinating care for this patient     Jasmyne Bautista MD 12/27/2019 8:05 AM    ED COURSE/MDM  Patient seen and evaluated. Old records reviewed. Diagnostic testing reviewed and results discussed. I have evaluated this patient. My supervising physician was available for consultation. Tony Davey presented to the ED today with above noted complaints. Physical exam overall is unremarkable. CBC is without evidence of systemic infection. No anemia. CMP is without electrolyte abnormality. No kidney or liver dysfunction. EKG normal sinus rhythm with nonspecific findings. Troponin negative. CXR negative  Flu Swab obtained and negative. Patient is reporting that his only concerning symptom is fatigue.   He reports that his chest pain and shortness of breath are not different than what he has been experiencing over the past couple of weeks. I was able to review prior records and patient does have an area of what is suspected to be reversible ischemia but this is not felt to be amenable to angioplasty. Medical management has been the recommendations the last 2 times the patient has been admitted for chest pain. As he is not having any other symptoms other than fatigue with a negative work-up I feel that discharge at this time is reasonable. I did advise him to contact his cardiology office to see if he could potentially be seen sooner than his current scheduled appointment on the 27th. He was advised to return to the ED if he has any new or worsening symptoms prior to being seen by cardiology. At this point I do not feel the patient requires further work up and it is reasonable to discharge the patient. Please refer to AVS for further details regarding discharge instructions. A discussion was had with the patient regarding diagnosis, diagnostic testing results, treatment/ plan of care, and follow up. All questions were answered. Patient will follow up as directed for further evaluation/treatment. The patient was given strict return precautions as we discussed symptoms that would necessitate return to the ED. Patient will return to ED for new/worsening symptoms. The patient verbalized their understanding and agreement with the above plan. I estimate there is LOW risk for ACUTE CORONARY SYNDROME, INTRACRANIAL HEMORRHAGE, MALIGNANT DYSRHYTHMIA or HYPERTENSION, PULMONARY EMBOLISM, SEPSIS, SUBARACHNOID HEMORRHAGE, SUBDURAL HEMATOMA, STROKE, or THORACIC AORTIC DISSECTION, thus I consider the discharge disposition reasonable. Kar Salguero and I have discussed the diagnosis and risks, and we agree with discharging home to follow-up with their primary doctor.  We also discussed returning to the Emergency Department immediately if new

## 2020-01-05 LAB
EKG ATRIAL RATE: 68 BPM
EKG DIAGNOSIS: NORMAL
EKG P AXIS: 24 DEGREES
EKG P-R INTERVAL: 152 MS
EKG Q-T INTERVAL: 408 MS
EKG QRS DURATION: 136 MS
EKG QTC CALCULATION (BAZETT): 433 MS
EKG R AXIS: -24 DEGREES
EKG T AXIS: -29 DEGREES
EKG VENTRICULAR RATE: 68 BPM

## 2020-01-05 PROCEDURE — 93010 ELECTROCARDIOGRAM REPORT: CPT | Performed by: INTERNAL MEDICINE

## 2020-01-20 RX ORDER — BLOOD-GLUCOSE METER
KIT MISCELLANEOUS
Qty: 50 STRIP | Refills: 7 | Status: SHIPPED | OUTPATIENT
Start: 2020-01-20 | End: 2022-09-14

## 2020-01-20 NOTE — TELEPHONE ENCOUNTER
Future appt scheduled 03/31/2020  Last appt 12/31/2019      Last Written 05/21/2019    blood glucose test strips (FREESTYLE TEST STRIPS) strip  #100  3 RF

## 2020-01-22 ENCOUNTER — TELEPHONE (OUTPATIENT)
Dept: FAMILY MEDICINE CLINIC | Age: 55
End: 2020-01-22

## 2020-01-22 NOTE — TELEPHONE ENCOUNTER
Daughter calling and wanting to know if you will fill out medical certification form for patient again to keep his electricity turned on

## 2020-01-23 NOTE — PROGRESS NOTES
Metformin and related     Review of Systems:   A 14 point review of symptoms completed. Pertinent positives identified in the HPI, all other review of symptoms negative as below.     Objective:   PHYSICAL EXAM:    Vitals:    01/27/20 1454   BP: 130/78   Pulse: 68   SpO2: 96%    Weight: 193 lb 12.8 oz (87.9 kg)     Wt Readings from Last 3 Encounters:   01/27/20 193 lb 12.8 oz (87.9 kg)   01/25/20 191 lb 6 oz (86.8 kg)   01/04/20 185 lb (83.9 kg)         General Appearance:  Alert, cooperative, no distress, appears stated age   Head:  Normocephalic, atraumatic   Eyes:  PERRL, conjunctiva/corneas clear   Nose: Nares normal, no drainage or sinus tenderness   Throat: Lips, mucosa, and tongue normal   Neck: Supple, symmetrical, trachea midline, NL thyroid no carotid bruit or JVD   Lungs:   CTAB, respirations unlabored   Chest Wall:  No tenderness or deformity   Heart:  Regular rhythm and normal rate; S1, S2 are normal;   no murmur noted; no rub or gallop   Abdomen:   Soft, non-tender, +BS x 4, no masses, no organomegaly   Extremities: Extremities normal, atraumatic, no cyanosis or edema   Pulses: 2+ and symmetric   Skin: Skin color, texture, turgor normal, no rashes or lesions   Pysch: Normal mood and affect   Neurologic: Normal gross motor and sensory exam.         LABS   CBC:      Lab Results   Component Value Date    WBC 11.2 01/26/2020    RBC 5.08 01/26/2020    HGB 14.6 01/26/2020    HCT 43.9 01/26/2020    MCV 86.4 01/26/2020    RDW 15.2 01/26/2020     01/26/2020     CMP:  Lab Results   Component Value Date     01/25/2020    K 3.8 01/25/2020    K 3.8 01/04/2020     01/25/2020    CO2 25 01/25/2020    BUN 12 01/25/2020    CREATININE 0.8 01/25/2020    GFRAA >60 01/25/2020    AGRATIO 1.2 01/25/2020    LABGLOM >60 01/25/2020    GLUCOSE 144 01/25/2020    PROT 7.6 01/25/2020    CALCIUM 9.3 01/25/2020    BILITOT 0.4 01/25/2020    ALKPHOS 110 01/25/2020    AST 16 01/25/2020    ALT 16 01/25/2020     PT/INR: No results found for: PTINR  Liver:  No components found for: CHLPL  Lab Results   Component Value Date    ALT 16 01/25/2020    AST 16 01/25/2020    ALKPHOS 110 01/25/2020    BILITOT 0.4 01/25/2020     Lab Results   Component Value Date    LABA1C 6.4 01/25/2020     Lipids:         Lab Results   Component Value Date    TRIG 162 (H) 01/26/2020    TRIG 77 12/27/2019    TRIG 105 08/04/2019            Lab Results   Component Value Date    HDL 25 (L) 01/26/2020    HDL 24 (L) 12/27/2019    HDL 24 (L) 08/04/2019            Lab Results   Component Value Date    LDLCALC 58 01/26/2020    LDLCALC 53 12/27/2019    LDLCALC 46 08/04/2019            Lab Results   Component Value Date    LABVLDL 32 01/26/2020    LABVLDL 15 12/27/2019    LABVLDL 21 08/04/2019         CARDIAC DATA     ECHO/MUGA: 5/13/19   Summary   Normal left ventricle systolic function with an estimated ejection fraction   of 55%. No regional wall motion abnormalities are seen. Normal left ventricular diastolic filling pressure. Systolic pulmonary artery pressure (SPAP) is normal and estimated at 26 mmHg   (right atrial pressure 3 mmHg). STRESS TEST: 2/22/19  Summary  Normal LV size and systolic function. Left ventricular ejection fraction of  68%. Normal wall motion. There is normal isotope uptake at stress and rest. There is no evidence of  myocardial ischemia or scar     CARDIAC CATH: 5/13/19  LEFT HEART CATH  LM: calcified,    LAD: calcified  LCX: calcified, prox 80-90%                OM1- small, ostial 80% (too small for PCI)                OM2- tiny vessel                OM3- larger vessel, acute takeoff angle, ostial 90%. OM4- distal 80%  RCA: dominant, moderate diffuse disease.  prox 50%, mid 60%, distal 70%                R->Lt collaterals to Lcx                PDA: 100% ostial with L->R collaterals                PLV: luminals     LIMA-LAD: patent with  Touchdown to mid LAD, mid 40-50% in native LAD  SVG-->RT RV Marginal patent to the best of our ability and knowledge. I agree with the details independently gathered by my clinical support staff, while the remaining scribed note accurately describes my personal service to the patient. The above RN is working as a scribe for and in the presence of myself . Working as a scribe, the RN may have prepopulated components of this note with my historical intellectual property under my direct supervision. Any additions to this intellectual property were performed at my direction. Furthermore, the content and accuracy of this note have been reviewed by me to the best of my ability.          Loni Palencia MD, [address] [facility] Cardiologist  [facility]  [phone] [facility]  [phone] [address] [place] [address]

## 2020-01-25 ENCOUNTER — APPOINTMENT (OUTPATIENT)
Dept: GENERAL RADIOLOGY | Age: 55
End: 2020-01-25
Payer: MEDICARE

## 2020-01-25 ENCOUNTER — HOSPITAL ENCOUNTER (OUTPATIENT)
Age: 55
Setting detail: OBSERVATION
Discharge: HOME | End: 2020-01-26
Attending: HOSPITALIST | Admitting: HOSPITALIST
Payer: MEDICARE

## 2020-01-25 PROBLEM — R07.9 CHEST PAIN: Status: ACTIVE | Noted: 2020-01-25

## 2020-01-25 LAB
A/G RATIO: 1.2 (ref 1.1–2.2)
ALBUMIN SERPL-MCNC: 4.2 G/DL (ref 3.4–5)
ALP BLD-CCNC: 110 U/L (ref 40–129)
ALT SERPL-CCNC: 16 U/L (ref 10–40)
ANION GAP SERPL CALCULATED.3IONS-SCNC: 11 MMOL/L (ref 3–16)
AST SERPL-CCNC: 16 U/L (ref 15–37)
BILIRUB SERPL-MCNC: 0.4 MG/DL (ref 0–1)
BUN BLDV-MCNC: 12 MG/DL (ref 7–20)
CALCIUM SERPL-MCNC: 9.3 MG/DL (ref 8.3–10.6)
CHLORIDE BLD-SCNC: 101 MMOL/L (ref 99–110)
CO2: 25 MMOL/L (ref 21–32)
CREAT SERPL-MCNC: 0.8 MG/DL (ref 0.9–1.3)
EKG ATRIAL RATE: 74 BPM
EKG DIAGNOSIS: NORMAL
EKG P AXIS: 23 DEGREES
EKG P-R INTERVAL: 158 MS
EKG Q-T INTERVAL: 388 MS
EKG QRS DURATION: 126 MS
EKG QTC CALCULATION (BAZETT): 430 MS
EKG R AXIS: -28 DEGREES
EKG T AXIS: -26 DEGREES
EKG VENTRICULAR RATE: 74 BPM
GFR AFRICAN AMERICAN: >60
GFR NON-AFRICAN AMERICAN: >60
GLOBULIN: 3.4 G/DL
GLUCOSE BLD-MCNC: 125 MG/DL (ref 70–99)
GLUCOSE BLD-MCNC: 144 MG/DL (ref 70–99)
GLUCOSE BLD-MCNC: 96 MG/DL (ref 70–99)
HCT VFR BLD CALC: 46.3 % (ref 40.5–52.5)
HEMOGLOBIN: 15.9 G/DL (ref 13.5–17.5)
MCH RBC QN AUTO: 29.3 PG (ref 26–34)
MCHC RBC AUTO-ENTMCNC: 34.3 G/DL (ref 31–36)
MCV RBC AUTO: 85.6 FL (ref 80–100)
PDW BLD-RTO: 14.8 % (ref 12.4–15.4)
PERFORMED ON: ABNORMAL
PERFORMED ON: NORMAL
PLATELET # BLD: 253 K/UL (ref 135–450)
PMV BLD AUTO: 7.8 FL (ref 5–10.5)
POTASSIUM SERPL-SCNC: 3.8 MMOL/L (ref 3.5–5.1)
RBC # BLD: 5.4 M/UL (ref 4.2–5.9)
SODIUM BLD-SCNC: 137 MMOL/L (ref 136–145)
TOTAL PROTEIN: 7.6 G/DL (ref 6.4–8.2)
TROPONIN: <0.01 NG/ML
WBC # BLD: 9.9 K/UL (ref 4–11)

## 2020-01-25 PROCEDURE — 6360000002 HC RX W HCPCS: Performed by: HOSPITALIST

## 2020-01-25 PROCEDURE — 2580000003 HC RX 258: Performed by: HOSPITALIST

## 2020-01-25 PROCEDURE — 6370000000 HC RX 637 (ALT 250 FOR IP): Performed by: HOSPITALIST

## 2020-01-25 PROCEDURE — G0378 HOSPITAL OBSERVATION PER HR: HCPCS

## 2020-01-25 PROCEDURE — 36415 COLL VENOUS BLD VENIPUNCTURE: CPT

## 2020-01-25 PROCEDURE — 99285 EMERGENCY DEPT VISIT HI MDM: CPT

## 2020-01-25 PROCEDURE — 71046 X-RAY EXAM CHEST 2 VIEWS: CPT

## 2020-01-25 PROCEDURE — 80053 COMPREHEN METABOLIC PANEL: CPT

## 2020-01-25 PROCEDURE — 83036 HEMOGLOBIN GLYCOSYLATED A1C: CPT

## 2020-01-25 PROCEDURE — 93010 ELECTROCARDIOGRAM REPORT: CPT | Performed by: INTERNAL MEDICINE

## 2020-01-25 PROCEDURE — 93005 ELECTROCARDIOGRAM TRACING: CPT | Performed by: EMERGENCY MEDICINE

## 2020-01-25 PROCEDURE — 84484 ASSAY OF TROPONIN QUANT: CPT

## 2020-01-25 PROCEDURE — 6370000000 HC RX 637 (ALT 250 FOR IP): Performed by: PHYSICIAN ASSISTANT

## 2020-01-25 PROCEDURE — 85027 COMPLETE CBC AUTOMATED: CPT

## 2020-01-25 PROCEDURE — 2580000003 HC RX 258: Performed by: PHYSICIAN ASSISTANT

## 2020-01-25 RX ORDER — ATORVASTATIN CALCIUM 10 MG/1
20 TABLET, FILM COATED ORAL NIGHTLY
Status: DISCONTINUED | OUTPATIENT
Start: 2020-01-25 | End: 2020-01-26 | Stop reason: HOSPADM

## 2020-01-25 RX ORDER — ASPIRIN 81 MG/1
81 TABLET, CHEWABLE ORAL ONCE
Status: COMPLETED | OUTPATIENT
Start: 2020-01-25 | End: 2020-01-25

## 2020-01-25 RX ORDER — 0.9 % SODIUM CHLORIDE 0.9 %
1000 INTRAVENOUS SOLUTION INTRAVENOUS ONCE
Status: COMPLETED | OUTPATIENT
Start: 2020-01-25 | End: 2020-01-25

## 2020-01-25 RX ORDER — AMLODIPINE BESYLATE 2.5 MG/1
2.5 TABLET ORAL DAILY
Status: DISCONTINUED | OUTPATIENT
Start: 2020-01-25 | End: 2020-01-26

## 2020-01-25 RX ORDER — NITROGLYCERIN 0.4 MG/1
0.4 TABLET SUBLINGUAL EVERY 5 MIN PRN
Status: DISCONTINUED | OUTPATIENT
Start: 2020-01-25 | End: 2020-01-26 | Stop reason: HOSPADM

## 2020-01-25 RX ORDER — ARGININE 500 MG
500 TABLET ORAL DAILY
Status: DISCONTINUED | OUTPATIENT
Start: 2020-01-26 | End: 2020-01-26 | Stop reason: HOSPADM

## 2020-01-25 RX ORDER — ASPIRIN 81 MG/1
81 TABLET ORAL DAILY
Status: DISCONTINUED | OUTPATIENT
Start: 2020-01-25 | End: 2020-01-26 | Stop reason: HOSPADM

## 2020-01-25 RX ORDER — SODIUM CHLORIDE 0.9 % (FLUSH) 0.9 %
10 SYRINGE (ML) INJECTION PRN
Status: DISCONTINUED | OUTPATIENT
Start: 2020-01-25 | End: 2020-01-26 | Stop reason: HOSPADM

## 2020-01-25 RX ORDER — NICOTINE POLACRILEX 4 MG
15 LOZENGE BUCCAL PRN
Status: DISCONTINUED | OUTPATIENT
Start: 2020-01-25 | End: 2020-01-26 | Stop reason: HOSPADM

## 2020-01-25 RX ORDER — BUTALBITAL, ACETAMINOPHEN AND CAFFEINE 50; 325; 40 MG/1; MG/1; MG/1
1 TABLET ORAL EVERY 4 HOURS PRN
Status: DISCONTINUED | OUTPATIENT
Start: 2020-01-25 | End: 2020-01-26 | Stop reason: HOSPADM

## 2020-01-25 RX ORDER — MORPHINE SULFATE 4 MG/ML
4 INJECTION, SOLUTION INTRAMUSCULAR; INTRAVENOUS ONCE
Status: COMPLETED | OUTPATIENT
Start: 2020-01-25 | End: 2020-01-26

## 2020-01-25 RX ORDER — DEXTROSE MONOHYDRATE 50 MG/ML
100 INJECTION, SOLUTION INTRAVENOUS PRN
Status: DISCONTINUED | OUTPATIENT
Start: 2020-01-25 | End: 2020-01-26 | Stop reason: HOSPADM

## 2020-01-25 RX ORDER — SODIUM CHLORIDE 0.9 % (FLUSH) 0.9 %
10 SYRINGE (ML) INJECTION EVERY 12 HOURS SCHEDULED
Status: DISCONTINUED | OUTPATIENT
Start: 2020-01-25 | End: 2020-01-26 | Stop reason: HOSPADM

## 2020-01-25 RX ORDER — ONDANSETRON 2 MG/ML
4 INJECTION INTRAMUSCULAR; INTRAVENOUS EVERY 6 HOURS PRN
Status: DISCONTINUED | OUTPATIENT
Start: 2020-01-25 | End: 2020-01-26 | Stop reason: HOSPADM

## 2020-01-25 RX ORDER — DEXTROSE MONOHYDRATE 25 G/50ML
12.5 INJECTION, SOLUTION INTRAVENOUS PRN
Status: DISCONTINUED | OUTPATIENT
Start: 2020-01-25 | End: 2020-01-26 | Stop reason: HOSPADM

## 2020-01-25 RX ORDER — KETOROLAC TROMETHAMINE 30 MG/ML
30 INJECTION, SOLUTION INTRAMUSCULAR; INTRAVENOUS ONCE
Status: DISCONTINUED | OUTPATIENT
Start: 2020-01-25 | End: 2020-01-25

## 2020-01-25 RX ADMIN — ASPIRIN 81 MG: 81 TABLET, COATED ORAL at 18:19

## 2020-01-25 RX ADMIN — NITROGLYCERIN 0.5 INCH: 20 OINTMENT TOPICAL at 14:23

## 2020-01-25 RX ADMIN — ATORVASTATIN CALCIUM 20 MG: 10 TABLET, FILM COATED ORAL at 22:13

## 2020-01-25 RX ADMIN — ENOXAPARIN SODIUM 40 MG: 40 INJECTION SUBCUTANEOUS at 18:46

## 2020-01-25 RX ADMIN — SODIUM CHLORIDE 1000 ML: 9 INJECTION, SOLUTION INTRAVENOUS at 14:22

## 2020-01-25 RX ADMIN — Medication 10 ML: at 17:46

## 2020-01-25 RX ADMIN — AMLODIPINE BESYLATE 2.5 MG: 2.5 TABLET ORAL at 17:46

## 2020-01-25 RX ADMIN — ASPIRIN 81 MG 81 MG: 81 TABLET ORAL at 14:23

## 2020-01-25 RX ADMIN — Medication 10 ML: at 22:13

## 2020-01-25 ASSESSMENT — PAIN SCALES - GENERAL
PAINLEVEL_OUTOF10: 4

## 2020-01-25 ASSESSMENT — PAIN DESCRIPTION - PAIN TYPE
TYPE: ACUTE PAIN

## 2020-01-25 ASSESSMENT — PAIN DESCRIPTION - ORIENTATION
ORIENTATION: MID;UPPER
ORIENTATION: LEFT
ORIENTATION: LEFT

## 2020-01-25 ASSESSMENT — PAIN DESCRIPTION - LOCATION
LOCATION: CHEST

## 2020-01-25 ASSESSMENT — PAIN DESCRIPTION - FREQUENCY
FREQUENCY: INTERMITTENT
FREQUENCY: INTERMITTENT

## 2020-01-25 ASSESSMENT — PAIN DESCRIPTION - DESCRIPTORS
DESCRIPTORS: BURNING

## 2020-01-25 NOTE — PLAN OF CARE
Problem: Pain:  Goal: Pain level will decrease  Description  Pain level will decrease  Outcome: Ongoing  Goal: Control of acute pain  Description  Control of acute pain  Outcome: Ongoing  Goal: Control of chronic pain  Description  Control of chronic pain  Outcome: Ongoing   Pt rates chest pain 8/10 comes and goes. Heidi

## 2020-01-25 NOTE — PROGRESS NOTES
.4 Eyes Skin Assessment     The patient is being assess for  Admission    I agree that 2 RN's have performed a thorough Head to Toe Skin Assessment on the patient. ALL assessment sites listed below have been assessed. Areas assessed by both nurses: Isaura Alan  [x]   Head, Face, and Ears   [x]   Shoulders, Back, and Chest  [x]   Arms, Elbows, and Hands   [x]   Coccyx, Sacrum, and IschIum  [x]   Legs, Feet, and Heels      Skin dry and intact. No signs of redness or skin breakdown. Does the Patient have Skin Breakdown?   No         Meño Prevention initiated:  No   Wound Care Orders initiated:  No      [facility] nurse consulted for Pressure Injury (Stage 3,4, Unstageable, DTI, NWPT, and Complex wounds), New and Established Ostomies:  No      Nurse 1 eSignature: Electronically signed by Adeola Bird RN on 1/25/20 at 5:53 PM    **SHARE this note so that the co-signing nurse is able to place an eSignature**    Nurse 2 eSignature: {Esignature:[id number]}

## 2020-01-25 NOTE — ED PROVIDER NOTES
of children: Not on file    Years of education: Not on file    Highest education level: Not on file   Occupational History    Not on file   Social Needs    Financial resource strain: Not on file    Food insecurity:     Worry: Not on file     Inability: Not on file    Transportation needs:     Medical: Not on file     Non-medical: Not on file   Tobacco Use    Smoking status: Never Smoker    Smokeless tobacco: Never Used   Substance and Sexual Activity    Alcohol use: No    Drug use: No    Sexual activity: Yes     Partners: Female   Lifestyle    Physical activity:     Days per week: Not on file     Minutes per session: Not on file    Stress: Not on file   Relationships    Social connections:     Talks on phone: Not on file     Gets together: Not on file     Attends Taoist service: Not on file     Active member of club or organization: Not on file     Attends meetings of clubs or organizations: Not on file     Relationship status: Not on file    Intimate partner violence:     Fear of current or ex partner: Not on file     Emotionally abused: Not on file     Physically abused: Not on file     Forced sexual activity: Not on file   Other Topics Concern    Not on file   Social History Narrative    Not on file     Current Facility-Administered Medications   Medication Dose Route Frequency Provider Last Rate Last Dose    morphine (PF) injection 4 mg  4 mg Intravenous Once Kwabena Paul        perflutren lipid microspheres (DEFINITY) injection 1.65 mg  1.5 mL Intravenous ONCE PRN Cayla Lange MD        insulin lispro (HUMALOG) injection vial 0-6 Units  0-6 Units Subcutaneous TID WC Cayla Lange MD        insulin lispro (HUMALOG) injection vial 0-3 Units  0-3 Units Subcutaneous Nightly Cayla Lange MD        glucose (GLUTOSE) 40 % oral gel 15 g  15 g Oral PRN Cayla Lange MD        dextrose 50 % IV solution  12.5 g Intravenous PRN Cayla Lange MD        glucagon discharging home to follow-up with their primary doctor. We also discussed returning to the Emergency Department immediately if new or worsening symptoms occur. We have discussed the symptoms which are most concerning (e.g., bloody sputum, fever, worsening pain or shortness of breath, vomiting, weakness) that necessitate immediate return. Final Impression  1. Chest pain, unspecified type      Blood pressure 113/76, pulse 84, temperature 98.6 °F (37 °C), temperature source Oral, resp. rate 16, height 6' (1.829 m), weight 185 lb (83.9 kg), SpO2 96 %. DISPOSITION  Patient was admitted to the hospital in stable condition.          Ciara Fritz, [address]  01/25/20 [id number]

## 2020-01-25 NOTE — H&P
chest pain as aching associated with numbness of the right arm , the pain is exertional exacerbated by walking and relieved by rest not relieved by aspirin or nitro sublingual.  Patient has had a frequent chest pain and hospitalization since August 2019. This time EKG show new changes indicating inferior ischemia and ED physician consulted cardiologist who recommended admit the patient for further evaluation  Serum troponin troponin negative  Patient denies feeling dizzy shortness of breath nausea vomiting fever chills however patient reported cough which is dry, chest x-ray in the ED nonacute  Patient is non-smoker. Ten point ROS reviewed negative, unless as noted above    Objective:   No intake or output data in the 24 hours ending 01/25/20 1514   Vitals:   Vitals:    01/25/20 1425   BP: 113/76   Pulse: 84   Resp: 16   Temp:    SpO2: 96%     Physical Exam:   GEN Awake. Alert , not in respiratory distress, not in pain  HEENT: PEERLA, , supple neck,   Chest: air entry equal bilaterally, no wheezing or crepitation  Heart: S1 and S2 heard, no murmur, no gallop or rub, regular rate  Abdomen: soft, ND , Nt, +BS  Extremities: no cyanosis, tenderness or erythema, peripheral pulses audible  Neurology: alert, oriented x3, able to move 4 limbs    Past Medical History:      Past Medical History:   Diagnosis Date    CAD (coronary artery disease) 2016    Diabetes mellitus ([address]     Hyperlipidemia     Hypertension     [facility] [address]     No history of procedure 06/01/2017    colonoscopy     PSHX:  has a past surgical history that includes Coronary artery bypass graft (09/20/2016); Eye surgery (Right); Colonoscopy (06/01/2017); hernia repair (Bilateral, 2005); and Cholecystectomy, laparoscopic (N/A, 4/17/2019). Allergies:    Allergies   Allergen Reactions    Metoprolol      Vomiting and Diarrhea    Nsaids      History of stomach ulcers    Tramadol Other (See Comments)       FAM HX: family

## 2020-01-25 NOTE — ED TRIAGE NOTES
Med list completed with patient, pt reports he has had no changes since 1/2/20 which was the last time his med list was updated. All information is believed to be true and accurate.

## 2020-01-25 NOTE — ED NOTES
[facility] cardiology  Re: chest pain per Carlo Butler returned [email or web address]     Tino Li  01/25/20 [id number]

## 2020-01-26 VITALS
BODY MASS INDEX: 25.92 KG/M2 | WEIGHT: 191.38 LBS | HEIGHT: 72 IN | RESPIRATION RATE: 20 BRPM | OXYGEN SATURATION: 94 % | TEMPERATURE: 98.3 F | SYSTOLIC BLOOD PRESSURE: 113 MMHG | HEART RATE: 62 BPM | DIASTOLIC BLOOD PRESSURE: 75 MMHG

## 2020-01-26 LAB
CHOLESTEROL, TOTAL: 115 MG/DL (ref 0–199)
EKG ATRIAL RATE: 68 BPM
EKG DIAGNOSIS: NORMAL
EKG P AXIS: 25 DEGREES
EKG P-R INTERVAL: 168 MS
EKG Q-T INTERVAL: 378 MS
EKG QRS DURATION: 126 MS
EKG QTC CALCULATION (BAZETT): 401 MS
EKG R AXIS: -16 DEGREES
EKG T AXIS: 6 DEGREES
EKG VENTRICULAR RATE: 68 BPM
ESTIMATED AVERAGE GLUCOSE: 137 MG/DL
GLUCOSE BLD-MCNC: 103 MG/DL (ref 70–99)
GLUCOSE BLD-MCNC: 132 MG/DL (ref 70–99)
HBA1C MFR BLD: 6.4 %
HCT VFR BLD CALC: 43.9 % (ref 40.5–52.5)
HDLC SERPL-MCNC: 25 MG/DL (ref 40–60)
HEMOGLOBIN: 14.6 G/DL (ref 13.5–17.5)
LDL CHOLESTEROL CALCULATED: 58 MG/DL
MCH RBC QN AUTO: 28.8 PG (ref 26–34)
MCHC RBC AUTO-ENTMCNC: 33.3 G/DL (ref 31–36)
MCV RBC AUTO: 86.4 FL (ref 80–100)
PDW BLD-RTO: 15.2 % (ref 12.4–15.4)
PERFORMED ON: ABNORMAL
PERFORMED ON: ABNORMAL
PLATELET # BLD: 232 K/UL (ref 135–450)
PMV BLD AUTO: 7.9 FL (ref 5–10.5)
RBC # BLD: 5.08 M/UL (ref 4.2–5.9)
TRIGL SERPL-MCNC: 162 MG/DL (ref 0–150)
VLDLC SERPL CALC-MCNC: 32 MG/DL
WBC # BLD: 11.2 K/UL (ref 4–11)

## 2020-01-26 PROCEDURE — 99214 OFFICE O/P EST MOD 30 MIN: CPT | Performed by: INTERNAL MEDICINE

## 2020-01-26 PROCEDURE — 96374 THER/PROPH/DIAG INJ IV PUSH: CPT

## 2020-01-26 PROCEDURE — 6360000002 HC RX W HCPCS: Performed by: PHYSICIAN ASSISTANT

## 2020-01-26 PROCEDURE — G0378 HOSPITAL OBSERVATION PER HR: HCPCS

## 2020-01-26 PROCEDURE — 80061 LIPID PANEL: CPT

## 2020-01-26 PROCEDURE — 36415 COLL VENOUS BLD VENIPUNCTURE: CPT

## 2020-01-26 PROCEDURE — 96372 THER/PROPH/DIAG INJ SC/IM: CPT

## 2020-01-26 PROCEDURE — 93010 ELECTROCARDIOGRAM REPORT: CPT | Performed by: INTERNAL MEDICINE

## 2020-01-26 PROCEDURE — 6360000002 HC RX W HCPCS: Performed by: HOSPITALIST

## 2020-01-26 PROCEDURE — 93005 ELECTROCARDIOGRAM TRACING: CPT | Performed by: HOSPITALIST

## 2020-01-26 PROCEDURE — 6370000000 HC RX 637 (ALT 250 FOR IP): Performed by: HOSPITALIST

## 2020-01-26 PROCEDURE — 2580000003 HC RX 258: Performed by: HOSPITALIST

## 2020-01-26 PROCEDURE — 85027 COMPLETE CBC AUTOMATED: CPT

## 2020-01-26 RX ORDER — METOPROLOL TARTRATE 50 MG/1
50 TABLET, FILM COATED ORAL 2 TIMES DAILY
Status: DISCONTINUED | OUTPATIENT
Start: 2020-01-26 | End: 2020-01-26 | Stop reason: HOSPADM

## 2020-01-26 RX ORDER — METOPROLOL TARTRATE 50 MG/1
50 TABLET, FILM COATED ORAL 2 TIMES DAILY
Qty: 60 TABLET | Refills: 3 | Status: SHIPPED | OUTPATIENT
Start: 2020-01-26 | End: 2020-05-26

## 2020-01-26 RX ORDER — AMLODIPINE BESYLATE 5 MG/1
5 TABLET ORAL DAILY
Qty: 30 TABLET | Refills: 3 | Status: SHIPPED | OUTPATIENT
Start: 2020-01-27 | End: 2020-05-26

## 2020-01-26 RX ORDER — AMLODIPINE BESYLATE 5 MG/1
5 TABLET ORAL DAILY
Status: DISCONTINUED | OUTPATIENT
Start: 2020-01-27 | End: 2020-01-26 | Stop reason: HOSPADM

## 2020-01-26 RX ORDER — MORPHINE SULFATE 2 MG/ML
2 INJECTION, SOLUTION INTRAMUSCULAR; INTRAVENOUS
Status: DISCONTINUED | OUTPATIENT
Start: 2020-01-26 | End: 2020-01-26 | Stop reason: HOSPADM

## 2020-01-26 RX ADMIN — Medication 10 ML: at 09:03

## 2020-01-26 RX ADMIN — MORPHINE SULFATE 4 MG: 4 INJECTION, SOLUTION INTRAMUSCULAR; INTRAVENOUS at 00:38

## 2020-01-26 RX ADMIN — ASPIRIN 81 MG: 81 TABLET, COATED ORAL at 09:03

## 2020-01-26 RX ADMIN — AMLODIPINE BESYLATE 2.5 MG: 2.5 TABLET ORAL at 09:03

## 2020-01-26 RX ADMIN — ENOXAPARIN SODIUM 40 MG: 40 INJECTION SUBCUTANEOUS at 09:03

## 2020-01-26 RX ADMIN — METOPROLOL TARTRATE 25 MG: 25 TABLET ORAL at 09:03

## 2020-01-26 ASSESSMENT — PAIN DESCRIPTION - LOCATION
LOCATION: CHEST
LOCATION: CHEST

## 2020-01-26 ASSESSMENT — PAIN SCALES - GENERAL
PAINLEVEL_OUTOF10: 6
PAINLEVEL_OUTOF10: 3
PAINLEVEL_OUTOF10: 3

## 2020-01-26 ASSESSMENT — PAIN DESCRIPTION - PAIN TYPE
TYPE: ACUTE PAIN
TYPE: ACUTE PAIN

## 2020-01-26 ASSESSMENT — PAIN DESCRIPTION - DESCRIPTORS: DESCRIPTORS: BURNING

## 2020-01-26 NOTE — CONSULTS
[id number]    Angina  CAD    OK discharge  Increase metop and norvasc  Has F/U w/ Alba tomorrow
headache or visual changes. No recent GI or  bleeding. No recent or upcoming surgeries. All other systems are negative except  as present illness. ALLERGIES:  See list in the chart, which I have reviewed. MEDICATIONS:  See list in the chart, which I have reviewed. PHYSICAL EXAMINATION:  VITALS:  Blood pressure is 113/75, heart rate 62, respiratory rate 16,  and temperature is 98.3. He is 94% saturated on room air. GENERAL:  A well-developed, well-nourished white male, in no acute  distress. HEENT:  Normocephalic and atraumatic. Oropharynx clear. Moist mucous  membranes. NECK:  Supple. CHEST:  Clear. CARDIAC:  Regular S1 and S2. There is no S3 or S4 gallop. There is no  significant murmur. Jugular venous pressure is normal.  Carotids are 2+  and symmetric without bruit. ABDOMEN:  Soft and nontender. Positive bowel sounds. EXTREMITIES:  No cyanosis or edema. NEUROLOGIC:  Grossly nonfocal.  SKIN:  Warm and dry. PSYCHIATRY:  Affect calm. DIAGNOSTIC DATA:  EKG, sinus rhythm, no acute ischemia or injury  pattern. Troponin is less than 0.01. Chest x-ray is without pulmonary  edema. IMPRESSION:  1. Exacerbation of chronic angina without objective evidence of acute  coronary syndrome. 2.  Coronary artery disease, see above for details. 3.  Hypertension, controlled. 4.  Hyperlipidemia, stable. RECOMMENDATIONS:  1. Increase metoprolol and Norvasc. 2.  The patient is at low risk and okay for discharge. 2.  The patient has follow up appointment in the office with Dr. Demian Melo  tomorrow and he would like to keep that for the discussion regarding his  treatment options.         Alma Sewell MD    D: 01/26/2020 12:05:55       T: 01/26/2020 14:44:04     [email or web address]  Job#: [id number]     Doc#: [id number]    CC:

## 2020-01-26 NOTE — PROGRESS NOTES
Hospitalist Progress Note  1/26/2020 10:14 AM  Subjective:   Admit Date: 1/25/2020 PCP: DONALDO Magallon CNP Status: Observation   Interval History: Hospital Day: 2, admitted with exertional chest pain on aspirin and beta blocker, improved with nitroglycerin. History of CAD s/p CABG with abnormal cardiac stress test in August 2019 with pattern of ischemia not amenable to catheterization. Medical history include type 2 diabetes and hypertension. Ruled out for MI by three negative troponin T enzymes. DIET CARB CONTROL; No Caffeine  Left forearm peripheral IV (1/25, day #2)  Medications:     amLODIPine  2.5 mg Oral Daily   arginine  500 mg Oral Daily   aspirin  81 mg Oral Daily   atorvastatin  20 mg Oral Nightly   metoprolol tartrate  25 mg Oral BID   enoxaparin  40 mg Subcutaneous Daily   insulin lispro SSI   0-6 Units Subcutaneous TID WC / HS     Recent Labs     01/25/20  1332 01/26/20  0650   WBC 9.9 11.2*   HGB 15.9 14.6    232   MCV 85.6 86.4     Recent Labs     01/25/20  1332      K 3.8      CO2 25   BUN 12   CREATININE 0.8*   GLUCOSE 144*     Recent Labs     01/25/20  1332   AST 16   ALT 16   BILITOT 0.4   ALKPHOS 110     Troponin T:  Negative x 3    POC Glucose:   01/25/20  1709 01/25/20  2204 01/26/20  0731   125* 96 103*     PA/lat CXR (1/25) The lung volumes are low.  No acute bony abnormality.  Sternotomy wires are again noted. The heart size and mediastinal contours are stable.  No definite focal consolidation, effusion or edema is seen. EKG: Normal sinus rhythm. Right bundle branch block.   Inferior infarct (age undetermined) with a onspecific ST abnormality unchanged from prior ECG of 04-JAN-2020 Yoel Lopez    Objective:   Vitals:  /83   Pulse 73   Temp 98.4 °F (36.9 °C) (Oral)   Resp 16   Ht 6' (1.829 m)   Wt 191 lb 6 oz (86.8 kg)   SpO2 95%   BMI 25.96 kg/m²   General appearance: alert and cooperative with exam  Lungs: clear to auscultation

## 2020-01-26 NOTE — DISCHARGE SUMMARY
(ZOFRAN ODT) 4 MG disintegrating tablet  Take 1 tablet by mouth every 8 hours as needed for Nausea         Consults:  Cardiology (Dr. Dwayne Blankenship)    Significant Diagnostic Studies:    Troponin T:  Negative x 3  PA/lat CXR (1/25) The lung volumes are low.  No acute bony abnormality.  Sternotomy wires are again noted. The heart size and mediastinal contours are stable.  No definite focal consolidation, effusion or edema is seen.      EKG: Normal sinus rhythm. Right bundle branch block.   Inferior infarct (age undetermined) with a onspecific ST abnormality unchanged from prior ECG of 04-JAN-2020 Emma Jackson)    Treatments:   Telemetry monitoring, medication adjustment    Disposition:   Home with self care   Follow up with Dr. Jelly Holm (cardiology) at scheduled appointment on Monday, January 27, 2020      Signed:  Denae Alan [facility]  1/26/2020, 12:11 PM

## 2020-01-26 NOTE — ED NOTES
4MG morphine wasted with Leroy Landa RN in med room. Unable to waste in pyxis d/t pt being off the floor.       Patricia Pratt RN  01/25/20 1308

## 2020-01-27 ENCOUNTER — OFFICE VISIT (OUTPATIENT)
Dept: CARDIOLOGY CLINIC | Age: 55
End: 2020-01-27
Payer: MEDICARE

## 2020-01-27 VITALS
HEIGHT: 72 IN | WEIGHT: 193.8 LBS | BODY MASS INDEX: 26.25 KG/M2 | OXYGEN SATURATION: 96 % | HEART RATE: 68 BPM | SYSTOLIC BLOOD PRESSURE: 130 MMHG | DIASTOLIC BLOOD PRESSURE: 78 MMHG

## 2020-01-27 PROCEDURE — G8482 FLU IMMUNIZE ORDER/ADMIN: HCPCS | Performed by: INTERNAL MEDICINE

## 2020-01-27 PROCEDURE — G8419 CALC BMI OUT NRM PARAM NOF/U: HCPCS | Performed by: INTERNAL MEDICINE

## 2020-01-27 PROCEDURE — 3017F COLORECTAL CA SCREEN DOC REV: CPT | Performed by: INTERNAL MEDICINE

## 2020-01-27 PROCEDURE — G8427 DOCREV CUR MEDS BY ELIG CLIN: HCPCS | Performed by: INTERNAL MEDICINE

## 2020-01-27 PROCEDURE — 1036F TOBACCO NON-USER: CPT | Performed by: INTERNAL MEDICINE

## 2020-01-27 PROCEDURE — 99214 OFFICE O/P EST MOD 30 MIN: CPT | Performed by: INTERNAL MEDICINE

## 2020-01-27 RX ORDER — ARGININE HCL 1000 MG
1000 TABLET ORAL 2 TIMES DAILY
Qty: 60 TABLET | Refills: 5 | Status: SHIPPED | OUTPATIENT
Start: 2020-01-27 | End: 2020-03-18 | Stop reason: SDUPTHER

## 2020-01-27 NOTE — PATIENT INSTRUCTIONS
Patient Plan:  1. Continue current medications  2. May increase the L-arginine to 1000 mg twice daily  3. Call our office next week to let us know how you are feeling  4.  Follow up in 2 months

## 2020-01-27 NOTE — LETTER
No current facility-administered medications for this visit. Allergies:  Nsaids; Tramadol; and Metformin and related     Review of Systems:   A 14 point review of symptoms completed. Pertinent positives identified in the HPI, all other review of symptoms negative as below.     Objective:   PHYSICAL EXAM:    Vitals:    01/27/20 1454   BP: 130/78   Pulse: 68   SpO2: 96%    Weight: 193 lb 12.8 oz (87.9 kg)     Wt Readings from Last 3 Encounters:   01/27/20 193 lb 12.8 oz (87.9 kg)   01/25/20 191 lb 6 oz (86.8 kg)   01/04/20 185 lb (83.9 kg)         General Appearance:  Alert, cooperative, no distress, appears stated age   Head:  Normocephalic, atraumatic   Eyes:  PERRL, conjunctiva/corneas clear   Nose: Nares normal, no drainage or sinus tenderness   Throat: Lips, mucosa, and tongue normal   Neck: Supple, symmetrical, trachea midline, NL thyroid no carotid bruit or JVD   Lungs:   CTAB, respirations unlabored   Chest Wall:  No tenderness or deformity   Heart:  Regular rhythm and normal rate; S1, S2 are normal;   no murmur noted; no rub or gallop   Abdomen:   Soft, non-tender, +BS x 4, no masses, no organomegaly   Extremities: Extremities normal, atraumatic, no cyanosis or edema   Pulses: 2+ and symmetric   Skin: Skin color, texture, turgor normal, no rashes or lesions   Pysch: Normal mood and affect   Neurologic: Normal gross motor and sensory exam.         LABS   CBC:      Lab Results   Component Value Date    WBC 11.2 01/26/2020    RBC 5.08 01/26/2020    HGB 14.6 01/26/2020    HCT 43.9 01/26/2020    MCV 86.4 01/26/2020    RDW 15.2 01/26/2020     01/26/2020     CMP:  Lab Results   Component Value Date     01/25/2020    K 3.8 01/25/2020    K 3.8 01/04/2020     01/25/2020    CO2 25 01/25/2020    BUN 12 01/25/2020    CREATININE 0.8 01/25/2020    GFRAA >60 01/25/2020    AGRATIO 1.2 01/25/2020    LABGLOM >60 01/25/2020    GLUCOSE 144 01/25/2020    PROT 7.6 01/25/2020    CALCIUM 9.3 01/25/2020 PLV: luminals     LIMA-LAD: patent with  Touchdown to mid LAD, mid 40-50% in native LAD  SVG-->RT RV Marginal patent with retrograde flow up to RCA  SVG-->Diag: patent. Flow down LAd and up to LCx                - Torsades with injection requiring Defib  No other grafts and no sequential limbs seen  LVEDP: 4  LVEF: 55%  Assessment  1. Severe CAD as above. Patent grafts x3 but unable to find grafts or \"sequentials\" to OM or PDa                - will need to get Cardiac CTA to eval further  2. May need to consider PCi to LCX but would likely jeopardize OM1-3. BYPASS: 09/20/16  VESSELS BYPASSED:  1. LIMA to LAD. 2. Reverse greater saphenous vein graft to acute marginal  3. Reverse greater saphenous vein graft to PDA sequentiall. 4. Reverse greater saphenous vein graft to OM sequential  5. Reverse greater sVG to diagonal sequential.    CATH: 09/19/16  LEFT HEART CATH  LM: luminals  LAD: heavy calcification burden, prox eccentric 75%- mid 90% around large septal, mid/distal 80%  LCX: heavy calcification burden. Mid 70%  OM1- small, moderate dz, ostial 80%  OM2- moderate in size, mild diffuse disease  OM3- large, 90% ostial PLOM  RCA: Dominant, moderate diffuse disease, prox 60%, mid 70%, distal 90%  Grade 3 L-->R collaterals  LVEDP: 10  LVEF: 65%  PLAN  1. 3 vessel CAD with preserved LVEF  - refer to CT surgery for CABG  - check Hemoglobin A1c and TSH  2. Start ASA, and statin. Holding BB for bradycardia and pauses seen at [address] CTA 5/14/19  Only 3 bypass grafts can be visualized. The insertion sites of the left internal mammary branch is poorly visualized secondary to phase misregistration artifact. The graft appears patent. A 2nd bypass graft terminates in the region of a diagonal branch. Graft appears patent.   Insertion site is poorly visualized secondary to artifact   A 3rd bypass graft appears small distally, terminating in the region of the acute marginal VASCULAR/OTHER IMAGING:      Assessment and Plan   Vasquez Disla is a 53 y.o. male who presents today for the following problems:      1. Chest pain: now retuning   - used push mower 4 hr yeterday w/o issues  2. CAD              -s/p CABGx5 with Dr. Breanna Bullock 9/20/2016   - PT surgical report for graft anatomy confusing and not see on cath per se. Cardiac CTA and cath bascialy showing pt has LIMA-LAD, SVG-Diag, SVG-rt RV marginal. Feel jump grafts closed and currently no grafts to LCX. 3. HTN: controlled  4. HLD: controlled        MD PLAN  1. Pt just r/o in hospital for AMI   - Norvasc taken to 5mg qday but pt only take 1-2 doses  2. Increase l-arginine to 1000mg po BID  3. Was not able to look at cath films due to computer crashing   - will need to review in cath lab but feel PCI to LCx may have R>B. 4. Pt to call next week with symptoms, if CP continues, consider EECP or possible PCI        Patient Active Problem List   Diagnosis    Family history of early CAD    S/P CABG (coronary artery bypass graft)    Benign essential HTN    Mixed hyperlipidemia    Coronary artery disease involving native coronary artery of native heart without angina pectoris    Microcytosis    Acute glaucoma of right eye    Acute chest pain    Paresthesia    Acute calculous cholecystitis    Type 2 diabetes mellitus without complication, without long-term current use of insulin ([address]    S/P laparoscopic cholecystectomy    Pneumonia due to organism    Abnormal stress test    Pre-syncope    Chest pain    Angina, class II ([address]       Patient Plan:  1. Continue current medications  2. May increase the L-arginine to 1000 mg twice daily  3. Call our office next week to let us know how you are feeling  4. Follow up in 2 months        It is a pleasure to assist in the care of Vasquez Disla. Please call with any questions. This note was scribed in the presence of Dr. Jose Angel Alcala MD by Merlin Voss RN.

## 2020-01-28 ENCOUNTER — TELEPHONE (OUTPATIENT)
Dept: CARDIOLOGY CLINIC | Age: 55
End: 2020-01-28

## 2020-01-29 NOTE — TELEPHONE ENCOUNTER
L-arginine is $6 for 1000 tablets last we checked. I don't know of anything cheaper. Maybe looking at wrong med or place?

## 2020-02-01 ENCOUNTER — HOSPITAL ENCOUNTER (OUTPATIENT)
Age: 55
Setting detail: OBSERVATION
Discharge: HOME | End: 2020-02-03
Attending: INTERNAL MEDICINE | Admitting: INTERNAL MEDICINE
Payer: MEDICARE

## 2020-02-01 ENCOUNTER — APPOINTMENT (OUTPATIENT)
Dept: CT IMAGING | Age: 55
End: 2020-02-01
Payer: MEDICARE

## 2020-02-01 ENCOUNTER — APPOINTMENT (OUTPATIENT)
Dept: GENERAL RADIOLOGY | Age: 55
End: 2020-02-01
Payer: MEDICARE

## 2020-02-01 ENCOUNTER — HOSPITAL ENCOUNTER (EMERGENCY)
Age: 55
Discharge: ANOTHER HOSPITAL | End: 2020-02-01
Attending: EMERGENCY MEDICINE
Payer: MEDICARE

## 2020-02-01 VITALS
HEART RATE: 69 BPM | BODY MASS INDEX: 26.24 KG/M2 | DIASTOLIC BLOOD PRESSURE: 67 MMHG | TEMPERATURE: 98.3 F | WEIGHT: 193.5 LBS | OXYGEN SATURATION: 98 % | RESPIRATION RATE: 16 BRPM | SYSTOLIC BLOOD PRESSURE: 97 MMHG

## 2020-02-01 PROBLEM — I63.9 CEREBROVASCULAR ACCIDENT (CVA) (HCC): Status: ACTIVE | Noted: 2020-02-01

## 2020-02-01 LAB
A/G RATIO: 1.4 (ref 1.1–2.2)
ALBUMIN SERPL-MCNC: 4.4 G/DL (ref 3.4–5)
ALP BLD-CCNC: 101 U/L (ref 40–129)
ALT SERPL-CCNC: 23 U/L (ref 10–40)
ANION GAP SERPL CALCULATED.3IONS-SCNC: 13 MMOL/L (ref 3–16)
AST SERPL-CCNC: 20 U/L (ref 15–37)
BASOPHILS ABSOLUTE: 0.2 K/UL (ref 0–0.2)
BASOPHILS RELATIVE PERCENT: 1.3 %
BILIRUB SERPL-MCNC: 0.6 MG/DL (ref 0–1)
BUN BLDV-MCNC: 15 MG/DL (ref 7–20)
CALCIUM SERPL-MCNC: 9.4 MG/DL (ref 8.3–10.6)
CHLORIDE BLD-SCNC: 100 MMOL/L (ref 99–110)
CHP ED QC CHECK: YES
CO2: 25 MMOL/L (ref 21–32)
CREAT SERPL-MCNC: 0.9 MG/DL (ref 0.9–1.3)
EKG ATRIAL RATE: 416 BPM
EKG DIAGNOSIS: NORMAL
EKG Q-T INTERVAL: 408 MS
EKG QRS DURATION: 132 MS
EKG QTC CALCULATION (BAZETT): 417 MS
EKG R AXIS: -18 DEGREES
EKG T AXIS: 13 DEGREES
EKG VENTRICULAR RATE: 63 BPM
EOSINOPHILS ABSOLUTE: 0.3 K/UL (ref 0–0.6)
EOSINOPHILS RELATIVE PERCENT: 2.7 %
GFR AFRICAN AMERICAN: >60
GFR NON-AFRICAN AMERICAN: >60
GLOBULIN: 3.1 G/DL
GLUCOSE BLD-MCNC: 127 MG/DL (ref 70–99)
GLUCOSE BLD-MCNC: 129 MG/DL (ref 70–99)
GLUCOSE BLD-MCNC: 137 MG/DL
GLUCOSE BLD-MCNC: 92 MG/DL (ref 70–99)
HCT VFR BLD CALC: 49 % (ref 40.5–52.5)
HEMOGLOBIN: 16.1 G/DL (ref 13.5–17.5)
INR BLD: 1.01 (ref 0.86–1.14)
LYMPHOCYTES ABSOLUTE: 2.8 K/UL (ref 1–5.1)
LYMPHOCYTES RELATIVE PERCENT: 22.7 %
MCH RBC QN AUTO: 28.2 PG (ref 26–34)
MCHC RBC AUTO-ENTMCNC: 32.8 G/DL (ref 31–36)
MCV RBC AUTO: 86.1 FL (ref 80–100)
MONOCYTES ABSOLUTE: 0.9 K/UL (ref 0–1.3)
MONOCYTES RELATIVE PERCENT: 7.6 %
NEUTROPHILS ABSOLUTE: 8.2 K/UL (ref 1.7–7.7)
NEUTROPHILS RELATIVE PERCENT: 65.7 %
PDW BLD-RTO: 15 % (ref 12.4–15.4)
PERFORMED ON: ABNORMAL
PERFORMED ON: NORMAL
PLATELET # BLD: 286 K/UL (ref 135–450)
PMV BLD AUTO: 8.1 FL (ref 5–10.5)
POTASSIUM REFLEX MAGNESIUM: 4.5 MMOL/L (ref 3.5–5.1)
PRO-BNP: 35 PG/ML (ref 0–124)
PROTHROMBIN TIME: 11.7 SEC (ref 10–13.2)
RBC # BLD: 5.68 M/UL (ref 4.2–5.9)
SODIUM BLD-SCNC: 138 MMOL/L (ref 136–145)
TOTAL PROTEIN: 7.5 G/DL (ref 6.4–8.2)
TROPONIN: <0.01 NG/ML
TROPONIN: <0.01 NG/ML
WBC # BLD: 12.5 K/UL (ref 4–11)

## 2020-02-01 PROCEDURE — 36415 COLL VENOUS BLD VENIPUNCTURE: CPT

## 2020-02-01 PROCEDURE — 71045 X-RAY EXAM CHEST 1 VIEW: CPT

## 2020-02-01 PROCEDURE — 93010 ELECTROCARDIOGRAM REPORT: CPT | Performed by: INTERNAL MEDICINE

## 2020-02-01 PROCEDURE — 99285 EMERGENCY DEPT VISIT HI MDM: CPT

## 2020-02-01 PROCEDURE — 84484 ASSAY OF TROPONIN QUANT: CPT

## 2020-02-01 PROCEDURE — 70450 CT HEAD/BRAIN W/O DYE: CPT

## 2020-02-01 PROCEDURE — 85025 COMPLETE CBC W/AUTO DIFF WBC: CPT

## 2020-02-01 PROCEDURE — 1200000000 HC SEMI PRIVATE

## 2020-02-01 PROCEDURE — G0378 HOSPITAL OBSERVATION PER HR: HCPCS

## 2020-02-01 PROCEDURE — 84443 ASSAY THYROID STIM HORMONE: CPT

## 2020-02-01 PROCEDURE — 93005 ELECTROCARDIOGRAM TRACING: CPT | Performed by: EMERGENCY MEDICINE

## 2020-02-01 PROCEDURE — 6370000000 HC RX 637 (ALT 250 FOR IP): Performed by: INTERNAL MEDICINE

## 2020-02-01 PROCEDURE — 80053 COMPREHEN METABOLIC PANEL: CPT

## 2020-02-01 PROCEDURE — G0379 DIRECT REFER HOSPITAL OBSERV: HCPCS

## 2020-02-01 PROCEDURE — 83880 ASSAY OF NATRIURETIC PEPTIDE: CPT

## 2020-02-01 PROCEDURE — 70496 CT ANGIOGRAPHY HEAD: CPT

## 2020-02-01 PROCEDURE — 6360000004 HC RX CONTRAST MEDICATION: Performed by: EMERGENCY MEDICINE

## 2020-02-01 PROCEDURE — 85610 PROTHROMBIN TIME: CPT

## 2020-02-01 PROCEDURE — 82607 VITAMIN B-12: CPT

## 2020-02-01 PROCEDURE — 82746 ASSAY OF FOLIC ACID SERUM: CPT

## 2020-02-01 PROCEDURE — 6370000000 HC RX 637 (ALT 250 FOR IP): Performed by: EMERGENCY MEDICINE

## 2020-02-01 RX ORDER — ONDANSETRON 4 MG/1
4 TABLET, ORALLY DISINTEGRATING ORAL EVERY 8 HOURS PRN
Status: DISCONTINUED | OUTPATIENT
Start: 2020-02-01 | End: 2020-02-03 | Stop reason: HOSPADM

## 2020-02-01 RX ORDER — METOPROLOL TARTRATE 50 MG/1
50 TABLET, FILM COATED ORAL 2 TIMES DAILY
Status: DISCONTINUED | OUTPATIENT
Start: 2020-02-01 | End: 2020-02-03 | Stop reason: HOSPADM

## 2020-02-01 RX ORDER — ONDANSETRON 2 MG/ML
4 INJECTION INTRAMUSCULAR; INTRAVENOUS EVERY 6 HOURS PRN
Status: DISCONTINUED | OUTPATIENT
Start: 2020-02-01 | End: 2020-02-03 | Stop reason: HOSPADM

## 2020-02-01 RX ORDER — CYCLOBENZAPRINE HCL 10 MG
10 TABLET ORAL 3 TIMES DAILY PRN
Status: DISCONTINUED | OUTPATIENT
Start: 2020-02-01 | End: 2020-02-03 | Stop reason: HOSPADM

## 2020-02-01 RX ORDER — ARGININE HCL 1000 MG
1000 TABLET ORAL 2 TIMES DAILY
Status: DISCONTINUED | OUTPATIENT
Start: 2020-02-01 | End: 2020-02-03 | Stop reason: HOSPADM

## 2020-02-01 RX ORDER — NICOTINE POLACRILEX 4 MG
15 LOZENGE BUCCAL PRN
Status: DISCONTINUED | OUTPATIENT
Start: 2020-02-01 | End: 2020-02-03 | Stop reason: HOSPADM

## 2020-02-01 RX ORDER — DEXTROSE MONOHYDRATE 50 MG/ML
100 INJECTION, SOLUTION INTRAVENOUS PRN
Status: DISCONTINUED | OUTPATIENT
Start: 2020-02-01 | End: 2020-02-03 | Stop reason: HOSPADM

## 2020-02-01 RX ORDER — SODIUM CHLORIDE 0.9 % (FLUSH) 0.9 %
10 SYRINGE (ML) INJECTION PRN
Status: DISCONTINUED | OUTPATIENT
Start: 2020-02-01 | End: 2020-02-03 | Stop reason: HOSPADM

## 2020-02-01 RX ORDER — ACETAMINOPHEN 325 MG/1
650 TABLET ORAL EVERY 4 HOURS PRN
Status: DISCONTINUED | OUTPATIENT
Start: 2020-02-01 | End: 2020-02-03 | Stop reason: HOSPADM

## 2020-02-01 RX ORDER — BUTALBITAL, ACETAMINOPHEN AND CAFFEINE 50; 325; 40 MG/1; MG/1; MG/1
1 TABLET ORAL EVERY 4 HOURS PRN
Status: DISCONTINUED | OUTPATIENT
Start: 2020-02-01 | End: 2020-02-03 | Stop reason: HOSPADM

## 2020-02-01 RX ORDER — DEXTROSE MONOHYDRATE 25 G/50ML
12.5 INJECTION, SOLUTION INTRAVENOUS PRN
Status: DISCONTINUED | OUTPATIENT
Start: 2020-02-01 | End: 2020-02-03 | Stop reason: HOSPADM

## 2020-02-01 RX ORDER — ATORVASTATIN CALCIUM 10 MG/1
20 TABLET, FILM COATED ORAL NIGHTLY
Status: DISCONTINUED | OUTPATIENT
Start: 2020-02-01 | End: 2020-02-03 | Stop reason: HOSPADM

## 2020-02-01 RX ORDER — PANTOPRAZOLE SODIUM 40 MG/1
40 TABLET, DELAYED RELEASE ORAL
Status: DISCONTINUED | OUTPATIENT
Start: 2020-02-02 | End: 2020-02-03 | Stop reason: HOSPADM

## 2020-02-01 RX ORDER — ASPIRIN 81 MG/1
324 TABLET, CHEWABLE ORAL ONCE
Status: COMPLETED | OUTPATIENT
Start: 2020-02-01 | End: 2020-02-01

## 2020-02-01 RX ORDER — SODIUM CHLORIDE 0.9 % (FLUSH) 0.9 %
10 SYRINGE (ML) INJECTION EVERY 12 HOURS SCHEDULED
Status: DISCONTINUED | OUTPATIENT
Start: 2020-02-01 | End: 2020-02-03 | Stop reason: HOSPADM

## 2020-02-01 RX ORDER — AMLODIPINE BESYLATE 5 MG/1
5 TABLET ORAL DAILY
Status: DISCONTINUED | OUTPATIENT
Start: 2020-02-01 | End: 2020-02-03 | Stop reason: HOSPADM

## 2020-02-01 RX ORDER — NITROGLYCERIN 0.4 MG/1
0.4 TABLET SUBLINGUAL EVERY 5 MIN PRN
Status: DISCONTINUED | OUTPATIENT
Start: 2020-02-01 | End: 2020-02-03 | Stop reason: HOSPADM

## 2020-02-01 RX ORDER — ASPIRIN 81 MG/1
81 TABLET, CHEWABLE ORAL DAILY
Status: DISCONTINUED | OUTPATIENT
Start: 2020-02-01 | End: 2020-02-03 | Stop reason: HOSPADM

## 2020-02-01 RX ORDER — CELECOXIB 100 MG/1
100 CAPSULE ORAL 2 TIMES DAILY PRN
Status: DISCONTINUED | OUTPATIENT
Start: 2020-02-01 | End: 2020-02-03 | Stop reason: HOSPADM

## 2020-02-01 RX ADMIN — NITROGLYCERIN 1 INCH: 20 OINTMENT TOPICAL at 09:51

## 2020-02-01 RX ADMIN — ASPIRIN 81 MG 324 MG: 81 TABLET ORAL at 09:51

## 2020-02-01 RX ADMIN — METOPROLOL TARTRATE 50 MG: 50 TABLET, FILM COATED ORAL at 20:48

## 2020-02-01 RX ADMIN — CYCLOBENZAPRINE 10 MG: 10 TABLET, FILM COATED ORAL at 20:51

## 2020-02-01 RX ADMIN — IOPAMIDOL 75 ML: 755 INJECTION, SOLUTION INTRAVENOUS at 09:39

## 2020-02-01 RX ADMIN — ATORVASTATIN CALCIUM 20 MG: 10 TABLET, FILM COATED ORAL at 20:48

## 2020-02-01 RX ADMIN — Medication 1000 MG: at 20:49

## 2020-02-01 ASSESSMENT — PAIN DESCRIPTION - LOCATION
LOCATION: CHEST
LOCATION: CHEST

## 2020-02-01 ASSESSMENT — PAIN DESCRIPTION - ORIENTATION
ORIENTATION: LEFT
ORIENTATION: LEFT;ANTERIOR

## 2020-02-01 ASSESSMENT — PAIN SCALES - GENERAL
PAINLEVEL_OUTOF10: 4
PAINLEVEL_OUTOF10: 4
PAINLEVEL_OUTOF10: 0

## 2020-02-01 ASSESSMENT — PAIN DESCRIPTION - PAIN TYPE
TYPE: CHRONIC PAIN
TYPE: CHRONIC PAIN

## 2020-02-01 NOTE — ED NOTES
357 at [facility]; report to [address] [place] [address] ETA [id number]     Sheryl Day  02/01/20 1139

## 2020-02-01 NOTE — ED PROVIDER NOTES
Emergency Department provider note    CHIEF COMPLAINT  Numbness (pt states he started having left sided numbess and dizziness last night while watching tv. States it really comes and goes and depends on what he is doing and using, such as while driving his left arm, walking left leg. ) and Chest Pain (left sided chest pain is pain has daily states its his normal chronic pain.)      HISTORY OF PRESENT ILLNESS  Rober Perez is a 53 y.o. male  who presents to the ED after developing some numbness to the left side of his body. He also complains of a headache on the left side and some left neck pain. He describes the numbness as a tingling sensation. It is associated with some chronic shortness of breath and chest pain. He is having some pain radiating up into the left jaw area. Nothing really seems to make his symptoms better or worse. Patient is in the process of being worked up for this chronic chest pain. He has previously been found to have a stenotic left obtuse marginal that was not amenable to percutaneous intervention. They are choosing to medically manage this. Patient had a coronary artery bypass graft in 2016. Patient has not missed any of his medications. No other complaints, modifying factors or associated symptoms. I have reviewed the following from the nursing documentation.     Past Medical History:   Diagnosis Date    CAD (coronary artery disease) 2016    [facility]vascular accident (CVA) ([facility] 2/1/2020    Diabetes mellitus ([address]     Hyperlipidemia     Hypertension     MI (myocardial infarction) ([facility]     No history of procedure 06/01/2017    colonoscopy     Past Surgical History:   Procedure Laterality Date    CHOLECYSTECTOMY, LAPAROSCOPIC N/A 4/17/2019    LAPAROSCOPIC CHOLECYSTECTOMY WITH INTRAOPERATIVE CHOLANGIOGRAM performed by Jina Hashimoto, MD at [address] [place] [address]  06/01/2017    Colonic polyp    CORONARY ARTERY BYPASS GRAFT  09/20/2016    LIMA- LAD, Reverse SVG- Rosalba, reverse SVG- PDA, Reverse SVG- OM seq- Diag    EYE SURGERY Right     drain placed behind eye    HERNIA REPAIR Bilateral 2005    bilateral inguinal hernia repair     Family History   Problem Relation Age of Onset    Heart Disease Father     Diabetes type 2  Father     Cancer Mother     Heart Disease Brother      Social History     Socioeconomic History    Marital status:      Spouse name: Not on file    Number of children: Not on file    Years of education: Not on file    Highest education level: Not on file   Occupational History    Not on file   Social Needs    Financial resource strain: Not on file    Food insecurity:     Worry: Not on file     Inability: Not on file    Transportation needs:     Medical: Not on file     Non-medical: Not on file   Tobacco Use    Smoking status: Never Smoker    Smokeless tobacco: Never Used   Substance and Sexual Activity    Alcohol use: No    Drug use: No    Sexual activity: Yes     Partners: Female   Lifestyle    Physical activity:     Days per week: Not on file     Minutes per session: Not on file    Stress: Not on file   Relationships    Social connections:     Talks on phone: Not on file     Gets together: Not on file     Attends Spiritism service: Not on file     Active member of club or organization: Not on file     Attends meetings of clubs or organizations: Not on file     Relationship status: Not on file    Intimate partner violence:     Fear of current or ex partner: Not on file     Emotionally abused: Not on file     Physically abused: Not on file     Forced sexual activity: Not on file   Other Topics Concern    Not on file   Social History Narrative    Not on file     No current facility-administered medications for this encounter. No current outpatient medications on file.      Facility-Administered Medications Ordered in Other Encounters   Medication Dose Route Frequency Provider Last Rate Last Dose    aspirin chewable tablet 81 mg  81 mg Oral Daily Shayy Mesa MD        nitroGLYCERIN (NITROSTAT) SL tablet 0.4 mg  0.4 mg Sublingual Q5 Min PRN Shayy Mesa MD        ondansetron (ZOFRAN-ODT) disintegrating tablet 4 mg  4 mg Oral Q8H PRN Shayy Mesa MD        butalbital-acetaminophen-caffeine (FIORICET, ESGIC) per tablet 1 tablet  1 tablet Oral Q4H PRN Shayy Mesa MD        metoprolol tartrate (LOPRESSOR) tablet 50 mg  50 mg Oral BID Shayy Mesa MD        amLODIPine (NORVASC) tablet 5 mg  5 mg Oral Daily Shayy Mesa MD        l-arginine TABS 1,000 mg  1,000 mg Oral BID Shayy Mesa MD        atorvastatin (LIPITOR) tablet 20 mg  20 mg Oral Nightly Shayy Mesa MD        insulin lispro (HUMALOG) injection vial 0-6 Units  0-6 Units Subcutaneous TID WC Shayy Mesa MD        insulin lispro (HUMALOG) injection vial 0-3 Units  0-3 Units Subcutaneous Nightly Shayy Mesa MD        sodium chloride flush 0.9 % injection 10 mL  10 mL Intravenous 2 times per day Shayy Mesa MD        sodium chloride flush 0.9 % injection 10 mL  10 mL Intravenous PRN Shayy Mesa MD        magnesium hydroxide (MILK OF MAGNESIA) 400 MG/5ML suspension 30 mL  30 mL Oral Daily PRN Shayy Mesa MD        ondansetron (ZOFRAN) injection 4 mg  4 mg Intravenous Q6H PRN Shayy Mesa MD       [address] [place] [address] Morning ON 2/2/2020] enoxaparin (LOVENOX) injection 40 mg  40 mg Subcutaneous Daily Shayy Mesa MD        acetaminophen (TYLENOL) tablet 650 mg  650 mg Oral Q4H PRN Shayy Mesa MD        celecoxib (CELEBREX) capsule 100 mg  100 mg Oral BID PRN Shayy Mesa MD       [address] [place] [address] ON 2/2/2020] pantoprazole (PROTONIX) tablet 40 mg  40 mg Oral QAM AC Shayy Mesa MD        cyclobenzaprine (FLEXERIL) tablet 10 mg  10 mg Oral TID PRN Shayy Mesa MD        glucose (GLUTOSE) 40 % oral gel 15 g  15 g Oral PRN Shayy Mesa MD       [address] [place] [address] dextrose 50 % IV solution  12.5 g Intravenous PRN Delma Bustamante MD        glucagon (rDNA) injection 1 mg  1 mg Intramuscular PRN Delma Bustamante MD        dextrose 5 % solution  100 mL/hr Intravenous PRN Delma Bustamante MD         Allergies   Allergen Reactions    Nsaids      History of stomach ulcers    Tramadol Other (See Comments)    Metformin And Related Nausea And Vomiting       REVIEW OF SYSTEMS  10 systems reviewed, pertinent positives per HPI otherwise noted to be negative. PHYSICAL EXAM  BP 97/67   Pulse 69   Temp 98.3 °F (36.8 °C) (Oral)   Resp 16   Wt 193 lb 8 oz (87.8 kg)   SpO2 98%   BMI 26.24 kg/m²   GENERAL APPEARANCE: Awake and alert. Cooperative. No distress. HEAD: Normocephalic. Atraumatic. EYES: PERRL. Patient has a lazy right eye. ENT: Mucous membranes are moist.   NECK: Supple. Some tenderness on palpation of the left neck both anteriorly as well as posteriorly. No palpable masses or bruits. HEART: RRR. No murmurs. LUNGS: Respirations unlabored. CTAB. Good air exchange. Speaking comfortably in full sentences. ABDOMEN: Soft. Non-distended. Non-tender. No masses. No organomegaly. No guarding or rebound. EXTREMITIES: No peripheral edema. Moves all extremities equally. All extremities neurovascularly intact. SKIN: Warm and dry. No acute rashes. NEUROLOGICAL: Alert and oriented. Please see NIH Stroke Scale below. PSYCHIATRIC: Normal mood and affect. NIH Stroke Scale     Interval: Baseline  Time: 0840  Person Administering Scale: Gail Hirsch MD   Administer stroke scale items in the order listed. Record performance in each category after each subscale exam. Do not go back and change scores. Follow directions provided for each exam technique. Scores should reflect what the patient does, not what the clinician thinks the patient can do. The clinician should record answers while administering the exam and work quickly.  Except where indicated, the due to suboptimal phase of contrast, making the   evaluation of the arteries of the head and neck nearly nondiagnostic. No acute abnormality or prominent stenosis of the major arteries of the head   and neck. CT HEAD WO CONTRAST   Final Result   No acute intracranial abnormality. Cerumen plugs obstruct the external auditory canals bilaterally right worse   than left. XR CHEST PORTABLE   Final Result   Cardiomegaly with mild vascular congestion with questionable trace left-sided   pleural effusion. Finding is grossly similar to the prior study. Findings   suggest underlying CHF. No focal consolidation is seen to suggest pneumonia. TIMES:  Last Known Well: 2100 (1/31/2020)  Arrival to ED: 0212  CT First Slice: 0064  CT Results: 9879  tPA Administration Time: n/a  Door to Needle: n/a  Time to ICU: n/a  Stroke Team: Case discussed with  Stroke Team, Dr. East Parents at 0889. Reason for Delays:  [] Social/Jehovah's witness  [] Initial refusal of testing or treatment  [] Care team unable to identify eligibility  [] Hypertension requiring aggressive control with IV medications  [] Further diagnostic evaluation to confirm stroke for patients with hypoglycemia (blood glucose <50), seizures, or major metabolic disorders  [] Management of concomitant emergent/acute conditions such as cardiopulmonary arrest, respiratory failure (requiring intubation)  [] Investigational or experimental protocol for thrombolysis    ED COURSE/MDM  Patient seen and evaluated. Old records reviewed. Labs and imaging reviewed and results discussed. Clinically I am slightly suspicious this may represent a lacunar infarct. Patient on arrival is outside of the window for treatment and her symptoms are minor enough that I doubt treatment would have been indicated anyway. Stroke work-up was initiated. This does not show any acute pathology. I do believe the patient warrants admission.   Case was discussed with  Johann Moctezuma who has agreed to accept admission. Patient subsequently was transferred for further advanced care not available here. I spoke with Dr. Johann Moctezuma. We thoroughly discussed the history, physical exam, laboratory and imaging studies, as well as, emergency department course. Based upon that discussion, we've decided to admit Bobo Leon for further observation and evaluation of Lopez Ta CVA-like symptoms. As I have deemed necessary from their history, physical and studies, I have considered and evaluated Bobo Leon for the following diagnoses:  DIABETES, INTRACRANIAL HEMORRHAGE, MENINGITIS, SUBARACHNOID HEMORRHAGE, SUBDURAL HEMATOMA, & STROKE. ED Course as of Feb 01 2018   Sat Feb 01, 2020   1054 Case discussed with Dr. Antonio Leger. He has accepted admission of this patient. [TC]      ED Course User Index  [TC] Erika Scales MD       t-PA NOT given due to the following EXCLUSION CRITERIA (only those checked):  [] Pregnancy  [x] Symptoms > 3 hours of onset  [x] Minor or isolated neurological signs  [] Rapid improvement of stroke symptoms  [] Seizure at the same time of stroke symptoms  [] Active bleeding or acute trauma (fracture)  [] Presentation consistent with acute MI or post-MI pericarditis  [] Known intracranial neoplasm, AV malformation or aneurysm  [] CT evidence of intracranial hemorrhage  [] Any prior history of intracranial hemorrhage  [] Symptoms suggestive of subarachnoid hemorrhage (even if head CT normal)  [] Persistent hypertension (SBP>185 or DBP>110)  [] Glucose < 50 or > 400.   [] Bleeding diathesis, including but not limited to:   -Platelets < 960,942   -Heparin within 48 hours with PTT > normal range   -Current or recent use of anticoagulants (dabagtran, rivaroxaban, or warfarin with     INR > 1.7)  [] Lumbar puncture in past 7 days  [] Arterial puncture at a noncompressible site in past 7 days  [] Major surgery in past 14 days  [] Gastrointestinal or urinary tract hemorrhage in past 21 days  [] Myocardial infarction in past 3 months  [] Stroke, intracranial surgery or serious head trauma in past 3 months    t-PA given with the following INCLUSION CRITERIA verified (only those checked):  [] Age 25 years or older  [] Clinical diagnosis of ischemic stroke causing measurable neurological deficit  [] Administration of t-PA can be initiated within 3 hours of onset of symptoms  [] A patient or family members who understand the potential risks and benefits:   Of every 100 patients treated with tPA:   72 will have the same outcome   32 will have a better outcome   3 will have a worse outcome (with 1 being severely disabled or fatal) due to t-PA        CLINICAL IMPRESSION  1. Cerebrovascular accident (CVA), unspecified mechanism ([facility]    2. Chest pain, unspecified type        Blood pressure 97/67, pulse 69, temperature 98.3 °F (36.8 °C), temperature source Oral, resp. rate 16, weight 193 lb 8 oz (87.8 kg), SpO2 98 %. DISPOSITION  Slaughters Joshua was transferred in stable condition. Asiya Madden MD    Comment: Please note this report has been produced using speech recognition software and may contain errors related to that system including errors in grammar, punctuation, and spelling, as well as words and phrases that may be inappropriate. If there are any questions or concerns please feel free to contact the dictating provider for clarification.        Asiya Madden MD  02/01/20 2019

## 2020-02-01 NOTE — PROGRESS NOTES
4 Eyes Skin Assessment     The patient is being assess for  Admission    I agree that 2 RN's have performed a thorough Head to Toe Skin Assessment on the patient. ALL assessment sites listed below have been assessed. Areas assessed by both nurses: ***  [x]   Head, Face, and Ears   [x]   Shoulders, Back, and Chest  [x]   Arms, Elbows, and Hands   [x]   Coccyx, Sacrum, and IschIum  [x]   Legs, Feet, and Heels        Does the Patient have Skin Breakdown?   No         Meño Prevention initiated:  No   Wound Care Orders initiated:  No      [facility] nurse consulted for Pressure Injury (Stage 3,4, Unstageable, DTI, NWPT, and Complex wounds), New and Established Ostomies:  No  Nurse 1 eSignature: Electronically signed by Dalia Sinclair RN on 2/1/20 at 4:38 PM    **SHARE this note so that the co-signing nurse is able to place an eSignature**    Nurse 2 eSignature: {Esignature:[id number]}

## 2020-02-01 NOTE — H&P
history of alcohol use. E-Cigarettes Vaping or Juuling     Questions Responses    E-Cigarette Use Never User    Start Date     Does device contain nicotine? Never    Quit Date     E-Cigarette Type Unknown            Family History:       Reviewed in detail and negative for DM, CAD, Cancer, CVA. Positive as follows:        Problem Relation Age of Onset    Heart Disease Father     Diabetes type 2  Father     Cancer Mother     Heart Disease Brother        REVIEW OF SYSTEMS:   Pertinent positives as noted in the HPI. All other systems reviewed and negative. PHYSICAL EXAM PERFORMED:    /69   Pulse 63   Temp 98.2 °F (36.8 °C) (Oral)   Resp 18   SpO2 95%     General appearance:  No apparent distress, appears stated age and cooperative. HEENT:  Normal cephalic, atraumatic without obvious deformity. Pupils equal, round, and reactive to light. Extra ocular muscles intact. Conjunctivae/corneas clear. Neck: Moderate tenderness to palpation in the left sternocleidomastoid area. Tenderness also elicited while moving the head to the right;   Respiratory:  Normal respiratory effort. Clear to auscultation, bilaterally without Rales/Wheezes/Rhonchi. Cardiovascular:  Regular rate and rhythm with normal S1/S2 without murmurs, rubs or gallops. Abdomen: Soft, non-tender, non-distended with normal bowel sounds. Musculoskeletal:  No clubbing, cyanosis or edema bilaterally. Full range of motion without deformity. Skin: Skin color, texture, turgor normal.  No rashes or lesions. Neurologic:  Neurovascularly intact without any focal sensory/motor deficits. Cranial nerves: II-XII intact, grossly non-focal.  No facial deviation or arm droop noted.   Reflexes are symmetrical  Psychiatric:  Alert and oriented, thought content appropriate, normal insight  Capillary Refill: Brisk,< 3 seconds   Peripheral Pulses: +2 palpable, equal bilaterally       Labs:     Recent Labs     02/01/20  0850   WBC 12.5*   HGB 16.1   HCT 49.0

## 2020-02-01 NOTE — ED NOTES
Nurse spoke with Dr. Chino Ramos who states pt is not a TPA candidate.       Chantale Reece RN  02/01/20 6004

## 2020-02-02 LAB
FOLATE: 11.14 NG/ML (ref 4.78–24.2)
GLUCOSE BLD-MCNC: 103 MG/DL (ref 70–99)
GLUCOSE BLD-MCNC: 107 MG/DL (ref 70–99)
GLUCOSE BLD-MCNC: 127 MG/DL (ref 70–99)
GLUCOSE BLD-MCNC: 130 MG/DL (ref 70–99)
PERFORMED ON: ABNORMAL
TROPONIN: <0.01 NG/ML
VITAMIN B-12: 283 PG/ML (ref 211–911)

## 2020-02-02 PROCEDURE — 1200000000 HC SEMI PRIVATE

## 2020-02-02 PROCEDURE — G0378 HOSPITAL OBSERVATION PER HR: HCPCS

## 2020-02-02 PROCEDURE — 97161 PT EVAL LOW COMPLEX 20 MIN: CPT

## 2020-02-02 PROCEDURE — 96372 THER/PROPH/DIAG INJ SC/IM: CPT

## 2020-02-02 PROCEDURE — 6360000002 HC RX W HCPCS: Performed by: INTERNAL MEDICINE

## 2020-02-02 PROCEDURE — 2580000003 HC RX 258: Performed by: INTERNAL MEDICINE

## 2020-02-02 PROCEDURE — 6370000000 HC RX 637 (ALT 250 FOR IP): Performed by: INTERNAL MEDICINE

## 2020-02-02 RX ADMIN — ENOXAPARIN SODIUM 40 MG: 40 INJECTION SUBCUTANEOUS at 09:12

## 2020-02-02 RX ADMIN — Medication 10 ML: at 09:13

## 2020-02-02 RX ADMIN — Medication 1000 MG: at 09:12

## 2020-02-02 RX ADMIN — METOPROLOL TARTRATE 50 MG: 50 TABLET, FILM COATED ORAL at 20:53

## 2020-02-02 RX ADMIN — PANTOPRAZOLE SODIUM 40 MG: 40 TABLET, DELAYED RELEASE ORAL at 05:13

## 2020-02-02 RX ADMIN — ASPIRIN 81 MG 81 MG: 81 TABLET ORAL at 09:12

## 2020-02-02 RX ADMIN — ATORVASTATIN CALCIUM 20 MG: 10 TABLET, FILM COATED ORAL at 20:53

## 2020-02-02 RX ADMIN — Medication 1000 MG: at 20:55

## 2020-02-02 ASSESSMENT — PAIN DESCRIPTION - LOCATION
LOCATION: HEAD
LOCATION: HEAD

## 2020-02-02 ASSESSMENT — PAIN DESCRIPTION - PAIN TYPE
TYPE: ACUTE PAIN
TYPE: ACUTE PAIN

## 2020-02-02 ASSESSMENT — PAIN SCALES - GENERAL
PAINLEVEL_OUTOF10: 0
PAINLEVEL_OUTOF10: 3
PAINLEVEL_OUTOF10: 3

## 2020-02-02 ASSESSMENT — PAIN DESCRIPTION - DESCRIPTORS: DESCRIPTORS: ACHING

## 2020-02-02 NOTE — PROGRESS NOTES
Physical Therapy    Facility/Department: [facility] B3 - MED SURG  Initial Assessment and Discharge    NAME: Sonia Birmingham  : 1965  MRN: [id number]    Date of Service: 2020    Discharge Recommendations:  Home with assist PRN, Outpatient PT   PT Equipment Recommendations  Equipment Needed: No    Assessment   Body structures, Functions, Activity limitations: Decreased ROM; Increased pain  Assessment: Patient is fully independent with all mobility, but presents with decreased cervical AROM due to pain in L SCM, especially with R rotation. No acute PT goals identified at this time. Discharge PT at this time, recommend home with PRN assist and outpatient PT for management of neck pain and limited ROM. Treatment Diagnosis: decreased ROM  Prognosis: Good  Decision Making: Low Complexity  PT Education: PT Role;Plan of Care  Patient Education: Patient verbalizes understanding  Barriers to Learning: None  No Skilled PT: Independent with functional mobility   REQUIRES PT FOLLOW UP: No  Activity Tolerance  Activity Tolerance: Patient Tolerated treatment well       Patient Diagnosis(es): There were no encounter diagnoses. has a past medical history of CAD (coronary artery disease), Cerebrovascular accident (CVA) ([facility] [address] Diabetes mellitus ([facility] [address] Hyperlipidemia, Hypertension, MI (myocardial infarction) ([facility] [address] and No history of procedure. has a past surgical history that includes Coronary artery bypass graft (2016); Eye surgery (Right); Colonoscopy (2017); hernia repair (Bilateral, ); and Cholecystectomy, laparoscopic (N/A, 2019).     Restrictions  Restrictions/Precautions  Restrictions/Precautions: General Precautions, Fall Risk  Position Activity Restriction  Other position/activity restrictions: Medium fall risk, telemetry  Vision/Hearing  Vision: Impaired  Vision Exceptions: Wears glasses at all times  Hearing: Within functional limits     Subjective  General  Patient assessed for rehabilitation

## 2020-02-02 NOTE — PROGRESS NOTES
Occupational Therapy   Occupational Therapy Initial Assessment  Date: 2020   Patient Name: Jayme Brittle  MRN: [id number]     : 1965    Date of Service: 2020    Discharge Recommendations:  Home with assist PRN       Assessment      OT Education: OT Role  REQUIRES OT FOLLOW UP: No  Activity Tolerance  Activity Tolerance: Patient Tolerated treatment well  Safety Devices  Safety Devices in place: Yes  Type of devices: Call light within reach; Left in chair           Patient Diagnosis(es): There were no encounter diagnoses. has a past medical history of CAD (coronary artery disease), Cerebrovascular accident (CVA) ([facility] [address] Diabetes mellitus ([facility] [address] Hyperlipidemia, Hypertension, [facility] and No history of procedure. has a past surgical history that includes Coronary artery bypass graft (2016); Eye surgery (Right); Colonoscopy (2017); hernia repair (Bilateral, ); and Cholecystectomy, laparoscopic (N/A, 2019).            Restrictions  Restrictions/Precautions  Restrictions/Precautions: General Precautions, Fall Risk  Position Activity Restriction  Other position/activity restrictions: Medium fall risk, telemetry    Subjective   General  Chart Reviewed: Yes  Patient assessed for rehabilitation services?: Yes  Family / Caregiver Present: No  Referring Practitioner: Dr. Jeffers Prior  Diagnosis: Left side numbness & tingling, Left neck pain  General Comment  Comments: RN cleared pt for OT eval; pt resting in bed, agreeable to therapy    Social/Functional History  Social/Functional History  Lives With: Significant other(available )  Type of Home: House  Home Layout: One level  Home Access: Level entry  Bathroom Shower/Tub: Tub/Shower unit  Bathroom Toilet: Standard  ADL Assistance: Independent  Homemaking Responsibilities: Yes  Meal Prep Responsibility: Secondary  Laundry Responsibility: Secondary  Cleaning Responsibility: Secondary  Bill Paying/Finance Responsibility: Secondary  Shopping Responsibility: Secondary  Ambulation Assistance: Independent(without AD)  Transfer Assistance: Independent  Active : Yes  Mode of Transportation: SUV  Occupation: Part time employment  Type of occupation: [employer]mit[employer] Ochoa at [address]: [employer]       Objective   Vision: Impaired  Vision Exceptions: Wears glasses at all times  Hearing: Within functional limits    Orientation  Overall Orientation Status: Within Functional Limits     Balance  Sitting Balance: Independent  Standing Balance: Independent(without AD)  Standing Balance  Time: 2-3 minutes x 1 functional mobility in hallway  Functional Mobility  Functional - Mobility Device: No device  Activity: (walking in hallway; RN reports pt independent with bathroom mobility )  Assist Level: Independent  Toilet Transfers  Toilet - Technique: Ambulating(Independently without AD)  Equipment Used: Standard toilet  Toilet Transfer: Independent  ADL  LE Dressing: Independent  Toileting: Independent    RUE Tone: Normotonic  LUE Tone: Normotonic  Coordination  Movements Are Fluid And Coordinated: Yes     Bed mobility  Supine to Sit: Independent  Transfers  Sit to stand: Independent  Stand to sit:  Independent     Vision - Basic Assessment  Prior Vision: Wears glasses all the time     Cognition  Overall Cognitive Status: WFL     Perception  Overall Perceptual Status: WFL     Sensation  Overall Sensation Status: WFL         Plan    OT eval only    AM-PAC Score  Self care score = 24; G-code= 509 [address]  Goals  Patient Goals   Patient goals : home when able       Therapy Time   Individual Concurrent Group Co-treatment   Time In 1025         Time Out 1040         Minutes [address]

## 2020-02-02 NOTE — PROGRESS NOTES
or major vessel stenosis. Suspected cervical muscle spasm vs totricollis of L SCM. PMH: CABG 2016, CVA, DM, HTN, HLD. Pain Screening  Patient Currently in Pain: Yes  Pain Assessment  Pain Assessment: 0-10  Pain Level: 3  Pain Type: Acute pain  Pain Location: Head(headache)  Pain Descriptors: Aching  Non-Pharmaceutical Pain Intervention(s): Ambulation/Increased Activity;Repositioned  Vital Signs  Patient Currently in Pain: Yes  Pre Treatment Pain Screening  Intervention List: Patient able to continue with treatment    Orientation  Orientation  Overall Orientation Status: Within Functional Limits  Cognition      Objective   Bed mobility  Supine to Sit: Independent  Transfers  Sit to Stand: Independent  Stand to sit: Independent  Ambulation  Ambulation?: Yes  Ambulation 1  Surface: level tile  Device: No Device  Assistance: Independent  Gait Deviations: None;Decreased head and trunk rotation  Distance: 100'     Balance  Posture: Good  Sitting - Static: Good  Sitting - Dynamic: Good  Standing - Static: Good  Standing - Dynamic: Good      AROM RLE (degrees)  RLE AROM: WFL  AROM LLE (degrees)  LLE AROM : WFL  Spine  Cervical: Overall ~50% AROM, limited by pain in L SCM in all directions, especially R rotation  Strength RLE  Strength RLE: WFL  Comment: all 5/5  Strength LLE  Strength LLE: WFL  Comment: all 5/5 except L knee extension 4/5                 AM-PAC Score  AM-PAC Inpatient Mobility Raw Score : 24 (02/02/20 1328)  AM-PAC Inpatient T-Scale Score : 61.14 (02/02/20 1328)  Mobility Inpatient CMS 0-100% Score: 0 (02/02/20 1328)  Mobility Inpatient CMS G-Code Modifier : CH (02/02/20 1328)          Goals  Short term goals  Time Frame for Short term goals: PT DC on eval - no acute goals identified. Patient Goals   Patient goals : \"To decrease pain\"    Plan    Plan  Times per week: Discharge PT on eval  Safety Devices  Type of devices:  All fall risk precautions in place, Call light within reach, Gait belt, Left in

## 2020-02-02 NOTE — PLAN OF CARE
Left-sided next pain slightly better with meds relaxants and NSAIDs as needed. Worked with physical therapy this morning. No more tingling of upper extremity as well as lower extremity on the left side. Would not think he needs an MRI. Get neurology opinion.   Plan to discharge him later home if  Neurology agrees

## 2020-02-02 NOTE — PROGRESS NOTES
Assessment completed, see flowsheet, pt resting, breaths unlabored, denies chest pain, nausea or vomitting. Call light within reach, needs and safety concerns addressed at this time.

## 2020-02-02 NOTE — PROGRESS NOTES
rhythm with normal S1/S2 without murmurs, rubs or gallops. Abdomen: Soft, non-tender, non-distended with normal bowel sounds. Musculoskeletal: No clubbing, cyanosis or edema bilaterally. Full range of motion without deformity. Skin: Skin color, texture, turgor normal.  No rashes or lesions. Neurologic:  Neurovascularly intact without any focal sensory/motor deficits. Cranial nerves: II-XII intact, grossly non-focal.  Psychiatric: Alert and oriented, thought content appropriate, normal insight  Capillary Refill: Brisk,< 3 seconds   Peripheral Pulses: +2 palpable, equal bilaterally       Labs:   Recent Labs     02/01/20  0850   WBC 12.5*   HGB 16.1   HCT 49.0        Recent Labs     02/01/20  0850      K 4.5      CO2 25   BUN 15   CREATININE 0.9   CALCIUM 9.4     Recent Labs     02/01/20  0850   AST 20   ALT 23   BILITOT 0.6   ALKPHOS 101     Recent Labs     02/01/20  0850   INR 1.01     Recent Labs     02/01/20  0850 02/01/20  1853 02/01/20  2359   TROPONINI <0.01 <0.01 <0.01       Urinalysis:      Lab Results   Component Value Date    NITRU Negative 12/26/2019    WBCUA 0-2 03/27/2019    BACTERIA 1+ 03/27/2019    RBCUA 3-5 03/27/2019    BLOODU Negative 12/26/2019    SPECGRAV 1.015 12/26/2019    GLUCOSEU Negative 12/26/2019       Radiology:  No orders to display           Assessment/Plan:    Active Hospital Problems    Diagnosis    Cerebrovascular accident (CVA) ([facility] [I63.9]       Cervical muscle spasm/torticollis of left sternocleidomastoid: possibly Related to poor posture while sleeping . Robinson Mccormick CTA head and neck and CT head ruled out stroke or major vessel stenosis. started on Flexeril 3 times daily and NSAIDs as needed. PT OT consulted- out pt therapy advised. Slowly improving. Await neuro input regarding need for MRI brain(could not be done either way today).      Left-sided chest pain: Acute on chronic. Recent couple of admissions for the same were reviewed. EKG and troponins nonacute.

## 2020-02-02 NOTE — PROGRESS NOTES
Dr Nettles Code added to the treatment team for Neurology on 2/2/2020 @ [phone]. John Aware.  Jarred Barakat

## 2020-02-03 VITALS
DIASTOLIC BLOOD PRESSURE: 81 MMHG | HEART RATE: 69 BPM | RESPIRATION RATE: 16 BRPM | SYSTOLIC BLOOD PRESSURE: 123 MMHG | OXYGEN SATURATION: 96 % | TEMPERATURE: 98.1 F

## 2020-02-03 LAB
GLUCOSE BLD-MCNC: 106 MG/DL (ref 70–99)
GLUCOSE BLD-MCNC: 139 MG/DL (ref 70–99)
GLUCOSE BLD-MCNC: 99 MG/DL (ref 70–99)
PERFORMED ON: ABNORMAL
PERFORMED ON: ABNORMAL
PERFORMED ON: NORMAL

## 2020-02-03 PROCEDURE — 6370000000 HC RX 637 (ALT 250 FOR IP): Performed by: INTERNAL MEDICINE

## 2020-02-03 PROCEDURE — G0378 HOSPITAL OBSERVATION PER HR: HCPCS

## 2020-02-03 RX ORDER — CYCLOBENZAPRINE HCL 10 MG
10 TABLET ORAL 3 TIMES DAILY PRN
Qty: 30 TABLET | Refills: 0 | Status: SHIPPED | OUTPATIENT
Start: 2020-02-03 | End: 2020-02-13

## 2020-02-03 RX ORDER — PANTOPRAZOLE SODIUM 40 MG/1
40 TABLET, DELAYED RELEASE ORAL
Qty: 30 TABLET | Refills: 3 | Status: SHIPPED | OUTPATIENT
Start: 2020-02-04 | End: 2020-02-05 | Stop reason: ALTCHOICE

## 2020-02-03 RX ADMIN — Medication 1000 MG: at 09:21

## 2020-02-03 RX ADMIN — PANTOPRAZOLE SODIUM 40 MG: 40 TABLET, DELAYED RELEASE ORAL at 06:25

## 2020-02-03 RX ADMIN — ASPIRIN 81 MG 81 MG: 81 TABLET ORAL at 09:21

## 2020-02-03 NOTE — DISCHARGE SUMMARY
outpt     Coronary artery disease status post CABG: Continue aspirin, statin, Norvasc, Lopressor.         Diabetes mellitus type 2: Controlled. Hold oral medications. Sliding scale insulin.     Hypertension     Hyperlipidemia       Physical Exam Performed:     /81   Pulse 69   Temp 98.1 °F (36.7 °C) (Oral)   Resp 16   SpO2 96%       General appearance:  No apparent distress, appears stated age and cooperative. HEENT:  Normal cephalic, atraumatic without obvious deformity. Pupils equal, round, and reactive to light. Extra ocular muscles intact. Conjunctivae/corneas clear. Neck: Supple, with full range of motion. No jugular venous distention. Trachea midline. Respiratory:  Normal respiratory effort. Clear to auscultation, bilaterally without Rales/Wheezes/Rhonchi. Cardiovascular:  Regular rate and rhythm with normal S1/S2 without murmurs, rubs or gallops. Abdomen: Soft, non-tender, non-distended with normal bowel sounds. Musculoskeletal:  No clubbing, cyanosis or edema bilaterally. Full range of motion without deformity. Skin: Skin color, texture, turgor normal.  No rashes or lesions. Neurologic:  Neurovascularly intact without any focal sensory/motor deficits. Cranial nerves: II-XII intact, grossly non-focal.  Psychiatric:  Alert and oriented, thought content appropriate, normal insight  Capillary Refill: Brisk,< 3 seconds   Peripheral Pulses: +2 palpable, equal bilaterally       Labs:  For convenience and continuity at follow-up the following most recent labs are provided:      CBC:    Lab Results   Component Value Date    WBC 12.5 02/01/2020    HGB 16.1 02/01/2020    HCT 49.0 02/01/2020     02/01/2020       Renal:    Lab Results   Component Value Date     02/01/2020    K 4.5 02/01/2020     02/01/2020    CO2 25 02/01/2020    BUN 15 02/01/2020    CREATININE 0.9 02/01/2020    CALCIUM 9.4 02/01/2020         Significant Diagnostic Studies    Radiology:   No orders to display Consults:     IP CONSULT TO NEUROLOGY    Disposition:  Home     Condition at Discharge: Stable    Discharge Instructions/Follow-up:    Future Appointments   Date Time Provider Cy Acuna   3/25/2020  9:45 AM MD Pilar Torres   3/31/2020  8:00 AM DONALDO Hodge CNP [facility]         Code Status:  Full Code     Activity: activity as tolerated    Diet: cardiac diet      Discharge Medications:     Current Discharge Medication List           Details   cyclobenzaprine (FLEXERIL) 10 MG tablet Take 1 tablet by mouth 3 times daily as needed for Muscle spasms  Qty: 30 tablet, Refills: 0      pantoprazole (PROTONIX) 40 MG tablet Take 1 tablet by mouth every morning (before breakfast)  Qty: 30 tablet, Refills: 3              Details   l-arginine 1000 MG TABS Take 1,000 mg by mouth 2 times daily  Qty: 60 tablet, Refills: 5      metoprolol tartrate (LOPRESSOR) 50 MG tablet Take 1 tablet by mouth 2 times daily  Qty: 60 tablet, Refills: 3      amLODIPine (NORVASC) 5 MG tablet Take 1 tablet by mouth daily  Qty: 30 tablet, Refills: 3      FREESTYLE LITE strip USE TO TEST ONCE A DAY AS NEEDED  Qty: 50 strip, Refills: 7    Associated Diagnoses: Type 2 diabetes mellitus with other circulatory complication, without long-term current use of insulin (HCC)      ondansetron (ZOFRAN ODT) 4 MG disintegrating tablet Take 1 tablet by mouth every 8 hours as needed for Nausea  Qty: 20 tablet, Refills: 0      butalbital-APAP-caffeine (FIORICET) -40 MG CAPS per capsule Take 1 capsule by mouth every 4 hours as needed for Headaches  Qty: 8 capsule, Refills: 0    Associated Diagnoses: Acute non-recurrent maxillary sinusitis; Acute nonintractable headache, unspecified headache type      metFORMIN (GLUCOPHAGE) 500 MG tablet TAKE 1 TABLET BY MOUTH EVERY DAY WITH BREAKFAST  Qty: 30 tablet, Refills: 5    Associated Diagnoses: Type 2 diabetes mellitus with other circulatory complication, without long-term

## 2020-02-04 ENCOUNTER — TELEPHONE (OUTPATIENT)
Dept: FAMILY MEDICINE CLINIC | Age: 55
End: 2020-02-04

## 2020-02-04 LAB — TSH REFLEX: 2.86 UIU/ML (ref 0.27–4.2)

## 2020-02-04 NOTE — TELEPHONE ENCOUNTER
[facility] Trans[facility] Initial Follow Up Call    Outreach made within 2 business days of discharge: Yes    Patient: Charline Hartmann Patient : 1965   MRN: [id number]  Reason for Admission: There are no discharge diagnoses documented for the most recent discharge. Discharge Date: 2/3/20       Spoke with: Patient    Discharge department/facility: [facility]    [facility] Interactive Patient Contact:  Was patient able to fill all prescriptions: Yes  Was patient instructed to bring all medications to the follow-up visit: Yes  Is patient taking all medications as directed in the discharge summary?  Yes  Does patient understand their discharge instructions: Yes  Does patient have questions or concerns that need addressed prior to 7-14 day follow up office visit: yes - 2020    Scheduled appointment with PCP within 7-14 days    Follow Up  Future Appointments   Date Time Provider Cy Acuna   3/25/2020  9:45 AM MD Grace Murillo   3/31/2020  8:00 AM DONALDO Villalobos - CNP [facility] Russo MA

## 2020-02-05 ENCOUNTER — OFFICE VISIT (OUTPATIENT)
Dept: FAMILY MEDICINE CLINIC | Age: 55
End: 2020-02-05
Payer: MEDICARE

## 2020-02-05 VITALS
SYSTOLIC BLOOD PRESSURE: 132 MMHG | DIASTOLIC BLOOD PRESSURE: 79 MMHG | HEART RATE: 83 BPM | BODY MASS INDEX: 26.5 KG/M2 | WEIGHT: 195.4 LBS | OXYGEN SATURATION: 95 %

## 2020-02-05 PROCEDURE — G8482 FLU IMMUNIZE ORDER/ADMIN: HCPCS | Performed by: NURSE PRACTITIONER

## 2020-02-05 PROCEDURE — 3017F COLORECTAL CA SCREEN DOC REV: CPT | Performed by: NURSE PRACTITIONER

## 2020-02-05 PROCEDURE — 1036F TOBACCO NON-USER: CPT | Performed by: NURSE PRACTITIONER

## 2020-02-05 PROCEDURE — G8419 CALC BMI OUT NRM PARAM NOF/U: HCPCS | Performed by: NURSE PRACTITIONER

## 2020-02-05 PROCEDURE — G8427 DOCREV CUR MEDS BY ELIG CLIN: HCPCS | Performed by: NURSE PRACTITIONER

## 2020-02-05 PROCEDURE — 99213 OFFICE O/P EST LOW 20 MIN: CPT | Performed by: NURSE PRACTITIONER

## 2020-02-05 ASSESSMENT — ENCOUNTER SYMPTOMS
GASTROINTESTINAL NEGATIVE: 1
SHORTNESS OF BREATH: 0
EYES NEGATIVE: 1
RESPIRATORY NEGATIVE: 1

## 2020-02-05 NOTE — PROGRESS NOTES
membranes are moist.      Pharynx: Oropharynx is clear. Eyes:      Conjunctiva/sclera: Conjunctivae normal.   Neck:      Musculoskeletal: Full passive range of motion without pain and neck supple. Cardiovascular:      Rate and Rhythm: Normal rate and regular rhythm. Heart sounds: Normal heart sounds, S1 normal and S2 normal.   Pulmonary:      Effort: Pulmonary effort is normal. No accessory muscle usage or respiratory distress. Breath sounds: Normal breath sounds. No decreased breath sounds, wheezing, rhonchi or rales. Abdominal:      General: Bowel sounds are normal.      Palpations: Abdomen is soft. Lymphadenopathy:      Cervical: No cervical adenopathy. Skin:     General: Skin is warm and dry. Findings: No rash. Neurological:      Mental Status: He is alert and oriented to person, place, and time. Psychiatric:         Attention and Perception: Attention normal.         Mood and Affect: Mood normal.         Speech: Speech normal.         Behavior: Behavior normal. Behavior is cooperative. Thought Content: Thought content normal.         Assessment/Plan:   1. Coronary artery disease involving native coronary artery of native heart without angina pectoris  Patient reports no other further neck pain or paresthesias since hospitalized. Reviewed CT of the head with and without contrast, labs and admission/discharge notes. Patient continues to follow with cardiology for medical management of coronary artery disease. Discussed importance of controlling risk factors such as diabetes, hypertension and hyperlipidemia. A1c on 1/2620 was 6.4% which indicates good glucose control, reviewed lipids from 1/2620 which indicate fairly good control with current treatment and blood pressure has been well controlled with current treatment. Recommend patient continue with current treatment, F/u in office in 3 month and to keep follow-up appointment with cardiology.   Patient verbalized

## 2020-02-05 NOTE — PATIENT INSTRUCTIONS
Please read the healthy family handout that you were given and share it with your family. Please compare this printed medication list with your medications at home to be sure they are the same. If you have any medications that are different please contact us immediately at [phone]. Also review your allergies that we have listed, these may also include medications that you have not been able to tolerate, make sure everything listed is correct. If you have any allergies that are different please contact us immediately at [phone]. Patient Education        Learning About Coronary Artery Disease (CAD)  What is coronary artery disease? Coronary artery disease (CAD) occurs when plaque builds up in the arteries that bring oxygen-rich blood to your heart. Plaque is a fatty substance made of cholesterol, calcium, and other substances in the blood. This process is called hardening of the arteries, or atherosclerosis. What happens when you have coronary artery disease? · Plaque may narrow the coronary arteries. Narrowed arteries cause poor blood flow. This can lead to angina symptoms such as chest pain or discomfort. If blood flow is completely blocked, you could have a heart attack. · You can slow CAD and reduce the risk of future problems by making changes in your lifestyle. These include quitting smoking and eating heart-healthy foods. · Treatments for CAD, along with changes in your lifestyle, can help you live a longer and healthier life. How can you prevent coronary artery disease? · Do not smoke. It may be the best thing you can do to prevent heart disease. If you need help quitting, talk to your doctor about stop-smoking programs and medicines. These can increase your chances of quitting for good. · Be active. Get at least 30 minutes of exercise on most days of the week. Walking is a good choice.  You also may want to do other activities, such as running, swimming, cycling, or playing tennis or team sports. · Eat heart-healthy foods. Eat more fruits and vegetables and less foods that contain saturated and trans fats. Limit alcohol, sodium, and sweets. · Stay at a healthy weight. Lose weight if you need to. · Manage other health problems such as diabetes, high blood pressure, and high cholesterol. How is coronary artery disease treated? · Your doctor will suggest that you make lifestyle changes. For example, your doctor may ask you to eat healthy foods, quit smoking, lose extra weight, and be more active. · You will have to take medicines. · Your doctor may suggest a procedure to open narrowed or blocked arteries. This is called angioplasty. Or your doctor may suggest using healthy blood vessels to create detours around narrowed or blocked arteries. This is called bypass surgery. Follow-up care is a key part of your treatment and safety. Be sure to make and go to all appointments, and call your doctor if you are having problems. It's also a good idea to know your test results and keep a list of the medicines you take. Where can you learn more? Go to https://[employer]byUs.com. org and sign in to your [employer] account. Enter [phone] in the [facility] box to learn more about \"Learning About Coronary Artery Disease (CAD). \"     If you do not have an account, please click on the \"Sign Up Now\" link. Current as of: April 9, 2019  Content Version: 12.3  © 4311-9716 Healthwise, Incorporated. Care instructions adapted under license by [facility] ([facility]). If you have questions about a medical condition or this instruction, always ask your healthcare professional. [place] [address] any warranty or liability for your use of this information.

## 2020-02-21 RX ORDER — AMLODIPINE BESYLATE 2.5 MG/1
TABLET ORAL
Qty: 30 TABLET | Refills: 5 | OUTPATIENT
Start: 2020-02-21

## 2020-03-07 ENCOUNTER — HOSPITAL ENCOUNTER (INPATIENT)
Age: 55
LOS: 2 days | Discharge: HOME | DRG: 247 | End: 2020-03-10
Attending: EMERGENCY MEDICINE | Admitting: INTERNAL MEDICINE
Payer: MEDICARE

## 2020-03-07 ENCOUNTER — APPOINTMENT (OUTPATIENT)
Dept: GENERAL RADIOLOGY | Age: 55
DRG: 247 | End: 2020-03-07
Payer: MEDICARE

## 2020-03-07 PROBLEM — R07.9 CHEST PAIN IN ADULT: Status: ACTIVE | Noted: 2020-03-07

## 2020-03-07 LAB
A/G RATIO: 1.2 (ref 1.1–2.2)
ALBUMIN SERPL-MCNC: 3.9 G/DL (ref 3.4–5)
ALP BLD-CCNC: 95 U/L (ref 40–129)
ALT SERPL-CCNC: 18 U/L (ref 10–40)
ANION GAP SERPL CALCULATED.3IONS-SCNC: 11 MMOL/L (ref 3–16)
AST SERPL-CCNC: 17 U/L (ref 15–37)
BASOPHILS ABSOLUTE: 0.1 K/UL (ref 0–0.2)
BASOPHILS RELATIVE PERCENT: 1 %
BILIRUB SERPL-MCNC: 0.4 MG/DL (ref 0–1)
BUN BLDV-MCNC: 11 MG/DL (ref 7–20)
CALCIUM SERPL-MCNC: 9.3 MG/DL (ref 8.3–10.6)
CHLORIDE BLD-SCNC: 101 MMOL/L (ref 99–110)
CO2: 25 MMOL/L (ref 21–32)
CREAT SERPL-MCNC: 0.7 MG/DL (ref 0.9–1.3)
EKG ATRIAL RATE: 55 BPM
EKG DIAGNOSIS: NORMAL
EKG P AXIS: 14 DEGREES
EKG P-R INTERVAL: 168 MS
EKG Q-T INTERVAL: 414 MS
EKG QRS DURATION: 142 MS
EKG QTC CALCULATION (BAZETT): 396 MS
EKG R AXIS: -17 DEGREES
EKG T AXIS: 0 DEGREES
EKG VENTRICULAR RATE: 55 BPM
EOSINOPHILS ABSOLUTE: 0.4 K/UL (ref 0–0.6)
EOSINOPHILS RELATIVE PERCENT: 3.5 %
GFR AFRICAN AMERICAN: >60
GFR NON-AFRICAN AMERICAN: >60
GLOBULIN: 3.3 G/DL
GLUCOSE BLD-MCNC: 104 MG/DL (ref 70–99)
GLUCOSE BLD-MCNC: 107 MG/DL (ref 70–99)
GLUCOSE BLD-MCNC: 112 MG/DL (ref 70–99)
GLUCOSE BLD-MCNC: 133 MG/DL (ref 70–99)
GLUCOSE BLD-MCNC: 139 MG/DL (ref 70–99)
HCT VFR BLD CALC: 44.9 % (ref 40.5–52.5)
HEMOGLOBIN: 15.2 G/DL (ref 13.5–17.5)
LYMPHOCYTES ABSOLUTE: 2.6 K/UL (ref 1–5.1)
LYMPHOCYTES RELATIVE PERCENT: 22.5 %
MCH RBC QN AUTO: 29.2 PG (ref 26–34)
MCHC RBC AUTO-ENTMCNC: 33.8 G/DL (ref 31–36)
MCV RBC AUTO: 86.2 FL (ref 80–100)
MONOCYTES ABSOLUTE: 0.9 K/UL (ref 0–1.3)
MONOCYTES RELATIVE PERCENT: 7.8 %
NEUTROPHILS ABSOLUTE: 7.5 K/UL (ref 1.7–7.7)
NEUTROPHILS RELATIVE PERCENT: 65.2 %
PDW BLD-RTO: 15.1 % (ref 12.4–15.4)
PERFORMED ON: ABNORMAL
PLATELET # BLD: 278 K/UL (ref 135–450)
PMV BLD AUTO: 7.8 FL (ref 5–10.5)
POTASSIUM REFLEX MAGNESIUM: 4.2 MMOL/L (ref 3.5–5.1)
RBC # BLD: 5.21 M/UL (ref 4.2–5.9)
SODIUM BLD-SCNC: 137 MMOL/L (ref 136–145)
TOTAL PROTEIN: 7.2 G/DL (ref 6.4–8.2)
TROPONIN: <0.01 NG/ML
WBC # BLD: 11.5 K/UL (ref 4–11)

## 2020-03-07 PROCEDURE — 99285 EMERGENCY DEPT VISIT HI MDM: CPT

## 2020-03-07 PROCEDURE — 6370000000 HC RX 637 (ALT 250 FOR IP): Performed by: INTERNAL MEDICINE

## 2020-03-07 PROCEDURE — 6360000002 HC RX W HCPCS: Performed by: EMERGENCY MEDICINE

## 2020-03-07 PROCEDURE — 99255 IP/OBS CONSLTJ NEW/EST HI 80: CPT | Performed by: INTERNAL MEDICINE

## 2020-03-07 PROCEDURE — 2580000003 HC RX 258: Performed by: INTERNAL MEDICINE

## 2020-03-07 PROCEDURE — 93005 ELECTROCARDIOGRAM TRACING: CPT | Performed by: EMERGENCY MEDICINE

## 2020-03-07 PROCEDURE — 36415 COLL VENOUS BLD VENIPUNCTURE: CPT

## 2020-03-07 PROCEDURE — 71045 X-RAY EXAM CHEST 1 VIEW: CPT

## 2020-03-07 PROCEDURE — 80053 COMPREHEN METABOLIC PANEL: CPT

## 2020-03-07 PROCEDURE — G0378 HOSPITAL OBSERVATION PER HR: HCPCS

## 2020-03-07 PROCEDURE — 85025 COMPLETE CBC W/AUTO DIFF WBC: CPT

## 2020-03-07 PROCEDURE — 96372 THER/PROPH/DIAG INJ SC/IM: CPT

## 2020-03-07 PROCEDURE — 96374 THER/PROPH/DIAG INJ IV PUSH: CPT

## 2020-03-07 PROCEDURE — 6360000002 HC RX W HCPCS: Performed by: INTERNAL MEDICINE

## 2020-03-07 PROCEDURE — 93010 ELECTROCARDIOGRAM REPORT: CPT | Performed by: INTERNAL MEDICINE

## 2020-03-07 PROCEDURE — 84484 ASSAY OF TROPONIN QUANT: CPT

## 2020-03-07 RX ORDER — BUTALBITAL, ACETAMINOPHEN AND CAFFEINE 300; 40; 50 MG/1; MG/1; MG/1
1 CAPSULE ORAL EVERY 4 HOURS PRN
Status: DISCONTINUED | OUTPATIENT
Start: 2020-03-07 | End: 2020-03-07

## 2020-03-07 RX ORDER — OXYCODONE HYDROCHLORIDE AND ACETAMINOPHEN 5; 325 MG/1; MG/1
1 TABLET ORAL EVERY 4 HOURS PRN
Status: DISCONTINUED | OUTPATIENT
Start: 2020-03-07 | End: 2020-03-10 | Stop reason: HOSPADM

## 2020-03-07 RX ORDER — SODIUM CHLORIDE 0.9 % (FLUSH) 0.9 %
10 SYRINGE (ML) INJECTION PRN
Status: DISCONTINUED | OUTPATIENT
Start: 2020-03-07 | End: 2020-03-10 | Stop reason: HOSPADM

## 2020-03-07 RX ORDER — MAGNESIUM SULFATE 1 G/100ML
1 INJECTION INTRAVENOUS PRN
Status: DISCONTINUED | OUTPATIENT
Start: 2020-03-07 | End: 2020-03-10 | Stop reason: HOSPADM

## 2020-03-07 RX ORDER — ARGININE HCL 1000 MG
1000 TABLET ORAL 2 TIMES DAILY
Status: DISCONTINUED | OUTPATIENT
Start: 2020-03-07 | End: 2020-03-10 | Stop reason: HOSPADM

## 2020-03-07 RX ORDER — ACETAMINOPHEN 650 MG/1
650 SUPPOSITORY RECTAL EVERY 6 HOURS PRN
Status: DISCONTINUED | OUTPATIENT
Start: 2020-03-07 | End: 2020-03-10 | Stop reason: HOSPADM

## 2020-03-07 RX ORDER — METOPROLOL TARTRATE 50 MG/1
50 TABLET, FILM COATED ORAL 2 TIMES DAILY
Status: DISCONTINUED | OUTPATIENT
Start: 2020-03-07 | End: 2020-03-10 | Stop reason: HOSPADM

## 2020-03-07 RX ORDER — ASPIRIN 81 MG/1
81 TABLET ORAL DAILY
Status: DISCONTINUED | OUTPATIENT
Start: 2020-03-07 | End: 2020-03-10 | Stop reason: HOSPADM

## 2020-03-07 RX ORDER — MORPHINE SULFATE 2 MG/ML
2 INJECTION, SOLUTION INTRAMUSCULAR; INTRAVENOUS ONCE
Status: COMPLETED | OUTPATIENT
Start: 2020-03-07 | End: 2020-03-07

## 2020-03-07 RX ORDER — AMLODIPINE BESYLATE 5 MG/1
5 TABLET ORAL DAILY
Status: DISCONTINUED | OUTPATIENT
Start: 2020-03-07 | End: 2020-03-10 | Stop reason: HOSPADM

## 2020-03-07 RX ORDER — POTASSIUM CHLORIDE 20 MEQ/1
40 TABLET, EXTENDED RELEASE ORAL PRN
Status: DISCONTINUED | OUTPATIENT
Start: 2020-03-07 | End: 2020-03-10 | Stop reason: HOSPADM

## 2020-03-07 RX ORDER — POTASSIUM CHLORIDE 7.45 MG/ML
10 INJECTION INTRAVENOUS PRN
Status: DISCONTINUED | OUTPATIENT
Start: 2020-03-07 | End: 2020-03-10 | Stop reason: HOSPADM

## 2020-03-07 RX ORDER — PROMETHAZINE HYDROCHLORIDE 25 MG/1
12.5 TABLET ORAL EVERY 6 HOURS PRN
Status: DISCONTINUED | OUTPATIENT
Start: 2020-03-07 | End: 2020-03-10 | Stop reason: HOSPADM

## 2020-03-07 RX ORDER — ACETAMINOPHEN 325 MG/1
650 TABLET ORAL EVERY 6 HOURS PRN
Status: DISCONTINUED | OUTPATIENT
Start: 2020-03-07 | End: 2020-03-10 | Stop reason: HOSPADM

## 2020-03-07 RX ORDER — ATORVASTATIN CALCIUM 10 MG/1
20 TABLET, FILM COATED ORAL NIGHTLY
Status: DISCONTINUED | OUTPATIENT
Start: 2020-03-07 | End: 2020-03-10 | Stop reason: HOSPADM

## 2020-03-07 RX ORDER — BUTALBITAL, ACETAMINOPHEN AND CAFFEINE 50; 325; 40 MG/1; MG/1; MG/1
1 TABLET ORAL EVERY 4 HOURS PRN
Status: DISCONTINUED | OUTPATIENT
Start: 2020-03-07 | End: 2020-03-10 | Stop reason: HOSPADM

## 2020-03-07 RX ORDER — ONDANSETRON 2 MG/ML
4 INJECTION INTRAMUSCULAR; INTRAVENOUS EVERY 6 HOURS PRN
Status: DISCONTINUED | OUTPATIENT
Start: 2020-03-07 | End: 2020-03-10 | Stop reason: HOSPADM

## 2020-03-07 RX ADMIN — Medication 1000 MG: at 15:22

## 2020-03-07 RX ADMIN — TICAGRELOR 180 MG: 90 TABLET ORAL at 15:22

## 2020-03-07 RX ADMIN — OXYCODONE HYDROCHLORIDE AND ACETAMINOPHEN 1 TABLET: 5; 325 TABLET ORAL at 21:20

## 2020-03-07 RX ADMIN — Medication 1000 MG: at 21:20

## 2020-03-07 RX ADMIN — Medication 10 ML: at 21:21

## 2020-03-07 RX ADMIN — ENOXAPARIN SODIUM 40 MG: 40 INJECTION SUBCUTANEOUS at 09:00

## 2020-03-07 RX ADMIN — OXYCODONE HYDROCHLORIDE AND ACETAMINOPHEN 1 TABLET: 5; 325 TABLET ORAL at 12:37

## 2020-03-07 RX ADMIN — TICAGRELOR 90 MG: 90 TABLET ORAL at 21:21

## 2020-03-07 RX ADMIN — METOPROLOL TARTRATE 50 MG: 50 TABLET, FILM COATED ORAL at 21:21

## 2020-03-07 RX ADMIN — ASPIRIN 81 MG: 81 TABLET, COATED ORAL at 09:00

## 2020-03-07 RX ADMIN — ATORVASTATIN CALCIUM 20 MG: 10 TABLET, FILM COATED ORAL at 21:21

## 2020-03-07 RX ADMIN — MORPHINE SULFATE 2 MG: 2 INJECTION, SOLUTION INTRAMUSCULAR; INTRAVENOUS at 05:33

## 2020-03-07 ASSESSMENT — PAIN SCALES - GENERAL
PAINLEVEL_OUTOF10: 0
PAINLEVEL_OUTOF10: 7
PAINLEVEL_OUTOF10: 3
PAINLEVEL_OUTOF10: 4
PAINLEVEL_OUTOF10: 3
PAINLEVEL_OUTOF10: 4

## 2020-03-07 ASSESSMENT — PAIN DESCRIPTION - ORIENTATION
ORIENTATION: MID

## 2020-03-07 ASSESSMENT — PAIN DESCRIPTION - LOCATION
LOCATION: CHEST

## 2020-03-07 ASSESSMENT — PAIN DESCRIPTION - DESCRIPTORS
DESCRIPTORS: TIGHTNESS
DESCRIPTORS: PRESSURE;TIGHTNESS
DESCRIPTORS: PRESSURE;TIGHTNESS

## 2020-03-07 ASSESSMENT — PAIN DESCRIPTION - PAIN TYPE
TYPE: ACUTE PAIN

## 2020-03-07 NOTE — PROGRESS NOTES
Patient admitted to room 217-1 from ED. Patient oriented to room, call light, bed rails, phone, lights and bathroom. Patient instructed about the schedule of the day including: vital sign frequency, lab draws, possible tests, frequency of MD and staff rounds, including RN/MD rounding together at bedside, daily weights, and I &O's. Patient instructed about prescribed diet, how to use 8MENU, and television. Telemetry box 46 in place, patient aware of placement and reason. Bed locked, in lowest position, side rails up 2/4, call light within reach. Will continue to monitor.

## 2020-03-07 NOTE — PLAN OF CARE
Problem: Pain:  Goal: Pain level will decrease  Description: Pain level will decrease  Outcome: Ongoing  Note: Pt will be satisfied with pain control. Pt uses numeric pain rating scale with reassessments after pain med administration. Will continue to monitor progression throughout shift. Problem: Metabolic:  Goal: Ability to maintain appropriate glucose levels will improve  Description: Ability to maintain appropriate glucose levels will improve  Outcome: Ongoing  Note: Pt will have accuchecks before meals and at bedtime with sliding scale insulin in place for coverage. Will continue to monitor for signs and symptoms of hypoglycemia and hyperglycemia throughout shift.

## 2020-03-07 NOTE — ED NOTES
[facility] @ 0219   Re: CP, hx cabg, dizzy  Dr. Geoffrey Henriquez resposned @ [address]  03/07/20 4222

## 2020-03-07 NOTE — H&P
use.  E-Cigarettes Vaping or Juuling     Questions Responses    Vaping Use Never User    Start Date     Does device contain nicotine? Never    Quit Date     Vaping Type Unknown            Family History:       Reviewed in detail and negative for DM, CAD, Cancer, CVA. Positive as follows:        Problem Relation Age of Onset    Heart Disease Father     Diabetes type 2  Father     Cancer Mother     Heart Disease Brother        REVIEW OF SYSTEMS:   Pertinent positives as noted in the HPI. All other systems reviewed and negative. PHYSICAL EXAM PERFORMED:    /73   Pulse 61   Temp 97.8 °F (36.6 °C) (Oral)   Resp 16   Ht 6' (1.829 m)   Wt 190 lb (86.2 kg)   SpO2 97%   BMI 25.77 kg/m²     General appearance: He is sitting up in bed in no apparent distress, he is alert and cooperative. HEENT:  Normal cephalic, atraumatic without obvious deformity. Pupils equal, round, and reactive to light. Extra ocular muscles intact. Conjunctivae/corneas clear. Neck: Supple, with full range of motion. No jugular venous distention. Trachea midline. Respiratory:  Normal respiratory effort. Clear to auscultation, bilaterally without Rales/Wheezes/Rhonchi. Cardiovascular:  Regular rate and rhythm with normal S1/S2   Abdomen: Soft, non-tender, non-distended with normal bowel sounds. Musculoskeletal:  No clubbing, cyanosis or edema bilaterally. Full range of motion without deformity. Neurologic:  Neurovascularly intact without any focal sensory/motor deficits.  Cranial nerves: II-XII intact, grossly non-focal.  Psychiatric:  Alert and oriented, thought content appropriate, normal insight  Capillary Refill: Brisk,< 3 seconds   Peripheral Pulses: +2 palpable, equal bilaterally       Labs:     Recent Labs     03/07/20 0455   WBC 11.5*   HGB 15.2   HCT 44.9        Recent Labs     03/07/20 0455      K 4.2      CO2 25   BUN 11   CREATININE 0.7*   CALCIUM 9.3     Recent Labs     03/07/20 0455   AST 17 outpatient. Migraine headaches: currenlty stable continue supportive care and will follow. DVT Prophylaxis: lovenox  Diet: DIET CARB CONTROL;  Code Status: Full Code    PT/OT Eval Status: not ordered    Dispo - 1-2 days        Ania Pittman MD    Thank you Claudeen Bills, APRN - CNP for the opportunity to be involved in this patient's care. If you have any questions or concerns please feel free to contact me at [phone].

## 2020-03-07 NOTE — CONSULTS
atorvastatin (LIPITOR) 20 MG tablet TAKE 1 TABLET BY MOUTH EVERY DAY AT NIGHT 9/10/19  Yes DONALDO Rodas CNP   nitroGLYCERIN (NITROSTAT) 0.4 MG SL tablet PLACE 1 TABLET UNDER THE TONGUE AS NEEDED, MAX OF 3 DOSES, IF NO RELIEF CALL 911 7/12/19  Yes DONALDO Rodas CNP   glucose monitoring kit (FREESTYLE) monitoring kit 1 kit by Does not apply route daily Monitor glucose daily.  Diagnosis: diabetes mellitus Type 2 5/21/19  Yes DONALDO Rodas CNP   FREESTYLE LANCETS MISC 1 each by Does not apply route daily 5/21/19  Yes DONALDO Rodas CNP   aspirin 81 MG tablet Take 81 mg by mouth daily   Yes Historical Provider, MD   butalbital-APAP-caffeine (FIORICET) -40 MG CAPS per capsule Take 1 capsule by mouth every 4 hours as needed for Headaches  Patient not taking: Reported on 2/5/2020 12/29/19 2/1/20  DONALDO Segovia CNP   meloxicam (MOBIC) 15 MG tablet TAKE 1 TABLET BY MOUTH DAILY AS NEEDED FOR PAIN 10/28/19   Historical Provider, MD        CURRENT Medications:  aspirin EC tablet 81 mg, Daily  atorvastatin (LIPITOR) tablet 20 mg, Nightly  l-arginine TABS 1,000 mg, BID  metoprolol tartrate (LOPRESSOR) tablet 50 mg, BID  amLODIPine (NORVASC) tablet 5 mg, Daily  sodium chloride flush 0.9 % injection 10 mL, PRN  potassium chloride (KLOR-CON M) extended release tablet 40 mEq, PRN    Or  potassium bicarb-citric acid (EFFER-K) effervescent tablet 40 mEq, PRN    Or  potassium chloride 10 mEq/100 mL IVPB (Peripheral Line), PRN  magnesium sulfate 1 g in dextrose 5% 100 mL IVPB, PRN  acetaminophen (TYLENOL) tablet 650 mg, Q6H PRN    Or  acetaminophen (TYLENOL) suppository 650 mg, Q6H PRN  promethazine (PHENERGAN) tablet 12.5 mg, Q6H PRN    Or  ondansetron (ZOFRAN) injection 4 mg, Q6H PRN  enoxaparin (LOVENOX) injection 40 mg, Daily  oxyCODONE-acetaminophen (PERCOCET) 5-325 MG per tablet 1 tablet, Q4H PRN  butalbital-acetaminophen-caffeine (FIORICET, ESGIC) per tablet 1 CAD   - 9/20/2016 CABG (LIMA-LAD, SVG-diag, SVG-RV marginal. Closure of LCX grafts  3. HTN: controlled  4. HLD: controlled        PLAN  1. Pt r/o for AMI  2. Pt has failed aggressive medical tx, d/ w EECP vs posisble PCI and he would like to proceed with PCI   - reviewed images (under wrong name on cath), there is disease up the LCX that matches stress test, could PCI with CSI but did warn pt about jailing some small OM  3. L-arginine at 100mg po BID, norvasc, ASA, lipitor   - start ticagrelor      Patient Active Problem List   Diagnosis    Family history of early CAD    S/P CABG (coronary artery bypass graft)    Benign essential HTN    Hyperlipidemia    CAD (coronary artery disease)    Microcytosis    Acute glaucoma of right eye    Acute chest pain    Paresthesia    Acute calculous cholecystitis    Type 2 diabetes mellitus without complication, without long-term current use of insulin ([address]    S/P laparoscopic cholecystectomy    Pneumonia due to organism    Abnormal stress test    Pre-syncope    Chest pain    Angina, class II ([address]    Cerebrovascular accident (CVA) ([address]    Chest pain in adult    Hypertension    Diabetes mellitus ([address]    Migraines           Thank you for allowing us to participate in the care of Dareen Gilford. Please call me with any questions [phone]. Maxx Christianson MD, [address] Cardiologist  [facility]  [phone] [facility]  [phone] [address] [place] [address]  3/7/2020 11:55 AM    I will address the patient's cardiac risk factors and adjusted pharmacologic treatment as needed. In addition, I have reinforced the need for patient directed risk factor modification. All questions and concerns were addressed to the patient/family. Alternatives to my treatment were discussed. The note was completed using EMR. Every effort was made to ensure accuracy; however, inadvertent computerized transcription errors may be present.

## 2020-03-08 LAB
ANION GAP SERPL CALCULATED.3IONS-SCNC: 11 MMOL/L (ref 3–16)
BUN BLDV-MCNC: 19 MG/DL (ref 7–20)
CALCIUM SERPL-MCNC: 9.6 MG/DL (ref 8.3–10.6)
CHLORIDE BLD-SCNC: 101 MMOL/L (ref 99–110)
CO2: 28 MMOL/L (ref 21–32)
CREAT SERPL-MCNC: 0.8 MG/DL (ref 0.9–1.3)
GFR AFRICAN AMERICAN: >60
GFR NON-AFRICAN AMERICAN: >60
GLUCOSE BLD-MCNC: 101 MG/DL (ref 70–99)
GLUCOSE BLD-MCNC: 102 MG/DL (ref 70–99)
GLUCOSE BLD-MCNC: 104 MG/DL (ref 70–99)
GLUCOSE BLD-MCNC: 116 MG/DL (ref 70–99)
HCT VFR BLD CALC: 45.1 % (ref 40.5–52.5)
HEMOGLOBIN: 15.6 G/DL (ref 13.5–17.5)
MAGNESIUM: 2.1 MG/DL (ref 1.8–2.4)
MCH RBC QN AUTO: 29.5 PG (ref 26–34)
MCHC RBC AUTO-ENTMCNC: 34.6 G/DL (ref 31–36)
MCV RBC AUTO: 85.2 FL (ref 80–100)
PDW BLD-RTO: 15.7 % (ref 12.4–15.4)
PERFORMED ON: ABNORMAL
PLATELET # BLD: 292 K/UL (ref 135–450)
PMV BLD AUTO: 8.1 FL (ref 5–10.5)
POTASSIUM SERPL-SCNC: 3.9 MMOL/L (ref 3.5–5.1)
RBC # BLD: 5.3 M/UL (ref 4.2–5.9)
SODIUM BLD-SCNC: 140 MMOL/L (ref 136–145)
WBC # BLD: 10.8 K/UL (ref 4–11)

## 2020-03-08 PROCEDURE — 6370000000 HC RX 637 (ALT 250 FOR IP): Performed by: INTERNAL MEDICINE

## 2020-03-08 PROCEDURE — G0378 HOSPITAL OBSERVATION PER HR: HCPCS

## 2020-03-08 PROCEDURE — 85027 COMPLETE CBC AUTOMATED: CPT

## 2020-03-08 PROCEDURE — 36415 COLL VENOUS BLD VENIPUNCTURE: CPT

## 2020-03-08 PROCEDURE — 83735 ASSAY OF MAGNESIUM: CPT

## 2020-03-08 PROCEDURE — 2580000003 HC RX 258: Performed by: INTERNAL MEDICINE

## 2020-03-08 PROCEDURE — 96372 THER/PROPH/DIAG INJ SC/IM: CPT

## 2020-03-08 PROCEDURE — 1200000000 HC SEMI PRIVATE

## 2020-03-08 PROCEDURE — 99233 SBSQ HOSP IP/OBS HIGH 50: CPT | Performed by: INTERNAL MEDICINE

## 2020-03-08 PROCEDURE — 80048 BASIC METABOLIC PNL TOTAL CA: CPT

## 2020-03-08 PROCEDURE — 6360000002 HC RX W HCPCS: Performed by: INTERNAL MEDICINE

## 2020-03-08 RX ADMIN — AMLODIPINE BESYLATE 5 MG: 5 TABLET ORAL at 08:43

## 2020-03-08 RX ADMIN — ASPIRIN 81 MG: 81 TABLET, COATED ORAL at 08:43

## 2020-03-08 RX ADMIN — ENOXAPARIN SODIUM 40 MG: 40 INJECTION SUBCUTANEOUS at 08:41

## 2020-03-08 RX ADMIN — METOPROLOL TARTRATE 50 MG: 50 TABLET, FILM COATED ORAL at 08:43

## 2020-03-08 RX ADMIN — Medication 1000 MG: at 08:50

## 2020-03-08 RX ADMIN — Medication 1000 MG: at 21:47

## 2020-03-08 RX ADMIN — METOPROLOL TARTRATE 50 MG: 50 TABLET, FILM COATED ORAL at 21:47

## 2020-03-08 RX ADMIN — OXYCODONE HYDROCHLORIDE AND ACETAMINOPHEN 1 TABLET: 5; 325 TABLET ORAL at 21:47

## 2020-03-08 RX ADMIN — TICAGRELOR 90 MG: 90 TABLET ORAL at 21:47

## 2020-03-08 RX ADMIN — Medication 10 ML: at 21:47

## 2020-03-08 RX ADMIN — TICAGRELOR 90 MG: 90 TABLET ORAL at 08:43

## 2020-03-08 RX ADMIN — Medication 10 ML: at 08:43

## 2020-03-08 RX ADMIN — ATORVASTATIN CALCIUM 20 MG: 10 TABLET, FILM COATED ORAL at 21:47

## 2020-03-08 ASSESSMENT — PAIN SCALES - GENERAL
PAINLEVEL_OUTOF10: 3
PAINLEVEL_OUTOF10: 0
PAINLEVEL_OUTOF10: 3
PAINLEVEL_OUTOF10: 7
PAINLEVEL_OUTOF10: 3
PAINLEVEL_OUTOF10: 0
PAINLEVEL_OUTOF10: 0
PAINLEVEL_OUTOF10: 3

## 2020-03-08 ASSESSMENT — PAIN DESCRIPTION - LOCATION
LOCATION: CHEST

## 2020-03-08 ASSESSMENT — PAIN DESCRIPTION - DESCRIPTORS
DESCRIPTORS: TIGHTNESS

## 2020-03-08 ASSESSMENT — PAIN - FUNCTIONAL ASSESSMENT
PAIN_FUNCTIONAL_ASSESSMENT: ACTIVITIES ARE NOT PREVENTED

## 2020-03-08 ASSESSMENT — PAIN DESCRIPTION - ONSET
ONSET: ON-GOING

## 2020-03-08 ASSESSMENT — PAIN DESCRIPTION - ORIENTATION
ORIENTATION: MID

## 2020-03-08 ASSESSMENT — PAIN DESCRIPTION - FREQUENCY
FREQUENCY: CONTINUOUS

## 2020-03-08 NOTE — PROGRESS NOTES
End of shift report given to Dawn Davis RN at bedside. Patient alert and oriented. Bed in lowest position with wheels locked. Call light within reach. No further needs at this time.

## 2020-03-08 NOTE — PROGRESS NOTES
Sent secure message to Dr. Smith Murdock Pt requesting a shower. Is it ok for pt to remove tele for shower?   Thank you

## 2020-03-08 NOTE — PROGRESS NOTES
[address]   Daily Cardiovascular Progress Note    Admit Date: 3/7/2020    Chief complaint: CP  HPI: states feels about the same      Medications/Labs all Reviewed:  Patient Active Problem List   Diagnosis    Family history of early CAD    S/P CABG (coronary artery bypass graft)    Benign essential HTN    Hyperlipidemia    CAD (coronary artery disease)    Microcytosis    Acute glaucoma of right eye    Acute chest pain    Paresthesia    Acute calculous cholecystitis    Type 2 diabetes mellitus without complication, without long-term current use of insulin ([facility]    S/P laparoscopic cholecystectomy    Pneumonia due to organism    Abnormal stress test    Pre-syncope    Chest pain    Angina, class II ([address]    Cerebrovascular accident (CVA) ([facility]    Chest pain in adult    Hypertension    Diabetes mellitus ([facility]    Migraines       Medications:  aspirin EC tablet 81 mg, Daily  atorvastatin (LIPITOR) tablet 20 mg, Nightly  l-arginine TABS 1,000 mg, BID  metoprolol tartrate (LOPRESSOR) tablet 50 mg, BID  amLODIPine (NORVASC) tablet 5 mg, Daily  sodium chloride flush 0.9 % injection 10 mL, PRN  potassium chloride (KLOR-CON M) extended release tablet 40 mEq, PRN    Or  potassium bicarb-citric acid (EFFER-K) effervescent tablet 40 mEq, PRN    Or  potassium chloride 10 mEq/100 mL IVPB (Peripheral Line), PRN  magnesium sulfate 1 g in dextrose 5% 100 mL IVPB, PRN  acetaminophen (TYLENOL) tablet 650 mg, Q6H PRN    Or  acetaminophen (TYLENOL) suppository 650 mg, Q6H PRN  promethazine (PHENERGAN) tablet 12.5 mg, Q6H PRN    Or  ondansetron (ZOFRAN) injection 4 mg, Q6H PRN  enoxaparin (LOVENOX) injection 40 mg, Daily  oxyCODONE-acetaminophen (PERCOCET) 5-325 MG per tablet 1 tablet, Q4H PRN  butalbital-acetaminophen-caffeine (FIORICET, ESGIC) per tablet 1 tablet, Q4H PRN  ticagrelor (BRILINTA) tablet 90 mg, BID           PHYSICAL EXAM   /75   Pulse 65   Temp 97.8 °F (36.6 °C) (Oral) Resp 16   Ht 6' (1.829 m)   Wt 191 lb 4.8 oz (86.8 kg)   SpO2 97%   BMI 25.94 kg/m²    Vitals:    03/07/20 2018 03/08/20 0023 03/08/20 0421 03/08/20 0550   BP: 123/76 122/72 115/75    Pulse: 67 67 65    Resp: 18 16 16    Temp: 99.1 °F (37.3 °C) 98.2 °F (36.8 °C) 97.8 °F (36.6 °C)    TempSrc: Oral Oral Oral    SpO2: 96% 95% 97%    Weight:    191 lb 4.8 oz (86.8 kg)   Height:             Intake/Output Summary (Last 24 hours) at 3/8/2020 0744  Last data filed at 3/8/2020 0005  Gross per 24 hour   Intake 960 ml   Output 1075 ml   Net -115 ml     Wt Readings from Last 3 Encounters:   03/08/20 191 lb 4.8 oz (86.8 kg)   02/05/20 195 lb 6.4 oz (88.6 kg)   02/01/20 193 lb 8 oz (87.8 kg)       Gen: Patient in NAD, resting comfortably  Neck: No JVD or bruits  Respiratory: CTAB no WRR  Chest: normal without deformity  Cardiovascular:RRR, S1S2, no mrg, normal PMI  Abdomen: Soft, NTND, Normal BS  Extremities: No clubbing, cyanosis, or edema  Neurological/Psychiatric: AxO x4, No gross motors/sensory deficits  Skin:  Warm and dry      Labs:  CBC:   Recent Labs     03/07/20  0455   WBC 11.5*   HGB 15.2   HCT 44.9   MCV 86.2        BMP:   Recent Labs     03/07/20  0455      K 4.2      CO2 25   BUN 11   CREATININE 0.7*     MG:  No results for input(s): MG in the last 72 hours. PT/INR: No results for input(s): PROTIME, INR in the last 72 hours. APTT: No results for input(s): APTT in the last 72 hours. Cardiac Enzymes:   Recent Labs     03/07/20  0455 03/07/20  1007 03/07/20  1428   TROPONINI <0.01 <0.01 <0.01       Cardiac Studies:          Assessment and Plan        1. Chest pain: pt r/o out for AMI               c/w unstable angina  2. CAD              - 9/20/2016 CABG (LIMA-LAD, SVG-diag, SVG-RV marginal. Closure of LCX grafts  3. HTN: controlled  4. HLD: controlled        PLAN  1. Pt r/o for AMI  2.  Pt has failed aggressive medical tx, d/ w EECP vs posisble PCI and he would like to proceed with PCI

## 2020-03-09 ENCOUNTER — APPOINTMENT (OUTPATIENT)
Dept: CARDIAC CATH/INVASIVE PROCEDURES | Age: 55
DRG: 247 | End: 2020-03-09
Payer: MEDICARE

## 2020-03-09 LAB
GLUCOSE BLD-MCNC: 128 MG/DL (ref 70–99)
GLUCOSE BLD-MCNC: 138 MG/DL (ref 70–99)
LEFT VENTRICULAR EJECTION FRACTION HIGH VALUE: 55 %
PERFORMED ON: ABNORMAL
PERFORMED ON: ABNORMAL

## 2020-03-09 PROCEDURE — C1894 INTRO/SHEATH, NON-LASER: HCPCS

## 2020-03-09 PROCEDURE — C1725 CATH, TRANSLUMIN NON-LASER: HCPCS

## 2020-03-09 PROCEDURE — 6360000002 HC RX W HCPCS

## 2020-03-09 PROCEDURE — 92933 PRQ TRLML C ATHRC ST ANGIOP1: CPT

## 2020-03-09 PROCEDURE — 6370000000 HC RX 637 (ALT 250 FOR IP): Performed by: INTERNAL MEDICINE

## 2020-03-09 PROCEDURE — 2580000003 HC RX 258

## 2020-03-09 PROCEDURE — B241ZZ3 ULTRASONOGRAPHY OF MULTIPLE CORONARY ARTERIES, INTRAVASCULAR: ICD-10-PCS | Performed by: INTERNAL MEDICINE

## 2020-03-09 PROCEDURE — 93459 L HRT ART/GRFT ANGIO: CPT | Performed by: INTERNAL MEDICINE

## 2020-03-09 PROCEDURE — 4A023N7 MEASUREMENT OF CARDIAC SAMPLING AND PRESSURE, LEFT HEART, PERCUTANEOUS APPROACH: ICD-10-PCS | Performed by: INTERNAL MEDICINE

## 2020-03-09 PROCEDURE — 6360000004 HC RX CONTRAST MEDICATION

## 2020-03-09 PROCEDURE — 99152 MOD SED SAME PHYS/QHP 5/>YRS: CPT | Performed by: INTERNAL MEDICINE

## 2020-03-09 PROCEDURE — 92978 ENDOLUMINL IVUS OCT C 1ST: CPT | Performed by: INTERNAL MEDICINE

## 2020-03-09 PROCEDURE — 93458 L HRT ARTERY/VENTRICLE ANGIO: CPT

## 2020-03-09 PROCEDURE — 92920 PRQ TRLUML C ANGIOP 1ART&/BR: CPT

## 2020-03-09 PROCEDURE — 2709999900 HC NON-CHARGEABLE SUPPLY

## 2020-03-09 PROCEDURE — 92920 PRQ TRLUML C ANGIOP 1ART&/BR: CPT | Performed by: INTERNAL MEDICINE

## 2020-03-09 PROCEDURE — C1724 CATH, TRANS ATHEREC,ROTATION: HCPCS

## 2020-03-09 PROCEDURE — 92979 ENDOLUMINL IVUS OCT C EA: CPT

## 2020-03-09 PROCEDURE — 2720000010 HC SURG SUPPLY STERILE

## 2020-03-09 PROCEDURE — 85347 COAGULATION TIME ACTIVATED: CPT

## 2020-03-09 PROCEDURE — C1874 STENT, COATED/COV W/DEL SYS: HCPCS

## 2020-03-09 PROCEDURE — 92979 ENDOLUMINL IVUS OCT C EA: CPT | Performed by: INTERNAL MEDICINE

## 2020-03-09 PROCEDURE — 92933 PRQ TRLML C ATHRC ST ANGIOP1: CPT | Performed by: INTERNAL MEDICINE

## 2020-03-09 PROCEDURE — C1769 GUIDE WIRE: HCPCS

## 2020-03-09 PROCEDURE — C1887 CATHETER, GUIDING: HCPCS

## 2020-03-09 PROCEDURE — 92978 ENDOLUMINL IVUS OCT C 1ST: CPT

## 2020-03-09 PROCEDURE — 2500000003 HC RX 250 WO HCPCS

## 2020-03-09 PROCEDURE — 027135Z DILATION OF CORONARY ARTERY, TWO ARTERIES WITH TWO DRUG-ELUTING INTRALUMINAL DEVICES, PERCUTANEOUS APPROACH: ICD-10-PCS | Performed by: INTERNAL MEDICINE

## 2020-03-09 PROCEDURE — B2111ZZ FLUOROSCOPY OF MULTIPLE CORONARY ARTERIES USING LOW OSMOLAR CONTRAST: ICD-10-PCS | Performed by: INTERNAL MEDICINE

## 2020-03-09 PROCEDURE — 2060000000 HC ICU INTERMEDIATE R&B

## 2020-03-09 RX ORDER — DEXTROSE MONOHYDRATE 25 G/50ML
12.5 INJECTION, SOLUTION INTRAVENOUS PRN
Status: DISCONTINUED | OUTPATIENT
Start: 2020-03-09 | End: 2020-03-10 | Stop reason: HOSPADM

## 2020-03-09 RX ORDER — MIDAZOLAM HYDROCHLORIDE 5 MG/ML
INJECTION INTRAMUSCULAR; INTRAVENOUS
Status: COMPLETED | OUTPATIENT
Start: 2020-03-09 | End: 2020-03-09

## 2020-03-09 RX ORDER — HEPARIN SODIUM 1000 [USP'U]/ML
500 INJECTION, SOLUTION INTRAVENOUS; SUBCUTANEOUS ONCE
Status: COMPLETED | OUTPATIENT
Start: 2020-03-09 | End: 2020-03-09

## 2020-03-09 RX ORDER — HEPARIN SODIUM 1000 [USP'U]/ML
INJECTION, SOLUTION INTRAVENOUS; SUBCUTANEOUS
Status: COMPLETED | OUTPATIENT
Start: 2020-03-09 | End: 2020-03-09

## 2020-03-09 RX ORDER — EPTIFIBATIDE 0.75 MG/ML
2 INJECTION, SOLUTION INTRAVENOUS CONTINUOUS
Status: ACTIVE | OUTPATIENT
Start: 2020-03-09 | End: 2020-03-09

## 2020-03-09 RX ORDER — FENTANYL CITRATE 50 UG/ML
INJECTION, SOLUTION INTRAMUSCULAR; INTRAVENOUS
Status: COMPLETED | OUTPATIENT
Start: 2020-03-09 | End: 2020-03-09

## 2020-03-09 RX ORDER — NICOTINE POLACRILEX 4 MG
15 LOZENGE BUCCAL PRN
Status: DISCONTINUED | OUTPATIENT
Start: 2020-03-09 | End: 2020-03-10 | Stop reason: HOSPADM

## 2020-03-09 RX ORDER — DEXTROSE MONOHYDRATE 50 MG/ML
100 INJECTION, SOLUTION INTRAVENOUS PRN
Status: DISCONTINUED | OUTPATIENT
Start: 2020-03-09 | End: 2020-03-10 | Stop reason: HOSPADM

## 2020-03-09 RX ADMIN — TICAGRELOR 90 MG: 90 TABLET ORAL at 08:26

## 2020-03-09 RX ADMIN — Medication 1000 MG: at 08:27

## 2020-03-09 RX ADMIN — METOPROLOL TARTRATE 50 MG: 50 TABLET, FILM COATED ORAL at 20:15

## 2020-03-09 RX ADMIN — Medication 1000 MG: at 23:08

## 2020-03-09 RX ADMIN — FENTANYL CITRATE 50 MCG: 50 INJECTION, SOLUTION INTRAMUSCULAR; INTRAVENOUS at 11:14

## 2020-03-09 RX ADMIN — MIDAZOLAM HYDROCHLORIDE 3 MG: 5 INJECTION INTRAMUSCULAR; INTRAVENOUS at 10:50

## 2020-03-09 RX ADMIN — METOPROLOL TARTRATE 50 MG: 50 TABLET, FILM COATED ORAL at 08:26

## 2020-03-09 RX ADMIN — MIDAZOLAM HYDROCHLORIDE 1 MG: 5 INJECTION INTRAMUSCULAR; INTRAVENOUS at 11:14

## 2020-03-09 RX ADMIN — FENTANYL CITRATE 50 MCG: 50 INJECTION, SOLUTION INTRAMUSCULAR; INTRAVENOUS at 10:50

## 2020-03-09 RX ADMIN — HEPARIN SODIUM 6000 UNITS: 1000 INJECTION, SOLUTION INTRAVENOUS; SUBCUTANEOUS at 11:15

## 2020-03-09 RX ADMIN — EPTIFIBATIDE 2 MCG/KG/MIN: 0.75 INJECTION, SOLUTION INTRAVENOUS at 11:52

## 2020-03-09 RX ADMIN — HEPARIN SODIUM 1000 UNITS: 1000 INJECTION, SOLUTION INTRAVENOUS; SUBCUTANEOUS at 11:38

## 2020-03-09 RX ADMIN — MIDAZOLAM HYDROCHLORIDE 1 MG: 5 INJECTION INTRAMUSCULAR; INTRAVENOUS at 11:35

## 2020-03-09 RX ADMIN — ATORVASTATIN CALCIUM 20 MG: 10 TABLET, FILM COATED ORAL at 20:15

## 2020-03-09 RX ADMIN — AMLODIPINE BESYLATE 5 MG: 5 TABLET ORAL at 08:25

## 2020-03-09 RX ADMIN — FENTANYL CITRATE 25 MCG: 50 INJECTION, SOLUTION INTRAMUSCULAR; INTRAVENOUS at 11:34

## 2020-03-09 RX ADMIN — TICAGRELOR 90 MG: 90 TABLET ORAL at 20:15

## 2020-03-09 RX ADMIN — ASPIRIN 81 MG: 81 TABLET, COATED ORAL at 08:26

## 2020-03-09 RX ADMIN — EPTIFIBATIDE 2 MCG/KG/MIN: 0.75 INJECTION, SOLUTION INTRAVENOUS at 14:00

## 2020-03-09 RX ADMIN — HEPARIN SODIUM 500 UNITS: 1000 INJECTION, SOLUTION INTRAVENOUS; SUBCUTANEOUS at 12:15

## 2020-03-09 ASSESSMENT — PAIN DESCRIPTION - FREQUENCY: FREQUENCY: CONTINUOUS

## 2020-03-09 ASSESSMENT — PAIN DESCRIPTION - DESCRIPTORS: DESCRIPTORS: TIGHTNESS

## 2020-03-09 ASSESSMENT — PAIN DESCRIPTION - LOCATION: LOCATION: CHEST

## 2020-03-09 ASSESSMENT — PAIN SCALES - GENERAL
PAINLEVEL_OUTOF10: 3
PAINLEVEL_OUTOF10: 0
PAINLEVEL_OUTOF10: 0

## 2020-03-09 ASSESSMENT — PAIN DESCRIPTION - PROGRESSION: CLINICAL_PROGRESSION: GRADUALLY WORSENING

## 2020-03-09 ASSESSMENT — PAIN DESCRIPTION - PAIN TYPE: TYPE: ACUTE PAIN

## 2020-03-09 ASSESSMENT — PAIN DESCRIPTION - ORIENTATION: ORIENTATION: LEFT

## 2020-03-09 NOTE — PROGRESS NOTES
up in bed in no apparent distress, he is alert and cooperative. HEENT:  Normal cephalic, atraumatic without obvious deformity. Pupils equal, round, and reactive to light. Extra ocular muscles intact. Conjunctivae/corneas clear. Neck: Supple, with full range of motion. No jugular venous distention. Trachea midline. Respiratory:  Normal respiratory effort. Clear to auscultation, bilaterally without Rales/Wheezes/Rhonchi. Cardiovascular:  Regular rate and rhythm with normal S1/S2   Abdomen: Soft, non-tender, non-distended with normal bowel sounds. Musculoskeletal:  No clubbing, cyanosis or edema bilaterally. Full range of motion without deformity. Neurologic:  Neurovascularly intact without any focal sensory/motor deficits. Cranial nerves: II-XII intact, grossly non-focal.  Psychiatric:  Alert and oriented, thought content appropriate, normal insight  Capillary Refill: Brisk,< 3 seconds   Peripheral Pulses: +2 palpable, equal bilaterally     Labs:   Recent Labs     03/07/20  0455 03/08/20  0851   WBC 11.5* 10.8   HGB 15.2 15.6   HCT 44.9 45.1    292     Recent Labs     03/07/20  0455 03/08/20  0851    140   K 4.2 3.9    101   CO2 25 28   BUN 11 19   CREATININE 0.7* 0.8*   CALCIUM 9.3 9.6     Recent Labs     03/07/20  0455   AST 17   ALT 18   BILITOT 0.4   ALKPHOS 95     No results for input(s): INR in the last 72 hours. Recent Labs     03/07/20  0455 03/07/20  1007 03/07/20  1428   TROPONINI <0.01 <0.01 <0.01       Urinalysis:      Lab Results   Component Value Date    NITRU Negative 12/26/2019    WBCUA 0-2 03/27/2019    BACTERIA 1+ 03/27/2019    RBCUA 3-5 03/27/2019    BLOODU Negative 12/26/2019    SPECGRAV 1.015 12/26/2019    GLUCOSEU Negative 12/26/2019       Radiology:  XR CHEST PORTABLE   Final Result   Limited due to very low lung volumes and patient rotation. No evidence of acute process.                  Assessment/Plan:    Active Hospital Problems    Diagnosis    Chest pain in

## 2020-03-09 NOTE — BRIEF OP NOTE
integrllin for 6 hr  3. COnt ASA 81mg qday and Ticagrelor 90mg po BID- Continue for LIFE given structs extending into LM merle Gonzalez MD  Date: 3/9/2020  Time: 12:43 PM

## 2020-03-09 NOTE — PROCEDURES
Carlos Dillon MD at [facility] COLONOSCOPY  06/01/2017    Colonic polyp    CORONARY ARTERY BYPASS GRAFT  09/20/2016    LIMA- LAD, Reverse SVG- Rosalba, reverse SVG- PDA, Reverse SVG- OM seq- Diag    EYE SURGERY Right     drain placed behind eye    HERNIA REPAIR Bilateral 2005    bilateral inguinal hernia repair         Medications:  Current Facility-Administered Medications   Medication Dose Route Frequency Provider Last Rate Last Dose    aspirin EC tablet 81 mg  81 mg Oral Daily Mayte Banks MD   81 mg at 03/09/20 0826    atorvastatin (LIPITOR) tablet 20 mg  20 mg Oral Nightly Mayte Banks MD   20 mg at 03/08/20 2147    l-arginine TABS 1,000 mg  1,000 mg Oral BID Mayte Banks MD   1,000 mg at 03/09/20 0827    metoprolol tartrate (LOPRESSOR) tablet 50 mg  50 mg Oral BID Mayte Banks MD   50 mg at 03/09/20 0826    amLODIPine (NORVASC) tablet 5 mg  5 mg Oral Daily Mayte Banks MD   5 mg at 03/09/20 0825    sodium chloride flush 0.9 % injection 10 mL  10 mL Intravenous PRN Mayte Banks MD   10 mL at 03/08/20 2147    potassium chloride (KLOR-CON M) extended release tablet 40 mEq  40 mEq Oral PRN Mayte Banks MD        Or    potassium bicarb-citric acid (EFFER-K) effervescent tablet 40 mEq  40 mEq Oral PRSERAFIN Banks MD        Or    potassium chloride 10 mEq/100 mL IVPB (Peripheral Line)  10 mEq Intravenous PRN Mayte Banks MD        magnesium sulfate 1 g in dextrose 5% 100 mL IVPB  1 g Intravenous PRN Mayte Banks MD        acetaminophen (TYLENOL) tablet 650 mg  650 mg Oral Q6H PRN Mayte Banks MD        Or    acetaminophen (TYLENOL) suppository 650 mg  650 mg Rectal Q6H PRN Mayte Banks MD        promethazine (PHENERGAN) tablet 12.5 mg  12.5 mg Oral Q6H PRSERAFIN Banks MD        Or    ondansetron [facility]) injection 4 mg  4 mg Intravenous Q6H PRN Mayte Banks MD        enoxaparin (LOVENOX) injection 40 mg  40 mg Subcutaneous

## 2020-03-10 VITALS
TEMPERATURE: 97.7 F | OXYGEN SATURATION: 97 % | HEIGHT: 72 IN | SYSTOLIC BLOOD PRESSURE: 120 MMHG | RESPIRATION RATE: 18 BRPM | WEIGHT: 189.8 LBS | BODY MASS INDEX: 25.71 KG/M2 | DIASTOLIC BLOOD PRESSURE: 76 MMHG | HEART RATE: 65 BPM

## 2020-03-10 LAB
EKG ATRIAL RATE: 64 BPM
EKG DIAGNOSIS: NORMAL
EKG P AXIS: 31 DEGREES
EKG P-R INTERVAL: 172 MS
EKG Q-T INTERVAL: 404 MS
EKG QRS DURATION: 146 MS
EKG QTC CALCULATION (BAZETT): 416 MS
EKG R AXIS: -18 DEGREES
EKG T AXIS: 6 DEGREES
EKG VENTRICULAR RATE: 64 BPM
GLUCOSE BLD-MCNC: 111 MG/DL (ref 70–99)
GLUCOSE BLD-MCNC: 114 MG/DL (ref 70–99)
PERFORMED ON: ABNORMAL
PERFORMED ON: ABNORMAL
POC ACT LR: 119 SEC
POC ACT LR: 167 SEC
POC ACT LR: 215 SEC
POC ACT LR: 295 SEC
POC ACT LR: 305 SEC

## 2020-03-10 PROCEDURE — 6360000002 HC RX W HCPCS: Performed by: INTERNAL MEDICINE

## 2020-03-10 PROCEDURE — 93010 ELECTROCARDIOGRAM REPORT: CPT | Performed by: INTERNAL MEDICINE

## 2020-03-10 PROCEDURE — 99232 SBSQ HOSP IP/OBS MODERATE 35: CPT | Performed by: NURSE PRACTITIONER

## 2020-03-10 PROCEDURE — 93005 ELECTROCARDIOGRAM TRACING: CPT | Performed by: NURSE PRACTITIONER

## 2020-03-10 PROCEDURE — 6370000000 HC RX 637 (ALT 250 FOR IP): Performed by: INTERNAL MEDICINE

## 2020-03-10 RX ORDER — ATORVASTATIN CALCIUM 80 MG/1
80 TABLET, FILM COATED ORAL NIGHTLY
Qty: 30 TABLET | Refills: 11 | Status: SHIPPED | OUTPATIENT
Start: 2020-03-10 | End: 2021-02-12 | Stop reason: SDUPTHER

## 2020-03-10 RX ADMIN — Medication 1000 MG: at 09:46

## 2020-03-10 RX ADMIN — METOPROLOL TARTRATE 50 MG: 50 TABLET, FILM COATED ORAL at 09:45

## 2020-03-10 RX ADMIN — ASPIRIN 81 MG: 81 TABLET, COATED ORAL at 09:45

## 2020-03-10 RX ADMIN — TICAGRELOR 90 MG: 90 TABLET ORAL at 09:45

## 2020-03-10 RX ADMIN — AMLODIPINE BESYLATE 5 MG: 5 TABLET ORAL at 09:45

## 2020-03-10 RX ADMIN — ENOXAPARIN SODIUM 40 MG: 40 INJECTION SUBCUTANEOUS at 09:45

## 2020-03-10 ASSESSMENT — PAIN SCALES - GENERAL: PAINLEVEL_OUTOF10: 0

## 2020-03-10 ASSESSMENT — ENCOUNTER SYMPTOMS
RESPIRATORY NEGATIVE: 1
GASTROINTESTINAL NEGATIVE: 1

## 2020-03-10 NOTE — PROGRESS NOTES
tartrate (LOPRESSOR) tablet 50 mg  50 mg Oral BID Laura Sheets MD   50 mg at 03/10/20 0945    amLODIPine (NORVASC) tablet 5 mg  5 mg Oral Daily Laura Sheets MD   5 mg at 03/10/20 0945    sodium chloride flush 0.9 % injection 10 mL  10 mL Intravenous PRN Laura Sheets MD   10 mL at 03/08/20 2147    potassium chloride (KLOR-CON M) extended release tablet 40 mEq  40 mEq Oral PRN Laura Sheets MD        Or    potassium bicarb-citric acid (EFFER-K) effervescent tablet 40 mEq  40 mEq Oral PRN Laura Sheets MD        Or    potassium chloride 10 mEq/100 mL IVPB (Peripheral Line)  10 mEq Intravenous PRN Laura Sheets MD        magnesium sulfate 1 g in dextrose 5% 100 mL IVPB  1 g Intravenous PRN Laura Sheets MD        acetaminophen (TYLENOL) tablet 650 mg  650 mg Oral Q6H PRN Laura Sheets MD        Or    acetaminophen (TYLENOL) suppository 650 mg  650 mg Rectal Q6H PRN Laura Sheets MD        promethazine (PHENERGAN) tablet 12.5 mg  12.5 mg Oral Q6H PRN Laura Sheets MD        Or    ondansetron [facility]) injection 4 mg  4 mg Intravenous Q6H PRN Laura Sheets MD        enoxaparin (LOVENOX) injection 40 mg  40 mg Subcutaneous Daily Laura Sheets MD   40 mg at 03/10/20 0945    oxyCODONE-acetaminophen (PERCOCET) 5-325 MG per tablet 1 tablet  1 tablet Oral Q4H PRN Laura Sheets MD   1 tablet at 03/08/20 2147    butalbital-acetaminophen-caffeine (FIORICET, ESGIC) per tablet 1 tablet  1 tablet Oral Q4H PRN Laura Sheets MD        [facility]) tablet 90 mg  90 mg Oral BID Laura Sheets MD   90 mg at 03/10/20 0945     Review of Systems   Constitutional: Negative. Respiratory: Negative. Cardiovascular: Negative. Gastrointestinal: Negative. Neurological: Negative.       Objective:     Telemetry monitor: SR RBBB    Physical Exam:  Constitutional:  Comfortable and alert, NAD, appears stated age  Eyes: PERRL, sclera nonicteric  Neck:  Supple, no masses, no thyroidmegaly, Activity restrictions reviewed  2. Post PCI EKG  3. Increase to high intensity statin therapy  4. Continue aspirin, brilinta, lopressor, l arginine   5. Continue DAPT for life due to stent struts extending into LM  6. Ok to return to work 3/14/2020 with strict lifting/activity restrictions  7. Follow up 3/25/2020  8. Ok to discharge from cardiology perspective, please call with any questions.      Iván Lewis, APRN-CNP  [facility]  [phone]

## 2020-03-10 NOTE — PROGRESS NOTES
Discharge instructions reviewed with patient and family member. Patient and family verbalized understanding. All home medications have been reviewed, questions answered and patient voiced understanding. Given prescriptions, discharge instructions, and appointment times. Telemetry removed and returned to bin. IV removed and charted.

## 2020-03-10 NOTE — PROGRESS NOTES
Page to Cite Vlad Lewis NP: Pt takes metformin at home, is it okay for him to resume that tomorrow? If not, when should he start taking it again? Awaiting response. 1307: Per Cite Vlad Lewis NP, okay to resume metformin tomorrow.

## 2020-03-10 NOTE — DISCHARGE SUMMARY
statin. Recent LDL 58.     Diabetes mellitus type 2:   - Recent A1C 6.4. Hold home metformin, SSI ACHS meanwhile while inpt.      Migraine headaches:  - Currenlty stable. Physical Exam Performed:     /76   Pulse 65   Temp 97.7 °F (36.5 °C) (Oral)   Resp 18   Ht 6' (1.829 m)   Wt 189 lb 12.8 oz (86.1 kg)   SpO2 97%   BMI 25.74 kg/m²       General appearance:  No apparent distress, appears stated age and cooperative. HEENT:  Normal cephalic, atraumatic without obvious deformity. Pupils equal, round, and reactive to light. Extra ocular muscles intact. Conjunctivae/corneas clear. Neck: Supple, with full range of motion. No jugular venous distention. Trachea midline. Respiratory:  Normal respiratory effort. Clear to auscultation, bilaterally without Rales/Wheezes/Rhonchi. Cardiovascular:  Regular rate and rhythm with normal S1/S2 without murmurs, rubs or gallops. Abdomen: Soft, non-tender, non-distended with normal bowel sounds. Musculoskeletal:  No clubbing, cyanosis or edema bilaterally. Full range of motion without deformity. Skin: Skin color, texture, turgor normal.  No rashes or lesions. Neurologic:  Neurovascularly intact without any focal sensory/motor deficits. Cranial nerves: II-XII intact, grossly non-focal.  Psychiatric:  Alert and oriented, thought content appropriate, normal insight  Capillary Refill: Brisk,< 3 seconds   Peripheral Pulses: +2 palpable, equal bilaterally       Labs:  For convenience and continuity at follow-up the following most recent labs are provided:      CBC:    Lab Results   Component Value Date    WBC 10.8 03/08/2020    HGB 15.6 03/08/2020    HCT 45.1 03/08/2020     03/08/2020       Renal:    Lab Results   Component Value Date     03/08/2020    K 3.9 03/08/2020    K 4.2 03/07/2020     03/08/2020    CO2 28 03/08/2020    BUN 19 03/08/2020    CREATININE 0.8 03/08/2020    CALCIUM 9.6 03/08/2020         Significant Diagnostic Starting Tue 5/21/2019, Disp-100 each, R-3, Normal      aspirin 81 MG tablet Take 81 mg by mouth dailyHistorical Med             Time Spent on discharge is more than 45 minutes in the examination, evaluation, counseling and review of medications and discharge plan. Signed:    DONALDO Leach CNP   3/10/2020      Thank you DONALDO Hancock CNP for the opportunity to be involved in this patient's care. If you have any questions or concerns please feel free to contact me at [phone].

## 2020-03-10 NOTE — FLOWSHEET NOTE
03/10/20 0808   Assessment   Charting Type Shift assessment   Neurological   Neuro (WDL) WDL   Level of Consciousness 0   Nancy Coma Scale   Eye Opening 4   Best Verbal Response 5   Best Motor Response 6   Curtis Coma Scale Score 15   HEENT   HEENT (WDL) X   Right Eye Intact; Impaired vision   Left Eye Intact; Impaired vision   Teeth Missing teeth   Respiratory   Respiratory (WDL) WDL   Respiratory Pattern Regular   Respiratory Depth Normal   Respiratory Quality/Effort Unlabored   Chest Assessment Chest expansion symmetrical;Trachea midline   L Breath Sounds Clear;Diminished   R Breath Sounds Clear;Diminished   Cardiac   Cardiac (WDL) WDL   Cardiac Regularity Regular   Heart Sounds S1, S2   Cardiac Rhythm NSR   Rhythm Interpretation   Pulse 65   Cardiac Monitor   Telemetry Monitor On Yes   Telemetry Audible Yes   Telemetry Alarms Set Yes   Telemetry Box Number 46   Gastrointestinal   Abdominal (WDL) WDL   Peripheral Vascular   Peripheral Vascular (WDL) WDL   Edema None   Puncture Site Assessment 1   Location Femoral - right   Site Assessment No redness, drainage, swelling or hematoma   Hemostasis Intervention Discontinued   Dressing Applied Transparent occlusive dressing   Multiple puncture sites No   Skin Color/Condition   Skin Color/Condition (WDL) WDL   Skin Integrity   Skin Integrity (WDL) X  (r groin site)   Musculoskeletal   Musculoskeletal (WDL) WDL   Genitourinary   Genitourinary (WDL) WDL   Flank Tenderness No   Suprapubic Tenderness No   Dysuria No   Anus/Rectum   Anus/Rectum (WDL) WDL   Psychosocial   Psychosocial (WDL) WDL

## 2020-03-11 ENCOUNTER — TELEPHONE (OUTPATIENT)
Dept: FAMILY MEDICINE CLINIC | Age: 55
End: 2020-03-11

## 2020-03-11 NOTE — TELEPHONE ENCOUNTER
[facility] Transitions Initial Follow Up Call    Outreach made within 2 business days of discharge: Yes    Patient: Marco Steele Patient : 1965   MRN: [id number]  Reason for Admission: There are no discharge diagnoses documented for the most recent discharge. Discharge Date: 3/10/20       Spoke with: Jimbo Schaefer    Discharge department/facility: [facility]    TCM Interactive Patient Contact:  Was patient able to fill all prescriptions: Yes  Was patient instructed to bring all medications to the follow-up visit: Yes  Is patient taking all medications as directed in the discharge summary?  Yes  Does patient understand their discharge instructions: Yes  Does patient have questions or concerns that need addressed prior to 7-14 day follow up office visit: no    Scheduled appointment with PCP within 7-14 days    Follow Up  Future Appointments   Date Time Provider Cy Acuna   3/18/2020 12:00 PM DONALDO Linn CNP [facility]   3/25/2020  9:45 AM MD Samanta López [facility]   2020  3:20 PM DONALDO Linn CNP [place], [place]

## 2020-03-12 NOTE — ED PROVIDER NOTES
RBC 5.21 4.20 - 5.90 M/uL    Hemoglobin 15.2 13.5 - 17.5 g/dL    Hematocrit 44.9 40.5 - 52.5 %    MCV 86.2 80.0 - 100.0 fL    MCH 29.2 26.0 - 34.0 pg    MCHC 33.8 31.0 - 36.0 g/dL    RDW 15.1 12.4 - 15.4 %    Platelets 762 351 - 915 K/uL    MPV 7.8 5.0 - 10.5 fL    Neutrophils % 65.2 %    Lymphocytes % 22.5 %    Monocytes % 7.8 %    Eosinophils % 3.5 %    Basophils % 1.0 %    Neutrophils Absolute 7.5 1.7 - 7.7 K/uL    Lymphocytes Absolute 2.6 1.0 - 5.1 K/uL    Monocytes Absolute 0.9 0.0 - 1.3 K/uL    Eosinophils Absolute 0.4 0.0 - 0.6 K/uL    Basophils Absolute 0.1 0.0 - 0.2 K/uL   Comprehensive Metabolic Panel w/ Reflex to MG   Result Value Ref Range    Sodium 137 136 - 145 mmol/L    Potassium reflex Magnesium 4.2 3.5 - 5.1 mmol/L    Chloride 101 99 - 110 mmol/L    CO2 25 21 - 32 mmol/L    Anion Gap 11 3 - 16    Glucose 133 (H) 70 - 99 mg/dL    BUN 11 7 - 20 mg/dL    CREATININE 0.7 (L) 0.9 - 1.3 mg/dL    GFR Non-African American >60 >60    GFR African American >60 >60    Calcium 9.3 8.3 - 10.6 mg/dL    Total Protein 7.2 6.4 - 8.2 g/dL    Alb 3.9 3.4 - 5.0 g/dL    Albumin/Globulin Ratio 1.2 1.1 - 2.2    Total Bilirubin 0.4 0.0 - 1.0 mg/dL    Alkaline Phosphatase 95 40 - 129 U/L    ALT 18 10 - 40 U/L    AST 17 15 - 37 U/L    Globulin 3.3 g/dL   Troponin   Result Value Ref Range    Troponin <0.01 <0.01 ng/mL   Troponin   Result Value Ref Range    Troponin <0.01 <0.01 ng/mL   Troponin   Result Value Ref Range    Troponin <0.01 <0.01 ng/mL   CBC   Result Value Ref Range    WBC 10.8 4.0 - 11.0 K/uL    RBC 5.30 4.20 - 5.90 M/uL    Hemoglobin 15.6 13.5 - 17.5 g/dL    Hematocrit 45.1 40.5 - 52.5 %    MCV 85.2 80.0 - 100.0 fL    MCH 29.5 26.0 - 34.0 pg    MCHC 34.6 31.0 - 36.0 g/dL    RDW 15.7 (H) 12.4 - 15.4 %    Platelets 555 410 - 200 K/uL    MPV 8.1 5.0 - 10.5 fL   Basic Metabolic Panel   Result Value Ref Range    Sodium 140 136 - 145 mmol/L    Potassium 3.9 3.5 - 5.1 mmol/L    Chloride 101 99 - 110 mmol/L    CO2 28 21 - 32 mmol/L    Anion Gap 11 3 - 16    Glucose 102 (H) 70 - 99 mg/dL    BUN 19 7 - 20 mg/dL    CREATININE 0.8 (L) 0.9 - 1.3 mg/dL    GFR Non-African American >60 >60    GFR African American >60 >60    Calcium 9.6 8.3 - 10.6 mg/dL   Magnesium   Result Value Ref Range    Magnesium 2.10 1.80 - 2.40 mg/dL   Ejection Fraction Percentage   Result Value Ref Range    Left Ventricular Ejection Fraction High Value 55 %   POCT Glucose   Result Value Ref Range    POC Glucose 104 (H) 70 - 99 mg/dl    Performed on ACCU-CHEK    POCT Glucose   Result Value Ref Range    POC Glucose 107 (H) 70 - 99 mg/dl    Performed on ACCU-CHEK    POCT Glucose   Result Value Ref Range    POC Glucose 112 (H) 70 - 99 mg/dl    Performed on ACCU-CHEK    POCT Glucose   Result Value Ref Range    POC Glucose 139 (H) 70 - 99 mg/dl    Performed on ACCU-CHEK    POCT Glucose   Result Value Ref Range    POC Glucose 101 (H) 70 - 99 mg/dl    Performed on ACCU-CHEK    POCT Glucose   Result Value Ref Range    POC Glucose 104 (H) 70 - 99 mg/dl    Performed on ACCU-CHEK    POCT Glucose   Result Value Ref Range    POC Glucose 116 (H) 70 - 99 mg/dl    Performed on ACCU-CHEK    POCT Glucose   Result Value Ref Range    POC Glucose 138 (H) 70 - 99 mg/dl    Performed on ACCU-CHEK    POCT Glucose   Result Value Ref Range    POC Glucose 128 (H) 70 - 99 mg/dl    Performed on ACCU-CHEK    POCT Glucose   Result Value Ref Range    POC Glucose 114 (H) 70 - 99 mg/dl    Performed on ACCU-CHEK    POCT Glucose   Result Value Ref Range    POC Glucose 111 (H) 70 - 99 mg/dl    Performed on ACCU-CHEK    POC ACT-Low Range   Result Value Ref Range    POC ACT  Not Established sec   POC ACT-Low Range   Result Value Ref Range    POC ACT  Not Established sec   POC ACT-Low Range   Result Value Ref Range    POC ACT  Not Established sec   POC ACT-Low Range   Result Value Ref Range    POC ACT  Not Established sec   POC ACT-Low Range   Result Value Ref Range    POC ACT LR

## 2020-03-17 PROBLEM — J18.9 PNEUMONIA DUE TO ORGANISM: Status: RESOLVED | Noted: 2019-08-03 | Resolved: 2020-03-17

## 2020-03-18 ENCOUNTER — HOSPITAL ENCOUNTER (EMERGENCY)
Age: 55
Discharge: HOME | End: 2020-03-18
Attending: EMERGENCY MEDICINE
Payer: MEDICARE

## 2020-03-18 ENCOUNTER — APPOINTMENT (OUTPATIENT)
Dept: GENERAL RADIOLOGY | Age: 55
End: 2020-03-18
Payer: MEDICARE

## 2020-03-18 ENCOUNTER — OFFICE VISIT (OUTPATIENT)
Dept: FAMILY MEDICINE CLINIC | Age: 55
End: 2020-03-18
Payer: MEDICARE

## 2020-03-18 ENCOUNTER — TELEPHONE (OUTPATIENT)
Dept: FAMILY MEDICINE CLINIC | Age: 55
End: 2020-03-18

## 2020-03-18 VITALS
SYSTOLIC BLOOD PRESSURE: 114 MMHG | RESPIRATION RATE: 16 BRPM | HEIGHT: 72 IN | DIASTOLIC BLOOD PRESSURE: 78 MMHG | WEIGHT: 195 LBS | BODY MASS INDEX: 26.41 KG/M2 | OXYGEN SATURATION: 95 % | TEMPERATURE: 98.6 F | HEART RATE: 69 BPM

## 2020-03-18 VITALS
SYSTOLIC BLOOD PRESSURE: 121 MMHG | HEART RATE: 60 BPM | TEMPERATURE: 98.1 F | OXYGEN SATURATION: 95 % | BODY MASS INDEX: 26.56 KG/M2 | WEIGHT: 195.8 LBS | DIASTOLIC BLOOD PRESSURE: 77 MMHG

## 2020-03-18 LAB
A/G RATIO: 1.2 (ref 1.1–2.2)
ALBUMIN SERPL-MCNC: 4.2 G/DL (ref 3.4–5)
ALP BLD-CCNC: 95 U/L (ref 40–129)
ALT SERPL-CCNC: 30 U/L (ref 10–40)
ANION GAP SERPL CALCULATED.3IONS-SCNC: 12 MMOL/L (ref 3–16)
AST SERPL-CCNC: 41 U/L (ref 15–37)
BASOPHILS ABSOLUTE: 0.2 K/UL (ref 0–0.2)
BASOPHILS RELATIVE PERCENT: 1.2 %
BILIRUB SERPL-MCNC: 0.8 MG/DL (ref 0–1)
BUN BLDV-MCNC: 12 MG/DL (ref 7–20)
CALCIUM SERPL-MCNC: 9.3 MG/DL (ref 8.3–10.6)
CHLORIDE BLD-SCNC: 99 MMOL/L (ref 99–110)
CO2: 25 MMOL/L (ref 21–32)
CREAT SERPL-MCNC: 0.7 MG/DL (ref 0.9–1.3)
EKG ATRIAL RATE: 61 BPM
EKG DIAGNOSIS: NORMAL
EKG P AXIS: 22 DEGREES
EKG P-R INTERVAL: 178 MS
EKG Q-T INTERVAL: 432 MS
EKG QRS DURATION: 130 MS
EKG QTC CALCULATION (BAZETT): 434 MS
EKG R AXIS: -21 DEGREES
EKG T AXIS: -10 DEGREES
EKG VENTRICULAR RATE: 61 BPM
EOSINOPHILS ABSOLUTE: 0.3 K/UL (ref 0–0.6)
EOSINOPHILS RELATIVE PERCENT: 2.5 %
GFR AFRICAN AMERICAN: >60
GFR NON-AFRICAN AMERICAN: >60
GLOBULIN: 3.4 G/DL
GLUCOSE BLD-MCNC: 109 MG/DL (ref 70–99)
HCT VFR BLD CALC: 43.8 % (ref 40.5–52.5)
HEMOGLOBIN: 15.1 G/DL (ref 13.5–17.5)
LYMPHOCYTES ABSOLUTE: 2.5 K/UL (ref 1–5.1)
LYMPHOCYTES RELATIVE PERCENT: 19.7 %
MCH RBC QN AUTO: 29.4 PG (ref 26–34)
MCHC RBC AUTO-ENTMCNC: 34.6 G/DL (ref 31–36)
MCV RBC AUTO: 85 FL (ref 80–100)
MONOCYTES ABSOLUTE: 1 K/UL (ref 0–1.3)
MONOCYTES RELATIVE PERCENT: 7.8 %
NEUTROPHILS ABSOLUTE: 8.7 K/UL (ref 1.7–7.7)
NEUTROPHILS RELATIVE PERCENT: 68.8 %
PDW BLD-RTO: 14.9 % (ref 12.4–15.4)
PLATELET # BLD: 324 K/UL (ref 135–450)
PMV BLD AUTO: 8.2 FL (ref 5–10.5)
POTASSIUM REFLEX MAGNESIUM: 5.3 MMOL/L (ref 3.5–5.1)
POTASSIUM SERPL-SCNC: 3.8 MMOL/L (ref 3.5–5.1)
POTASSIUM SERPL-SCNC: 5.2 MMOL/L (ref 3.5–5.1)
PRO-BNP: 14 PG/ML (ref 0–124)
RBC # BLD: 5.15 M/UL (ref 4.2–5.9)
SODIUM BLD-SCNC: 136 MMOL/L (ref 136–145)
TOTAL PROTEIN: 7.6 G/DL (ref 6.4–8.2)
TROPONIN: <0.01 NG/ML
TROPONIN: <0.01 NG/ML
WBC # BLD: 12.7 K/UL (ref 4–11)

## 2020-03-18 PROCEDURE — 93010 ELECTROCARDIOGRAM REPORT: CPT | Performed by: INTERNAL MEDICINE

## 2020-03-18 PROCEDURE — 83880 ASSAY OF NATRIURETIC PEPTIDE: CPT

## 2020-03-18 PROCEDURE — 93000 ELECTROCARDIOGRAM COMPLETE: CPT | Performed by: NURSE PRACTITIONER

## 2020-03-18 PROCEDURE — 93005 ELECTROCARDIOGRAM TRACING: CPT | Performed by: EMERGENCY MEDICINE

## 2020-03-18 PROCEDURE — 80053 COMPREHEN METABOLIC PANEL: CPT

## 2020-03-18 PROCEDURE — 84484 ASSAY OF TROPONIN QUANT: CPT

## 2020-03-18 PROCEDURE — 1111F DSCHRG MED/CURRENT MED MERGE: CPT | Performed by: NURSE PRACTITIONER

## 2020-03-18 PROCEDURE — 84132 ASSAY OF SERUM POTASSIUM: CPT

## 2020-03-18 PROCEDURE — 71046 X-RAY EXAM CHEST 2 VIEWS: CPT

## 2020-03-18 PROCEDURE — 85025 COMPLETE CBC W/AUTO DIFF WBC: CPT

## 2020-03-18 PROCEDURE — 99214 OFFICE O/P EST MOD 30 MIN: CPT | Performed by: NURSE PRACTITIONER

## 2020-03-18 PROCEDURE — 6370000000 HC RX 637 (ALT 250 FOR IP): Performed by: NURSE PRACTITIONER

## 2020-03-18 PROCEDURE — 99285 EMERGENCY DEPT VISIT HI MDM: CPT

## 2020-03-18 RX ORDER — ASPIRIN 81 MG/1
243 TABLET, CHEWABLE ORAL ONCE
Status: COMPLETED | OUTPATIENT
Start: 2020-03-18 | End: 2020-03-18

## 2020-03-18 RX ORDER — ARGININE HCL 1000 MG
1500 TABLET ORAL 2 TIMES DAILY
Qty: 60 TABLET | Refills: 5 | Status: SHIPPED | OUTPATIENT
Start: 2020-03-18 | End: 2020-09-02

## 2020-03-18 RX ADMIN — NITROGLYCERIN 0.5 INCH: 20 OINTMENT TOPICAL at 16:09

## 2020-03-18 RX ADMIN — ASPIRIN 81 MG 243 MG: 81 TABLET ORAL at 16:09

## 2020-03-18 ASSESSMENT — ENCOUNTER SYMPTOMS
EYES NEGATIVE: 1
VOMITING: 1
SHORTNESS OF BREATH: 1
NAUSEA: 1

## 2020-03-18 ASSESSMENT — PAIN SCALES - GENERAL
PAINLEVEL_OUTOF10: 3
PAINLEVEL_OUTOF10: 4

## 2020-03-18 ASSESSMENT — PAIN DESCRIPTION - PAIN TYPE: TYPE: ACUTE PAIN

## 2020-03-18 ASSESSMENT — PAIN DESCRIPTION - LOCATION: LOCATION: CHEST

## 2020-03-18 NOTE — PROGRESS NOTES
Post-Discharge Transitional Care Management Services or Hospital Follow Up      Kar Salguero   YOB: 1965    Date of Office Visit:  3/18/2020  Date of Hospital Admission: 3/7/20  Date of Hospital Discharge: 3/10/20  Readmission Risk Score(high >=14%.  Medium >=10%):Readmission Risk Score: 18      Care management risk score Rising risk (score 2-5) and Complex Care (Scores >=6): 13     Non face to face  following discharge, date last encounter closed (first attempt may have been earlier): 3/11/2020 12:01 PM 3/11/2020 12:01 PM    Call initiated 2 business days of discharge: Yes     Patient Active Problem List   Diagnosis    Family history of early CAD    S/P CABG (coronary artery bypass graft)    Benign essential HTN    Hyperlipidemia    CAD (coronary artery disease)    Microcytosis    Acute glaucoma of right eye    Acute chest pain    Paresthesia    Acute calculous cholecystitis    Type 2 diabetes mellitus without complication, without long-term current use of insulin ([address]    S/P laparoscopic cholecystectomy    Abnormal stress test    Pre-syncope    Chest pain    Angina, class II ([address]    Cerebrovascular accident (CVA) ([address]    Chest pain in adult    Hypertension    Migraines       Allergies   Allergen Reactions    Nsaids      History of stomach ulcers    Tramadol Other (See Comments)    Metformin And Related Nausea And Vomiting       Medications listed as ordered at the time of discharge from hospital   [facility] Medication Instructions KRISTA:    Printed on:03/18/20 6189   Medication Information                      amLODIPine (NORVASC) 5 MG tablet  Take 1 tablet by mouth daily             aspirin 81 MG tablet  Take 81 mg by mouth daily             atorvastatin (LIPITOR) 80 MG tablet  Take 1 tablet by mouth nightly             FREESTYLE LANCETS MISC  1 each by Does not apply route daily             FREESTYLE LITE strip  USE TO TEST ONCE A DAY AS NEEDED glucose monitoring kit (FREESTYLE) monitoring kit  1 kit by Does not apply route daily Monitor glucose daily. Diagnosis: diabetes mellitus Type 2             l-arginine 1000 MG TABS  Take 1,000 mg by mouth 2 times daily             meloxicam (MOBIC) 15 MG tablet  TAKE 1 TABLET BY MOUTH DAILY AS NEEDED FOR PAIN             metFORMIN (GLUCOPHAGE) 500 MG tablet  TAKE 1 TABLET BY MOUTH EVERY DAY WITH BREAKFAST             metoprolol tartrate (LOPRESSOR) 50 MG tablet  Take 1 tablet by mouth 2 times daily             nitroGLYCERIN (NITROSTAT) 0.4 MG SL tablet  PLACE 1 TABLET UNDER THE TONGUE AS NEEDED, MAX OF 3 DOSES, IF NO RELIEF CALL 911             ticagrelor (BRILINTA) 90 MG TABS tablet  Take 1 tablet by mouth 2 times daily                   Medications marked \"taking\" at this time  Outpatient Medications Marked as Taking for the 3/18/20 encounter (Office Visit) with DONALDO Linn - CNP   Medication Sig Dispense Refill    atorvastatin (LIPITOR) 80 MG tablet Take 1 tablet by mouth nightly 30 tablet 11    ticagrelor (BRILINTA) 90 MG TABS tablet Take 1 tablet by mouth 2 times daily 60 tablet 11    l-arginine 1000 MG TABS Take 1,000 mg by mouth 2 times daily 60 tablet 5    metoprolol tartrate (LOPRESSOR) 50 MG tablet Take 1 tablet by mouth 2 times daily 60 tablet 3    amLODIPine (NORVASC) 5 MG tablet Take 1 tablet by mouth daily 30 tablet 3    FREESTYLE LITE strip USE TO TEST ONCE A DAY AS NEEDED 50 strip 7    metFORMIN (GLUCOPHAGE) 500 MG tablet TAKE 1 TABLET BY MOUTH EVERY DAY WITH BREAKFAST 30 tablet 5    meloxicam (MOBIC) 15 MG tablet TAKE 1 TABLET BY MOUTH DAILY AS NEEDED FOR PAIN  3    glucose monitoring kit (FREESTYLE) monitoring kit 1 kit by Does not apply route daily Monitor glucose daily.  Diagnosis: diabetes mellitus Type 2 1 kit 0    FREESTYLE LANCETS MISC 1 each by Does not apply route daily 100 each 3    aspirin 81 MG tablet Take 81 mg by mouth daily          Medications patient taking

## 2020-03-18 NOTE — ED PROVIDER NOTES
I independently performed a history and physical on Diana Taylor. All diagnostic, treatment, and disposition decisions were made by myself in conjunction with the advanced practice provider.     -Diana Taylor is a 53 y.o. male with history of CAD, hypertension, diabetes, hyperlipidemia presents to ED for chest pain. Laureano reports he had a stent placed last week and started to have chest pain that started on Monday, described as pressure, nonradiating, intermittent. Was seen at PCPs office today and was sent to the ED.  -PE: well appearing, nontoxic, not in acute distress. RRR, CTAB/l, no w/r/r, abdomen soft, ND, NTTP, + BS x 4, no rigidity, rebound, guarding  -Chart review patient is status post CABG 2016, was again admitted 3/7/2020 and underwent PCI with SEGUNDO to proximal/mid LCx on 3/9/2020  -lab workup was negative for any acute findings. Cardiac enzyme was negative. Chest x-ray shows probable small left pleural effusion.  -The Ekg interpreted by me shows  normal sinus rhythm with a rate of 61  Axis is   Normal  QTc is  434  Intervals and Durations are unremarkable. ST Segments: no acute change  No significant change from prior EKG dated 3/10/20  -JOSE ANTONIO Maradiaga spoke with cardiology Dr. Lawrence Guerrero who is familiar with patient, per Dr. Lawrence Guerrero can repeat troponin and if that is negative then can discharge home and increase patient's L-arginine and to take nitroglycerin if he further experiences chest pain Strict ED return precautions given for new/worsending symptoms. Patient and family in agreement with plan, verbally confirm understanding and have no further questions/concerns. For further details of [facility] emergency department encounter, please see JOSE ANTONIO Navarrete documentation.         Dangelo Noble MD  03/19/20 6074 To get better and follow your care plan as instructed.

## 2020-03-18 NOTE — PATIENT INSTRUCTIONS
Please read the healthy family handout that you were given and share it with your family. Please compare this printed medication list with your medications at home to be sure they are the same. If you have any medications that are different please contact us immediately at [phone]. Also review your allergies that we have listed, these may also include medications that you have not been able to tolerate, make sure everything listed is correct. If you have any allergies that are different please contact us immediately at [phone]. Patient Education        Chest Pain: Care Instructions  Your Care Instructions    There are many things that can cause chest pain. Some are not serious and will get better on their own in a few days. But some kinds of chest pain need more testing and treatment. Your doctor may have recommended a follow-up visit in the next 8 to 12 hours. If you are not getting better, you may need more tests or treatment. Even though your doctor has released you, you still need to watch for any problems. The doctor carefully checked you, but sometimes problems can develop later. If you have new symptoms or if your symptoms do not get better, get medical care right away. If you have worse or different chest pain or pressure that lasts more than 5 minutes or you passed out (lost consciousness), call 911 or seek other emergency help right away. A medical visit is only one step in your treatment. Even if you feel better, you still need to do what your doctor recommends, such as going to all suggested follow-up appointments and taking medicines exactly as directed. This will help you recover and help prevent future problems. How can you care for yourself at home? · Rest until you feel better. · Take your medicine exactly as prescribed. Call your doctor if you think you are having a problem with your medicine. · Do not drive after taking a prescription pain medicine.   When should you call for help? Call 911 if:    · You passed out (lost consciousness).     · You have severe difficulty breathing.     · You have symptoms of a heart attack. These may include:  ? Chest pain or pressure, or a strange feeling in your chest.  ? Sweating. ? Shortness of breath. ? Nausea or vomiting. ? Pain, pressure, or a strange feeling in your back, neck, jaw, or upper belly or in one or both shoulders or arms. ? Lightheadedness or sudden weakness. ? A fast or irregular heartbeat. After you call  911, the  may tell you to chew 1 adult-strength or 2 to 4 low-dose aspirin. Wait for an ambulance. Do not try to drive yourself.    Call your doctor today if:    · You have any trouble breathing.     · Your chest pain gets worse.     · You are dizzy or lightheaded, or you feel like you may faint.     · You are not getting better as expected.     · You are having new or different chest pain. Where can you learn more? Go to [email or web address]. org and sign in to your [employer] account. Enter A120 in the [facility] box to learn more about \"Chest Pain: Care Instructions. \"     If you do not have an account, please click on the \"Sign Up Now\" link. Current as of: June 26, 2019Content Version: 12.4  © 7987-0699 Healthwise, Incorporated. Care instructions adapted under license by [facility] ([facility]). If you have questions about a medical condition or this instruction, always ask your healthcare professional. [place] [address] any warranty or liability for your use of this information.

## 2020-03-18 NOTE — ED NOTES
Bed: 11  Expected date:   Expected time:   Means of arrival:   Comments:  Opens at [address], [facility]  03/18/20 7434

## 2020-03-18 NOTE — ED PROVIDER NOTES
[facility] Emergency Department    CHIEF COMPLAINT  Chest Pain (Pt reports chest pain started on Monday; pt reports that he was seen in hospital for chest pain last Monday )      HISTORY OF PRESENT ILLNESS  Vasquez Cornelius is a 53 y.o. male who presents to the ED complaining of chest pain. Patient has a notable history of coronary artery disease, hypertension, diabetes, hyperlipidemia. Patient has a history of a CABG in 2016 and most recently a stent to the left circumflex on March 9. Patient reports that he has been having chest pain that he describes as a \"pressure and tightness\" that is worse with exertion. Patient reports that the chest pain started Monday and has been constant. Patient also reports associated dizziness and blurred vision that will generally only last in the morning and then resolve throughout the day. Patient reports vomiting started today. Patient was in his primary care physician's office this morning who sent him to the emergency department for further evaluation. Per primary care physician he had a unchanged EKG in the office. Upon arrival to the emergency department patient reports he did take his 81 mg aspirin and he has been taking all of his medications as prescribed, but he did not take any nitroglycerin today or since the chest pain started on Monday. Patient denies any recent travel or known sick contacts. Patient denies measurable fever, chills, body aches, cough, nasal congestion, mild process, shortness of breath, abdominal pain, diarrhea, constipation, dysuria, hematuria, urinary frequency urgency, flank pain, leg swelling, calf pain, palpitations. No other complaints, modifying factors or associated symptoms. Nursing notes reviewed.    Past Medical History:   Diagnosis Date    CAD (coronary artery disease) 2016    Diabetes mellitus ([address]     Hyperlipidemia     Hypertension     MI (my[facility] infarction) [facility]     No history of procedure 06/01/2017    colonoscopy    Pneumonia due to organism 8/3/2019     Past Surgical History:   Procedure Laterality Date    CHOLECYSTECTOMY, LAPAROSCOPIC N/A 4/17/2019    LAPAROSCOPIC CHOLECYSTECTOMY WITH INTRAOPERATIVE CHOLANGIOGRAM performed by Latonya Cagle MD at [facility] COLONOSCOPY  06/01/2017    Colonic polyp    CORONARY ARTERY BYPASS GRAFT  09/20/2016    LIMA- LAD, Reverse SVG- Rosalba, reverse SVG- PDA, Reverse SVG- OM seq- Diag    EYE SURGERY Right     drain placed behind eye    HERNIA REPAIR Bilateral 2005    bilateral inguinal hernia repair     Family History   Problem Relation Age of Onset    Heart Disease Father     Diabetes type 2  Father     Cancer Mother     Heart Disease Brother      Social History     Socioeconomic History    Marital status:      Spouse name: Not on file    Number of children: Not on file    Years of education: Not on file    Highest education level: Not on file   Occupational History    Not on file   Social Needs    Financial resource strain: Not on file    Food insecurity     Worry: Not on file     Inability: Not on file    Transportation needs     Medical: Not on file     Non-medical: Not on file   Tobacco Use    Smoking status: Never Smoker    Smokeless tobacco: Never Used   Substance and Sexual Activity    Alcohol use: No    Drug use: No    Sexual activity: Yes     Partners: Female   Lifestyle    Physical activity     Days per week: Not on file     Minutes per session: Not on file    Stress: Not on file   Relationships    Social connections     Talks on phone: Not on file     Gets together: Not on file     Attends Faith service: Not on file     Active member of club or organization: Not on file     Attends meetings of clubs or organizations: Not on file     Relationship status: Not on file    Intimate partner violence     Fear of current or ex partner: Not on file     Emotionally abused: Not on file     Physically abused: Not on file     Forced sexual activity: Not on file   Other Topics Concern    Not on file   Social History Narrative    Not on file     No current facility-administered medications for this encounter. Current Outpatient Medications   Medication Sig Dispense Refill    l-arginine 1000 MG TABS Take 1,500 mg by mouth 2 times daily 60 tablet 5    atorvastatin (LIPITOR) 80 MG tablet Take 1 tablet by mouth nightly 30 tablet 11    ticagrelor (BRILINTA) 90 MG TABS tablet Take 1 tablet by mouth 2 times daily 60 tablet 11    metoprolol tartrate (LOPRESSOR) 50 MG tablet Take 1 tablet by mouth 2 times daily 60 tablet 3    amLODIPine (NORVASC) 5 MG tablet Take 1 tablet by mouth daily 30 tablet 3    FREESTYLE LITE strip USE TO TEST ONCE A DAY AS NEEDED 50 strip 7    metFORMIN (GLUCOPHAGE) 500 MG tablet TAKE 1 TABLET BY MOUTH EVERY DAY WITH BREAKFAST 30 tablet 5    meloxicam (MOBIC) 15 MG tablet TAKE 1 TABLET BY MOUTH DAILY AS NEEDED FOR PAIN  3    nitroGLYCERIN (NITROSTAT) 0.4 MG SL tablet PLACE 1 TABLET UNDER THE TONGUE AS NEEDED, MAX OF 3 DOSES, IF NO RELIEF CALL 911 (Patient not taking: Reported on 3/18/2020) 25 tablet 0    glucose monitoring kit (FREESTYLE) monitoring kit 1 kit by Does not apply route daily Monitor glucose daily. Diagnosis: diabetes mellitus Type 2 1 kit 0    FREESTYLE LANCETS MISC 1 each by Does not apply route daily 100 each 3    aspirin 81 MG tablet Take 81 mg by mouth daily       Allergies   Allergen Reactions    Nsaids      History of stomach ulcers    Tramadol Other (See Comments)    Metformin And Related Nausea And Vomiting       REVIEW OF SYSTEMS  10 systems reviewed, pertinent positives per HPI otherwise noted to be negative    PHYSICAL EXAM  /78   Pulse 69   Temp 98.6 °F (37 °C) (Oral)   Resp 16   Ht 6' (1.829 m)   Wt 195 lb (88.5 kg)   SpO2 95%   BMI 26.45 kg/m²   GENERAL APPEARANCE: Awake and alert. Cooperative. No acute distress. Vital signs are stable. 124 pg/mL   Potassium   Result Value Ref Range    Potassium 5.2 (H) 3.5 - 5.1 mmol/L   Troponin   Result Value Ref Range    Troponin <0.01 <0.01 ng/mL   Potassium   Result Value Ref Range    Potassium 3.8 3.5 - 5.1 mmol/L   EKG 12 Lead   Result Value Ref Range    Ventricular Rate 61 BPM    Atrial Rate 61 BPM    P-R Interval 178 ms    QRS Duration 130 ms    Q-T Interval 432 ms    QTc Calculation (Bazett) 434 ms    P Axis 22 degrees    R Axis -21 degrees    T Axis -10 degrees    Diagnosis       Normal sinus rhythmRight bundle branch blockMinimal voltage criteria for LVH, may be normal variantInferior infarct , age undeterminedAbnormal ECGWhen compared with ECG of 10-MAR-2020 11:11,No significant change was foundConfirmed by Rosa M Fox MD, Stefanie Kwan ([id number]) on 3/18/2020 4:42:09 PM       I estimate there is LOW risk for PULMONARY EMBOLISM, ACUTE CORONARY SYNDROME, OR THORACIC AORTIC DISSECTION, thus I consider the discharge disposition reasonable. Vasquez Disla and I have discussed the diagnosis and risks, and we agree with discharging home to follow-up with their primary doctor. We also discussed returning to the Emergency Department immediately if new or worsening symptoms occur. We have discussed the symptoms which are most concerning (e.g., bloody sputum, fever, worsening pain or shortness of breath, vomiting) that necessitate immediate return. FINAL Impression    1. Angina pectoris (HCC)        Blood pressure 114/78, pulse 69, temperature 98.6 °F (37 °C), temperature source Oral, resp. rate 16, height 6' (1.829 m), weight 195 lb (88.5 kg), SpO2 95 %.mdm    Patient was sent home with a prescription for below medication/s. I did [place] patient on appropriate use of these medication.   Discharge Medication List as of 3/18/2020  6:37 PM              FOLLOW UP  Carmine Guajardo MD  [address]  [phone]    Schedule an appointment as soon as possible for a visit   follow up    [facility]  ED  [address]  Go to   As needed, If symptoms worsen      DISPOSITION  Patient was discharged to home in good condition. Comment: Please note this report has been produced using speech recognition software and may contain errors related to that system including errors in grammar, punctuation, and spelling, as well as words and phrases that may be inappropriate. If there are any questions or concerns please feel free to contact the dictating provider for clarification.             [id number] Enid Pringle, APRN - CNP  03/18/20 0835

## 2020-03-18 NOTE — ED NOTES
Called Cardiology @ 0855 reg chest pain, recent stent per Viktor Louis, APRN - CNP  Dr Zoey Rodas returned call @ [facility] [place] [facility]  03/18/20 2118

## 2020-03-19 ENCOUNTER — CARE COORDINATION (OUTPATIENT)
Dept: CASE MANAGEMENT | Age: 55
End: 2020-03-19

## 2020-04-02 ENCOUNTER — CARE COORDINATION (OUTPATIENT)
Dept: CASE MANAGEMENT | Age: 55
End: 2020-04-02

## 2020-04-02 NOTE — CARE COORDINATION
You Patient resolved from the Care Transitions episode on 3/19/20  Spoke with patient and then call abruptly ended  Patient called back and information reviewed. Patient/family has been provided the following resources and education related to COVID-19:                         Signs, symptoms and red flags related to COVID-19            CDC exposure and quarantine guidelines            [facility] exposure contact - [phone]            Contact for their local Department of Health                 Patient currently reports that the following symptoms have improved:  Patient reports all symptoms resolved and no new/worsening symptoms     No further outreach scheduled with this ACM. Episode of Care resolved. Patient has this ACM contact information if future needs arise.

## 2020-04-28 ENCOUNTER — TELEPHONE (OUTPATIENT)
Dept: PRIMARY CARE CLINIC | Age: 55
End: 2020-04-28

## 2020-05-05 ENCOUNTER — VIRTUAL VISIT (OUTPATIENT)
Dept: FAMILY MEDICINE CLINIC | Age: 55
End: 2020-05-05
Payer: MEDICARE

## 2020-05-05 PROCEDURE — 3044F HG A1C LEVEL LT 7.0%: CPT | Performed by: NURSE PRACTITIONER

## 2020-05-05 PROCEDURE — 2022F DILAT RTA XM EVC RTNOPTHY: CPT | Performed by: NURSE PRACTITIONER

## 2020-05-05 PROCEDURE — G8427 DOCREV CUR MEDS BY ELIG CLIN: HCPCS | Performed by: NURSE PRACTITIONER

## 2020-05-05 PROCEDURE — 99213 OFFICE O/P EST LOW 20 MIN: CPT | Performed by: NURSE PRACTITIONER

## 2020-05-05 PROCEDURE — 3017F COLORECTAL CA SCREEN DOC REV: CPT | Performed by: NURSE PRACTITIONER

## 2020-05-05 ASSESSMENT — ENCOUNTER SYMPTOMS
ALLERGIC/IMMUNOLOGIC NEGATIVE: 1
EYES NEGATIVE: 1
COUGH: 0
SHORTNESS OF BREATH: 0
GASTROINTESTINAL NEGATIVE: 1
RESPIRATORY NEGATIVE: 1

## 2020-05-05 ASSESSMENT — PATIENT HEALTH QUESTIONNAIRE - PHQ9
SUM OF ALL RESPONSES TO PHQ9 QUESTIONS 1 & 2: 0
1. LITTLE INTEREST OR PLEASURE IN DOING THINGS: 0
SUM OF ALL RESPONSES TO PHQ QUESTIONS 1-9: 0
SUM OF ALL RESPONSES TO PHQ QUESTIONS 1-9: 0
2. FEELING DOWN, DEPRESSED OR HOPELESS: 0

## 2020-05-05 NOTE — PROGRESS NOTES
Abnormal -         Discharge [x]  None visible  [] Abnormal -    HENT:   [x] Normocephalic, atraumatic. [] Abnormal   [x] Mouth/Throat: Mucous membranes are moist.     External Ears [x] Normal  [] Abnormal-     Neck: [x] No visualized mass     Pulmonary/Chest: [x] Respiratory effort normal.  [x] No visualized signs of difficulty breathing or respiratory distress        [] Abnormal-      Musculoskeletal:   [x] Normal gait with no signs of ataxia         [x] Normal range of motion of neck        [] Abnormal-       Neurological:        [x] No Facial Asymmetry (Cranial nerve 7 motor function) (limited exam to video visit)          [x] No gaze palsy        [] Abnormal-         Skin:        [x] No significant exanthematous lesions or discoloration noted on facial skin         [] Abnormal-            Psychiatric:       [x] Normal Affect [x] No Hallucinations        [] Abnormal-     Other pertinent observable physical exam findings-     ASSESSMENT/PLAN:  1. Type 2 diabetes mellitus without complication, without long-term current use of insulin (HCC)  Patient presents today to follow-up on chronic conditions including diabetes, hypertension, coronary artery disease and hyperlipidemia. Patient reports fasting blood sugars typically run . Patient denies any signs or symptoms of hypo-hyperglycemia. Patient reports he is tolerating medications without any side effects. Last A1c was 6.4% on 1/25/2020. Recommend patient continue with current medication regimen and follow-up in office in 2 months for in-person visit and fasting blood work. Patient verbalized understanding and agreeable to plan. 2. Essential hypertension  Patient monitors blood pressure daily and reports systolic blood pressure 896H 606Z and diastolic blood pressure 70 to 80s. Patient denies any chest pain, shortness of breath, palpitations, dizziness, peripheral edema, headache, syncope or near syncope.   Recommend patient continue with current

## 2020-05-20 RX ORDER — MELOXICAM 7.5 MG/1
7.5 TABLET ORAL DAILY
Qty: 90 TABLET | Refills: 1 | OUTPATIENT
Start: 2020-05-20

## 2020-05-26 RX ORDER — METOPROLOL TARTRATE 50 MG/1
TABLET, FILM COATED ORAL
Qty: 180 TABLET | Refills: 3 | Status: SHIPPED | OUTPATIENT
Start: 2020-05-26 | End: 2021-05-24

## 2020-05-26 RX ORDER — AMLODIPINE BESYLATE 5 MG/1
TABLET ORAL
Qty: 90 TABLET | Refills: 3 | Status: SHIPPED | OUTPATIENT
Start: 2020-05-26 | End: 2021-05-24

## 2020-07-06 ENCOUNTER — APPOINTMENT (OUTPATIENT)
Dept: GENERAL RADIOLOGY | Age: 55
DRG: 287 | End: 2020-07-06
Payer: MEDICARE

## 2020-07-06 ENCOUNTER — HOSPITAL ENCOUNTER (INPATIENT)
Age: 55
LOS: 3 days | Discharge: HOME | DRG: 287 | End: 2020-07-09
Attending: EMERGENCY MEDICINE | Admitting: INTERNAL MEDICINE
Payer: MEDICARE

## 2020-07-06 PROBLEM — I20.0 UNSTABLE ANGINA (HCC): Status: ACTIVE | Noted: 2020-07-06

## 2020-07-06 LAB
A/G RATIO: 1.4 (ref 1.1–2.2)
ALBUMIN SERPL-MCNC: 4.5 G/DL (ref 3.4–5)
ALP BLD-CCNC: 123 U/L (ref 40–129)
ALT SERPL-CCNC: 30 U/L (ref 10–40)
ANION GAP SERPL CALCULATED.3IONS-SCNC: 12 MMOL/L (ref 3–16)
APTT: 33.4 SEC (ref 24.2–36.2)
AST SERPL-CCNC: 25 U/L (ref 15–37)
BASOPHILS ABSOLUTE: 0.2 K/UL (ref 0–0.2)
BASOPHILS RELATIVE PERCENT: 1.3 %
BILIRUB SERPL-MCNC: 0.7 MG/DL (ref 0–1)
BUN BLDV-MCNC: 11 MG/DL (ref 7–20)
CALCIUM SERPL-MCNC: 9.6 MG/DL (ref 8.3–10.6)
CHLORIDE BLD-SCNC: 100 MMOL/L (ref 99–110)
CO2: 25 MMOL/L (ref 21–32)
CREAT SERPL-MCNC: 0.9 MG/DL (ref 0.9–1.3)
EKG ATRIAL RATE: 67 BPM
EKG DIAGNOSIS: NORMAL
EKG P AXIS: 15 DEGREES
EKG P-R INTERVAL: 168 MS
EKG Q-T INTERVAL: 408 MS
EKG QRS DURATION: 140 MS
EKG QTC CALCULATION (BAZETT): 431 MS
EKG R AXIS: -24 DEGREES
EKG T AXIS: -14 DEGREES
EKG VENTRICULAR RATE: 67 BPM
EOSINOPHILS ABSOLUTE: 0.4 K/UL (ref 0–0.6)
EOSINOPHILS RELATIVE PERCENT: 3.5 %
GFR AFRICAN AMERICAN: >60
GFR NON-AFRICAN AMERICAN: >60
GLOBULIN: 3.2 G/DL
GLUCOSE BLD-MCNC: 110 MG/DL (ref 70–99)
GLUCOSE BLD-MCNC: 93 MG/DL (ref 70–99)
HCT VFR BLD CALC: 46.3 % (ref 40.5–52.5)
HEMOGLOBIN: 15.8 G/DL (ref 13.5–17.5)
INR BLD: 1.08 (ref 0.86–1.14)
LYMPHOCYTES ABSOLUTE: 3.3 K/UL (ref 1–5.1)
LYMPHOCYTES RELATIVE PERCENT: 26.8 %
MCH RBC QN AUTO: 29.1 PG (ref 26–34)
MCHC RBC AUTO-ENTMCNC: 34.1 G/DL (ref 31–36)
MCV RBC AUTO: 85.3 FL (ref 80–100)
MONOCYTES ABSOLUTE: 1.3 K/UL (ref 0–1.3)
MONOCYTES RELATIVE PERCENT: 10.9 %
NEUTROPHILS ABSOLUTE: 7 K/UL (ref 1.7–7.7)
NEUTROPHILS RELATIVE PERCENT: 57.5 %
PDW BLD-RTO: 15 % (ref 12.4–15.4)
PERFORMED ON: ABNORMAL
PLATELET # BLD: 309 K/UL (ref 135–450)
PMV BLD AUTO: 7.8 FL (ref 5–10.5)
POTASSIUM SERPL-SCNC: 3.8 MMOL/L (ref 3.5–5.1)
PROTHROMBIN TIME: 12.5 SEC (ref 10–13.2)
RBC # BLD: 5.43 M/UL (ref 4.2–5.9)
SODIUM BLD-SCNC: 137 MMOL/L (ref 136–145)
TOTAL PROTEIN: 7.7 G/DL (ref 6.4–8.2)
TROPONIN: <0.01 NG/ML
WBC # BLD: 12.2 K/UL (ref 4–11)

## 2020-07-06 PROCEDURE — 93010 ELECTROCARDIOGRAM REPORT: CPT | Performed by: INTERNAL MEDICINE

## 2020-07-06 PROCEDURE — 36415 COLL VENOUS BLD VENIPUNCTURE: CPT

## 2020-07-06 PROCEDURE — 71046 X-RAY EXAM CHEST 2 VIEWS: CPT

## 2020-07-06 PROCEDURE — 6370000000 HC RX 637 (ALT 250 FOR IP): Performed by: EMERGENCY MEDICINE

## 2020-07-06 PROCEDURE — 99285 EMERGENCY DEPT VISIT HI MDM: CPT

## 2020-07-06 PROCEDURE — 83036 HEMOGLOBIN GLYCOSYLATED A1C: CPT

## 2020-07-06 PROCEDURE — 85025 COMPLETE CBC W/AUTO DIFF WBC: CPT

## 2020-07-06 PROCEDURE — 85730 THROMBOPLASTIN TIME PARTIAL: CPT

## 2020-07-06 PROCEDURE — 6370000000 HC RX 637 (ALT 250 FOR IP): Performed by: INTERNAL MEDICINE

## 2020-07-06 PROCEDURE — 84484 ASSAY OF TROPONIN QUANT: CPT

## 2020-07-06 PROCEDURE — 93005 ELECTROCARDIOGRAM TRACING: CPT | Performed by: EMERGENCY MEDICINE

## 2020-07-06 PROCEDURE — 2580000003 HC RX 258: Performed by: INTERNAL MEDICINE

## 2020-07-06 PROCEDURE — 85610 PROTHROMBIN TIME: CPT

## 2020-07-06 PROCEDURE — 80053 COMPREHEN METABOLIC PANEL: CPT

## 2020-07-06 PROCEDURE — 1200000000 HC SEMI PRIVATE

## 2020-07-06 RX ORDER — ASPIRIN 81 MG/1
81 TABLET, CHEWABLE ORAL DAILY
Status: DISCONTINUED | OUTPATIENT
Start: 2020-07-06 | End: 2020-07-09 | Stop reason: HOSPADM

## 2020-07-06 RX ORDER — AMLODIPINE BESYLATE 5 MG/1
5 TABLET ORAL DAILY
Status: DISCONTINUED | OUTPATIENT
Start: 2020-07-06 | End: 2020-07-09 | Stop reason: HOSPADM

## 2020-07-06 RX ORDER — ACETAMINOPHEN 650 MG/1
650 SUPPOSITORY RECTAL EVERY 6 HOURS PRN
Status: DISCONTINUED | OUTPATIENT
Start: 2020-07-06 | End: 2020-07-09 | Stop reason: HOSPADM

## 2020-07-06 RX ORDER — POLYETHYLENE GLYCOL 3350 17 G/17G
17 POWDER, FOR SOLUTION ORAL DAILY PRN
Status: DISCONTINUED | OUTPATIENT
Start: 2020-07-06 | End: 2020-07-09 | Stop reason: HOSPADM

## 2020-07-06 RX ORDER — DEXTROSE MONOHYDRATE 50 MG/ML
100 INJECTION, SOLUTION INTRAVENOUS PRN
Status: DISCONTINUED | OUTPATIENT
Start: 2020-07-06 | End: 2020-07-09 | Stop reason: HOSPADM

## 2020-07-06 RX ORDER — PROMETHAZINE HYDROCHLORIDE 25 MG/1
12.5 TABLET ORAL EVERY 6 HOURS PRN
Status: DISCONTINUED | OUTPATIENT
Start: 2020-07-06 | End: 2020-07-09 | Stop reason: HOSPADM

## 2020-07-06 RX ORDER — SODIUM CHLORIDE 0.9 % (FLUSH) 0.9 %
10 SYRINGE (ML) INJECTION PRN
Status: DISCONTINUED | OUTPATIENT
Start: 2020-07-06 | End: 2020-07-09 | Stop reason: HOSPADM

## 2020-07-06 RX ORDER — ACETAMINOPHEN 325 MG/1
650 TABLET ORAL EVERY 6 HOURS PRN
Status: DISCONTINUED | OUTPATIENT
Start: 2020-07-06 | End: 2020-07-09 | Stop reason: HOSPADM

## 2020-07-06 RX ORDER — ARGININE HCL 1000 MG
1500 TABLET ORAL 2 TIMES DAILY
Status: DISCONTINUED | OUTPATIENT
Start: 2020-07-06 | End: 2020-07-06 | Stop reason: RX

## 2020-07-06 RX ORDER — DEXTROSE MONOHYDRATE 25 G/50ML
12.5 INJECTION, SOLUTION INTRAVENOUS PRN
Status: DISCONTINUED | OUTPATIENT
Start: 2020-07-06 | End: 2020-07-09 | Stop reason: HOSPADM

## 2020-07-06 RX ORDER — ONDANSETRON 2 MG/ML
4 INJECTION INTRAMUSCULAR; INTRAVENOUS EVERY 6 HOURS PRN
Status: DISCONTINUED | OUTPATIENT
Start: 2020-07-06 | End: 2020-07-09 | Stop reason: HOSPADM

## 2020-07-06 RX ORDER — NICOTINE POLACRILEX 4 MG
15 LOZENGE BUCCAL PRN
Status: DISCONTINUED | OUTPATIENT
Start: 2020-07-06 | End: 2020-07-09 | Stop reason: HOSPADM

## 2020-07-06 RX ORDER — SODIUM CHLORIDE 0.9 % (FLUSH) 0.9 %
10 SYRINGE (ML) INJECTION EVERY 12 HOURS SCHEDULED
Status: DISCONTINUED | OUTPATIENT
Start: 2020-07-06 | End: 2020-07-09 | Stop reason: HOSPADM

## 2020-07-06 RX ORDER — ATORVASTATIN CALCIUM 80 MG/1
80 TABLET, FILM COATED ORAL NIGHTLY
Status: DISCONTINUED | OUTPATIENT
Start: 2020-07-06 | End: 2020-07-09 | Stop reason: HOSPADM

## 2020-07-06 RX ADMIN — ATORVASTATIN CALCIUM 80 MG: 80 TABLET, FILM COATED ORAL at 21:29

## 2020-07-06 RX ADMIN — NITROGLYCERIN 1 INCH: 20 OINTMENT TOPICAL at 13:53

## 2020-07-06 RX ADMIN — TICAGRELOR 90 MG: 90 TABLET ORAL at 21:29

## 2020-07-06 RX ADMIN — Medication 10 ML: at 21:30

## 2020-07-06 RX ADMIN — METOPROLOL TARTRATE 50 MG: 25 TABLET, FILM COATED ORAL at 21:30

## 2020-07-06 ASSESSMENT — PAIN DESCRIPTION - FREQUENCY: FREQUENCY: INTERMITTENT

## 2020-07-06 ASSESSMENT — PAIN DESCRIPTION - LOCATION: LOCATION: CHEST

## 2020-07-06 ASSESSMENT — PAIN DESCRIPTION - ORIENTATION: ORIENTATION: MID

## 2020-07-06 ASSESSMENT — PAIN DESCRIPTION - PAIN TYPE: TYPE: ACUTE PAIN

## 2020-07-06 ASSESSMENT — PAIN DESCRIPTION - DESCRIPTORS: DESCRIPTORS: TIGHTNESS

## 2020-07-06 ASSESSMENT — PAIN SCALES - GENERAL
PAINLEVEL_OUTOF10: 4
PAINLEVEL_OUTOF10: 2

## 2020-07-06 NOTE — H&P
Hospital Medicine History & Physical      PCP: DONALDO Paris CNP    Date of Admission: 7/6/2020    Date of Service: Pt seen/examined on 07/06/20 and Admitted to Inpatient with expected LOS greater than two midnights due to medical therapy. Chief Complaint:  Chest pain      History Of Present Illness:    63 y.o. male who presented to [facility] with chest pain. Pain has been present for about two weeks but become more severe over the past two days. Pain comes and goes without any clear trigger. Chest pain is associated with bilateral arm discomfort and diaphoresis. Patient has a prior history of CABG and had a stent placed about three months ago. The chest pain he is having is similar to prior cardiac episodes. He has not talked to his cardiologist since the pain started. He denies any fevers, chills, SOB, cough, nausea or diarrhea. Past Medical History:          Diagnosis Date    CAD (coronary artery disease) 2016    Diabetes mellitus ([facility]     Hyperlipidemia     Hypertension     MI (myocardial infarction) ([facility] [address] [facility]     No history of procedure 06/01/2017    colonoscopy    Pneumonia due to organism 8/3/2019       Past Surgical History:          Procedure Laterality Date    CHOLECYSTECTOMY, LAPAROSCOPIC N/A 4/17/2019    LAPAROSCOPIC CHOLECYSTECTOMY WITH INTRAOPERATIVE CHOLANGIOGRAM performed by Lindsay Beauchamp MD at [facility] COLONOSCOPY  06/01/2017    Colonic polyp    CORONARY ARTERY BYPASS GRAFT  09/20/2016    LIMA- LAD, Reverse SVG- Rosalba, reverse SVG- PDA, Reverse SVG- OM seq- Diag    EYE SURGERY Right     drain placed behind eye    HERNIA REPAIR Bilateral 2005    bilateral inguinal hernia repair       Medications Prior to Admission:      Prior to Admission medications    Medication Sig Start Date End Date Taking?  Authorizing Provider   metFORMIN (GLUCOPHAGE) 500 MG tablet TAKE 1 TABLET BY MOUTH EVERY DAY WITH BREAKFAST 5/26/20   DONALDO Paris CNP metoprolol tartrate (LOPRESSOR) 50 MG tablet TAKE 1 TABLET BY MOUTH TWICE A DAY 5/26/20   Stanford Hernandez MD   amLODIPine (NORVASC) 5 MG tablet TAKE 1 TABLET BY MOUTH EVERY DAY 5/26/20   Stanford Hernandez MD   l-arginine 1000 MG TABS Take 1,500 mg by mouth 2 times daily 3/18/20   DONALDO Pittman CNP   atorvastatin (LIPITOR) 80 MG tablet Take 1 tablet by mouth nightly 3/10/20   DONALDO Pollack CNP   ticagrelor (BRILINTA) 90 MG TABS tablet Take 1 tablet by mouth 2 times daily 3/10/20   DONALDO Pollack CNP   FREESTYLE LITE strip USE TO TEST ONCE A DAY AS NEEDED 1/20/20   DONALDO Paris CNP   meloxicam ([address]) 15 MG tablet TAKE 1 TABLET BY MOUTH DAILY AS NEEDED FOR PAIN 10/28/19   Historical Provider, MD   nitroGLYCERIN (NITROSTAT) 0.4 MG SL tablet PLACE 1 TABLET UNDER THE TONGUE AS NEEDED, MAX OF 3 DOSES, IF NO RELIEF CALL 911 7/12/19   DONALDO Paris CNP   glucose monitoring kit (FREESTYLE) monitoring kit 1 kit by Does not apply route daily Monitor glucose daily. Diagnosis: diabetes mellitus Type 2 5/21/19   DONALDO Paris CNP   FREESTYLE LANCETS MISC 1 each by Does not apply route daily 5/21/19   DONALDO Paris CNP   aspirin 81 MG tablet Take 81 mg by mouth daily    Historical Provider, MD       Allergies:  Patient has no known allergies. Social History:      The patient currently lives at home    TOBACCO:   reports that he has never smoked. He has never used smokeless tobacco.  ETOH:   reports no history of alcohol use. E-Cigarettes Vaping or Juuling     Questions Responses    Vaping Use Never User    Start Date     Does device contain nicotine? Never    Quit Date     Vaping Type Unknown            Family History:      Reviewed in detail and negative for CVA.  Positive as follows:        Problem Relation Age of Onset    Heart Disease Father     Diabetes type 2  Father     Cancer Mother     Heart Disease Brother        REVIEW OF SYSTEMS:   Pertinent positives as noted in the HPI. All other systems reviewed and negative. PHYSICAL EXAM PERFORMED:    BP 94/72   Pulse 71   Temp 98.3 °F (36.8 °C) (Oral)   Resp 18   Ht 6' (1.829 m)   Wt 200 lb (90.7 kg)   SpO2 97%   BMI 27.12 kg/m²     General appearance:  No apparent distress, appears stated age and cooperative. HEENT:  Normal cephalic, atraumatic without obvious deformity. Pupils equal, round, and reactive to light. Extra ocular muscles intact. Conjunctivae/corneas clear. Neck: Supple, with full range of motion. No jugular venous distention. Trachea midline. Respiratory:  Normal respiratory effort. Clear to auscultation, bilaterally without Rales/Wheezes/Rhonchi. Cardiovascular:  Regular rate and rhythm with normal S1/S2 without murmurs, rubs or gallops. Abdomen: Soft, non-tender, non-distended with normal bowel sounds. Musculoskeletal:  No clubbing, cyanosis or edema bilaterally. Full range of motion without deformity. Skin: Skin color, texture, turgor normal.  No rashes or lesions. Neurologic:  Neurovascularly intact without any focal sensory/motor deficits.  Cranial nerves: II-XII intact, grossly non-focal.  Psychiatric:  Alert and oriented, thought content appropriate, normal insight  Capillary Refill: Brisk,< 3 seconds   Peripheral Pulses: +2 palpable, equal bilaterally       Labs:     Recent Labs     07/06/20  1310   WBC 12.2*   HGB 15.8   HCT 46.3        Recent Labs     07/06/20  1310      K 3.8      CO2 25   BUN 11   CREATININE 0.9   CALCIUM 9.6     Recent Labs     07/06/20  1310   AST 25   ALT 30   BILITOT 0.7   ALKPHOS 123     Recent Labs     07/06/20  1310   INR 1.08     Recent Labs     07/06/20  1310   TROPONINI <0.01       Urinalysis:      Lab Results   Component Value Date    NITRU Negative 12/26/2019    WBCUA 0-2 03/27/2019    BACTERIA 1+ 03/27/2019    RBCUA 3-5 03/27/2019    BLOODU Negative 12/26/2019    SPECGRAV 1.015 12/26/2019    GLUCOSEU Negative 12/26/2019 Radiology:     CXR: I have reviewed the CXR with the following interpretation: no acute disease  EKG:  I have reviewed the EKG with the following interpretation: NSR, RBBB    XR CHEST STANDARD (2 VW)   Final Result   No acute finding or significant change. ASSESSMENT:    Active Hospital Problems    Diagnosis Date Noted    Unstable angina ([facility] [address] [I20.0] 07/06/2020         PLAN:  Unstable angina  - prior history of CABG, stenting  - EKG, trop negative  - cardiology consulted  - continue ASA, brilinta, metoprolol, lipitor, L-arginine  - no need for heparin gtt unless chest pain recurs  - defer further testing to cardiology  - tele    DMII  - well controlled  - holding home metformin  - SSI ordered    HTN  - well controlled  - continue home metoprolol, norvasc    HLD  - continue home statin    DVT Prophylaxis: lovenox  Diet: diabetic diet  Code Status: full code    PT/OT Eval Status: not ordered    Dispo - 1-2 days       Cary Jimenez MD    Thank you DONALDO Rosario - ELIZABETH for the opportunity to be involved in this patient's care. If you have any questions or concerns please feel free to contact me at [phone].

## 2020-07-06 NOTE — ED PROVIDER NOTES
1 TABLET BY MOUTH DAILY AS NEEDED FOR PAIN 10/28/19   Historical Provider, MD   nitroGLYCERIN (NITROSTAT) 0.4 MG SL tablet PLACE 1 TABLET UNDER THE TONGUE AS NEEDED, MAX OF 3 DOSES, IF NO RELIEF CALL 911 7/12/19   DONALDO Day CNP   glucose monitoring kit (FREESTYLE) monitoring kit 1 kit by Does not apply route daily Monitor glucose daily.  Diagnosis: diabetes mellitus Type 2 5/21/19   DONALDO Day CNP   FREESTYLE LANCETS MISC 1 each by Does not apply route daily 5/21/19   DONALDO Day CNP   aspirin 81 MG tablet Take 81 mg by mouth daily    Historical Provider, MD       Allergies as of 07/06/2020    (No Known Allergies)       Past Medical History:   Diagnosis Date    CAD (coronary artery disease) 2016    Diabetes mellitus ([facility] [address]     Hyperlipidemia     Hypertension     MI (myocardial infarction) ([facility] [address]     No history of procedure 06/01/2017    colonoscopy    Pneumonia due to organism 8/3/2019        Surgical History:   Past Surgical History:   Procedure Laterality Date    CHOLECYSTECTOMY, LAPAROSCOPIC N/A 4/17/2019    LAPAROSCOPIC CHOLECYSTECTOMY WITH INTRAOPERATIVE CHOLANGIOGRAM performed by Lidia Ortega MD at [facility]  06/01/2017    Colonic polyp    CORONARY ARTERY BYPASS GRAFT  09/20/2016    LIMA- LAD, Reverse SVG- Rosalba, reverse SVG- PDA, Reverse SVG- OM seq- Diag    EYE SURGERY Right     drain placed behind eye    HERNIA REPAIR Bilateral 2005    bilateral inguinal hernia repair        Family History:    Family History   Problem Relation Age of Onset    Heart Disease Father     Diabetes type 2  Father     Cancer Mother     Heart Disease Brother        Social History     Socioeconomic History    Marital status:      Spouse name: Not on file    Number of children: Not on file    Years of education: Not on file    Highest education level: Not on file   Occupational History    Not on file   Social Needs    Financial resource strain: Not on file    Food insecurity     Worry: Not on file     Inability: Not on file    Transportation needs     Medical: Not on file     Non-medical: Not on file   Tobacco Use    Smoking status: Never Smoker    Smokeless tobacco: Never Used   Substance and Sexual Activity    Alcohol use: No    Drug use: No    Sexual activity: Yes     Partners: Female   Lifestyle    Physical activity     Days per week: Not on file     Minutes per session: Not on file    Stress: Not on file   Relationships    Social connections     Talks on phone: Not on file     Gets together: Not on file     Attends Jehovah's witness service: Not on file     Active member of club or organization: Not on file     Attends meetings of clubs or organizations: Not on file     Relationship status: Not on file    Intimate partner violence     Fear of current or ex partner: Not on file     Emotionally abused: Not on file     Physically abused: Not on file     Forced sexual activity: Not on file   Other Topics Concern    Not on file   Social History Narrative    Not on file       Nursing notes reviewed. ED Triage Vitals [07/06/20 1306]   Enc Vitals Group      /85      Pulse 70      Resp 18      Temp 98.3 °F (36.8 °C)      Temp Source Oral      SpO2 97 %      Weight 200 lb (90.7 kg)      Height 6' (1.829 m)      Head Circumference       Peak Flow       Pain Score       Pain Loc       Pain Edu? Excl. in [address]? GENERAL:  Awake, alert. Well developed, well nourished with no apparent distress. HENT:  Normocephalic, Atraumatic, moist mucous membranes. EYES:  Pupils equal round and reactive to light, Conjunctiva normal, extraocular movements normal.  NECK:  No meningeal signs, Supple. CHEST:  Regular rate and rhythm, chest wall non-tender. LUNGS:  Clear to auscultation bilaterally. ABDOMEN:  Soft, non-tender, no rebound, rigidity or guarding, non-distended, normal bowel sounds.  No costovertebral angle tenderness to palpation. BACK:  No tenderness. EXTREMITIES:  Normal range of motion, no edema, no bony tenderness, no deformity, distal pulses present. SKIN: Warm, dry and intact. NEUROLOGIC: Normal mental status. Moving all extremities to command. LABS and DIAGNOSTIC RESULTS  EKG  The Ekg interpreted by me shows  normal sinus rhythm with a rate of 67  Axis is   Normal  QTc is  normal  RBBB  ST Segments: no change  Delta waves, Brugada Syndrome, and Short OH are not present. No significant change from prior EKG dated 18 mar 2020    RADIOLOGY  X-RAYS:  I have reviewed radiologic plain film image(s). ALL OTHER NON-PLAIN FILM IMAGES SUCH AS CT, ULTRASOUND AND MRI HAVE BEEN READ BY THE RADIOLOGIST. XR CHEST STANDARD (2 VW)   Final Result   No acute finding or significant change. LABS  Labs Reviewed   CBC WITH AUTO DIFFERENTIAL - Abnormal; Notable for the following components:       Result Value    WBC 12.2 (*)     All other components within normal limits    Narrative:     Performed at:  [place] [address] [facility]   Phone [phone]   APTT    Narrative:     Performed at:  [place] [address] [facility]   Phone [phone]   PROTIME-INR    Narrative:     Performed at:  [place] [address] [facility]   Phone [phone]   TROPONIN    Narrative:     Performed at:  [place] [address] [facility]   Phone [phone]   COMPREHENSIVE METABOLIC PANEL    Narrative:     Performed at:  [place] [address] [facility]   Phone [phone] time is 20 minutes which excludes separately billable procedures. The critical care was concerning for unstable angina with nitrates.   This time

## 2020-07-06 NOTE — ED TRIAGE NOTES
A&Ox4 pt arrives ambulatory to ED with c/o intermittent midline chest pain with SOB on exertion x2 weeks. Hx of bipass x5 and a stent put in this past march. R even and unlabored at this time.

## 2020-07-06 NOTE — ED NOTES

## 2020-07-06 NOTE — ED NOTES
Pt reports chest tightness decreased from a 4/10 to a 2/10 after nitro patch.       Eitenne Hunter RN  07/06/20 1243

## 2020-07-06 NOTE — ED NOTES
[facility] @ [phone]  RE: unstable angina per Dr. Sherif Navarro called back @ [phone] [facility]  07/06/20 1501

## 2020-07-06 NOTE — ED NOTES
Called [facility] @ [phone]  RE: unstable angina per Dr. Rom Sevilla called back @ [phone] [facility]  07/06/20 4625

## 2020-07-07 LAB
ANION GAP SERPL CALCULATED.3IONS-SCNC: 11 MMOL/L (ref 3–16)
BUN BLDV-MCNC: 16 MG/DL (ref 7–20)
CALCIUM SERPL-MCNC: 9 MG/DL (ref 8.3–10.6)
CHLORIDE BLD-SCNC: 105 MMOL/L (ref 99–110)
CO2: 24 MMOL/L (ref 21–32)
CREAT SERPL-MCNC: 0.9 MG/DL (ref 0.9–1.3)
EKG ATRIAL RATE: 60 BPM
EKG DIAGNOSIS: NORMAL
EKG P AXIS: 30 DEGREES
EKG P-R INTERVAL: 170 MS
EKG Q-T INTERVAL: 436 MS
EKG QRS DURATION: 146 MS
EKG QTC CALCULATION (BAZETT): 436 MS
EKG R AXIS: -20 DEGREES
EKG T AXIS: -11 DEGREES
EKG VENTRICULAR RATE: 60 BPM
ESTIMATED AVERAGE GLUCOSE: 151.3 MG/DL
ESTIMATED AVERAGE GLUCOSE: 151.3 MG/DL
GFR AFRICAN AMERICAN: >60
GFR NON-AFRICAN AMERICAN: >60
GLUCOSE BLD-MCNC: 108 MG/DL (ref 70–99)
GLUCOSE BLD-MCNC: 120 MG/DL (ref 70–99)
GLUCOSE BLD-MCNC: 124 MG/DL (ref 70–99)
GLUCOSE BLD-MCNC: 130 MG/DL (ref 70–99)
GLUCOSE BLD-MCNC: 148 MG/DL (ref 70–99)
HBA1C MFR BLD: 6.9 %
HBA1C MFR BLD: 6.9 %
HCT VFR BLD CALC: 41.4 % (ref 40.5–52.5)
HEMOGLOBIN: 13.9 G/DL (ref 13.5–17.5)
MCH RBC QN AUTO: 28.9 PG (ref 26–34)
MCHC RBC AUTO-ENTMCNC: 33.7 G/DL (ref 31–36)
MCV RBC AUTO: 85.9 FL (ref 80–100)
PDW BLD-RTO: 14.7 % (ref 12.4–15.4)
PERFORMED ON: ABNORMAL
PLATELET # BLD: 271 K/UL (ref 135–450)
PMV BLD AUTO: 7.9 FL (ref 5–10.5)
POTASSIUM REFLEX MAGNESIUM: 3.9 MMOL/L (ref 3.5–5.1)
RBC # BLD: 4.82 M/UL (ref 4.2–5.9)
SODIUM BLD-SCNC: 140 MMOL/L (ref 136–145)
WBC # BLD: 12 K/UL (ref 4–11)

## 2020-07-07 PROCEDURE — 6360000002 HC RX W HCPCS: Performed by: INTERNAL MEDICINE

## 2020-07-07 PROCEDURE — 99223 1ST HOSP IP/OBS HIGH 75: CPT | Performed by: INTERNAL MEDICINE

## 2020-07-07 PROCEDURE — 6370000000 HC RX 637 (ALT 250 FOR IP): Performed by: INTERNAL MEDICINE

## 2020-07-07 PROCEDURE — U0002 COVID-19 LAB TEST NON-CDC: HCPCS

## 2020-07-07 PROCEDURE — 36415 COLL VENOUS BLD VENIPUNCTURE: CPT

## 2020-07-07 PROCEDURE — 1200000000 HC SEMI PRIVATE

## 2020-07-07 PROCEDURE — 93005 ELECTROCARDIOGRAM TRACING: CPT | Performed by: INTERNAL MEDICINE

## 2020-07-07 PROCEDURE — 80048 BASIC METABOLIC PNL TOTAL CA: CPT

## 2020-07-07 PROCEDURE — 85027 COMPLETE CBC AUTOMATED: CPT

## 2020-07-07 PROCEDURE — 2580000003 HC RX 258: Performed by: INTERNAL MEDICINE

## 2020-07-07 PROCEDURE — U0003 INFECTIOUS AGENT DETECTION BY NUCLEIC ACID (DNA OR RNA); SEVERE ACUTE RESPIRATORY SYNDROME CORONAVIRUS 2 (SARS-COV-2) (CORONAVIRUS DISEASE [COVID-19]), AMPLIFIED PROBE TECHNIQUE, MAKING USE OF HIGH THROUGHPUT TECHNOLOGIES AS DESCRIBED BY CMS-2020-01-R: HCPCS

## 2020-07-07 PROCEDURE — 93010 ELECTROCARDIOGRAM REPORT: CPT | Performed by: INTERNAL MEDICINE

## 2020-07-07 RX ORDER — SODIUM CHLORIDE 9 MG/ML
INJECTION, SOLUTION INTRAVENOUS CONTINUOUS
Status: CANCELLED | OUTPATIENT
Start: 2020-07-07

## 2020-07-07 RX ADMIN — AMLODIPINE BESYLATE 5 MG: 5 TABLET ORAL at 09:35

## 2020-07-07 RX ADMIN — Medication 10 ML: at 09:35

## 2020-07-07 RX ADMIN — ENOXAPARIN SODIUM 40 MG: 40 INJECTION SUBCUTANEOUS at 09:35

## 2020-07-07 RX ADMIN — ATORVASTATIN CALCIUM 80 MG: 80 TABLET, FILM COATED ORAL at 21:21

## 2020-07-07 RX ADMIN — Medication 10 ML: at 21:21

## 2020-07-07 RX ADMIN — TICAGRELOR 90 MG: 90 TABLET ORAL at 21:21

## 2020-07-07 RX ADMIN — METOPROLOL TARTRATE 50 MG: 25 TABLET, FILM COATED ORAL at 21:21

## 2020-07-07 RX ADMIN — TICAGRELOR 90 MG: 90 TABLET ORAL at 09:35

## 2020-07-07 RX ADMIN — ASPIRIN 81 MG 81 MG: 81 TABLET ORAL at 09:35

## 2020-07-07 ASSESSMENT — PAIN DESCRIPTION - PAIN TYPE
TYPE: ACUTE PAIN
TYPE: ACUTE PAIN

## 2020-07-07 ASSESSMENT — PAIN SCALES - GENERAL
PAINLEVEL_OUTOF10: 0
PAINLEVEL_OUTOF10: 3
PAINLEVEL_OUTOF10: 0
PAINLEVEL_OUTOF10: 0
PAINLEVEL_OUTOF10: 3
PAINLEVEL_OUTOF10: 3

## 2020-07-07 ASSESSMENT — PAIN DESCRIPTION - LOCATION
LOCATION: CHEST
LOCATION: CHEST

## 2020-07-07 NOTE — PROGRESS NOTES
Pt. Resting in bed. Alert/oriented. Vitals and assessment stable as charted. C\P improved at 3/10 now. States nitro paste the night before helped minimally. Call light in reach. Will continue to monitor.

## 2020-07-07 NOTE — PROGRESS NOTES
4 Eyes Skin Assessment     The patient is being assess for  Admission    I agree that 2 RN's have performed a thorough Head to Toe Skin Assessment on the patient. ALL assessment sites listed below have been assessed. Areas assessed by both nurses:   [x]   Head, Face, and Ears   [x]   Shoulders, Back, and Chest  [x]   Arms, Elbows, and Hands   [x]   Coccyx, Sacrum, and IschIum  [x]   Legs, Feet, and Heels        Does the Patient have Skin Breakdown?   No         Meño Prevention initiated:  N/A  Wound Care Orders initiated:  N/A      WOC nurse consulted for Pressure Injury (Stage 3,4, Unstageable, DTI, NWPT, and Complex wounds), New and Established Ostomies:  N/A      Nurse 1 eSignature: Electronically signed by Moe Martinez RN on 7/7/20 at [address] AM EDT    **SHARE this note so that the co-signing nurse is able to place an eSignature**    Nurse 2 eSignature: Electronically signed by Pam Moreno RN on 7/7/20 at 6:45 AM EDT

## 2020-07-07 NOTE — PLAN OF CARE
Nutrition Problem: No nutrition diagnosis at this time  Intervention: Food and/or Nutrient Delivery: Continue current diet  Nutritional Goals: Patient will eat 50% or greater of meals without swallowing difficulty

## 2020-07-07 NOTE — CONSULTS
[address] [id number]                                  CONSULTATION    PATIENT NAME: Connie Rodarte                      :        1965  MED REC NO:   [id number]                          ROOM:       3213  ACCOUNT NO:   [de-identified]                           ADMIT DATE: 2020  PROVIDER:     Agustin Ott. Rossana Titus MD    CONSULT DATE:  2020    REASON FOR CONSULTATION:  Chest pain. HISTORY OF PRESENT ILLNESS:  A 58-year-old male presented to the  hospital with complaints of chest pain over the past two weeks. He has  been having intermittent chest pain. The pain is exertional, comes on  with walking less than a block. He has also been having pain at rest,  feels like his prior angina, tightness in the center of his chest,  occasionally radiates into his left arm. It can last up to an hour. Feels like a pressure sensation. Resolves spontaneously. PAST MEDICAL HISTORY:  1. Coronary artery disease, status post coronary artery bypass surgery. Most recent cardiac catheterization was 2020 when he had angioplasty  of his left circumflex artery. He was noted to have patent grafts  including left internal graft to the left anterior descending artery,  saphenous vein graft to diagonal artery and saphenous vein graft to the  right coronary artery. 2.  Hypertension. 3.  Hyperlipidemia. 4.  Diabetes. SOCIAL HISTORY:  Does not smoke. FAMILY HISTORY:  Positive for heart disease. REVIEW OF SYSTEMS:  No fevers or chills. No cough. No focal neurologic  symptoms. No headache or visual changes. No recent GI or  bleeding. No recent or upcoming surgeries. All other systems are negative except  history of present illness. ALLERGIES:  No known drug allergies. MEDICATIONS:  See list in the chart which I have reviewed.     PHYSICAL EXAMINATION:  VITAL SIGNS:  Blood pressure is 138/77, heart rate 66, respirations 17,  temperature 98.5. He is 97% saturated on room air. Physical exam deferred due to COVID-19 pandemic. DIAGNOSTIC DATA:  Troponin less than 0.01. EKG, right bundle branch  block. Chest x-ray, negative for pulmonary edema. IMPRESSION:  1. Unstable angina, class III to IV. 2.  Coronary artery disease. See above for details. 3.  Hypertension. 4.  Hyperlipidemia. 5.  Diabetes. RECOMMENDATION:  Cardiac catheterization. Pending COVID testing. DALE Ward MD    D: 07/07/2020 11:29:57       T: 07/07/2020 15:18:31     [email or web address]  Job#: [id number]     Doc#: [id number]    CC:

## 2020-07-07 NOTE — PROGRESS NOTES
Hospitalist Progress Note      PCP: Vidhya Martins, APRN - CNP    Date of Admission: 7/6/2020    Chief Complaint: chest pain     Hospital Course: H & P reviewed. Admitted with unstable angina. Has hx of CAD s/p CABG, PCI. Subjective:      Pt reports exertional chest pain and SOB which improves with rest for past 2 weeks and today when he ambulated. Denied any chest pain currently . Denies any fever or chills. No known contact with person with COVID 19. Medications:  Reviewed    Infusion Medications    dextrose       Scheduled Medications    amLODIPine  5 mg Oral Daily    aspirin  81 mg Oral Daily    atorvastatin  80 mg Oral Nightly    metoprolol tartrate  50 mg Oral BID    ticagrelor  90 mg Oral BID    sodium chloride flush  10 mL Intravenous 2 times per day    enoxaparin  40 mg Subcutaneous Daily    insulin lispro  0-6 Units Subcutaneous TID WC    insulin lispro  0-3 Units Subcutaneous Nightly     PRN Meds: sodium chloride flush, acetaminophen **OR** acetaminophen, polyethylene glycol, promethazine **OR** ondansetron, glucose, dextrose, glucagon (rDNA), dextrose      Intake/Output Summary (Last 24 hours) at 7/7/2020 1153  Last data filed at 7/7/2020 0545  Gross per 24 hour   Intake 480 ml   Output 175 ml   Net 305 ml       Physical Exam Performed:    /77   Pulse 66   Temp 98.5 °F (36.9 °C) (Oral)   Resp 17   Ht 6' (1.829 m)   Wt 200 lb (90.7 kg)   SpO2 97%   BMI 27.12 kg/m²     General appearance: No apparent distress, appears stated age and cooperative. HEENT: Pupils equal, round Conjunctivae/corneas clear. Neck: Supple, with full range of motion. No jugular venous distention. Trachea midline. Respiratory:  Normal respiratory effort. Clear to auscultation, bilaterally without Rales/Wheezes/Rhonchi. Cardiovascular: Regular rate and rhythm with normal S1/S2 without murmurs, rubs or gallops.   Abdomen: Soft, non-tender, non-distended with normal bowel

## 2020-07-07 NOTE — PROGRESS NOTES
Patient admitted to room 360 from ED. Bedside report received. Patient oriented to room, call light, bed rails, phone, lights and bathroom. Telemetry box in place, patient aware of placement and reason. Bed locked, in lowest position, side rails up 2/4, call light within reach.    Vitals:    07/06/20 2015   BP: 116/86   Pulse: 67   Resp: 20   Temp: 97.5 °F (36.4 °C)   SpO2: 95%

## 2020-07-07 NOTE — PROGRESS NOTES
Nutrition Assessment    Type and Reason for Visit: Positive Nutrition Screen(swallowing function)    Nutrition Recommendations:   1. Cardiac, no caffeine  2. Will monitor nutritional adequacy, nutrition-related labs, weights, BMs, and clinical progress     Nutrition Assessment: NPO today. Cardiology consulted. History of CABG and stent 3 months ago. Patient stated swallowing is getting better as pain improves. Patient refused need for any nutrition education at this time; has had a lot in the past few years with history of CABG. Malnutrition Assessment:  · Malnutrition Status: At risk for malnutrition    Nutrition Risk Level:  Moderate    Nutrient Needs:  · Estimated Daily Total Kcal: 2023-2427  · Estimated Daily Protein (g): 100-109  · Estimated Daily Total Fluid (ml/day): 1 kcal/ml    Nutrition Diagnosis:   · Problem: No nutrition diagnosis at this time    Objective Information:  · Nutrition-Focused Physical Findings: swallowing improving, appetite stable, no BM yet  · Wound Type: None  · Current Nutrition Therapies:  · Oral Diet Orders: Carb Control 4 Carbs/Meal(no caffeine; NPO at this time)   · Oral Diet intake: %  · Oral Nutrition Supplement (ONS) Orders: None  · ONS intake: Unable to assess  · Anthropometric Measures:  · Ht: 6' (182.9 cm)   · Current Body Wt: 200 lb (90.7 kg)  · BMI Classification: BMI 25.0 - 29.9 Overweight    Nutrition Interventions:   Continue current diet  Continued Inpatient Monitoring    Nutrition Evaluation:   · Evaluation: Goals set   · Goals: Patient will eat 50% or greater of meals without swallowing difficulty    · Monitoring: Meal Intake, Nutrition Progression, Pertinent Labs      Electronically signed by Ned Garcia RD, SKIP on 7/7/20 at 12:05 PM EDT    Contact Number: Office: [phone]; [address]: [phone]

## 2020-07-07 NOTE — CARE COORDINATION
CASE MANAGEMENT INITIAL ASSESSMENT      Reviewed chart and completed assessment via telephone with: daughter  Explained Case Management role/services. Primary contact information: Gaudencio Johnson [phone]    Admit date/status: 7/6/20  Diagnosis: unstable angina   Is this a Readmission?:  no    Insurance: caresource   Precert required for SNF - yes    3 night stay required - no    Living arrangements, Adls, care needs, prior to admission: pt lives in a single story house with his girlfriend. No a/c in home. Independent in ADL's     Transportation: private    Carbonated Content at home: none    Services in the home and/or outpatient, prior to admission: none    PT/OT recs: 2 [place] Notification (HEN): not initiated    Barriers to discharge: none     Plan/comments:   Spoke to patient daughter via phone. Writer had attempted call to patient room without answer. Per daughter pt has just begun receiving disability for his eyes and heart condition. Normally very independent and without needs. Please advise should any needs arise.       ECOC on chart for MD signature     Araceli Gaona RN

## 2020-07-07 NOTE — PROGRESS NOTES
/73; HR 63 at present. Per night RN HR did lower to 34 then the 40's SB for 10-15 sec's. Held this AM's dose of metoprolol; will check with MD before administering.

## 2020-07-08 LAB
GLUCOSE BLD-MCNC: 131 MG/DL (ref 70–99)
GLUCOSE BLD-MCNC: 137 MG/DL (ref 70–99)
GLUCOSE BLD-MCNC: 88 MG/DL (ref 70–99)
GLUCOSE BLD-MCNC: 99 MG/DL (ref 70–99)
PERFORMED ON: ABNORMAL
PERFORMED ON: ABNORMAL
PERFORMED ON: NORMAL
PERFORMED ON: NORMAL
SARS-COV-2, PCR: NOT DETECTED

## 2020-07-08 PROCEDURE — 2580000003 HC RX 258: Performed by: INTERNAL MEDICINE

## 2020-07-08 PROCEDURE — 99233 SBSQ HOSP IP/OBS HIGH 50: CPT | Performed by: INTERNAL MEDICINE

## 2020-07-08 PROCEDURE — 1200000000 HC SEMI PRIVATE

## 2020-07-08 PROCEDURE — 6370000000 HC RX 637 (ALT 250 FOR IP): Performed by: INTERNAL MEDICINE

## 2020-07-08 RX ADMIN — TICAGRELOR 90 MG: 90 TABLET ORAL at 07:51

## 2020-07-08 RX ADMIN — METOPROLOL TARTRATE 50 MG: 25 TABLET, FILM COATED ORAL at 07:52

## 2020-07-08 RX ADMIN — Medication 10 ML: at 07:51

## 2020-07-08 RX ADMIN — ASPIRIN 81 MG 81 MG: 81 TABLET ORAL at 07:52

## 2020-07-08 RX ADMIN — AMLODIPINE BESYLATE 5 MG: 5 TABLET ORAL at 07:52

## 2020-07-08 RX ADMIN — METOPROLOL TARTRATE 50 MG: 25 TABLET, FILM COATED ORAL at 20:28

## 2020-07-08 RX ADMIN — TICAGRELOR 90 MG: 90 TABLET ORAL at 20:28

## 2020-07-08 RX ADMIN — ATORVASTATIN CALCIUM 80 MG: 80 TABLET, FILM COATED ORAL at 20:28

## 2020-07-08 RX ADMIN — Medication 10 ML: at 20:29

## 2020-07-08 ASSESSMENT — PAIN SCALES - GENERAL
PAINLEVEL_OUTOF10: 0

## 2020-07-08 NOTE — ADT AUTH CERT
Utilization Reviews         Covid 19 - Pending by Davon Grady RN         Review Status Review Entered   In Primary 7/8/2020 09:39       Criteria Review   The illness suspected to be related to the Coronavirus (COVID-19)? Yes,   Has the member been tested for the COVID-19? Yes   If Yes, what are the results of the COVID-19? Pending  What is the severity of the members condition (i.e. Isolation, Ventilator use)?        Angina - Care Day 2 (7/7/2020) by Davon Grady RN         Review Status Review Entered   Completed 7/8/2020 09:36       Criteria Review      Care Day: 2 Care Date: 7/7/2020 Level of Care: Telemetry    Guideline Day 2    Clinical Status    (X) * Hemodynamic stability    (X) * Dangerous arrhythmia absent    ( ) * Chest pain, dyspnea, or anginal equivalent absent    ( ) * No evidence of bleeding or myocardial ischemia    ( ) * Vascular access site without evidence of infection, aneurysm, or growing hematoma    ( ) * Renal function at baseline or acceptable for next level of care    ( ) * Discharge plans and education understood    Activity    ( ) * Ambulatory    Routes    ( ) * Oral hydration, medications, and diet    Interventions    ( ) * Oxygen absent    (X) Cardiac monitoring    7/8/2020 9:36 AM EDT by Sincere Pickard      telem    Medications    ( ) * Anticoagulation absent    * Milestone   Additional Notes   7/7   Attending MD:   Pt reports exertional chest pain and SOB which improves with rest for past 2 weeks and today when he ambulated. Denied any chest pain currently . Denies any fever or chills. No known contact with person with COVID 19. /77   Pulse 66   Temp 98.5 °F (36.9 °C) (Oral)   Resp 17   Ht 6' (1.829 m)   Wt 200 lb (90.7 kg)   SpO2 97%   BMI 27.12 kg/m²       Assessment/Plan:   Active Hospital Problems     Diagnosis   · Unstable angina ([facility] [address] [I20.0]       Unstable angina: has known hx of CABG, PCI. EKG, trop negative. Cardiology consulted.  Cardio consulted- plans for cath but would like COVID19 test prior to proceeding - result pending. Continue ASA, brilinta, metoprolol, lipitor       DMII: metformin held. Continue SSI.        HTN:  Controlled. Continue home metoprolol, norvasc       HLD: continue  home statin       DVT Prophylaxis: lovenox    Diet: DIET CARDIAC; No Caffeine   Code Status: Full Code       PT/OT Eval Status: Not indicated at this time       Dispo -pending COVID 19 test result  for cath. CV MD:   IMPRESSION:   1.  Unstable angina, class III to IV. 2.  Coronary artery disease.  See above for details. 3.  Hypertension. 4.  Hyperlipidemia.    5.  Diabetes.       RECOMMENDATION:  Cardiac catheterization.  Pending COVID testing.

## 2020-07-08 NOTE — PROGRESS NOTES
[facility]   Progress Note  Cardiology    CC: chest pain    HPI: chest pain persists but decreased    Medications/Labs all Reviewed    Lab Results   Component Value Date    WBC 12.0 (H) 07/07/2020    HGB 13.9 07/07/2020    HCT 41.4 07/07/2020    MCV 85.9 07/07/2020     07/07/2020     Lab Results   Component Value Date    CREATININE 0.9 07/07/2020    BUN 16 07/07/2020     07/07/2020    K 3.9 07/07/2020     07/07/2020    CO2 24 07/07/2020     Lab Results   Component Value Date    INR 1.08 07/06/2020    PROTIME 12.5 07/06/2020        Physical Examination:    BP (!) 143/89   Pulse 63   Temp 97.6 °F (36.4 °C) (Oral)   Resp 16   Ht 6' (1.829 m)   Wt 203 lb 4.8 oz (92.2 kg)   SpO2 96%   BMI 27.57 kg/m²      · Deferred while covid test pending      Assessment:    [place] 4 - reduced  CAD - possible progression  CABG  HTN - stable  HLD  DM     Plan  Cath pending covid testing      Rahul Thapa MD, 7/8/2020 8:55 AM

## 2020-07-09 ENCOUNTER — APPOINTMENT (OUTPATIENT)
Dept: CARDIAC CATH/INVASIVE PROCEDURES | Age: 55
DRG: 287 | End: 2020-07-09
Payer: MEDICARE

## 2020-07-09 VITALS
SYSTOLIC BLOOD PRESSURE: 103 MMHG | WEIGHT: 202 LBS | TEMPERATURE: 97.5 F | OXYGEN SATURATION: 96 % | HEART RATE: 65 BPM | DIASTOLIC BLOOD PRESSURE: 69 MMHG | BODY MASS INDEX: 27.36 KG/M2 | HEIGHT: 72 IN | RESPIRATION RATE: 16 BRPM

## 2020-07-09 LAB
GLUCOSE BLD-MCNC: 116 MG/DL (ref 70–99)
GLUCOSE BLD-MCNC: 96 MG/DL (ref 70–99)
PERFORMED ON: ABNORMAL
PERFORMED ON: NORMAL

## 2020-07-09 PROCEDURE — 2500000003 HC RX 250 WO HCPCS

## 2020-07-09 PROCEDURE — 6370000000 HC RX 637 (ALT 250 FOR IP): Performed by: INTERNAL MEDICINE

## 2020-07-09 PROCEDURE — 2580000003 HC RX 258: Performed by: INTERNAL MEDICINE

## 2020-07-09 PROCEDURE — B2111ZZ FLUOROSCOPY OF MULTIPLE CORONARY ARTERIES USING LOW OSMOLAR CONTRAST: ICD-10-PCS | Performed by: INTERNAL MEDICINE

## 2020-07-09 PROCEDURE — 93455 CORONARY ART/GRFT ANGIO S&I: CPT

## 2020-07-09 PROCEDURE — 4A023N7 MEASUREMENT OF CARDIAC SAMPLING AND PRESSURE, LEFT HEART, PERCUTANEOUS APPROACH: ICD-10-PCS | Performed by: INTERNAL MEDICINE

## 2020-07-09 PROCEDURE — C1760 CLOSURE DEV, VASC: HCPCS

## 2020-07-09 PROCEDURE — 2580000003 HC RX 258

## 2020-07-09 PROCEDURE — 2709999900 HC NON-CHARGEABLE SUPPLY

## 2020-07-09 PROCEDURE — 6360000004 HC RX CONTRAST MEDICATION

## 2020-07-09 PROCEDURE — B2181ZZ FLUOROSCOPY OF LEFT INTERNAL MAMMARY BYPASS GRAFT USING LOW OSMOLAR CONTRAST: ICD-10-PCS | Performed by: INTERNAL MEDICINE

## 2020-07-09 PROCEDURE — 6360000002 HC RX W HCPCS

## 2020-07-09 PROCEDURE — C1894 INTRO/SHEATH, NON-LASER: HCPCS

## 2020-07-09 PROCEDURE — C1769 GUIDE WIRE: HCPCS

## 2020-07-09 PROCEDURE — 94761 N-INVAS EAR/PLS OXIMETRY MLT: CPT

## 2020-07-09 RX ORDER — SODIUM CHLORIDE 9 MG/ML
INJECTION, SOLUTION INTRAVENOUS CONTINUOUS
Status: DISCONTINUED | OUTPATIENT
Start: 2020-07-09 | End: 2020-07-09 | Stop reason: HOSPADM

## 2020-07-09 RX ORDER — RANOLAZINE 500 MG/1
500 TABLET, EXTENDED RELEASE ORAL 2 TIMES DAILY
Qty: 60 TABLET | Refills: 3 | Status: SHIPPED | OUTPATIENT
Start: 2020-07-09 | End: 2020-07-10

## 2020-07-09 RX ORDER — RANOLAZINE 500 MG/1
500 TABLET, EXTENDED RELEASE ORAL 2 TIMES DAILY
Status: DISCONTINUED | OUTPATIENT
Start: 2020-07-09 | End: 2020-07-09 | Stop reason: HOSPADM

## 2020-07-09 RX ORDER — ISOSORBIDE MONONITRATE 30 MG/1
30 TABLET, EXTENDED RELEASE ORAL DAILY
Qty: 30 TABLET | Refills: 3 | Status: SHIPPED | OUTPATIENT
Start: 2020-07-10 | End: 2020-11-03 | Stop reason: SDUPTHER

## 2020-07-09 RX ORDER — MIDAZOLAM HYDROCHLORIDE 1 MG/ML
INJECTION INTRAMUSCULAR; INTRAVENOUS
Status: COMPLETED | OUTPATIENT
Start: 2020-07-09 | End: 2020-07-09

## 2020-07-09 RX ORDER — FENTANYL CITRATE 50 UG/ML
INJECTION, SOLUTION INTRAMUSCULAR; INTRAVENOUS
Status: COMPLETED | OUTPATIENT
Start: 2020-07-09 | End: 2020-07-09

## 2020-07-09 RX ORDER — ISOSORBIDE MONONITRATE 30 MG/1
30 TABLET, EXTENDED RELEASE ORAL DAILY
Status: DISCONTINUED | OUTPATIENT
Start: 2020-07-09 | End: 2020-07-09 | Stop reason: HOSPADM

## 2020-07-09 RX ADMIN — MIDAZOLAM HYDROCHLORIDE 2 MG: 1 INJECTION INTRAMUSCULAR; INTRAVENOUS at 13:16

## 2020-07-09 RX ADMIN — METOPROLOL TARTRATE 50 MG: 25 TABLET, FILM COATED ORAL at 07:28

## 2020-07-09 RX ADMIN — AMLODIPINE BESYLATE 5 MG: 5 TABLET ORAL at 07:29

## 2020-07-09 RX ADMIN — ISOSORBIDE MONONITRATE 30 MG: 30 TABLET, EXTENDED RELEASE ORAL at 15:49

## 2020-07-09 RX ADMIN — ASPIRIN 81 MG 81 MG: 81 TABLET ORAL at 07:29

## 2020-07-09 RX ADMIN — TICAGRELOR 90 MG: 90 TABLET ORAL at 07:29

## 2020-07-09 RX ADMIN — Medication 10 ML: at 07:30

## 2020-07-09 RX ADMIN — FENTANYL CITRATE 50 MCG: 50 INJECTION, SOLUTION INTRAMUSCULAR; INTRAVENOUS at 13:16

## 2020-07-09 RX ADMIN — FENTANYL CITRATE 50 MCG: 50 INJECTION, SOLUTION INTRAMUSCULAR; INTRAVENOUS at 13:38

## 2020-07-09 ASSESSMENT — PAIN SCALES - GENERAL
PAINLEVEL_OUTOF10: 0

## 2020-07-09 NOTE — PROGRESS NOTES
Hospitalist Progress Note      PCP: Sofi Knapp, APRN - CNP    Date of Admission: 7/6/2020    Chief Complaint: chest pain     Hospital Course: H & P reviewed. Admitted with unstable angina. Has hx of CAD s/p CABG, PCI. Subjective:      Pt reports exertional chest pain and SOB which improves with rest for past 2 weeks and today when he ambulated. Denied any chest pain currently . Denies any fever or chills. No known contact with person with COVID 19. Medications:  Reviewed    Infusion Medications    dextrose       Scheduled Medications    amLODIPine  5 mg Oral Daily    aspirin  81 mg Oral Daily    atorvastatin  80 mg Oral Nightly    metoprolol tartrate  50 mg Oral BID    ticagrelor  90 mg Oral BID    sodium chloride flush  10 mL Intravenous 2 times per day    enoxaparin  40 mg Subcutaneous Daily    insulin lispro  0-6 Units Subcutaneous TID WC    insulin lispro  0-3 Units Subcutaneous Nightly     PRN Meds: sodium chloride flush, acetaminophen **OR** acetaminophen, polyethylene glycol, promethazine **OR** ondansetron, glucose, dextrose, glucagon (rDNA), dextrose      Intake/Output Summary (Last 24 hours) at 7/9/2020 1100  Last data filed at 7/8/2020 2354  Gross per 24 hour   Intake 600 ml   Output 600 ml   Net 0 ml       Physical Exam Performed:    /82   Pulse 61   Temp 97.5 °F (36.4 °C) (Oral)   Resp 16   Ht 6' (1.829 m)   Wt 202 lb 3.2 oz (91.7 kg)   SpO2 94%   BMI 27.42 kg/m²     General appearance: No apparent distress, appears stated age and cooperative. HEENT: Pupils equal, round Conjunctivae/corneas clear. Neck: Supple, with full range of motion. No jugular venous distention. Trachea midline. Respiratory:  Normal respiratory effort. Clear to auscultation, bilaterally without Rales/Wheezes/Rhonchi. Cardiovascular: Regular rate and rhythm with normal S1/S2 without murmurs, rubs or gallops.   Abdomen: Soft, non-tender, non-distended with normal bowel

## 2020-07-09 NOTE — PROGRESS NOTES
Patient back to floor from cath lab. VSS. No new orders to acknowledge. Awaiting orders from MD. Will continue to monitor.

## 2020-07-09 NOTE — PROGRESS NOTES
Assessment complete. VSS. Patient comfortable, NPO, awaiting procedure. Bed in lowest position, call light in reach.

## 2020-07-09 NOTE — DISCHARGE SUMMARY
Hospital Medicine Discharge Summary    Patient ID: Clearance Dilling      Patient's PCP: Jennifer Griffin, APRN - CNP    Admit Date: 7/6/2020     Discharge Date:   7/9/2020    Admitting Physician: Sherine Hunter MD     Discharge Physician: Tabby Villafuerte MD     Discharge Diagnoses: Active Hospital Problems    Diagnosis    Unstable angina ([facility] [address] [I20.0]       The patient was seen and examined on day of discharge and this discharge summary is in conjunction with any daily progress note from day of discharge. Hospital Course:     Unstable angina: has known hx of CABG, PCI. EKG, trop negative. Cardiology consulted. Cardio consulted- plans for cath but would like COVID19 test prior to proceeding - result pending. Continue ASA, brilinta, metoprolol, lipitor  COVID-19 was negative. Plan for cardiac cath today. S/p C on 7/9/2020-     Procedures: Cor angio, SVG x 2, LIMA     LM 50%  LAD 70% prox, 95% mid  Cx stent patent              OM3 95% ostium, jailed by stent              OM 4 70% distal  RCA 70% mid, 60% distal  LIMA to LAD patent  SVG to Dx patent  SVG to %  SVG to R-marginal patent  SVG to ? OM not identified. Reported 100% for prior cath     No sig cahnge compared to cath 3/2020  Add wolf Marques to review for consider further PCI  OK discharge    DMII: metformin held. Continue SSI.      HTN:  Controlled. Continue home metoprolol, norvasc     HLD: continue  home statin             Physical Exam Performed:     /74   Pulse 63   Temp 97.5 °F (36.4 °C) (Oral)   Resp 16   Ht 6' (1.829 m)   Wt 202 lb (91.6 kg)   SpO2 99%   BMI 27.40 kg/m²       General appearance:  No apparent distress, appears stated age and cooperative. HEENT:  Normal cephalic, atraumatic without obvious deformity. Pupils equal, round, and reactive to light. Extra ocular muscles intact. Conjunctivae/corneas clear. Neck: Supple, with full range of motion. No jugular venous distention.  Trachea midline. Respiratory:  Normal respiratory effort. Clear to auscultation, bilaterally without Rales/Wheezes/Rhonchi. Cardiovascular:  Regular rate and rhythm with normal S1/S2 without murmurs, rubs or gallops. Abdomen: Soft, non-tender, non-distended with normal bowel sounds. Musculoskeletal:  No clubbing, cyanosis or edema bilaterally. Full range of motion without deformity. Skin: Skin color, texture, turgor normal.  No rashes or lesions. Neurologic:  Neurovascularly intact without any focal sensory/motor deficits. Cranial nerves: II-XII intact, grossly non-focal.  Psychiatric:  Alert and oriented, thought content appropriate, normal insight  Capillary Refill: Brisk,< 3 seconds   Peripheral Pulses: +2 palpable, equal bilaterally       Labs: For convenience and continuity at follow-up the following most recent labs are provided:      CBC:    Lab Results   Component Value Date    WBC 12.0 07/07/2020    HGB 13.9 07/07/2020    HCT 41.4 07/07/2020     07/07/2020       Renal:    Lab Results   Component Value Date     07/07/2020    K 3.9 07/07/2020     07/07/2020    CO2 24 07/07/2020    BUN 16 07/07/2020    CREATININE 0.9 07/07/2020    CALCIUM 9.0 07/07/2020         Significant Diagnostic Studies    Radiology:   XR CHEST STANDARD (2 VW)   Final Result   No acute finding or significant change.                 Consults:     IP CONSULT TO CARDIOLOGY  IP CONSULT TO HOSPITALIST    Disposition:  Home     Condition at Discharge: Stable    Discharge Instructions/Follow-up:  Cardiology    Code Status:  Full Code     Activity: activity as tolerated    Diet: cardiac diet      Discharge Medications:     Current Discharge Medication List           Details   isosorbide mononitrate (IMDUR) 30 MG extended release tablet Take 1 tablet by mouth daily  Qty: 30 tablet, Refills: 3      ranolazine (RANEXA) 500 MG extended release tablet Take 1 tablet by mouth 2 times daily  Qty: 60 tablet, Refills: 3 Details   metFORMIN (GLUCOPHAGE) 500 MG tablet TAKE 1 TABLET BY MOUTH EVERY DAY WITH BREAKFAST  Qty: 30 tablet, Refills: 2    Associated Diagnoses: Type 2 diabetes mellitus with other circulatory complication, without long-term current use of insulin ([facility])      metoprolol tartrate (LOPRESSOR) 50 MG tablet TAKE 1 TABLET BY MOUTH TWICE A DAY  Qty: 180 tablet, Refills: 3      amLODIPine (NORVASC) 5 MG tablet TAKE 1 TABLET BY MOUTH EVERY DAY  Qty: 90 tablet, Refills: 3      l-arginine 1000 MG TABS Take 1,500 mg by mouth 2 times daily  Qty: 60 tablet, Refills: 5      atorvastatin (LIPITOR) 80 MG tablet Take 1 tablet by mouth nightly  Qty: 30 tablet, Refills: 11      ticagrelor (BRILINTA) 90 MG TABS tablet Take 1 tablet by mouth 2 times daily  Qty: 60 tablet, Refills: 11      FREESTYLE LITE strip USE TO TEST ONCE A DAY AS NEEDED  Qty: 50 strip, Refills: 7    Associated Diagnoses: Type 2 diabetes mellitus with other circulatory complication, without long-term current use of insulin ([facility])      nitroGLYCERIN (NITROSTAT) 0.4 MG SL tablet PLACE 1 TABLET UNDER THE TONGUE AS NEEDED, MAX OF 3 DOSES, IF NO RELIEF CALL 911  Qty: 25 tablet, Refills: 0      glucose monitoring kit (FREESTYLE) monitoring kit 1 kit by Does not apply route daily Monitor glucose daily. Diagnosis: diabetes mellitus Type 2  Qty: 1 kit, Refills: 0    Associated Diagnoses: Type 2 diabetes mellitus with other circulatory complication, without long-term current use of insulin ([facility])      FREESTYLE LANCETS MISC 1 each by Does not apply route daily  Qty: 100 each, Refills: 3    Associated Diagnoses: Type 2 diabetes mellitus with other circulatory complication, without long-term current use of insulin ([facility])      aspirin 81 MG tablet Take 81 mg by mouth daily             Time Spent on discharge is more than 30 minutes in the examination, evaluation, counseling and review of medications and discharge plan.       Signed:    Efrain Doherty MD   7/9/2020      Thank you Sen Valle, DONALDO - ELIZABETH for the opportunity to be involved in this patient's care. If you have any questions or concerns please feel free to contact me at [phone].

## 2020-07-09 NOTE — POST SEDATION
Patient:  Ivone Hardy   :   1965    A pre-sedation re-evaluation was performed immediately prior to beginning of  the procedure. Procedure: cardiac cath  Medications: Procedural sedation with minimal conscious sedation  Complications: None  Estimated Blood Loss: none  Specimens: Were not obtained        Mundo Medication and Procedural Reconciliation:  I agree that the documented medications and procedures performed are true. The medications were given under my order. The procedures were performed under my direct supervision.

## 2020-07-09 NOTE — PRE SEDATION
Brief Pre-Op Note/Sedation Assessment      Schuyler Flores  1965  [id number]      [id number]  3:05 PM    Planned Procedure: Cardiac Catheterization Procedure    Post Procedure Plan: Return to same level of care    Consent: Consent was unable to be obtained due to patient's condition. Chief Complaint: Chest Pain/Pressure      Indications for Cath Procedure:   Worsening Angina and Suspected CAD  Anginal Classification within 2 weeks:  CCS III - Symptoms with everyday living activities, i.e., moderate limitation  NYHA Heart Failure Class within 2 weeks: No symptoms  Is Cath Lab Visit Valve-related?: No  Surgical Risk: N/A  Functional Type: N/A    Anti- Anginal Meds within 2 weeks:   Yes: Beta Blockers and Ca Channel Blockers    Stress or Imaging Studies Performed:  None     Vital Signs:  /76   Pulse 63   Temp 97.5 °F (36.4 °C) (Oral)   Resp 16   Ht 6' (1.829 m)   Wt 202 lb (91.6 kg)   SpO2 94%   BMI 27.40 kg/m²     Allergies:  No Known Allergies    Past Medical History:  Past Medical History:   Diagnosis Date    CAD (coronary artery disease) 2016    Diabetes mellitus (HCC)     Hyperlipidemia     Hypertension     MI (myocardial infarction) ([facility] [address]     No history of procedure 06/01/2017    colonoscopy    Pneumonia due to organism 8/3/2019         Surgical History:  Past Surgical History:   Procedure Laterality Date    CHOLECYSTECTOMY, LAPAROSCOPIC N/A 4/17/2019    LAPAROSCOPIC CHOLECYSTECTOMY WITH INTRAOPERATIVE CHOLANGIOGRAM performed by Velia Angelo MD at [facility] COLONOSCOPY  06/01/2017    Colonic polyp    CORONARY ARTERY BYPASS GRAFT  09/20/2016    LIMA- LAD, Reverse SVG- [place], reverse SVG- PDA, Reverse SVG- OM seq- Diag    EYE SURGERY Right     drain placed behind eye    HERNIA REPAIR Bilateral 2005    bilateral inguinal hernia repair    PTCA  03/09/2020    SEGUNDO- 3.0 x 34 to pCx         Medications:  Current Facility-Administered Medications   Medication Dose Route Frequency Provider Last Rate Last Dose    amLODIPine (NORVASC) tablet 5 mg  5 mg Oral Daily Rolan Montez MD   5 mg at 07/09/20 0729    aspirin chewable tablet 81 mg  81 mg Oral Daily Rolan Montez MD   81 mg at 07/09/20 0729    atorvastatin (LIPITOR) tablet 80 mg  80 mg Oral Nightly Rolan Montez MD   80 mg at 07/08/20 2028    metoprolol tartrate (LOPRESSOR) tablet 50 mg  50 mg Oral BID Rolan Montez MD   50 mg at 07/09/20 7073    ticagrelor (BRILINTA) tablet 90 mg  90 mg Oral BID Rolan Montez MD   90 mg at 07/09/20 0729    sodium chloride flush 0.9 % injection 10 mL  10 mL Intravenous 2 times per day Rolan Montez MD   10 mL at 07/09/20 0730    sodium chloride flush 0.9 % injection 10 mL  10 mL Intravenous PRN Rolan Montez MD        acetaminophen (TYLENOL) tablet 650 mg  650 mg Oral Q6H PRN Rolan Montez MD        Or   Cuong Filter acetaminophen (TYLENOL) suppository 650 mg  650 mg Rectal Q6H PRN Rolan Montez MD        polyethylene glycol [facility]) packet 17 g  17 g Oral Daily PRN Rolan Montez MD        promethazine (PHENERGAN) tablet 12.5 mg  12.5 mg Oral Q6H PRN Rolan Montez MD        Or    ondansetron [facility]) injection 4 mg  4 mg Intravenous Q6H PRN Rolan Montez MD        enoxaparin (LOVENOX) injection 40 mg  40 mg Subcutaneous Daily Rolan Montez MD   Stopped at 07/08/20 0900    glucose (GLUTOSE) 40 % oral gel 15 g  15 g Oral PRN Rolan Montez MD        dextrose 50 % IV solution  12.5 g Intravenous PRN Rolan Montez MD        glucagon (rDNA) injection 1 mg  1 mg Intramuscular PRN Rolan Montez MD        dextrose 5 % solution  100 mL/hr Intravenous PRN Rolan Montez MD        insulin lispro (HUMALOG) injection vial 0-6 Units  0-6 Units Subcutaneous TID WC Rolan Montez MD   Stopped at 07/08/20 1200    insulin lispro (HUMALOG) injection vial 0-3 Units  0-3 Units Subcutaneous Nightly Rolan Montez MD               Pre-Sedation:    Pre-Sedation Documentation and Exam:  I have assessed the patient and agree with the H&P present on the chart. Prior History of Anesthesia Complications:   none    Modified Mallampati:  II (soft palate, uvula, fauces visible)    ASA Classification:  Class 2 - A normal healthy patient with mild systemic disease      Soco Scale: Activity:  2 - Able to move 4 extremities voluntarily on command  Respiration:  2 - Able to breathe deeply and cough freely  Circulation:  2 - BP+/- 20mmHg of normal  Consciousness:  2 - Fully awake  Oxygen Saturation (color):  2 - Able to maintain oxygen saturation >92% on room air    Sedation/Anesthesia Plan:  Guard the patient's safety and welfare. Minimize physical discomfort and pain. Minimize negative psychological responses to treatment by providing sedation and analgesia and maximize the potential amnesia. Patient to meet pre-procedure discharge plan.     Medication Planned:  midazolam intravenously    Patient is an appropriate candidate for plan of sedation: yes      Electronically signed by Steve Chavez MD on 7/9/2020 at 3:05 PM

## 2020-07-09 NOTE — CARE COORDINATION
Chart review completed. Patient a 63year old male, admitted for unstable angina. Hospital day 3, currently on B3. Initial assessment done via phone with daughter on 7/7/20. Per daughter pt is normally very independent and without needs. Pt lives with his girlfriend. Per therapy recommendations pt will require 24 hour assistance and HHC. Call placed to patient room and spoke to patient regarding the above recommendations. Per pt his girlfriend is able to provide the 24 hour assistance as she does not work. When discussed Franky  or home therapy pt asked if he could talk about it with his daughter and girlfriend and get back to writer. CM will follow up with patient again prior to discharge.  Maxine Barragan RN

## 2020-07-09 NOTE — ADT AUTH CERT
(H) FRANCHESKA CONROY   Utilization Reviews         Angina - Care Day 4 (7/9/2020) by Sonia Etienne RN         Review Status Review Entered   Completed 7/9/2020 15:17       Criteria Review      Care Day: 4 Care Date: 7/9/2020 Level of Care: Telemetry    Guideline Day 2    Level Of Care    (X) Intermediate care or telemetry to discharge    Clinical Status    (X) * Hemodynamic stability    (X) * Dangerous arrhythmia absent    ( ) * Chest pain, dyspnea, or anginal equivalent absent    ( ) * No evidence of bleeding or myocardial ischemia    ( ) * Vascular access site without evidence of infection, aneurysm, or growing hematoma    ( ) * Renal function at baseline or acceptable for next level of care    ( ) * Discharge plans and education understood    Activity    (X) * Ambulatory    7/9/2020 3:17 PM EDT by Hilda Gonzales      up as tolerated    Routes    ( ) * Oral hydration, medications, and diet    7/9/2020 3:17 PM EDT by Evelyn Sosa      NPO for cath pending covid 19 results    Interventions    (X) * Oxygen absent    (X) Cardiac monitoring    7/9/2020 3:17 PM EDT by Evelyn Sosa      tele    Medications    ( ) * Anticoagulation absent    * Milestone   Additional Notes   7/9/2020      Care day 4      Cardiac cath was pending covid 19 results which are negative      Internal medicine progress note           Assessment/Plan:       Active Hospital Problems     Diagnosis   · Unstable angina ([facility] [address] [I20.0]       Unstable angina: has known hx of CABG, PCI. EKG, trop negative. Cardiology consulted. Cardio consulted- plans for cath but would like COVID19 test prior to proceeding - result pending. Continue ASA, brilinta, metoprolol, lipitor   COVID-19 was negative. Plan for cardiac cath today.       DMII: metformin held. Continue SSI.        HTN:  Controlled.  Continue home metoprolol, norvasc       HLD: continue  home statin       DVT Prophylaxis: lovenox    Diet: Diet NPO Time Specified absent    (X) Cardiac monitoring    Medications    ( ) * Anticoagulation absent    * Milestone   Additional Notes   7/8/2020      Care day 3      Internal medicine   Progress  note      VS: 138/77  66 98.5 17 97%  RA      Assessment/Plan:    Active Hospital Problems        · Unstable angina ([facility] [address] [I20.0]       Unstable angina: has known hx of CABG, PCI. EKG, trop negative. Cardiology consulted. Cardio consulted- plans for cath but would like COVID19 test prior to proceeding - result pending. Continue ASA, brilinta, metoprolol, lipitor       DMII: metformin held. Continue SSI.        HTN:  Controlled. Continue home metoprolol, norvasc       HLD: continue  home statin       DVT Prophylaxis: lovenox    Diet: DIET CARDIAC; No Caffeine   Code Status: Full Code       PT/OT Eval Status: Not indicated at this time       Dispo -pending covid 19 for stress      Abnormal labs; WBC 12.0         Cardiology consult       DIAGNOSTIC DATA:  Troponin less than 0.01.  EKG, right bundle branch   block.  Chest x-ray, negative for pulmonary edema.       IMPRESSION:   1.  Unstable angina, class III to IV. 2.  Coronary artery disease.  See above for details. 3.  Hypertension. 4.  Hyperlipidemia. 5.  Diabetes.       RECOMMENDATION:  Cardiac catheterization.  Pending COVID testing.       ·  isosorbide mononitrate, 30 mg, Oral, Daily   ·  ranolazine, 500 mg, Oral, BID   ·  amLODIPine, 5 mg, Oral, Daily   ·  aspirin, 81 mg, Oral, Daily   ·  atorvastatin, 80 mg, Oral, Nightly   ·  metoprolol tartrate, 50 mg, Oral, BID   ·  ticagrelor, 90 mg, Oral, BID   ·  sodium chloride flush, 10 mL, Intravenous, 2 times per day   ·  enoxaparin, 40 mg, Subcutaneous, Daily   ·  insulin lispro, 0-6 Units, Subcutaneous, TID WC   ·  insulin lispro, 0-3 Units, Subcutaneous, Nightly                        Angina - Care Day 2 (7/7/2020) by Viridiana Rodarte RN         Review Status Review Entered   Completed 7/9/2020 15:10       Criteria Review

## 2020-07-09 NOTE — BRIEF OP NOTE
Procedures: Cor angio, SVG x 2, LIMA    LM 50%  LAD 70% prox, 95% mid  Cx stent patent   OM3 95% ostium, jailed by stent   OM 4 70% distal  RCA 70% mid, 60% distal  LIMA to LAD patent  SVG to Dx patent  SVG to %  SVG to R-marginal patent  SVG to ? OM not identified.  Reported 100% for prior cath    No sig cahnge compared to cath 3/2020  Add imapple and lotusexneida German to review for consider further PCI  OK discharge

## 2020-07-10 ENCOUNTER — TELEPHONE (OUTPATIENT)
Dept: CARDIOLOGY CLINIC | Age: 55
End: 2020-07-10

## 2020-07-10 RX ORDER — ISOSORBIDE MONONITRATE 30 MG/1
30 TABLET, EXTENDED RELEASE ORAL DAILY
Qty: 30 TABLET | Refills: 5 | Status: CANCELLED | OUTPATIENT
Start: 2020-07-10

## 2020-07-10 NOTE — TELEPHONE ENCOUNTER
Called patient. Left message to return call. Need to clairfy if he is already taking IMDUR since it is on his medication list, as this is the alternative to Ranexa.

## 2020-07-29 NOTE — PROGRESS NOTES
[address] [facility]   Cardiovascular Evaluation    PATIENT: Ivone Hardy  DATE: 2020  MRN: [id number]  CSN: [id number]  : 1965      Primary Care Doctor: DONALDO Mckay - CNP  Reason for evaluation:   Hospital follow up      Subjective:   History of present illness on initial date of evaluation:   Ivone Hardy is a 63 y.o. patient who presents for hospital follow up, s/p [facility] 19. He presented to the ED on 19 w/ complaints of chest pain and sweating. PMH: CAD, CABG x 5 (). Today he reports he only has chest pain when coughing or taking a deep breath. States any type of exertion or physical activity does not bother him. He is taking his medications as prescribed. Patient denies sob, palpitations, dizziness or syncope. He was seen in the ED for chest pain. EKG 2020 demonstrated NSR, RBBB. Blood tests, negative. His medications were increased and is doing ok with that. Today he reports feeling fine from a cardiac standpoint. Patient denies chest pain, sob, palpitations, dizziness or syncope. He was admitted -2020 for chest pain. He underwent cardiac cath on 2020 that showed no change from previous cath. Imdur and Ranexa were added. He did not start Ranexa as the pharmacy was out of it.       Patient Active Problem List   Diagnosis    Family history of early CAD    S/P CABG (coronary artery bypass graft)    Benign essential HTN    Hyperlipidemia    CAD (coronary artery disease)    Microcytosis    Acute glaucoma of right eye    Acute chest pain    Paresthesia    Acute calculous cholecystitis    Type 2 diabetes mellitus without complication, without long-term current use of insulin (HCC)    S/P laparoscopic cholecystectomy    Abnormal stress test    Pre-syncope    Chest pain    Angina, class II ([address]    Cerebrovascular accident (CVA) ([address]    Chest pain in adult    Hypertension    Migraines    Unstable angina [facility])         Past Medical History:   has a past medical history of CAD (coronary artery disease), Diabetes mellitus ([facility] Hyperlipidemia, Hypertension, MI (myocardial infarction) ([facility] No history of procedure, and Pneumonia due to organism. Surgical History:   has a past surgical history that includes Coronary artery bypass graft (09/20/2016); Eye surgery (Right); Colonoscopy (06/01/2017); hernia repair (Bilateral, 2005); Cholecystectomy, laparoscopic (N/A, 4/17/2019); and Percutaneous Transluminal Coronary Angio (03/09/2020). Social History:   reports that he has never smoked. He has never used smokeless tobacco. He reports that he does not drink alcohol or use drugs. Family History:  No evidence for sudden cardiac death or premature CAD    Home Medications:  Reviewed and are listed in nursing record. and/or listed below  Current Outpatient Medications   Medication Sig Dispense Refill    isosorbide mononitrate (IMDUR) 30 MG extended release tablet Take 1 tablet by mouth daily 30 tablet 3    metFORMIN (GLUCOPHAGE) 500 MG tablet TAKE 1 TABLET BY MOUTH EVERY DAY WITH BREAKFAST 30 tablet 2    metoprolol tartrate (LOPRESSOR) 50 MG tablet TAKE 1 TABLET BY MOUTH TWICE A  tablet 3    amLODIPine (NORVASC) 5 MG tablet TAKE 1 TABLET BY MOUTH EVERY DAY 90 tablet 3    l-arginine 1000 MG TABS Take 1,500 mg by mouth 2 times daily 60 tablet 5    atorvastatin (LIPITOR) 80 MG tablet Take 1 tablet by mouth nightly 30 tablet 11    ticagrelor (BRILINTA) 90 MG TABS tablet Take 1 tablet by mouth 2 times daily 60 tablet 11    FREESTYLE LITE strip USE TO TEST ONCE A DAY AS NEEDED 50 strip 7    nitroGLYCERIN (NITROSTAT) 0.4 MG SL tablet PLACE 1 TABLET UNDER THE TONGUE AS NEEDED, MAX OF 3 DOSES, IF NO RELIEF CALL 911 25 tablet 0    glucose monitoring kit (FREESTYLE) monitoring kit 1 kit by Does not apply route daily Monitor glucose daily.  Diagnosis: diabetes mellitus Type 2 1 kit 0    aspirin 81 MG tablet Take 81 mg by mouth daily      FREESTYLE LANCETS MISC 1 each by Does not apply route daily 100 each 3     No current facility-administered medications for this visit. Allergies:  Patient has no known allergies. Review of Systems:   A 14 point review of symptoms completed. Pertinent positives identified in the HPI, all other review of symptoms negative as below. Objective:   PHYSICAL EXAM:    There were no vitals filed for this visit.  Weight: 204 lb (92.5 kg)     Wt Readings from Last 3 Encounters:   07/31/20 204 lb (92.5 kg)   07/09/20 202 lb (91.6 kg)   03/18/20 195 lb (88.5 kg)         General Appearance:  Alert, cooperative, no distress, appears stated age   Head:  Normocephalic, atraumatic   Eyes:  PERRL, conjunctiva/corneas clear   Nose: Nares normal, no drainage or sinus tenderness   Throat: Lips, mucosa, and tongue normal   Neck: Supple, symmetrical, trachea midline, NL thyroid no carotid bruit or JVD   Lungs:   CTAB, respirations unlabored   Chest Wall:  No tenderness or deformity   Heart:  Regular rhythm and normal rate; S1, S2 are normal;   no murmur noted; no rub or gallop   Abdomen:   Soft, non-tender, +BS x 4, no masses, no organomegaly   Extremities: Extremities normal, atraumatic, no cyanosis or edema   Pulses: 2+ and symmetric   Skin: Skin color, texture, turgor normal, no rashes or lesions   Pysch: Normal mood and affect   Neurologic: Normal gross motor and sensory exam.         LABS   CBC:      Lab Results   Component Value Date    WBC 12.0 07/07/2020    RBC 4.82 07/07/2020    HGB 13.9 07/07/2020    HCT 41.4 07/07/2020    MCV 85.9 07/07/2020    RDW 14.7 07/07/2020     07/07/2020     CMP:  Lab Results   Component Value Date     07/07/2020    K 3.9 07/07/2020     07/07/2020    CO2 24 07/07/2020    BUN 16 07/07/2020    CREATININE 0.9 07/07/2020    GFRAA >60 07/07/2020    AGRATIO 1.4 07/06/2020    LABGLOM >60 07/07/2020    GLUCOSE 124 07/07/2020    PROT 7.7 07/06/2020 CALCIUM 9.0 2020    BILITOT 0.7 2020    ALKPHOS 123 2020    AST 25 2020    ALT 30 2020     PT/INR:   No results found for: PTINR  Liver:  No components found for: CHLPL  Lab Results   Component Value Date    ALT 30 2020    AST 25 2020    ALKPHOS 123 2020    BILITOT 0.7 2020     Lab Results   Component Value Date    LABA1C 6.9 2020     Lipids:         Lab Results   Component Value Date    TRIG 162 (H) 2020    TRIG 77 2019    TRIG 105 2019            Lab Results   Component Value Date    HDL 25 (L) 2020    HDL 24 (L) 2019    HDL 24 (L) 2019            Lab Results   Component Value Date    LDLCALC 58 2020    LDLCALC 53 2019    LDLCALC 46 2019            Lab Results   Component Value Date    LABVLDL 32 2020    LABVLDL 15 2019    LABVLDL 21 2019         CARDIAC DATA     EK2020  NSR, RBBB 60 bpm    ECHO/MUGA: 19   Summary   Normal left ventricle systolic function with an estimated ejection fraction   of 55%. No regional wall motion abnormalities are seen. Normal left ventricular diastolic filling pressure. Systolic pulmonary artery pressure (SPAP) is normal and estimated at 26 mmHg   (right atrial pressure 3 mmHg). STRESS TEST:  2019   Summary  Small sized, mild intensity basal lateral partial reversibility defect  consistent with mild ischemia and infarction in the territory of the distal  LCx and/or RCA. LV function is normal with and ejection fraction of 63%. Lower risk abnormal study. STRESS TEST: 19  Summary  Normal LV size and systolic function. Left ventricular ejection fraction of  68%. Normal wall motion.   There is normal isotope uptake at stress and rest. There is no evidence of  myocardial ischemia or scar     CARDIAC CATH:  2020  Procedures: Cor angio, SVG x 2, LIMA     LM 50%  LAD 70% prox, 95% mid  Cx stent patent              OM3 95% ostial PLOM  RCA: Dominant, moderate diffuse disease, prox 60%, mid 70%, distal 90%  Grade 3 L-->R collaterals  LVEDP: 10  LVEF: 65%  PLAN  1. 3 vessel CAD with preserved LVEF  - refer to CT surgery for CABG  - check Hemoglobin A1c and TSH  2. Start ASA, and statin. Holding BB for bradycardia and pauses seen at [address] CTA 5/14/19  Only 3 bypass grafts can be visualized. The insertion sites of the left internal mammary branch is poorly visualized secondary to phase misregistration artifact. The graft appears patent. A 2nd bypass graft terminates in the region of a diagonal branch. Graft appears patent. Insertion site is poorly visualized secondary to artifact   A 3rd bypass graft appears small distally, terminating in the region of the acute marginal     VASCULAR/OTHER IMAGING:      Assessment and Plan   Magdalena Baocn is a 63 y.o. male who presents today for the following problems:      1. Chest pain: resolved on imdur  2. CAD              - s/p CABGx5 with Dr. Jesús [place] 9/20/2016   - 3/2020 CSI and PCi to LCx  3. HTN: controlled  4. HLD: controlled        MD PLAN  1. Pt now doing well, no CP. Given extent of native CAD will have chronic stable angina  2. COnt L-argine and imdur.  Ok to hold off ranexa   - ubsling NTG for breakthru CP        Patient Active Problem List   Diagnosis    Family history of early CAD    S/P CABG (coronary artery bypass graft)    Benign essential HTN    Hyperlipidemia    CAD (coronary artery disease)    Microcytosis    Acute glaucoma of right eye    Acute chest pain    Paresthesia    Acute calculous cholecystitis    Type 2 diabetes mellitus without complication, without long-term current use of insulin (HCC)    S/P laparoscopic cholecystectomy    Abnormal stress test    Pre-syncope    Chest pain    Angina, class II ([address]    Cerebrovascular accident (CVA) ([address]    Chest pain in adult    Hypertension    Migraines    Unstable angina ([address]       Patient Plan:  1. Continue current medications  2. Follow up in 6 months with NP  3. Use nitro if needed for chest pain         It is a pleasure to assist in the care of [place]. Please call with any questions. This note was scribed in the presence of Scott Douglass MD by Monica Schumacher RN.        Marie Martinez MD, personally performed the services described in this documentation as scribed by the above signed scribe in my presence, and it is both accurate and complete to the best of our ability and knowledge. I agree with the details independently gathered by my clinical support staff, while the remaining scribed note accurately describes my personal service to the patient. The above RN is working as a scribe for and in the presence of myself . Working as a scribe, the RN may have prepopulated components of this note with my historical intellectual property under my direct supervision. Any additions to this intellectual property were performed at my direction. Furthermore, the content and accuracy of this note have been reviewed by me to the best of my ability.          Scott Douglass MD, [facility] Cardiologist  [address]  [phone] [address] [place] [address]  [phone] [address] [place] [address]

## 2020-07-31 ENCOUNTER — OFFICE VISIT (OUTPATIENT)
Dept: CARDIOLOGY CLINIC | Age: 55
End: 2020-07-31
Payer: MEDICARE

## 2020-07-31 VITALS
HEIGHT: 72 IN | BODY MASS INDEX: 27.63 KG/M2 | DIASTOLIC BLOOD PRESSURE: 72 MMHG | HEART RATE: 61 BPM | WEIGHT: 204 LBS | OXYGEN SATURATION: 97 % | SYSTOLIC BLOOD PRESSURE: 114 MMHG

## 2020-07-31 PROCEDURE — 99213 OFFICE O/P EST LOW 20 MIN: CPT | Performed by: INTERNAL MEDICINE

## 2020-07-31 PROCEDURE — 3017F COLORECTAL CA SCREEN DOC REV: CPT | Performed by: INTERNAL MEDICINE

## 2020-07-31 PROCEDURE — G8427 DOCREV CUR MEDS BY ELIG CLIN: HCPCS | Performed by: INTERNAL MEDICINE

## 2020-07-31 PROCEDURE — 1036F TOBACCO NON-USER: CPT | Performed by: INTERNAL MEDICINE

## 2020-07-31 PROCEDURE — G8419 CALC BMI OUT NRM PARAM NOF/U: HCPCS | Performed by: INTERNAL MEDICINE

## 2020-07-31 PROCEDURE — 1111F DSCHRG MED/CURRENT MED MERGE: CPT | Performed by: INTERNAL MEDICINE

## 2020-07-31 NOTE — PATIENT INSTRUCTIONS
Patient Plan:  1. Continue current medications  2. Follow up in 6 months with NP  3.  Use nitro if needed for chest pain

## 2020-07-31 NOTE — LETTER
[address] [facility]   Cardiovascular Evaluation    PATIENT: Letitia August  DATE: 2020  MRN: [id number]  CSN: [id number]  : 1965      Primary Care Doctor: DONALDO Mejia CNP  Reason for evaluation:   Hospital follow up      Subjective:   History of present illness on initial date of evaluation:   Letitia August is a 63 y.o. patient who presents for hospital follow up, s/p [facility] 19. He presented to the ED on 19 w/ complaints of chest pain and sweating. PMH: CAD, CABG x 5 (). Today he reports he only has chest pain when coughing or taking a deep breath. States any type of exertion or physical activity does not bother him. He is taking his medications as prescribed. Patient denies sob, palpitations, dizziness or syncope. He was seen in the ED for chest pain. EKG 2020 demonstrated NSR, RBBB. Blood tests, negative. His medications were increased and is doing ok with that. Today he reports feeling fine from a cardiac standpoint. Patient denies chest pain, sob, palpitations, dizziness or syncope. He was admitted -2020 for chest pain. He underwent cardiac cath on 2020 that showed no change from previous cath. Imdur and Ranexa were added. He did not start Ranexa as the pharmacy was out of it.       Patient Active Problem List   Diagnosis    Family history of early CAD    S/P CABG (coronary artery bypass graft)    Benign essential HTN    Hyperlipidemia    CAD (coronary artery disease)    Microcytosis    Acute glaucoma of right eye    Acute chest pain    Paresthesia    Acute calculous cholecystitis    Type 2 diabetes mellitus without complication, without long-term current use of insulin (HCC)    S/P laparoscopic cholecystectomy    Abnormal stress test    Pre-syncope    Chest pain    Angina, class II ([address]    Cerebrovascular accident (CVA) ([address]    Chest pain in adult    Hypertension    Migraines  Unstable angina (HCC)         Past Medical History:   has a past medical history of CAD (coronary artery disease), Diabetes mellitus ([facility] [address] Hyperlipidemia, Hypertension, MI (myocardial infarction) ([facility] [address] No history of procedure, and Pneumonia due to organism. Surgical History:   has a past surgical history that includes Coronary artery bypass graft (09/20/2016); Eye surgery (Right); Colonoscopy (06/01/2017); hernia repair (Bilateral, 2005); Cholecystectomy, laparoscopic (N/A, 4/17/2019); and Percutaneous Transluminal Coronary Angio (03/09/2020). Social History:   reports that he has never smoked. He has never used smokeless tobacco. He reports that he does not drink alcohol or use drugs. Family History:  No evidence for sudden cardiac death or premature CAD    Home Medications:  Reviewed and are listed in nursing record. and/or listed below  Current Outpatient Medications   Medication Sig Dispense Refill    isosorbide mononitrate (IMDUR) 30 MG extended release tablet Take 1 tablet by mouth daily 30 tablet 3    metFORMIN (GLUCOPHAGE) 500 MG tablet TAKE 1 TABLET BY MOUTH EVERY DAY WITH BREAKFAST 30 tablet 2    metoprolol tartrate (LOPRESSOR) 50 MG tablet TAKE 1 TABLET BY MOUTH TWICE A  tablet 3    amLODIPine (NORVASC) 5 MG tablet TAKE 1 TABLET BY MOUTH EVERY DAY 90 tablet 3    l-arginine 1000 MG TABS Take 1,500 mg by mouth 2 times daily 60 tablet 5    atorvastatin (LIPITOR) 80 MG tablet Take 1 tablet by mouth nightly 30 tablet 11    ticagrelor (BRILINTA) 90 MG TABS tablet Take 1 tablet by mouth 2 times daily 60 tablet 11    FREESTYLE LITE strip USE TO TEST ONCE A DAY AS NEEDED 50 strip 7    nitroGLYCERIN (NITROSTAT) 0.4 MG SL tablet PLACE 1 TABLET UNDER THE TONGUE AS NEEDED, MAX OF 3 DOSES, IF NO RELIEF CALL 911 25 tablet 0    glucose monitoring kit (FREESTYLE) monitoring kit 1 kit by Does not apply route daily Monitor glucose daily.  Diagnosis: diabetes mellitus Type 2 1 kit 0  aspirin 81 MG tablet Take 81 mg by mouth daily      FREESTYLE LANCETS MISC 1 each by Does not apply route daily 100 each 3     No current facility-administered medications for this visit. Allergies:  Patient has no known allergies. Review of Systems:   A 14 point review of symptoms completed. Pertinent positives identified in the HPI, all other review of symptoms negative as below. Objective:   PHYSICAL EXAM:    There were no vitals filed for this visit.  Weight: 204 lb (92.5 kg)     Wt Readings from Last 3 Encounters:   07/31/20 204 lb (92.5 kg)   07/09/20 202 lb (91.6 kg)   03/18/20 195 lb (88.5 kg)         General Appearance:  Alert, cooperative, no distress, appears stated age   Head:  Normocephalic, atraumatic   Eyes:  PERRL, conjunctiva/corneas clear   Nose: Nares normal, no drainage or sinus tenderness   Throat: Lips, mucosa, and tongue normal   Neck: Supple, symmetrical, trachea midline, NL thyroid no carotid bruit or JVD   Lungs:   CTAB, respirations unlabored   Chest Wall:  No tenderness or deformity   Heart:  Regular rhythm and normal rate; S1, S2 are normal;   no murmur noted; no rub or gallop   Abdomen:   Soft, non-tender, +BS x 4, no masses, no organomegaly   Extremities: Extremities normal, atraumatic, no cyanosis or edema   Pulses: 2+ and symmetric   Skin: Skin color, texture, turgor normal, no rashes or lesions   Pysch: Normal mood and affect   Neurologic: Normal gross motor and sensory exam.         LABS   CBC:      Lab Results   Component Value Date    WBC 12.0 07/07/2020    RBC 4.82 07/07/2020    HGB 13.9 07/07/2020    HCT 41.4 07/07/2020    MCV 85.9 07/07/2020    RDW 14.7 07/07/2020     07/07/2020     CMP:  Lab Results   Component Value Date     07/07/2020    K 3.9 07/07/2020     07/07/2020    CO2 24 07/07/2020    BUN 16 07/07/2020    CREATININE 0.9 07/07/2020    GFRAA >60 07/07/2020    AGRATIO 1.4 07/06/2020    LABGLOM >60 07/07/2020 GLUCOSE 124 2020    PROT 7.7 2020    CALCIUM 9.0 2020    BILITOT 0.7 2020    ALKPHOS 123 2020    AST 25 2020    ALT 30 2020     PT/INR:   No results found for: PTINR  Liver:  No components found for: CHLPL  Lab Results   Component Value Date    ALT 30 2020    AST 25 2020    ALKPHOS 123 2020    BILITOT 0.7 2020     Lab Results   Component Value Date    LABA1C 6.9 2020     Lipids:         Lab Results   Component Value Date    TRIG 162 (H) 2020    TRIG 77 2019    TRIG 105 2019            Lab Results   Component Value Date    HDL 25 (L) 2020    HDL 24 (L) 2019    HDL 24 (L) 2019            Lab Results   Component Value Date    LDLCALC 58 2020    LDLCALC 53 2019    LDLCALC 46 2019            Lab Results   Component Value Date    LABVLDL 32 2020    LABVLDL 15 2019    LABVLDL 21 2019         CARDIAC DATA     EK2020  NSR, RBBB 60 bpm    ECHO/MUGA: 19   Summary   Normal left ventricle systolic function with an estimated ejection fraction   of 55%. No regional wall motion abnormalities are seen. Normal left ventricular diastolic filling pressure. Systolic pulmonary artery pressure (SPAP) is normal and estimated at 26 mmHg   (right atrial pressure 3 mmHg). STRESS TEST:  2019   Summary  Small sized, mild intensity basal lateral partial reversibility defect  consistent with mild ischemia and infarction in the territory of the distal  LCx and/or RCA. LV function is normal with and ejection fraction of 63%. Lower risk abnormal study. STRESS TEST: 19  Summary  Normal LV size and systolic function. Left ventricular ejection fraction of  68%. Normal wall motion.   There is normal isotope uptake at stress and rest. There is no evidence of  myocardial ischemia or scar     CARDIAC CATH:  2020  Procedures: Cor angio, SVG x 2, LIMA     LM 50% LAD 70% prox, 95% mid  Cx stent patent              OM3 95% ostium, jailed by stent              OM 4 70% distal  RCA 70% mid, 60% distal  LIMA to LAD patent  SVG to Dx patent  SVG to %  SVG to R-marginal patent  SVG to ? OM not identified. Reported 100% for prior cath     No sig cahnge compared to cath 3/2020  Add wolf Hall to review for consider further PCI        CARDIAC CATH: 5/13/19  LEFT HEART CATH  LM: calcified,    LAD: calcified  LCX: calcified, prox 80-90%                OM1- small, ostial 80% (too small for PCI)                OM2- tiny vessel                OM3- larger vessel, acute takeoff angle, ostial 90%. OM4- distal 80%  RCA: dominant, moderate diffuse disease. prox 50%, mid 60%, distal 70%                R->Lt collaterals to Lcx                PDA: 100% ostial with L->R collaterals                PLV: luminals     LIMA-LAD: patent with  Touchdown to mid LAD, mid 40-50% in native LAD  SVG-->RT RV Marginal patent with retrograde flow up to RCA  SVG-->Diag: patent. Flow down LAd and up to LCx                - Torsades with injection requiring Defib  No other grafts and no sequential limbs seen  LVEDP: 4  LVEF: 55%  Assessment  1. Severe CAD as above. Patent grafts x3 but unable to find grafts or \"sequentials\" to OM or PDa                - will need to get Cardiac CTA to eval further  2. May need to consider PCi to LCX but would likely jeopardize OM1-3. BYPASS: 09/20/16  VESSELS BYPASSED:  1. LIMA to LAD. 2. Reverse greater saphenous vein graft to acute marginal  3. Reverse greater saphenous vein graft to PDA sequentiall. 4. Reverse greater saphenous vein graft to OM sequential  5. Reverse greater sVG to diagonal sequential.    CATH: 09/19/16  LEFT HEART CATH  LM: luminals  LAD: heavy calcification burden, prox eccentric 75%- mid 90% around large septal, mid/distal 80%  LCX: heavy calcification burden.  Mid 70%  OM1- small, moderate dz, ostial 80% OM2- moderate in size, mild diffuse disease  OM3- large, 90% ostial PLOM  RCA: Dominant, moderate diffuse disease, prox 60%, mid 70%, distal 90%  Grade 3 L-->R collaterals  LVEDP: 10  LVEF: 65%  PLAN  1. 3 vessel CAD with preserved LVEF  - refer to CT surgery for CABG  - check Hemoglobin A1c and TSH  2. Start ASA, and statin. Holding BB for bradycardia and pauses seen at [address] CTA 5/14/19  Only 3 bypass grafts can be visualized. The insertion sites of the left internal mammary branch is poorly visualized secondary to phase misregistration artifact. The graft appears patent. A 2nd bypass graft terminates in the region of a diagonal branch. Graft appears patent. Insertion site is poorly visualized secondary to artifact   A 3rd bypass graft appears small distally, terminating in the region of the acute marginal     VASCULAR/OTHER IMAGING:      Assessment and Plan   Clearance Jonathan is a 63 y.o. male who presents today for the following problems:      1. Chest pain: resolved on imdur  2. CAD              - s/p CABGx5 with Dr. Feliberto Winters 9/20/2016   - 3/2020 CSI and PCi to LCx  3. HTN: controlled  4. HLD: controlled        MD PLAN  1. Pt now doing well, no CP. Given extent of native CAD will have chronic stable angina  2. COnt L-argine and imdur.  Ok to hold off ranexa   - ubsling NTG for breakthru CP        Patient Active Problem List   Diagnosis    Family history of early CAD    S/P CABG (coronary artery bypass graft)    Benign essential HTN    Hyperlipidemia    CAD (coronary artery disease)    Microcytosis    Acute glaucoma of right eye    Acute chest pain    Paresthesia    Acute calculous cholecystitis    Type 2 diabetes mellitus without complication, without long-term current use of insulin (HCC)    S/P laparoscopic cholecystectomy    Abnormal stress test    Pre-syncope    Chest pain    Angina, class II ([address]    Cerebrovascular accident (CVA) ([address]    Chest pain in adult  Hypertension    Migraines    Unstable angina ([facility]       Patient Plan:  1. Continue current medications  2. Follow up in 6 months with NP  3. Use nitro if needed for chest pain         It is a pleasure to assist in the care of Marci Lau. Please call with any questions. This note was scribed in the presence of Margrett Bernheim MD by Maday Messina RN.        Lynn Albert MD, personally performed the services described in this documentation as scribed by the above signed scribe in my presence, and it is both accurate and complete to the best of our ability and knowledge. I agree with the details independently gathered by my clinical support staff, while the remaining scribed note accurately describes my personal service to the patient. The above RN is working as a scribe for and in the presence of myself . Working as a scribe, the RN may have prepopulated components of this note with my historical intellectual property under my direct supervision. Any additions to this intellectual property were performed at my direction. Furthermore, the content and accuracy of this note have been reviewed by me to the best of my ability.          Margrett Bernheim, MD, [address] [facility] Cardiologist  [address]  [phone] [facility]  [phone] [address] [place] [address]

## 2020-08-25 ENCOUNTER — OFFICE VISIT (OUTPATIENT)
Dept: FAMILY MEDICINE CLINIC | Age: 55
End: 2020-08-25
Payer: MEDICARE

## 2020-08-25 VITALS
TEMPERATURE: 98 F | OXYGEN SATURATION: 94 % | SYSTOLIC BLOOD PRESSURE: 109 MMHG | WEIGHT: 204.8 LBS | BODY MASS INDEX: 27.78 KG/M2 | DIASTOLIC BLOOD PRESSURE: 64 MMHG | HEART RATE: 53 BPM

## 2020-08-25 PROCEDURE — 1036F TOBACCO NON-USER: CPT | Performed by: NURSE PRACTITIONER

## 2020-08-25 PROCEDURE — G8427 DOCREV CUR MEDS BY ELIG CLIN: HCPCS | Performed by: NURSE PRACTITIONER

## 2020-08-25 PROCEDURE — G8419 CALC BMI OUT NRM PARAM NOF/U: HCPCS | Performed by: NURSE PRACTITIONER

## 2020-08-25 PROCEDURE — 3017F COLORECTAL CA SCREEN DOC REV: CPT | Performed by: NURSE PRACTITIONER

## 2020-08-25 PROCEDURE — 3044F HG A1C LEVEL LT 7.0%: CPT | Performed by: NURSE PRACTITIONER

## 2020-08-25 PROCEDURE — 99214 OFFICE O/P EST MOD 30 MIN: CPT | Performed by: NURSE PRACTITIONER

## 2020-08-25 PROCEDURE — 2022F DILAT RTA XM EVC RTNOPTHY: CPT | Performed by: NURSE PRACTITIONER

## 2020-08-25 NOTE — PATIENT INSTRUCTIONS
Please read the healthy family handout that you were given and share it with your family. Please compare this printed medication list with your medications at home to be sure they are the same. If you have any medications that are different please contact us immediately at [phone]. Also review your allergies that we have listed, these may also include medications that you have not been able to tolerate, make sure everything listed is correct. If you have any allergies that are different please contact us immediately at [phone]. Patient Education        Angina: Care Instructions  Your Care Instructions     You have a problem called angina. Angina happens when there is not enough blood flow to your heart muscle. Angina is a sign of coronary artery disease (CAD). CAD occurs when blood vessels that supply the heart become narrowed. Having CAD increases your risk of a heart attack. Chest pain or pressure is the most common symptom of angina. But some people have other symptoms, like:  · Pain, pressure, or a strange feeling in the back, neck, jaw, or upper belly, or in one or both shoulders or arms. · Shortness of breath. · Nausea or vomiting. · Lightheadedness or sudden weakness. · Fast or irregular heartbeat. Women are somewhat more likely than men to have angina symptoms like shortness of breath, nausea, and back or jaw pain. Angina can be dangerous. That's why it is important to pay attention to your symptoms. Know what is typical for you, learn how to control your symptoms, and understand when you need to get treatment. A change in your usual pattern of symptoms is an emergency. It may mean that you are having a heart attack. The doctor has checked you carefully, but problems can develop later. If you notice any problems or new symptoms, get medical treatment right away. Follow-up care is a key part of your treatment and safety.  Be sure to make and go to all appointments, and call your adult-strength or 2 to 4 low-dose aspirin. Wait for an ambulance. Do not try to drive yourself. · You have angina symptoms that do not go away with rest or are not getting better within 5 minutes after you take a dose of nitroglycerin. Call your doctor now if:  · Your angina symptoms seem worse but still follow your typical pattern. You can predict when symptoms will happen, but they may come on sooner, feel worse, or last longer. · You feel dizzy or lightheaded, or you feel like you may faint. Watch closely for changes in your health, and be sure to contact your doctor if you have any problems. Where can you learn more? Go to https://CommercialTribe.Tablefinder. org and sign in to your Animated Speech account. Enter H129 in the NaHere box to learn more about \"Angina: Care Instructions. \"     If you do not have an account, please click on the \"Sign Up Now\" link. Current as of: December 16, 2019               Content Version: 12.5  © 6820-6221 [employer]orporated. Care instructions adapted under license by [facility] ([facility]). If you have questions about a medical condition or this instruction, always ask your healthcare professional. [place] [address] any warranty or liability for your use of this information.

## 2020-08-25 NOTE — LETTER
[address] [id number]  Phone: [phone]  Fax: [phone]    Jazmin Tucker CNP        August 25, 2020     Patient: Cassidy Salas   YOB: 1965   Date of Visit: 8/25/2020       To Whom It May Concern: It is my medical opinion that Cassidy Salas requires a disability parking placard for the following reasons:  He cannot walk 200 feet without stopping to rest.  Duration of need: Lifetime     If you have any questions or concerns, please don't hesitate to call.     Sincerely,                DONALDO Givens - CNP

## 2020-08-25 NOTE — PROGRESS NOTES
Subjective:      Patient ID: Freddy Bay is a 63 y.o. male. HPI    Chief Complaint   Patient presents with    Check-Up   Patient presents today to follow-up on chronic conditions as noted below. Patient recently had cardiac catheterization on 7/9/2020 which showed no significant changes. Patient with known severe coronary artery disease. Patient was started on Imdur and recommend continue Artane. Patient also provided with nitroglycerin for breakthrough chest pain as cardiology anticipates this will continue to have chest pain. Coronary Artery Disease  Patient presents for routine coronary artery disease follow-up. Current symptoms:mild occasional shortness of breath. Aggravating factors: lying flat. Alleviating factors: sleeping elevated. Cardiac risk factors include dyslipidemia, family history of premature cardiovascular disease, hypertension, male gender and sedentary lifestyle. Treatment Adherence:   Medication compliance:  compliant most of the time  Diet compliance:  compliant most of the time  Weight trend: stable  Current exercise: no regular exercise  Barriers: impairment:  physical: chest pain    Diabetes Mellitus Type 2: Current symptoms/problems include none. Home blood sugar records: fasting range: 100-120  Any episodes of hypoglycemia? no  Eye exam current (within one year): yes  Tobacco history: He  reports that he has never smoked. He has never used smokeless tobacco.   Daily Aspirin? Yes    Hypertension:  Home blood pressure monitoring: Yes - 120-130 over 80. He is adherent to a low sodium diet. Patient denies chest pain, shortness of breath, headache, lightheadedness, blurred vision, peripheral edema, palpitations, dry cough and fatigue. Antihypertensive medication side effects: no medication side effects noted. Use of agents associated with hypertension: none. Hyperlipidemia:  No new myalgias or GI upset on atorvastatin (Lipitor).        Lab Results   Component Value Date    LABA1C 6.9 07/06/2020    LABA1C 6.9 07/06/2020    LABA1C 6.4 01/25/2020     Lab Results   Component Value Date    LABMICR Not Indicated 12/26/2019    CREATININE 0.9 07/07/2020     Lab Results   Component Value Date    ALT 30 07/06/2020    AST 25 07/06/2020     Lab Results   Component Value Date    CHOL 115 01/26/2020    TRIG 162 (H) 01/26/2020    HDL 25 (L) 01/26/2020    LDLCALC 58 01/26/2020        Review of Systems    Patient Active Problem List   Diagnosis    Family history of early CAD    S/P CABG (coronary artery bypass graft)    Benign essential HTN    Hyperlipidemia    CAD (coronary artery disease)    Microcytosis    Acute glaucoma of right eye    Acute chest pain    Paresthesia    Acute calculous cholecystitis    S/P laparoscopic cholecystectomy    Abnormal stress test    Pre-syncope    Chest pain    Angina, class II ([address]    Cerebrovascular accident (CVA) ([address]    Chest pain in adult    Diabetes mellitus ([address]    Migraines    Unstable angina ([address]       Outpatient Medications Marked as Taking for the 8/25/20 encounter (Office Visit) with DONALDO Gregory CNP   Medication Sig Dispense Refill    isosorbide mononitrate (IMDUR) 30 MG extended release tablet Take 1 tablet by mouth daily 30 tablet 3    metFORMIN (GLUCOPHAGE) 500 MG tablet TAKE 1 TABLET BY MOUTH EVERY DAY WITH BREAKFAST 30 tablet 2    metoprolol tartrate (LOPRESSOR) 50 MG tablet TAKE 1 TABLET BY MOUTH TWICE A  tablet 3    amLODIPine (NORVASC) 5 MG tablet TAKE 1 TABLET BY MOUTH EVERY DAY 90 tablet 3    l-arginine 1000 MG TABS Take 1,500 mg by mouth 2 times daily 60 tablet 5    atorvastatin (LIPITOR) 80 MG tablet Take 1 tablet by mouth nightly 30 tablet 11    ticagrelor (BRILINTA) 90 MG TABS tablet Take 1 tablet by mouth 2 times daily 60 tablet 11    FREESTYLE LITE strip USE TO TEST ONCE A DAY AS NEEDED 50 strip 7    nitroGLYCERIN (NITROSTAT) 0.4 MG SL tablet PLACE 1 TABLET UNDER THE TONGUE AS NEEDED, MAX OF 3 DOSES, IF NO RELIEF CALL 911 25 tablet 0    glucose monitoring kit (FREESTYLE) monitoring kit 1 kit by Does not apply route daily Monitor glucose daily. Diagnosis: diabetes mellitus Type 2 1 kit 0    FREESTYLE LANCETS MISC 1 each by Does not apply route daily 100 each 3    aspirin 81 MG tablet Take 81 mg by mouth daily         No Known Allergies    Social History     Tobacco Use    Smoking status: Never Smoker    Smokeless tobacco: Never Used   Substance Use Topics    Alcohol use: No       Objective:   /64   Pulse 53   Temp 98 °F (36.7 °C) (Oral)   Wt 204 lb 12.8 oz (92.9 kg)   SpO2 94%   BMI 27.78 kg/m²     Physical Exam  Vitals signs and nursing note reviewed. Constitutional:       General: He is not in acute distress. Appearance: Normal appearance. He is well-developed and well-groomed. He is not ill-appearing or toxic-appearing. HENT:      Head: Normocephalic and atraumatic. Mouth/Throat:      Mouth: Mucous membranes are moist.   Eyes:      Extraocular Movements: Extraocular movements intact. Conjunctiva/sclera: Conjunctivae normal.   Neck:      Musculoskeletal: Normal range of motion and neck supple. Thyroid: No thyromegaly. Cardiovascular:      Rate and Rhythm: Normal rate and regular rhythm. Pulses: Normal pulses. Heart sounds: Normal heart sounds, S1 normal and S2 normal. No murmur. No friction rub. No gallop. Pulmonary:      Effort: Pulmonary effort is normal. No accessory muscle usage or respiratory distress. Breath sounds: Normal breath sounds. No decreased breath sounds, wheezing, rhonchi or rales. Abdominal:      General: Bowel sounds are normal.      Palpations: Abdomen is soft. Musculoskeletal: Normal range of motion. Lymphadenopathy:      Cervical: No cervical adenopathy. Skin:     General: Skin is warm and dry. Capillary Refill: Capillary refill takes less than 2 seconds. Findings: No rash.    Neurological: Mental Status: He is alert and oriented to person, place, and time. Coordination: Coordination normal.   Psychiatric:         Attention and Perception: Attention normal.         Mood and Affect: Mood normal.         Speech: Speech normal.         Behavior: Behavior normal. Behavior is cooperative. Thought Content: Thought content normal.         Assessment/Plan:   1. Type 2 diabetes mellitus with other circulatory complication, without long-term current use of insulin ([facility]  Patient presents today to follow-up on chronic conditions including diabetes, heart disease, hypertension and hyperlipidemia. On 7/6/2020 A1c was 6.9%. Patient reports fasting blood sugars range from 100-1 20. Patient denies any signs or symptoms of hyper/hypoglycemia. Patient is tolerating medications without any side effects. Recommend patient continue with current medication regimen and follow-up in office in 4 to 6 months or sooner with problems or concerns. Patient agreeable. 2. Coronary artery disease involving native coronary artery of native heart with unstable angina pectoris (HCC)  Severe heart disease. Following closely with cardiology. Recent cath that showed no significant changes from 3/2020. Patient started on Imdur and reports no chest pain since discharge from hospital.  Patient reports he is tolerating medications and believes he is doing fairly well overall. Recommend patient continue current treatment and to follow closely with cardiology as directed. Patient verbalized understanding and agreeable to plan. 3. Benign essential HTN  Well controlled with current treatment. 4. Pure hypercholesterolemia  Well controlled.      5. S/P CABG (coronary artery bypass graft)  See #2

## 2020-08-26 NOTE — TELEPHONE ENCOUNTER
Future appt scheduled 02/26/2021          Last appt 08/25/2020      Last Written 05/26/2020      metFORMIN (GLUCOPHAGE) 500 MG tablet  #30  2 RF

## 2020-09-02 RX ORDER — ARGININE HCL 1000 MG
TABLET ORAL
Qty: 60 TABLET | Refills: 11 | Status: SHIPPED | OUTPATIENT
Start: 2020-09-02 | End: 2021-11-01

## 2020-11-03 PROBLEM — R07.9 CHEST PAIN: Status: RESOLVED | Noted: 2020-01-25 | Resolved: 2020-11-03

## 2020-11-03 RX ORDER — ISOSORBIDE MONONITRATE 30 MG/1
30 TABLET, EXTENDED RELEASE ORAL DAILY
Qty: 30 TABLET | Refills: 3 | Status: SHIPPED | OUTPATIENT
Start: 2020-11-03 | End: 2021-02-12 | Stop reason: SDUPTHER

## 2021-02-12 ENCOUNTER — OFFICE VISIT (OUTPATIENT)
Dept: CARDIOLOGY CLINIC | Age: 56
End: 2021-02-12
Payer: MEDICARE

## 2021-02-12 VITALS
BODY MASS INDEX: 28.31 KG/M2 | WEIGHT: 209 LBS | HEIGHT: 72 IN | DIASTOLIC BLOOD PRESSURE: 70 MMHG | SYSTOLIC BLOOD PRESSURE: 112 MMHG | HEART RATE: 74 BPM | OXYGEN SATURATION: 96 %

## 2021-02-12 DIAGNOSIS — E11.9 TYPE 2 DIABETES MELLITUS WITHOUT COMPLICATION, WITHOUT LONG-TERM CURRENT USE OF INSULIN (HCC): Chronic | ICD-10-CM

## 2021-02-12 DIAGNOSIS — I25.118 CORONARY ARTERY DISEASE OF NATIVE ARTERY OF NATIVE HEART WITH STABLE ANGINA PECTORIS (HCC): Primary | ICD-10-CM

## 2021-02-12 DIAGNOSIS — I10 ESSENTIAL HYPERTENSION: ICD-10-CM

## 2021-02-12 DIAGNOSIS — Z82.49 FAMILY HISTORY OF EARLY CAD: ICD-10-CM

## 2021-02-12 DIAGNOSIS — Z95.1 S/P CABG (CORONARY ARTERY BYPASS GRAFT): ICD-10-CM

## 2021-02-12 DIAGNOSIS — E78.2 MIXED HYPERLIPIDEMIA: Chronic | ICD-10-CM

## 2021-02-12 PROCEDURE — G8419 CALC BMI OUT NRM PARAM NOF/U: HCPCS | Performed by: NURSE PRACTITIONER

## 2021-02-12 PROCEDURE — 2022F DILAT RTA XM EVC RTNOPTHY: CPT | Performed by: NURSE PRACTITIONER

## 2021-02-12 PROCEDURE — 3017F COLORECTAL CA SCREEN DOC REV: CPT | Performed by: NURSE PRACTITIONER

## 2021-02-12 PROCEDURE — G8484 FLU IMMUNIZE NO ADMIN: HCPCS | Performed by: NURSE PRACTITIONER

## 2021-02-12 PROCEDURE — 99214 OFFICE O/P EST MOD 30 MIN: CPT | Performed by: NURSE PRACTITIONER

## 2021-02-12 PROCEDURE — G8427 DOCREV CUR MEDS BY ELIG CLIN: HCPCS | Performed by: NURSE PRACTITIONER

## 2021-02-12 PROCEDURE — 1036F TOBACCO NON-USER: CPT | Performed by: NURSE PRACTITIONER

## 2021-02-12 PROCEDURE — 3046F HEMOGLOBIN A1C LEVEL >9.0%: CPT | Performed by: NURSE PRACTITIONER

## 2021-02-12 RX ORDER — ATORVASTATIN CALCIUM 80 MG/1
80 TABLET, FILM COATED ORAL NIGHTLY
Qty: 90 TABLET | Refills: 3 | Status: SHIPPED | OUTPATIENT
Start: 2021-02-12 | End: 2022-02-25 | Stop reason: SDUPTHER

## 2021-02-12 RX ORDER — ISOSORBIDE MONONITRATE 30 MG/1
30 TABLET, EXTENDED RELEASE ORAL DAILY
Qty: 90 TABLET | Refills: 3 | Status: SHIPPED | OUTPATIENT
Start: 2021-02-12 | End: 2022-02-25 | Stop reason: SDUPTHER

## 2021-02-12 NOTE — PATIENT INSTRUCTIONS
1. No change in heart medicines  2. Have fasting blood work when you see PCP later this month  3. Talk to DONALDO Rosado CNP about your diarrhea  4.  Follow up with Dr. Joaquina Hoang in 6 months

## 2021-02-12 NOTE — PROGRESS NOTES
[address]   Cardiac Evaluation    Primary Care Doctor:  DONALDO Mayer - CNP    Chief Complaint   Patient presents with    6 Month Follow-Up     no cardiac complaints         History of Present Illness:   I had the pleasure of seeing Ga Mays in follow up for CAD, s/p CABG X5 2016, s/p PCI to LCx 2020, chronic stable angina, HTN, HLD, and DM. He continues to have angina, a tightnening burning sensation in chest.  Happens once per month, usually at rest, goes away on own in 5-10 minutes. Has not taken sl nitro in long time. Was able to shovel snow yesterday and tolerated well. Weight is up here but staying stable at home. Appetite is good but notes if eats meats (not just red meats) will have diarrhea. If he doesn't eat meats, will have normal formed stools. No nausea or vomiting. No belly pain. No lightheadedness or dizziness. Energy is fair. He is active, walking around the house. No dedicated exercise. Ga Mays describes symptoms including chest pain, fatigue but denies dyspnea, palpitations, orthopnea, PND, early saiety, edema, syncope. NYHA:   II  ACC/ AHA Stage:    C    Past Medical History:   has a past medical history of CAD (coronary artery disease), Diabetes mellitus ([address] Hyperlipidemia, Hypertension, MI (myocardial infarction) ([address] No history of procedure, and Pneumonia due to organism. Surgical History:   has a past surgical history that includes Coronary artery bypass graft (09/20/2016); Eye surgery (Right); Colonoscopy (06/01/2017); hernia repair (Bilateral, 2005); Cholecystectomy, laparoscopic (N/A, 4/17/2019); and Percutaneous Transluminal Coronary Angio (03/09/2020). Social History:   reports that he has never smoked. He has never used smokeless tobacco. He reports that he does not drink alcohol or use drugs.    Family History:   Family History   Problem Relation Age of Onset    Heart Disease Father     Diabetes type 2  Father     Cancer Mother     Heart Disease Brother        Home Medications:  Prior to Admission medications    Medication Sig Start Date End Date Taking? Authorizing Provider   metFORMIN (GLUCOPHAGE) 500 MG tablet TAKE 1 TABLET BY MOUTH EVERY DAY WITH BREAKFAST 11/27/20  Yes DONALDO Ruano CNP   isosorbide mononitrate (IMDUR) 30 MG extended release tablet Take 1 tablet by mouth daily 11/3/20  Yes DONALDO Sharma CNP   l-arginine 1000 MG TABS TAKE 1 TABLET BY MOUTH TWICE A DAY 9/2/20  Yes Amanda Lora MD   metoprolol tartrate (LOPRESSOR) 50 MG tablet TAKE 1 TABLET BY MOUTH TWICE A DAY 5/26/20  Yes Amanda Lora MD   amLODIPine (NORVASC) 5 MG tablet TAKE 1 TABLET BY MOUTH EVERY DAY 5/26/20  Yes Amanda Lora MD   atorvastatin (LIPITOR) 80 MG tablet Take 1 tablet by mouth nightly 3/10/20  Yes DONALDO Sharma CNP   ticagrelor (BRILINTA) 90 MG TABS tablet Take 1 tablet by mouth 2 times daily 3/10/20  Yes DONALDO Sharma CNP   FREESTYLE LITE strip USE TO TEST ONCE A DAY AS NEEDED 1/20/20  Yes DONALDO Ruano CNP   nitroGLYCERIN (NITROSTAT) 0.4 MG SL tablet PLACE 1 TABLET UNDER THE TONGUE AS NEEDED, MAX OF 3 DOSES, IF NO RELIEF CALL 911 7/12/19  Yes DONALDO Ruano CNP   glucose monitoring kit (FREESTYLE) monitoring kit 1 kit by Does not apply route daily Monitor glucose daily. Diagnosis: diabetes mellitus Type 2 5/21/19  Yes DONALDO Ruano CNP   FREESTYLE LANCETS MISC 1 each by Does not apply route daily 5/21/19  Yes DONALDO Rauno CNP   aspirin 81 MG tablet Take 81 mg by mouth daily   Yes Historical Provider, MD        Allergies:  Patient has no known allergies.      Physical Examination:    Vitals:    02/12/21 1045   BP: 112/70   Pulse: 74   SpO2: 96%   Weight: 209 lb (94.8 kg)   Height: 6' (1.829 m)        Constitutional and General Appearance: Warm and dry, no apparent distress, normal coloration  HEENT:  Normocephalic, atraumatic  Respiratory:  · Normal excursion and expansion without use of accessory muscles  · Resp Auscultation: Clear to auscultation   Cardiovascular:  · The apical impulses not displaced  · Heart tones are crisp and normal  · JVP 8 cm H2O  · Regular rate and rhythm, normal S1S2, no m/g/r  · Peripheral pulses are symmetrical and full  · There is no clubbing, cyanosis of the extremities.   · No BLE edema  · Pedal Pulses: 2+ and equal   Abdomen:  · No masses or tenderness  · Liver/Spleen: No Abnormalities Noted  Neurological/Psychiatric:  · Alert and oriented in all spheres  · Moves all extremities well  · Exhibits normal gait balance and coordination  · No abnormalities of mood, affect, memory, mentation, or behavior are noted    Lab Data:  Most recent lab results below reviewed in office    CBC:   Lab Results   Component Value Date    WBC 12.0 07/07/2020    WBC 12.2 07/06/2020    WBC 12.7 03/18/2020    RBC 4.82 07/07/2020    RBC 5.43 07/06/2020    RBC 5.15 03/18/2020    HGB 13.9 07/07/2020    HGB 15.8 07/06/2020    HGB 15.1 03/18/2020    HCT 41.4 07/07/2020    HCT 46.3 07/06/2020    HCT 43.8 03/18/2020    MCV 85.9 07/07/2020    MCV 85.3 07/06/2020    MCV 85.0 03/18/2020    RDW 14.7 07/07/2020    RDW 15.0 07/06/2020    RDW 14.9 03/18/2020     07/07/2020     07/06/2020     03/18/2020     BMP:  Lab Results   Component Value Date     07/07/2020     07/06/2020     03/18/2020    K 3.9 07/07/2020    K 3.8 07/06/2020    K 3.8 03/18/2020    K 5.2 03/18/2020    K 5.3 03/18/2020    K 4.2 03/07/2020     07/07/2020     07/06/2020    CL 99 03/18/2020    CO2 24 07/07/2020    CO2 25 07/06/2020    CO2 25 03/18/2020    BUN 16 07/07/2020    BUN 11 07/06/2020    BUN 12 03/18/2020    CREATININE 0.9 07/07/2020    CREATININE 0.9 07/06/2020    CREATININE 0.7 03/18/2020     BNP:   Lab Results   Component Value Date    PROBNP 14 03/18/2020    PROBNP 35 02/01/2020    PROBNP 22 08/04/2019     LIPID:   Lab Results   Component Value Date    TRIG 162 01/26/2020    TRIG 77 12/27/2019    HDL 25 01/26/2020    HDL 24 12/27/2019    LDLCALC 58 01/26/2020    LDLCALC 53 12/27/2019       Cardiac Imaging:  CARDIAC CATH:  7/9/2020  Procedures: Cor angio, SVG x 2, LIMA     LM 50%  LAD 70% prox, 95% mid  Cx stent patent              OM3 95% ostium, jailed by stent              OM 4 70% distal  RCA 70% mid, 60% distal  LIMA to LAD patent  SVG to Dx patent  SVG to %  SVG to R-marginal patent  SVG to ? OM not identified. Reported 100% for prior cath     No sig cahnge compared to cath 3/2020  Add imapple and lotusexneida Healy to review for consider further PCI      Coronary angiogram 3/9/2020:  LM: calcified,  30 mid  LAD: calcified, 100% mid  LCX: calcified, prox 80-90%, sequential lesions, +poststenotic aneurysm. L-->Rt collaterals                OM1- small, ostial 80% (too small for PCI)                OM2- tiny vessel                OM3- larger vessel, acute takeoff angle, ostial 90%. Fed by L-L collaterals                OM4- distal 80%  RCA: dominant, moderate diffuse disease. prox 50%, mid 60%, distal 70%                R->Lt collaterals to Lcx                PDA: 100% ostial with L->R collaterals                PLV: luminals  LIMA-LAD: patent with  Touchdown to mid LAD, mid 40-50% in native LAD  SVG-->RT RV Marginal: patent with retrograde flow up to RCA and down to distal RCA  SVG-->Diag: patent. Flow down LAD and up to LCx    No other grafts and no sequential limbs seen  LVEDP: 4  LVEF: 55%  PCI of prox./mid LCX  CSI arthrectomy to prep vessel, multiple runs at low RPM  Pre dil 3 x 20mm  STENT: resolute [place] 3 x 34mm  OCT showed stent expanded but stent extended 3mm into LAD/LM merle                - there fore placed runthry wire thru struts into LCX                - POTS to left main using 4 x6mm                - then 3.25mm to LAD then kissing balloon [id number] and then 3mm x12 to LM/LAD/LCx  1.  Successful PCI to prox/mid LCx using [place] [facility] stent using CSI and OCT                - 80%-->0%, Vinny-3 flow  2. integrllin for 6 hr  3. COnt ASA 81mg qday and Ticagrelor 90mg po BID- Continue for LIFE given structs extending into LM merle      STRESS TEST:  8/5/2019   Summary  Small sized, mild intensity basal lateral partial reversibility defect  consistent with mild ischemia and infarction in the territory of the distal  LCx and/or RCA. LV function is normal with and ejection fraction of 63%. Lower risk abnormal study. CARDIAC CTA 5/14/19  Only 3 bypass grafts can be visualized. The insertion sites of the left internal mammary branch is poorly visualized secondary to phase misregistration artifact. The graft appears patent. A 2nd bypass graft terminates in the region of a diagonal branch. Graft appears patent. Insertion site is poorly visualized secondary to artifact   A 3rd bypass graft appears small distally, terminating in the region of the acute marginal     Echo 5/13/2019:  Normal left ventricle systolic function with an estimated ejection fraction of 55%. No regional wall motion abnormalities are seen. Normal left ventricular diastolic filling pressure. Systolic pulmonary artery pressure (SPAP) is normal and estimated at 26 mmHg (right atrial pressure 3 mmHg). STRESS TEST: 2/22/19  Summary  Normal LV size and systolic function. Left ventricular ejection fraction of  68%. Normal wall motion. There is normal isotope uptake at stress and rest. There is no evidence of  myocardial ischemia or scar     CABG 9/20/2016:  1. LIMA to LAD. 2. Reverse greater saphenous vein graft to acute marginal  3. Reverse greater saphenous vein graft to PDA sequentiall. 4. Reverse greater saphenous vein graft to OM sequential  5.  Reverse greater sVG to diagonal sequential.       CATH: 09/19/16  LEFT HEART CATH  LM: luminals  LAD: heavy calcification burden, prox eccentric 75%- mid 90% around large septal, mid/distal 80%  LCX: heavy calcification burden. Mid 70%  OM1- small, moderate dz, ostial 80%  OM2- moderate in size, mild diffuse disease  OM3- large, 90% ostial PLOM  RCA: Dominant, moderate diffuse disease, prox 60%, mid 70%, distal 90%  Grade 3 L-->R collaterals  LVEDP: 10  LVEF: 65%  PLAN  1. 3 vessel CAD with preserved LVEF  - refer to CT surgery for CABG  - check Hemoglobin A1c and TSH  2. Start ASA, and statin. Holding BB for bradycardia and pauses seen at [address]:    1. Coronary artery disease of native artery of native heart with stable angina pectoris ([facility]    2. S/P CABG (coronary artery bypass graft)    3. Essential hypertension    4. Mixed hyperlipidemia    5. Family history of early CAD    10. Type 2 diabetes mellitus without complication, without long-term current use of insulin ([facility]          Plan:   1. No change in heart medicines  2. Have fasting blood work when you see PCP later this month  3. Talk to DONALDO Aguilera CNP about your diarrhea  4.  Follow up with Dr. Blair Solis in 6 months       I appreciate the opportunity of cooperating in the care of this individual.    DONALDO Marquez CNP, 2/12/2021, 11:00 AM

## 2021-02-26 ENCOUNTER — OFFICE VISIT (OUTPATIENT)
Dept: FAMILY MEDICINE CLINIC | Age: 56
End: 2021-02-26
Payer: MEDICARE

## 2021-02-26 VITALS
SYSTOLIC BLOOD PRESSURE: 117 MMHG | TEMPERATURE: 98.3 F | HEART RATE: 57 BPM | BODY MASS INDEX: 27.83 KG/M2 | DIASTOLIC BLOOD PRESSURE: 74 MMHG | WEIGHT: 205.2 LBS | OXYGEN SATURATION: 94 %

## 2021-02-26 DIAGNOSIS — E78.2 MIXED HYPERLIPIDEMIA: Chronic | ICD-10-CM

## 2021-02-26 DIAGNOSIS — E11.59 TYPE 2 DIABETES MELLITUS WITH OTHER CIRCULATORY COMPLICATION, WITHOUT LONG-TERM CURRENT USE OF INSULIN (HCC): Primary | ICD-10-CM

## 2021-02-26 DIAGNOSIS — I10 ESSENTIAL HYPERTENSION: ICD-10-CM

## 2021-02-26 DIAGNOSIS — R19.7 DIARRHEA, UNSPECIFIED TYPE: ICD-10-CM

## 2021-02-26 DIAGNOSIS — I25.118 CORONARY ARTERY DISEASE OF NATIVE ARTERY OF NATIVE HEART WITH STABLE ANGINA PECTORIS (HCC): Chronic | ICD-10-CM

## 2021-02-26 DIAGNOSIS — Z95.1 S/P CABG (CORONARY ARTERY BYPASS GRAFT): ICD-10-CM

## 2021-02-26 PROBLEM — Z86.73 HISTORY OF CVA (CEREBROVASCULAR ACCIDENT): Status: ACTIVE | Noted: 2020-02-01

## 2021-02-26 LAB
A/G RATIO: 1.6 (ref 1.1–2.2)
ALBUMIN SERPL-MCNC: 4.5 G/DL (ref 3.4–5)
ALP BLD-CCNC: 122 U/L (ref 40–129)
ALT SERPL-CCNC: 33 U/L (ref 10–40)
ANION GAP SERPL CALCULATED.3IONS-SCNC: 12 MMOL/L (ref 3–16)
AST SERPL-CCNC: 24 U/L (ref 15–37)
BASOPHILS ABSOLUTE: 0.1 K/UL (ref 0–0.2)
BASOPHILS RELATIVE PERCENT: 0.8 %
BILIRUB SERPL-MCNC: 1 MG/DL (ref 0–1)
BUN BLDV-MCNC: 13 MG/DL (ref 7–20)
CALCIUM SERPL-MCNC: 9.8 MG/DL (ref 8.3–10.6)
CHLORIDE BLD-SCNC: 106 MMOL/L (ref 99–110)
CHOLESTEROL, TOTAL: 99 MG/DL (ref 0–199)
CO2: 24 MMOL/L (ref 21–32)
CREAT SERPL-MCNC: 0.8 MG/DL (ref 0.9–1.3)
CREATININE URINE POCT: 300
EOSINOPHILS ABSOLUTE: 0.3 K/UL (ref 0–0.6)
EOSINOPHILS RELATIVE PERCENT: 2.7 %
GFR AFRICAN AMERICAN: >60
GFR NON-AFRICAN AMERICAN: >60
GLOBULIN: 2.9 G/DL
GLUCOSE BLD-MCNC: 118 MG/DL (ref 70–99)
HBA1C MFR BLD: 6.7 %
HCT VFR BLD CALC: 45.9 % (ref 40.5–52.5)
HDLC SERPL-MCNC: 23 MG/DL (ref 40–60)
HEMOGLOBIN: 15.2 G/DL (ref 13.5–17.5)
LDL CHOLESTEROL CALCULATED: 52 MG/DL
LYMPHOCYTES ABSOLUTE: 2.7 K/UL (ref 1–5.1)
LYMPHOCYTES RELATIVE PERCENT: 22.3 %
MCH RBC QN AUTO: 27.9 PG (ref 26–34)
MCHC RBC AUTO-ENTMCNC: 33.1 G/DL (ref 31–36)
MCV RBC AUTO: 84.3 FL (ref 80–100)
MICROALBUMIN/CREAT 24H UR: 30 MG/G{CREAT}
MICROALBUMIN/CREAT UR-RTO: 30
MONOCYTES ABSOLUTE: 0.9 K/UL (ref 0–1.3)
MONOCYTES RELATIVE PERCENT: 7.6 %
NEUTROPHILS ABSOLUTE: 8.1 K/UL (ref 1.7–7.7)
NEUTROPHILS RELATIVE PERCENT: 66.6 %
PDW BLD-RTO: 16.2 % (ref 12.4–15.4)
PLATELET # BLD: 319 K/UL (ref 135–450)
PMV BLD AUTO: 7.8 FL (ref 5–10.5)
POTASSIUM SERPL-SCNC: 4.4 MMOL/L (ref 3.5–5.1)
RBC # BLD: 5.45 M/UL (ref 4.2–5.9)
SODIUM BLD-SCNC: 142 MMOL/L (ref 136–145)
TOTAL PROTEIN: 7.4 G/DL (ref 6.4–8.2)
TRIGL SERPL-MCNC: 119 MG/DL (ref 0–150)
VLDLC SERPL CALC-MCNC: 24 MG/DL
WBC # BLD: 12.2 K/UL (ref 4–11)

## 2021-02-26 PROCEDURE — 99214 OFFICE O/P EST MOD 30 MIN: CPT | Performed by: NURSE PRACTITIONER

## 2021-02-26 PROCEDURE — 36415 COLL VENOUS BLD VENIPUNCTURE: CPT | Performed by: NURSE PRACTITIONER

## 2021-02-26 PROCEDURE — 82044 UR ALBUMIN SEMIQUANTITATIVE: CPT | Performed by: NURSE PRACTITIONER

## 2021-02-26 PROCEDURE — G8427 DOCREV CUR MEDS BY ELIG CLIN: HCPCS | Performed by: NURSE PRACTITIONER

## 2021-02-26 PROCEDURE — 2022F DILAT RTA XM EVC RTNOPTHY: CPT | Performed by: NURSE PRACTITIONER

## 2021-02-26 PROCEDURE — 3044F HG A1C LEVEL LT 7.0%: CPT | Performed by: NURSE PRACTITIONER

## 2021-02-26 PROCEDURE — 1036F TOBACCO NON-USER: CPT | Performed by: NURSE PRACTITIONER

## 2021-02-26 PROCEDURE — 83036 HEMOGLOBIN GLYCOSYLATED A1C: CPT | Performed by: NURSE PRACTITIONER

## 2021-02-26 PROCEDURE — G8484 FLU IMMUNIZE NO ADMIN: HCPCS | Performed by: NURSE PRACTITIONER

## 2021-02-26 PROCEDURE — 3017F COLORECTAL CA SCREEN DOC REV: CPT | Performed by: NURSE PRACTITIONER

## 2021-02-26 PROCEDURE — G8419 CALC BMI OUT NRM PARAM NOF/U: HCPCS | Performed by: NURSE PRACTITIONER

## 2021-02-26 RX ORDER — CHOLESTYRAMINE 4 G/9G
1 POWDER, FOR SUSPENSION ORAL 2 TIMES DAILY
Qty: 90 PACKET | Refills: 3 | Status: SHIPPED | OUTPATIENT
Start: 2021-02-26 | End: 2021-04-06

## 2021-02-26 ASSESSMENT — ENCOUNTER SYMPTOMS
EYES NEGATIVE: 1
DIARRHEA: 1
RESPIRATORY NEGATIVE: 1

## 2021-02-26 ASSESSMENT — PATIENT HEALTH QUESTIONNAIRE - PHQ9: SUM OF ALL RESPONSES TO PHQ9 QUESTIONS 1 & 2: 0

## 2021-02-26 NOTE — PATIENT INSTRUCTIONS
Please read the healthy family handout that you were given and share it with your family. Please compare this printed medication list with your medications at home to be sure they are the same. If you have any medications that are different please contact us immediately at [phone]. Also review your allergies that we have listed, these may also include medications that you have not been able to tolerate, make sure everything listed is correct. If you have any allergies that are different please contact us immediately at [phone]. Patient Education        Diarrhea: Care Instructions  Your Care Instructions     Diarrhea is loose, watery stools (bowel movements). The exact cause is often hard to find. Sometimes diarrhea is your body's way of getting rid of what caused an upset stomach. Viruses, food poisoning, and many medicines can cause diarrhea. Some people get diarrhea in response to emotional stress, anxiety, or certain foods. Almost everyone has diarrhea now and then. It usually isn't serious, and your stools will return to normal soon. The important thing to do is replace the fluids you have lost, so you can prevent dehydration. The doctor has checked you carefully, but problems can develop later. If you notice any problems or new symptoms, get medical treatment right away. Follow-up care is a key part of your treatment and safety. Be sure to make and go to all appointments, and call your doctor if you are having problems. It's also a good idea to know your test results and keep a list of the medicines you take. How can you care for yourself at home? · Watch for signs of dehydration, which means your body has lost too much water. Dehydration is a serious condition and should be treated right away. Signs of dehydration are:  ? Increasing thirst and dry eyes and mouth. ? Feeling faint or lightheaded. ? A smaller amount of urine than normal.  · To prevent dehydration, drink plenty of fluids. Choose water and other caffeine-free clear liquids until you feel better. If you have kidney, heart, or liver disease and have to limit fluids, talk with your doctor before you increase the amount of fluids you drink. · Begin eating small amounts of mild foods the next day, if you feel like it. ? Try yogurt that has live cultures of Lactobacillus. (Check the label.)  ? Avoid spicy foods, fruits, alcohol, and caffeine until 48 hours after all symptoms are gone. ? Avoid chewing gum that contains sorbitol. ? Avoid dairy products (except for yogurt with Lactobacillus) while you have diarrhea and for 3 days after symptoms are gone. · The doctor may recommend that you take over-the-counter medicine, such as loperamide (Imodium), if you still have diarrhea after 6 hours. Read and follow all instructions on the label. Do not use this medicine if you have bloody diarrhea, a high fever, or other signs of serious illness. Call your doctor if you think you are having a problem with your medicine. When should you call for help? Call 911 anytime you think you may need emergency care. For example, call if:    · You passed out (lost consciousness).     · Your stools are maroon or very bloody. Call your doctor now or seek immediate medical care if:    · You are dizzy or lightheaded, or you feel like you may faint.     · Your stools are black and look like tar, or they have streaks of blood.     · You have new or worse belly pain.     · You have symptoms of dehydration, such as:  ? Dry eyes and a dry mouth. ? Passing only a little dark urine. ? Feeling thirstier than usual.     · You have a new or higher fever. Watch closely for changes in your health, and be sure to contact your doctor if:    · Your diarrhea is getting worse.     · You see pus in the diarrhea.     · You are not getting better after 2 days (48 hours). Where can you learn more? Go to [email or web address] org and sign in to your MyChart account. Enter S269 in the [facility] box to learn more about \"Diarrhea: Care Instructions. \"     If you do not have an account, please click on the \"Sign Up Now\" link. Current as of: June 26, 2019               Content Version: 12.6  © 5132-2543 [employer]. Care instructions adapted under license by [facility] ([facility]). If you have questions about a medical condition or this instruction, always ask your healthcare professional. [address] any warranty or liability for your use of this information. Patient Education        cholestyramine  Pronunciation:  koe le STYE ra meen  Brand:  Cholestyramine Light, Prevalite, Questran  What is the most important information I should know about cholestyramine? You should not use this medicine if you have a blockage in your stomach or intestines. Wait at least 4 to 6 hours after taking cholestyramine before you take any other medications. What is cholestyramine? Cholestyramine helps reduce cholesterol (fatty acids) in the blood. High cholesterol is associated with an increased risk of heart disease and atherosclerosis (clogged arteries). Cholestyramine is used to lower high levels of cholesterol in the blood, especially low-density lipoprotein (LDL) (\"bad\" cholesterol). Cholestyramine powder is also used to treat itching caused by a blockage in the bile ducts of the gallbladder. Cholestyramine may also be used for purposes not listed in this medication guide. What should I discuss with my healthcare provider before taking cholestyramine? You should not use cholestyramine if you are allergic to it, or if you have:  · a blockage in your digestive tract (stomach or intestines). To make sure cholestyramine is safe for you, tell your doctor if you have:  · chronic constipation;  · a thyroid disorder;  · diabetes;  · kidney disease;  · liver disease; or  · coronary artery disease (clogged arteries).   This medicine may contain phenylalanine. Talk to your doctor before using cholestyramine if you have phenylketonuria (PKU). Taking cholestyramine can make it harder for your body to absorb certain vitamins. Your doctor may recommend you take a vitamin supplement. You may have an even greater need for vitamin supplements during pregnancy or while you are breast-feeding a baby. Follow your doctor's instructions about taking vitamin supplements during treatment with cholestyramine. It is not known whether this medicine will harm an unborn baby. Tell your doctor if you are pregnant or plan to become pregnant. How should I take cholestyramine? Follow all directions on your prescription label. Your doctor may occasionally change your dose. Do not use this medicine in larger or smaller amounts or for longer than recommended. Although cholestyramine is usually taken once or twice per day, this medicine may be taken up to 6 times per day. Carefully follow your doctor's dosing instructions. Mix the cholestyramine powder with at least 2 to 3 ounces of water or other non-carbonated beverage. You may also mix the powder with a brothy soup, crushed pineapple, or applesauce. Measure the powder using the scoop provided with your medication. Do not use any other scoop or measuring cup to measure your cholestyramine dose. Cholestyramine works best if you take it with meals. However, your dosing schedule may depend on when you need to take any other medications. Cholestyramine should not be taken within 1 hour after or 4 hours before you take other medications. Use cholestyramine regularly to get the most benefit. Get your prescription refilled before you run out of medicine completely. Cholestyramine can affect the surfaces of your teeth. Sipping the cholestyramine/liquid mixture slowly or holding the liquid in your mouth for too long may result in tooth discoloration, enamel erosion, or tooth decay.  Be sure to brush your teeth regularly doctor about all your current medicines and any you start or stop using, especially:  · birth control pills or hormone replacement therapy;  · digoxin (digitalis);  · a diuretic or \"water pill\";  · penicillin G;  · phenobarbital;  · phenylbutazone;  · propranolol;  · spironolactone;  · tetracycline;  · thyroid medication such as levothyroxine (Synthroid and others); or  · warfarin (Coumadin, Avie Laser). This list is not complete. Other drugs may interact with cholestyramine, including prescription and over-the-counter medicines, vitamins, and herbal products. Not all possible interactions are listed in this medication guide. Where can I get more information? Your pharmacist can provide more information about cholestyramine. Remember, keep this and all other medicines out of the reach of children, never share your medicines with others, and use this medication only for the indication prescribed. Every effort has been made to ensure that the information provided by [address] [place], [place] [address]can Dr is accurate, up-to-date, and complete, but no guarantee is made to that effect. Drug information contained herein may be time sensitive. [employer] information has been compiled for use by healthcare practitioners and consumers in the [place] and therefore [employer] does not warrant that uses outside of the [employer] are appropriate, unless specifically indicated otherwise. [facility]"[employer] drug information does not endorse drugs, diagnose patients or recommend therapy. [facility]"[employer] drug information is an informational resource designed to assist licensed healthcare practitioners in caring for their patients and/or to serve consumers viewing this service as a supplement to, and not a substitute for, the expertise, skill, knowledge and judgment of healthcare practitioners.  The absence of a warning for a given drug or drug combination in no way should be construed to indicate that the drug or drug combination is safe, effective or appropriate for any given patient. [facility] does not assume any responsibility for any aspect of healthcare administered with the aid of information [facility] provides. The information contained herein is not intended to cover all possible uses, directions, precautions, warnings, drug interactions, allergic reactions, or adverse effects. If you have questions about the drugs you are taking, check with your doctor, nurse or pharmacist.  Copyright 8007-5862 [address]. Version: 5.01. Revision date: 3/10/2017. Care instructions adapted under license by [facility] ([facility]). If you have questions about a medical condition or this instruction, always ask your healthcare professional. [place] [address] any warranty or liability for your use of this information.

## 2021-02-26 NOTE — PROGRESS NOTES
Subjective:      Patient ID: Priscilla Pierre is a 63 y.o. male. HPI    Chief Complaint   Patient presents with    Check-Up    Diarrhea     Diabetes Mellitus Type 2: Current symptoms/problems include none. Medication compliance:  compliant most of the time  Diabetic diet compliance:  compliant most of the time,  Weight trend: stable  Current exercise: walks routinely  Barriers: none    Home blood sugar records: fasting range: 100-120  Any episodes of hypoglycemia? no  Eye exam current (within one year): no patient is aware he is due to f/u for dilated eye exam   reports that he has never smoked. He has never used smokeless tobacco.   Daily Aspirin? Yes    Lab Results   Component Value Date    LABA1C 6.9 07/06/2020    LABA1C 6.9 07/06/2020    LABA1C 6.4 01/25/2020     Lab Results   Component Value Date    LABMICR Not Indicated 12/26/2019    CREATININE 0.9 07/07/2020     Lab Results   Component Value Date    ALT 30 07/06/2020    AST 25 07/06/2020     Lab Results   Component Value Date    CHOL 115 01/26/2020    TRIG 162 (H) 01/26/2020    HDL 25 (L) 01/26/2020    LDLCALC 58 01/26/2020        Hypertension:  Home blood pressure monitoring: Yes - 110-120 over 70's. He is adherent to a low sodium diet. Patient denies chest pain, shortness of breath, headache, lightheadedness, blurred vision, peripheral edema, palpitations, dry cough and fatigue. Antihypertensive medication side effects: no medication side effects noted. Use of agents associated with hypertension: none. Sodium (mmol/L)   Date Value   07/07/2020 140    BUN (mg/dL)   Date Value   07/07/2020 16    Glucose (mg/dL)   Date Value   07/07/2020 124 (H)      Potassium reflex Magnesium (mmol/L)   Date Value   07/07/2020 3.9    CREATININE (mg/dL)   Date Value   07/07/2020 0.9         Hyperlipidemia:  No new myalgias or GI upset on atorvastatin (Lipitor). Medication compliance: compliant most of the time.  Patient is following a low fat, low cholesterol diet. He is  exercising regularly. Lab Results   Component Value Date    CHOL 115 01/26/2020    TRIG 162 (H) 01/26/2020    HDL 25 (L) 01/26/2020    LDLCALC 58 01/26/2020     Lab Results   Component Value Date    ALT 30 07/06/2020    AST 25 07/06/2020        Diarrhea  Patient complains of diarrhea. Onset of diarrhea was several months ago. Diarrhea is occurring approximately 2 times per day. Patient describes diarrhea as watery. Diarrhea has been associated with eating meat only. Patient denies blood in stool, fever, illness in household contacts, recent antibiotic use, recent camping, recent travel, significant abdominal pain, unintentional weight loss. Previous visits for diarrhea: none. Evaluation to date: none. Treatment to date: nothing per patient. Past Medical History:   Diagnosis Date    CAD (coronary artery disease) 2016    Diabetes mellitus [facility] [address]     Hyperlipidemia     Hypertension     MI (myocardial infarction) ([facility] [address]     No history of procedure 06/01/2017    colonoscopy    Pneumonia due to organism 8/3/2019     Past Surgical History:   Procedure Laterality Date    CHOLECYSTECTOMY, LAPAROSCOPIC N/A 4/17/2019    LAPAROSCOPIC CHOLECYSTECTOMY WITH INTRAOPERATIVE CHOLANGIOGRAM performed by Alka Salazar MD at [facility]  06/01/2017    Colonic polyp    CORONARY ARTERY BYPASS GRAFT  09/20/2016    LIMA- LAD, Reverse SVG- Rosalba, reverse SVG- PDA, Reverse SVG- OM seq- Diag    EYE SURGERY Right     drain placed behind eye    HERNIA REPAIR Bilateral 2005    bilateral inguinal hernia repair    PTCA  03/09/2020    SEGUNDO- 3.0 x 34 to pCx     Family History   Problem Relation Age of Onset    Heart Disease Father     Diabetes type 2  Father     Cancer Mother     Heart Disease Brother        Review of Systems   Constitutional: Negative for appetite change, chills and fever. HENT: Negative. Eyes: Negative. Respiratory: Negative. Cardiovascular: Positive for chest pain. Gastrointestinal: Positive for diarrhea. Endocrine: Negative. Genitourinary: Negative. Musculoskeletal: Negative. Neurological: Negative. Hematological: Negative. Psychiatric/Behavioral: Negative. Patient Active Problem List   Diagnosis    Family history of early CAD    S/P CABG (coronary artery bypass graft)    Essential hypertension    Hyperlipidemia    CAD (coronary artery disease)    Microcytosis    Acute glaucoma of right eye    Acute chest pain    Paresthesia    Acute calculous cholecystitis    S/P laparoscopic cholecystectomy    Abnormal stress test    Pre-syncope    Angina, class II ([address]    [facility] [address]    Chest pain in adult    Diabetes mellitus ([facility] [address]    Migraines    Unstable angina ([facility] [address]       Outpatient Medications Marked as Taking for the 2/26/21 encounter (Office Visit) with DONALDO Rodriguez CNP   Medication Sig Dispense Refill    atorvastatin (LIPITOR) 80 MG tablet Take 1 tablet by mouth nightly 90 tablet 3    isosorbide mononitrate (IMDUR) 30 MG extended release tablet Take 1 tablet by mouth daily 90 tablet 3    ticagrelor (BRILINTA) 90 MG TABS tablet Take 1 tablet by mouth 2 times daily 180 tablet 3    metFORMIN (GLUCOPHAGE) 500 MG tablet TAKE 1 TABLET BY MOUTH EVERY DAY WITH BREAKFAST 30 tablet 5    l-arginine 1000 MG TABS TAKE 1 TABLET BY MOUTH TWICE A DAY 60 tablet 11    metoprolol tartrate (LOPRESSOR) 50 MG tablet TAKE 1 TABLET BY MOUTH TWICE A  tablet 3    amLODIPine (NORVASC) 5 MG tablet TAKE 1 TABLET BY MOUTH EVERY DAY 90 tablet 3    FREESTYLE LITE strip USE TO TEST ONCE A DAY AS NEEDED 50 strip 7    nitroGLYCERIN (NITROSTAT) 0.4 MG SL tablet PLACE 1 TABLET UNDER THE TONGUE AS NEEDED, MAX OF 3 DOSES, IF NO RELIEF CALL 911 25 tablet 0    glucose monitoring kit (FREESTYLE) monitoring kit 1 kit by Does not apply route daily Monitor glucose daily.  Diagnosis: diabetes mellitus Type 2 1 kit 0    FREESTYLE LANCETS MISC 1 each by Does not apply route daily 100 each 3    aspirin 81 MG tablet Take 81 mg by mouth daily         No Known Allergies    Social History     Tobacco Use    Smoking status: Never Smoker    Smokeless tobacco: Never Used   Substance Use Topics    Alcohol use: No       Objective:   /74   Pulse 57   Temp 98.3 °F (36.8 °C) (Oral)   Wt 205 lb 3.2 oz (93.1 kg)   SpO2 94%   BMI 27.83 kg/m²     Physical Exam  HENT:      Head: Normocephalic and atraumatic. Eyes:      Conjunctiva/sclera: Conjunctivae normal.   Neck:      Musculoskeletal: Normal range of motion and neck supple. Thyroid: No thyromegaly. Cardiovascular:      Rate and Rhythm: Normal rate and regular rhythm. Pulses:           Dorsalis pedis pulses are 2+ on the right side and 2+ on the left side. Posterior tibial pulses are 2+ on the right side and 2+ on the left side. Heart sounds: Normal heart sounds. No murmur. No friction rub. No gallop. Pulmonary:      Effort: Pulmonary effort is normal. No respiratory distress. Breath sounds: Normal breath sounds. Abdominal:      General: Bowel sounds are normal.      Palpations: Abdomen is soft. Musculoskeletal: Normal range of motion. Feet:      Right foot:      Protective Sensation: 10 sites tested. 10 sites sensed. Toenail Condition: Right toenails are long. Left foot:      Protective Sensation: 10 sites tested. 10 sites sensed. Toenail Condition: Left toenails are long. Skin:     General: Skin is warm and dry. Findings: No rash. Neurological:      Mental Status: He is oriented to person, place, and time. Coordination: Coordination normal.         Assessment/Plan:   1.  Type 2 diabetes mellitus with other circulatory complication, without long-term current use of insulin ([address]  Patient presents today to follow-up on chronic conditions including diabetes, high disease, hypertension, hyperlipidemia and history of CVA. Patient reports fasting blood sugars have ranged from 100-1 20. Patient denies any signs or symptoms of hypohyperglycemia. Patient is taking medications as prescribed. Foot exam completed today with no sensory deficits noted. A1c today is 6.7% which indicates good glucose control and is improved from A1c of 6.9% on 7/6/2020. Urine for microalbumin is less than 30 which is within normal limits. Recommend patient continue with healthy diet, routine exercise and current medication regimen. Advised patient to follow-up in office in 3 months or sooner with problems or concerns. Patient verbalized understanding and agreeable to plan. -  DIABETES FOOT EXAM  - POCT glycosylated hemoglobin (Hb A1C)  - POCT microalbumin    2. Coronary artery disease of native artery of native heart with stable angina pectoris [facility])  Patient is following closely with cardiology. Patient denies any recent chest pain, shortness of breath, palpitations, peripheral edema, dizziness, lightheadedness or syncope. Patient continues to take Isorbid, Brilinta, I arginine, metoprolol, amlodipine, nitroglycerin sublingual as needed and aspirin 81 mg daily. Recommend patient continue with current treatment and follow-up with cardiology as advised. Patient agreeable. 3. Essential hypertension  Blood pressure is well controlled with current treatment. Order for CBC and CMP as below. Recommend patient continue with current medication regimen. - Comprehensive Metabolic Panel  - CBC Auto Differential    4. Mixed hyperlipidemia  Patient continues to take atorvastatin without any myalgias or GI upset. Order for fasting lipids today. Recommend patient continue with current treatment and I will make further recommendations based on lab results. Patient agreeable. - Lipid Panel    5. S/P CABG (coronary artery bypass graft)    6.  Diarrhea, unspecified type  Patient with complaints of intermittent loose stools after eating meat. Patient is status post cholecystectomy. Discussed the possibility of postcholecystectomy diarrhea. Recommend patient trial Questran as below. Advised to follow-up if no better worsening of symptoms. Patient verbalized understanding agreeable to plan. - cholestyramine (QUESTRAN) 4 g packet; Take 1 packet by mouth 2 times daily  Dispense: 90 packet;  Refill: 3

## 2021-03-01 ENCOUNTER — TELEPHONE (OUTPATIENT)
Dept: CARDIOLOGY CLINIC | Age: 56
End: 2021-03-01

## 2021-03-01 NOTE — TELEPHONE ENCOUNTER
----- Message from Marylen Coast, APRN - CNP sent at 3/1/2021  9:05 AM EST -----  Covering for Anjali  Kidney function stable and cholesterol numbers look good. No changes, follow up as planned. Thanks!

## 2021-04-05 DIAGNOSIS — R19.7 DIARRHEA, UNSPECIFIED TYPE: ICD-10-CM

## 2021-04-06 RX ORDER — CHOLESTYRAMINE 4 G/9G
1 POWDER, FOR SUSPENSION ORAL 2 TIMES DAILY
Qty: 60 PACKET | Refills: 5 | Status: SHIPPED | OUTPATIENT
Start: 2021-04-06 | End: 2021-10-04

## 2021-04-06 NOTE — TELEPHONE ENCOUNTER
Can you verify directions on script, you prescribed in Feb, directions say bid but qty was for 90?  (pharmacy is requesting refill)

## 2021-05-23 DIAGNOSIS — E11.59 TYPE 2 DIABETES MELLITUS WITH OTHER CIRCULATORY COMPLICATION, WITHOUT LONG-TERM CURRENT USE OF INSULIN (HCC): ICD-10-CM

## 2021-05-24 RX ORDER — METOPROLOL TARTRATE 50 MG/1
TABLET, FILM COATED ORAL
Qty: 180 TABLET | Refills: 3 | Status: SHIPPED | OUTPATIENT
Start: 2021-05-24 | End: 2022-02-25 | Stop reason: SDUPTHER

## 2021-05-24 RX ORDER — AMLODIPINE BESYLATE 5 MG/1
TABLET ORAL
Qty: 90 TABLET | Refills: 3 | Status: SHIPPED | OUTPATIENT
Start: 2021-05-24 | End: 2022-02-25 | Stop reason: SDUPTHER

## 2021-08-06 NOTE — PROGRESS NOTES
[address] [facility]   Cardiovascular Evaluation    PATIENT: Eunice Wang  DATE: 8/10/2021  MRN: [id number]  CSN: [id number]  : 1965      Primary Care Doctor: DONALDO Pedroza CNP  Reason for evaluation:   Hospital follow up      Subjective:   History of present illness on initial date of evaluation:   Eunice Wang is a 61 y.o. patient who presents for hospital follow up, s/p [facility] 19. He presented to the ED on 19 w/ complaints of chest pain and sweating. PMH: CAD, CABG x 5 ()     Today, he reports that occasionally his right arms tingles and burns for a couple of minutes. He has weakness when it occurs. It is not like prior pain. Patient denies chest pain, sob, palpitations, dizziness or syncope. Patient Active Problem List   Diagnosis    Family history of early CAD    S/P CABG (coronary artery bypass graft)    Essential hypertension    Hyperlipidemia    CAD (coronary artery disease)    Microcytosis    Acute glaucoma of right eye    Acute chest pain    Paresthesia    Acute calculous cholecystitis    S/P laparoscopic cholecystectomy    Abnormal stress test    Pre-syncope    Angina, class II ([address]    History of CVA (cerebrovascular accident)    Chest pain in adult    Diabetes mellitus ([address]    Migraines    Unstable angina ([address]         Past Medical History:   has a past medical history of CAD (coronary artery disease), Diabetes mellitus ([address] History of CVA (cerebrovascular accident), Hyperlipidemia, Hypertension, MI (myocardial infarction) ([address] No history of procedure, and Pneumonia due to organism. Surgical History:   has a past surgical history that includes Coronary artery bypass graft (2016); Eye surgery (Right); Colonoscopy (2017); hernia repair (Bilateral, ); Cholecystectomy, laparoscopic (N/A, 2019); and Percutaneous Transluminal Coronary Angio (2020).      Social History:   reports that he has never smoked. He has never used smokeless tobacco. He reports that he does not drink alcohol and does not use drugs. Family History:  No evidence for sudden cardiac death or premature CAD    Home Medications:  Reviewed and are listed in nursing record. and/or listed below  Current Outpatient Medications   Medication Sig Dispense Refill    metFORMIN (GLUCOPHAGE) 500 MG tablet TAKE 1 TABLET BY MOUTH EVERY DAY WITH BREAKFAST 90 tablet 1    metoprolol tartrate (LOPRESSOR) 50 MG tablet TAKE 1 TABLET BY MOUTH TWICE A  tablet 3    amLODIPine (NORVASC) 5 MG tablet TAKE 1 TABLET BY MOUTH EVERY DAY 90 tablet 3    cholestyramine (QUESTRAN) 4 g packet TAKE 1 PACKET BY MOUTH 2 TIMES DAILY 60 packet 5    atorvastatin (LIPITOR) 80 MG tablet Take 1 tablet by mouth nightly 90 tablet 3    isosorbide mononitrate (IMDUR) 30 MG extended release tablet Take 1 tablet by mouth daily 90 tablet 3    ticagrelor (BRILINTA) 90 MG TABS tablet Take 1 tablet by mouth 2 times daily 180 tablet 3    l-arginine 1000 MG TABS TAKE 1 TABLET BY MOUTH TWICE A DAY 60 tablet 11    FREESTYLE LITE strip USE TO TEST ONCE A DAY AS NEEDED 50 strip 7    nitroGLYCERIN (NITROSTAT) 0.4 MG SL tablet PLACE 1 TABLET UNDER THE TONGUE AS NEEDED, MAX OF 3 DOSES, IF NO RELIEF CALL 911 25 tablet 0    glucose monitoring kit (FREESTYLE) monitoring kit 1 kit by Does not apply route daily Monitor glucose daily. Diagnosis: diabetes mellitus Type 2 1 kit 0    FREESTYLE LANCETS MISC 1 each by Does not apply route daily 100 each 3    aspirin 81 MG tablet Take 81 mg by mouth daily       No current facility-administered medications for this visit. Allergies:  Patient has no known allergies. Review of Systems:   A 14 point review of symptoms completed. Pertinent positives identified in the HPI, all other review of symptoms negative as below.     Objective:   PHYSICAL EXAM:    Vitals:    08/10/21 0901   BP: 110/70   Pulse: 69   SpO2: 96%    Weight: 201 lb (91.2 kg)     Wt Readings from Last 3 Encounters:   08/10/21 201 lb (91.2 kg)   02/26/21 205 lb 3.2 oz (93.1 kg)   02/12/21 209 lb (94.8 kg)         General Appearance:  Alert, cooperative, no distress, appears stated age   Head:  Normocephalic, atraumatic   Eyes:  PERRL, conjunctiva/corneas clear   Nose: Nares normal, no drainage or sinus tenderness   Throat: Lips, mucosa, and tongue normal   Neck: Supple, symmetrical, trachea midline, NL thyroid no carotid bruit or JVD   Lungs:   CTAB, respirations unlabored   Chest Wall:  No tenderness or deformity   Heart:  Regular rhythm and normal rate; S1, S2 are normal;   no murmur noted; no rub or gallop   Abdomen:   Soft, non-tender, +BS x 4, no masses, no organomegaly   Extremities: Extremities normal, atraumatic, no cyanosis or edema   Pulses: 2+ and symmetric   Skin: Skin color, texture, turgor normal, no rashes or lesions   Pysch: Normal mood and affect   Neurologic: Normal gross motor and sensory exam.         LABS   CBC:      Lab Results   Component Value Date    WBC 12.2 02/26/2021    RBC 5.45 02/26/2021    HGB 15.2 02/26/2021    HCT 45.9 02/26/2021    MCV 84.3 02/26/2021    RDW 16.2 02/26/2021     02/26/2021     CMP:  Lab Results   Component Value Date     02/26/2021    K 4.4 02/26/2021    K 3.9 07/07/2020     02/26/2021    CO2 24 02/26/2021    BUN 13 02/26/2021    CREATININE 0.8 02/26/2021    GFRAA >60 02/26/2021    AGRATIO 1.6 02/26/2021    LABGLOM >60 02/26/2021    GLUCOSE 118 02/26/2021    PROT 7.4 02/26/2021    CALCIUM 9.8 02/26/2021    BILITOT 1.0 02/26/2021    ALKPHOS 122 02/26/2021    AST 24 02/26/2021    ALT 33 02/26/2021     PT/INR:   No results found for: PTINR  Liver:  No components found for: CHLPL  Lab Results   Component Value Date    ALT 33 02/26/2021    AST 24 02/26/2021    ALKPHOS 122 02/26/2021    BILITOT 1.0 02/26/2021     Lab Results   Component Value Date    LABA1C 6.7 02/26/2021     Lipids:         Lab Results   Component Value Date    TRIG 119 2021    TRIG 162 (H) 2020    TRIG 77 2019            Lab Results   Component Value Date    HDL 23 (L) 2021    HDL 25 (L) 2020    HDL 24 (L) 2019            Lab Results   Component Value Date    LDLCALC 52 2021    LDLCALC 58 2020    LDLCALC 53 2019            Lab Results   Component Value Date    LABVLDL 24 2021    LABVLDL 32 2020    LABVLDL 15 2019         CARDIAC DATA     EK2020  NSR, RBBB 60 bpm    ECHO/MUGA: 19   Summary   Normal left ventricle systolic function with an estimated ejection fraction   of 55%. No regional wall motion abnormalities are seen. Normal left ventricular diastolic filling pressure. Systolic pulmonary artery pressure (SPAP) is normal and estimated at 26 mmHg   (right atrial pressure 3 mmHg). STRESS TEST:  2019   Summary  Small sized, mild intensity basal lateral partial reversibility defect  consistent with mild ischemia and infarction in the territory of the distal  LCx and/or RCA. LV function is normal with and ejection fraction of 63%. Lower risk abnormal study. STRESS TEST: 19  Summary  Normal LV size and systolic function. Left ventricular ejection fraction of  68%. Normal wall motion. There is normal isotope uptake at stress and rest. There is no evidence of  myocardial ischemia or scar     CARDIAC CATH:  2020  Procedures: Cor angio, SVG x 2, LIMA     LM 50%  LAD 70% prox, 95% mid  Cx stent patent              OM3 95% ostium, jailed by stent              OM 4 70% distal  RCA 70% mid, 60% distal  LIMA to LAD patent  SVG to Dx patent  SVG to %  SVG to R-marginal patent  SVG to ? OM not identified.  Reported 100% for prior cath     No sig change compared to cath 3/2020  Add wolf Pizarro to review for consider further PCI        CARDIAC CATH: 19  LEFT HEART CATH  LM: calcified,    LAD: calcified  LCX: calcified, prox 80-90% OM1- small, ostial 80% (too small for PCI)                OM2- tiny vessel                OM3- larger vessel, acute takeoff angle, ostial 90%. OM4- distal 80%  RCA: dominant, moderate diffuse disease. prox 50%, mid 60%, distal 70%                R->Lt collaterals to Lcx                PDA: 100% ostial with L->R collaterals                PLV: luminals     LIMA-LAD: patent with  Touchdown to mid LAD, mid 40-50% in native LAD  SVG-->RT RV Marginal patent with retrograde flow up to RCA  SVG-->Diag: patent. Flow down LAd and up to LCx                - Torsades with injection requiring Defib  No other grafts and no sequential limbs seen  LVEDP: 4  LVEF: 55%  Assessment  1. Severe CAD as above. Patent grafts x3 but unable to find grafts or \"sequentials\" to OM or PDa                - will need to get Cardiac CTA to eval further  2. May need to consider PCi to LCX but would likely jeopardize OM1-3. BYPASS: 09/20/16  VESSELS BYPASSED:  1. LIMA to LAD. 2. Reverse greater saphenous vein graft to acute marginal  3. Reverse greater saphenous vein graft to PDA sequentiall. 4. Reverse greater saphenous vein graft to OM sequential  5. Reverse greater sVG to diagonal sequential.    CATH: 09/19/16  LEFT HEART CATH  LM: luminals  LAD: heavy calcification burden, prox eccentric 75%- mid 90% around large septal, mid/distal 80%  LCX: heavy calcification burden. Mid 70%  OM1- small, moderate dz, ostial 80%  OM2- moderate in size, mild diffuse disease  OM3- large, 90% ostial PLOM  RCA: Dominant, moderate diffuse disease, prox 60%, mid 70%, distal 90%  Grade 3 L-->R collaterals  LVEDP: 10  LVEF: 65%  PLAN  1. 3 vessel CAD with preserved LVEF  - refer to CT surgery for CABG  - check Hemoglobin A1c and TSH  2. Start ASA, and statin. Holding BB for bradycardia and pauses seen at [address] CTA 5/14/19  Only 3 bypass grafts can be visualized.    The insertion sites of the left internal mammary branch is poorly visualized secondary to phase misregistration artifact. The graft appears patent. A 2nd bypass graft terminates in the region of a diagonal branch. Graft appears patent. Insertion site is poorly visualized secondary to artifact   A 3rd bypass graft appears small distally, terminating in the region of the acute marginal     VASCULAR/OTHER IMAGING:      Assessment and Plan   Sridevi West is a 61 y.o. male who presents today for the following problems:      1. Chest pain: Dates none   -Previously resolved on imdur  2. CAD              - s/p CABGx5 with Dr. Katie Jean 9/20/2016   - 3/2020 CSI and PCi to LCx  3. HTN: controlled  4. HLD: controlled, but low HDL        MD PLAN  1. Pt now doing well, no CP. Given extent of native CAD not unusual that patient may have chronic stable angina   -Continue Imdur, will try and wean off L-arginine as tolerated  3. We will DC ticagrelor is more than 1 year since PCI and significant financial burden    Follow-up in 1 year with nurse practitioner      Patient Active Problem List   Diagnosis    Family history of early CAD    S/P CABG (coronary artery bypass graft)    Essential hypertension    Hyperlipidemia    CAD (coronary artery disease)    Microcytosis    Acute glaucoma of right eye    Acute chest pain    Paresthesia    Acute calculous cholecystitis    S/P laparoscopic cholecystectomy    Abnormal stress test    Pre-syncope    Angina, class II ([address]    History of CVA (cerebrovascular accident)    Chest pain in adult    Diabetes mellitus ([address]    Migraines    Unstable angina ([address]       Patient Plan:  1. Okay to stop brilinta  2. Decrease L arginine to 500 mg twice a day for a week then down to 500 mg a day for a week then stop if you feel good. 3. Continue aspirin  4. Follow up in 1 year with NP         It is a pleasure to assist in the care of Sridevi West. Please call with any questions.     This note was scribed in the presence of Zafar Tate MD by Deonna Oden RN.        Layton Keys MD, personally performed the services described in this documentation as scribed by the above signed scribe in my presence, and it is both accurate and complete to the best of our ability and knowledge. I agree with the details independently gathered by my clinical support staff, while the remaining scribed note accurately describes my personal service to the patient. The above RN is working as a scribe for and in the presence of myself . Working as a scribe, the RN may have prepopulated components of this note with my historical intellectual property under my direct supervision. Any additions to this intellectual property were performed at my direction. Furthermore, the content and accuracy of this note have been reviewed by me to the best of my ability.          Beverley Lang MD, [address] [facility] Cardiologist  [facility]  [phone] [facility]  [phone] [address] [place] [address]

## 2021-08-10 ENCOUNTER — OFFICE VISIT (OUTPATIENT)
Dept: CARDIOLOGY CLINIC | Age: 56
End: 2021-08-10
Payer: MEDICARE

## 2021-08-10 VITALS
SYSTOLIC BLOOD PRESSURE: 110 MMHG | BODY MASS INDEX: 27.22 KG/M2 | OXYGEN SATURATION: 96 % | HEIGHT: 72 IN | WEIGHT: 201 LBS | HEART RATE: 69 BPM | DIASTOLIC BLOOD PRESSURE: 70 MMHG

## 2021-08-10 DIAGNOSIS — E78.2 MIXED HYPERLIPIDEMIA: Chronic | ICD-10-CM

## 2021-08-10 DIAGNOSIS — I25.118 CORONARY ARTERY DISEASE OF NATIVE ARTERY OF NATIVE HEART WITH STABLE ANGINA PECTORIS (HCC): Primary | Chronic | ICD-10-CM

## 2021-08-10 DIAGNOSIS — Z95.1 S/P CABG (CORONARY ARTERY BYPASS GRAFT): ICD-10-CM

## 2021-08-10 DIAGNOSIS — I10 ESSENTIAL HYPERTENSION: ICD-10-CM

## 2021-08-10 PROCEDURE — 99214 OFFICE O/P EST MOD 30 MIN: CPT | Performed by: INTERNAL MEDICINE

## 2021-08-10 PROCEDURE — G8427 DOCREV CUR MEDS BY ELIG CLIN: HCPCS | Performed by: INTERNAL MEDICINE

## 2021-08-10 PROCEDURE — 3017F COLORECTAL CA SCREEN DOC REV: CPT | Performed by: INTERNAL MEDICINE

## 2021-08-10 PROCEDURE — G8419 CALC BMI OUT NRM PARAM NOF/U: HCPCS | Performed by: INTERNAL MEDICINE

## 2021-08-10 PROCEDURE — 1036F TOBACCO NON-USER: CPT | Performed by: INTERNAL MEDICINE

## 2021-08-10 NOTE — PATIENT INSTRUCTIONS
Patient Plan:  1. Okay to stop brilinta  2. Decrease L arginine to 500 mg twice a day for a week then down to 500 mg a day for a week then stop if you feel good. 3. Continue aspirin  4.  Follow up in 1 year with NP

## 2021-08-23 ENCOUNTER — OFFICE VISIT (OUTPATIENT)
Dept: FAMILY MEDICINE CLINIC | Age: 56
End: 2021-08-23
Payer: MEDICARE

## 2021-08-23 VITALS
WEIGHT: 205.2 LBS | BODY MASS INDEX: 27.83 KG/M2 | OXYGEN SATURATION: 96 % | HEART RATE: 60 BPM | SYSTOLIC BLOOD PRESSURE: 124 MMHG | TEMPERATURE: 98.8 F | DIASTOLIC BLOOD PRESSURE: 79 MMHG

## 2021-08-23 DIAGNOSIS — E11.9 TYPE 2 DIABETES MELLITUS WITHOUT COMPLICATION, WITHOUT LONG-TERM CURRENT USE OF INSULIN (HCC): Primary | Chronic | ICD-10-CM

## 2021-08-23 DIAGNOSIS — Z86.73 HISTORY OF CVA (CEREBROVASCULAR ACCIDENT): ICD-10-CM

## 2021-08-23 DIAGNOSIS — Z95.1 S/P CABG (CORONARY ARTERY BYPASS GRAFT): ICD-10-CM

## 2021-08-23 DIAGNOSIS — I25.118 CORONARY ARTERY DISEASE OF NATIVE ARTERY OF NATIVE HEART WITH STABLE ANGINA PECTORIS (HCC): Chronic | ICD-10-CM

## 2021-08-23 DIAGNOSIS — I10 ESSENTIAL HYPERTENSION: ICD-10-CM

## 2021-08-23 LAB
A/G RATIO: 1.6 (ref 1.1–2.2)
ALBUMIN SERPL-MCNC: 4.3 G/DL (ref 3.4–5)
ALP BLD-CCNC: 134 U/L (ref 40–129)
ALT SERPL-CCNC: 31 U/L (ref 10–40)
ANION GAP SERPL CALCULATED.3IONS-SCNC: 15 MMOL/L (ref 3–16)
AST SERPL-CCNC: 24 U/L (ref 15–37)
BASOPHILS ABSOLUTE: 0.2 K/UL (ref 0–0.2)
BASOPHILS RELATIVE PERCENT: 1.3 %
BILIRUB SERPL-MCNC: 0.8 MG/DL (ref 0–1)
BUN BLDV-MCNC: 11 MG/DL (ref 7–20)
CALCIUM SERPL-MCNC: 9.7 MG/DL (ref 8.3–10.6)
CHLORIDE BLD-SCNC: 101 MMOL/L (ref 99–110)
CO2: 24 MMOL/L (ref 21–32)
CREAT SERPL-MCNC: 1 MG/DL (ref 0.9–1.3)
EOSINOPHILS ABSOLUTE: 0.3 K/UL (ref 0–0.6)
EOSINOPHILS RELATIVE PERCENT: 2.2 %
GFR AFRICAN AMERICAN: >60
GFR NON-AFRICAN AMERICAN: >60
GLOBULIN: 2.7 G/DL
GLUCOSE BLD-MCNC: 120 MG/DL (ref 70–99)
HBA1C MFR BLD: 6.3 %
HCT VFR BLD CALC: 43.7 % (ref 40.5–52.5)
HEMOGLOBIN: 14.5 G/DL (ref 13.5–17.5)
LYMPHOCYTES ABSOLUTE: 3.7 K/UL (ref 1–5.1)
LYMPHOCYTES RELATIVE PERCENT: 29.2 %
MCH RBC QN AUTO: 28.3 PG (ref 26–34)
MCHC RBC AUTO-ENTMCNC: 33.3 G/DL (ref 31–36)
MCV RBC AUTO: 84.9 FL (ref 80–100)
MONOCYTES ABSOLUTE: 1.1 K/UL (ref 0–1.3)
MONOCYTES RELATIVE PERCENT: 9 %
NEUTROPHILS ABSOLUTE: 7.4 K/UL (ref 1.7–7.7)
NEUTROPHILS RELATIVE PERCENT: 58.3 %
PDW BLD-RTO: 15.6 % (ref 12.4–15.4)
PLATELET # BLD: 284 K/UL (ref 135–450)
PMV BLD AUTO: 8.3 FL (ref 5–10.5)
POTASSIUM SERPL-SCNC: 4.5 MMOL/L (ref 3.5–5.1)
RBC # BLD: 5.14 M/UL (ref 4.2–5.9)
SODIUM BLD-SCNC: 140 MMOL/L (ref 136–145)
TOTAL PROTEIN: 7 G/DL (ref 6.4–8.2)
WBC # BLD: 12.6 K/UL (ref 4–11)

## 2021-08-23 PROCEDURE — 1036F TOBACCO NON-USER: CPT | Performed by: NURSE PRACTITIONER

## 2021-08-23 PROCEDURE — 3017F COLORECTAL CA SCREEN DOC REV: CPT | Performed by: NURSE PRACTITIONER

## 2021-08-23 PROCEDURE — 83036 HEMOGLOBIN GLYCOSYLATED A1C: CPT | Performed by: NURSE PRACTITIONER

## 2021-08-23 PROCEDURE — 3044F HG A1C LEVEL LT 7.0%: CPT | Performed by: NURSE PRACTITIONER

## 2021-08-23 PROCEDURE — 36415 COLL VENOUS BLD VENIPUNCTURE: CPT | Performed by: NURSE PRACTITIONER

## 2021-08-23 PROCEDURE — G8427 DOCREV CUR MEDS BY ELIG CLIN: HCPCS | Performed by: NURSE PRACTITIONER

## 2021-08-23 PROCEDURE — 99214 OFFICE O/P EST MOD 30 MIN: CPT | Performed by: NURSE PRACTITIONER

## 2021-08-23 PROCEDURE — G8419 CALC BMI OUT NRM PARAM NOF/U: HCPCS | Performed by: NURSE PRACTITIONER

## 2021-08-23 PROCEDURE — 2022F DILAT RTA XM EVC RTNOPTHY: CPT | Performed by: NURSE PRACTITIONER

## 2021-08-23 ASSESSMENT — ENCOUNTER SYMPTOMS
RESPIRATORY NEGATIVE: 1
GASTROINTESTINAL NEGATIVE: 1
EYES NEGATIVE: 1

## 2021-08-23 NOTE — PROGRESS NOTES
Subjective:      Patient ID: Arpita River is a 61 y.o. male. Chief Complaint   Patient presents with    Check-Up    Diabetes    Hypertension    Hyperlipidemia        HPI   Presents today to follow-up on chronic conditions including diabetes, coronary artery disease, hypertension and hyperlipidemia. Patient follows closely with cardiology and had a recent visit. Patient reports he was able to discontinue Brilinta however continues to take a daily aspirin as ordered. Patient reports overall he is doing well and denies any acute problems or concerns. Diabetes Mellitus Type 2: Current symptoms/problems include none. Medication compliance:  compliant most of the time  Diabetic diet compliance:  compliant most of the time,  Weight trend: stable  Current exercise: no regular exercise  Barriers: none    Home blood sugar records: patient does not test  Any episodes of hypoglycemia? no  Eye exam current (within one year): no due to dilated eye exam   reports that he has never smoked. He has never used smokeless tobacco.   Daily Aspirin? Yes    Lab Results   Component Value Date    LABA1C 6.7 02/26/2021    LABA1C 6.9 07/06/2020    LABA1C 6.9 07/06/2020     Lab Results   Component Value Date    LABMICR Not Indicated 12/26/2019    CREATININE 0.8 (L) 02/26/2021     Lab Results   Component Value Date    ALT 33 02/26/2021    AST 24 02/26/2021     Lab Results   Component Value Date    CHOL 99 02/26/2021    TRIG 119 02/26/2021    HDL 23 (L) 02/26/2021    LDLCALC 52 02/26/2021        Review of Systems   Constitutional: Negative for appetite change, chills and fever. HENT: Negative. Eyes: Negative. Respiratory: Negative. Cardiovascular: Negative. Negative for chest pain, palpitations and leg swelling. Gastrointestinal: Negative. Endocrine: Negative. Negative for polydipsia, polyphagia and polyuria. Genitourinary: Negative. Musculoskeletal: Negative. Psychiatric/Behavioral: Negative. Patient Active Problem List   Diagnosis    Family history of early CAD    S/P CABG (coronary artery bypass graft)    Essential hypertension    Hyperlipidemia    CAD (coronary artery disease)    Microcytosis    Acute glaucoma of right eye    Acute chest pain    Paresthesia    Acute calculous cholecystitis    S/P laparoscopic cholecystectomy    Abnormal stress test    Pre-syncope    Angina, class II ([facility]    History of CVA (cerebrovascular accident)    Chest pain in adult    Diabetes mellitus ([facility]    Migraines    Unstable angina ([facility]       Outpatient Medications Marked as Taking for the 8/23/21 encounter (Office Visit) with DONALDO Durant CNP   Medication Sig Dispense Refill    metFORMIN (GLUCOPHAGE) 500 MG tablet TAKE 1 TABLET BY MOUTH EVERY DAY WITH BREAKFAST 90 tablet 1    metoprolol tartrate (LOPRESSOR) 50 MG tablet TAKE 1 TABLET BY MOUTH TWICE A  tablet 3    amLODIPine (NORVASC) 5 MG tablet TAKE 1 TABLET BY MOUTH EVERY DAY 90 tablet 3    cholestyramine (QUESTRAN) 4 g packet TAKE 1 PACKET BY MOUTH 2 TIMES DAILY 60 packet 5    atorvastatin (LIPITOR) 80 MG tablet Take 1 tablet by mouth nightly 90 tablet 3    isosorbide mononitrate (IMDUR) 30 MG extended release tablet Take 1 tablet by mouth daily 90 tablet 3    l-arginine 1000 MG TABS TAKE 1 TABLET BY MOUTH TWICE A DAY (Patient taking differently: 1 tablet daily ) 60 tablet 11    FREESTYLE LITE strip USE TO TEST ONCE A DAY AS NEEDED 50 strip 7    nitroGLYCERIN (NITROSTAT) 0.4 MG SL tablet PLACE 1 TABLET UNDER THE TONGUE AS NEEDED, MAX OF 3 DOSES, IF NO RELIEF CALL 911 25 tablet 0    glucose monitoring kit (FREESTYLE) monitoring kit 1 kit by Does not apply route daily Monitor glucose daily.  Diagnosis: diabetes mellitus Type 2 1 kit 0    FREESTYLE LANCETS MISC 1 each by Does not apply route daily 100 each 3    aspirin 81 MG tablet Take 81 mg by mouth daily         No Known Allergies    Social History Tobacco Use    Smoking status: Never Smoker    Smokeless tobacco: Never Used   Substance Use Topics    Alcohol use: No       Objective:   /79   Pulse 60   Temp 98.8 °F (37.1 °C) (Oral)   Wt 205 lb 3.2 oz (93.1 kg)   SpO2 96%   BMI 27.83 kg/m²     Physical Exam  Vitals and nursing note reviewed. Constitutional:       Appearance: Normal appearance. HENT:      Head: Normocephalic and atraumatic. Eyes:      Conjunctiva/sclera: Conjunctivae normal.   Neck:      Thyroid: No thyromegaly. Cardiovascular:      Rate and Rhythm: Normal rate and regular rhythm. Heart sounds: Normal heart sounds. No murmur heard. No friction rub. No gallop. Pulmonary:      Effort: Pulmonary effort is normal. No respiratory distress. Breath sounds: Normal breath sounds. Abdominal:      General: Bowel sounds are normal.      Palpations: Abdomen is soft. Musculoskeletal:         General: Normal range of motion. Cervical back: Normal range of motion and neck supple. Skin:     General: Skin is warm and dry. Findings: No rash. Neurological:      Mental Status: He is alert and oriented to person, place, and time. Coordination: Coordination normal.         Assessment/Plan:   1. Type 2 diabetes mellitus without complication, without long-term current use of insulin [facility])  Patient presents today to follow-up on chronic conditions. Patient denies any signs or symptoms of hypo or hyperglycemia. Patient is tolerating Metformin without any side effects. A1c today is 6.3% which indicates good glucose control. Recommend patient continue with current treatment. Advised to follow-up in office in 6 months or sooner with problems or concerns. Patient agreeable. - POCT glycosylated hemoglobin (Hb A1C)    2.  Coronary artery disease of native artery of native heart with stable angina pectoris [facility])  Denies any recent chest pain, palpitations, lightheadedness, dizziness, peripheral edema or headaches. Continues to take metoprolol, L-arginine, Isorbid, atorvastatin, amlodipine and aspirin. Brilinta recently discontinued by cardiologist.  L-arginine was reduced to 500 mg twice daily. Recommend patient continue with current treatment and to follow with cardiology as advised. Patient agreeable. 3. S/P CABG (coronary artery bypass graft)  See #2    4. Essential hypertension  Blood pressures well controlled with current treatment. Patient continues to take metoprolol, Isorbid and amlodipine as ordered. Recommend patient continue with current treatment. - CBC Auto Differential  - Comprehensive Metabolic Panel    5. History of CVA (cerebrovascular accident)  Asymptomatic. Continue to control risk factors. Reviewed lipids from 2/21 which indicate good control with current treatment. Recommend patient continue with the atorvastatin.

## 2021-08-23 NOTE — PATIENT INSTRUCTIONS
Please read the healthy family handout that you were given and share it with your family. Please compare this printed medication list with your medications at home to be sure they are the same. If you have any medications that are different please contact us immediately at [phone]. Also review your allergies that we have listed, these may also include medications that you have not been able to tolerate, make sure everything listed is correct. If you have any allergies that are different please contact us immediately at [phone].

## 2021-10-03 DIAGNOSIS — R19.7 DIARRHEA, UNSPECIFIED TYPE: ICD-10-CM

## 2021-10-04 RX ORDER — CHOLESTYRAMINE 4 G/9G
1 POWDER, FOR SUSPENSION ORAL 2 TIMES DAILY
Qty: 180 PACKET | Refills: 1 | Status: SHIPPED | OUTPATIENT
Start: 2021-10-04 | End: 2022-02-25 | Stop reason: SDUPTHER

## 2021-10-04 NOTE — TELEPHONE ENCOUNTER
Future appt scheduled 2/25/21  Last appt 8/23/21  Refilled medication per verbal order from provider.

## 2021-11-01 RX ORDER — ARGININE HCL 1000 MG
TABLET ORAL
Qty: 60 TABLET | Refills: 11 | Status: SHIPPED | OUTPATIENT
Start: 2021-11-01 | End: 2022-02-25

## 2021-11-01 RX ORDER — NITROGLYCERIN 0.4 MG/1
TABLET SUBLINGUAL
Qty: 25 TABLET | Refills: 0 | Status: SHIPPED | OUTPATIENT
Start: 2021-11-01

## 2021-11-14 DIAGNOSIS — E11.59 TYPE 2 DIABETES MELLITUS WITH OTHER CIRCULATORY COMPLICATION, WITHOUT LONG-TERM CURRENT USE OF INSULIN (HCC): ICD-10-CM

## 2021-11-15 NOTE — TELEPHONE ENCOUNTER
Future appt scheduled 02/25/2022              Last appt  08/23/2021       Last Written 05/24/2021    metFORMIN (GLUCOPHAGE) 500 MG tablet  #90  1 RF

## 2022-02-25 ENCOUNTER — OFFICE VISIT (OUTPATIENT)
Dept: FAMILY MEDICINE CLINIC | Age: 57
End: 2022-02-25
Payer: MEDICARE

## 2022-02-25 VITALS
DIASTOLIC BLOOD PRESSURE: 83 MMHG | BODY MASS INDEX: 27.07 KG/M2 | WEIGHT: 199.6 LBS | SYSTOLIC BLOOD PRESSURE: 115 MMHG | HEART RATE: 92 BPM | TEMPERATURE: 100 F | OXYGEN SATURATION: 94 %

## 2022-02-25 DIAGNOSIS — I25.118 CORONARY ARTERY DISEASE OF NATIVE ARTERY OF NATIVE HEART WITH STABLE ANGINA PECTORIS (HCC): Chronic | ICD-10-CM

## 2022-02-25 DIAGNOSIS — E78.2 MIXED HYPERLIPIDEMIA: Chronic | ICD-10-CM

## 2022-02-25 DIAGNOSIS — R19.7 DIARRHEA, UNSPECIFIED TYPE: ICD-10-CM

## 2022-02-25 DIAGNOSIS — E11.59 TYPE 2 DIABETES MELLITUS WITH OTHER CIRCULATORY COMPLICATION, WITHOUT LONG-TERM CURRENT USE OF INSULIN (HCC): Primary | ICD-10-CM

## 2022-02-25 DIAGNOSIS — I10 ESSENTIAL HYPERTENSION: ICD-10-CM

## 2022-02-25 DIAGNOSIS — Z12.5 PROSTATE CANCER SCREENING: ICD-10-CM

## 2022-02-25 LAB
A/G RATIO: 1.6 (ref 1.1–2.2)
ALBUMIN SERPL-MCNC: 5 G/DL (ref 3.4–5)
ALP BLD-CCNC: 121 U/L (ref 40–129)
ALT SERPL-CCNC: 78 U/L (ref 10–40)
ANION GAP SERPL CALCULATED.3IONS-SCNC: 20 MMOL/L (ref 3–16)
AST SERPL-CCNC: 80 U/L (ref 15–37)
ATYPICAL LYMPHOCYTE RELATIVE PERCENT: 4 % (ref 0–6)
BANDED NEUTROPHILS RELATIVE PERCENT: 7 % (ref 0–7)
BASOPHILS ABSOLUTE: 0 K/UL (ref 0–0.2)
BASOPHILS RELATIVE PERCENT: 0 %
BILIRUB SERPL-MCNC: 1.5 MG/DL (ref 0–1)
BUN BLDV-MCNC: 21 MG/DL (ref 7–20)
CALCIUM SERPL-MCNC: 9.7 MG/DL (ref 8.3–10.6)
CHLORIDE BLD-SCNC: 98 MMOL/L (ref 99–110)
CHOLESTEROL, TOTAL: 90 MG/DL (ref 0–199)
CO2: 18 MMOL/L (ref 21–32)
CREAT SERPL-MCNC: 1.1 MG/DL (ref 0.9–1.3)
CREATININE URINE POCT: 300
EOSINOPHILS ABSOLUTE: 0 K/UL (ref 0–0.6)
EOSINOPHILS RELATIVE PERCENT: 0 %
GFR AFRICAN AMERICAN: >60
GFR NON-AFRICAN AMERICAN: >60
GLUCOSE BLD-MCNC: 146 MG/DL (ref 70–99)
HBA1C MFR BLD: 6.4 %
HCT VFR BLD CALC: 52.3 % (ref 40.5–52.5)
HDLC SERPL-MCNC: 25 MG/DL (ref 40–60)
HEMATOLOGY PATH CONSULT: YES
HEMOGLOBIN: 17.5 G/DL (ref 13.5–17.5)
LDL CHOLESTEROL CALCULATED: 44 MG/DL
LYMPHOCYTES ABSOLUTE: 1.5 K/UL (ref 1–5.1)
LYMPHOCYTES RELATIVE PERCENT: 7 %
MCH RBC QN AUTO: 28.4 PG (ref 26–34)
MCHC RBC AUTO-ENTMCNC: 33.4 G/DL (ref 31–36)
MCV RBC AUTO: 85.2 FL (ref 80–100)
MICROALBUMIN/CREAT 24H UR: 150 MG/G{CREAT}
MICROALBUMIN/CREAT UR-RTO: ABNORMAL
MONOCYTES ABSOLUTE: 1.5 K/UL (ref 0–1.3)
MONOCYTES RELATIVE PERCENT: 11 %
NEUTROPHILS ABSOLUTE: 10.9 K/UL (ref 1.7–7.7)
NEUTROPHILS RELATIVE PERCENT: 71 %
PDW BLD-RTO: 15.9 % (ref 12.4–15.4)
PLATELET # BLD: 355 K/UL (ref 135–450)
PMV BLD AUTO: 8.3 FL (ref 5–10.5)
POTASSIUM SERPL-SCNC: 3.9 MMOL/L (ref 3.5–5.1)
PROSTATE SPECIFIC ANTIGEN: 0.45 NG/ML (ref 0–4)
RBC # BLD: 6.14 M/UL (ref 4.2–5.9)
SLIDE REVIEW: ABNORMAL
SODIUM BLD-SCNC: 136 MMOL/L (ref 136–145)
TOTAL PROTEIN: 8.2 G/DL (ref 6.4–8.2)
TRIGL SERPL-MCNC: 103 MG/DL (ref 0–150)
TSH REFLEX: 2.15 UIU/ML (ref 0.27–4.2)
VLDLC SERPL CALC-MCNC: 21 MG/DL
WBC # BLD: 14 K/UL (ref 4–11)

## 2022-02-25 PROCEDURE — 3044F HG A1C LEVEL LT 7.0%: CPT | Performed by: NURSE PRACTITIONER

## 2022-02-25 PROCEDURE — 99214 OFFICE O/P EST MOD 30 MIN: CPT | Performed by: NURSE PRACTITIONER

## 2022-02-25 PROCEDURE — 1036F TOBACCO NON-USER: CPT | Performed by: NURSE PRACTITIONER

## 2022-02-25 PROCEDURE — 36415 COLL VENOUS BLD VENIPUNCTURE: CPT | Performed by: NURSE PRACTITIONER

## 2022-02-25 PROCEDURE — G8427 DOCREV CUR MEDS BY ELIG CLIN: HCPCS | Performed by: NURSE PRACTITIONER

## 2022-02-25 PROCEDURE — 82044 UR ALBUMIN SEMIQUANTITATIVE: CPT | Performed by: NURSE PRACTITIONER

## 2022-02-25 PROCEDURE — 83036 HEMOGLOBIN GLYCOSYLATED A1C: CPT | Performed by: NURSE PRACTITIONER

## 2022-02-25 PROCEDURE — 2022F DILAT RTA XM EVC RTNOPTHY: CPT | Performed by: NURSE PRACTITIONER

## 2022-02-25 PROCEDURE — G8419 CALC BMI OUT NRM PARAM NOF/U: HCPCS | Performed by: NURSE PRACTITIONER

## 2022-02-25 PROCEDURE — G8484 FLU IMMUNIZE NO ADMIN: HCPCS | Performed by: NURSE PRACTITIONER

## 2022-02-25 PROCEDURE — 3017F COLORECTAL CA SCREEN DOC REV: CPT | Performed by: NURSE PRACTITIONER

## 2022-02-25 RX ORDER — METOPROLOL TARTRATE 50 MG/1
50 TABLET, FILM COATED ORAL 2 TIMES DAILY
Qty: 180 TABLET | Refills: 3 | Status: SHIPPED | OUTPATIENT
Start: 2022-02-25 | End: 2022-04-26 | Stop reason: SDUPTHER

## 2022-02-25 RX ORDER — ISOSORBIDE MONONITRATE 30 MG/1
30 TABLET, EXTENDED RELEASE ORAL DAILY
Qty: 90 TABLET | Refills: 3 | Status: SHIPPED | OUTPATIENT
Start: 2022-02-25

## 2022-02-25 RX ORDER — AMLODIPINE BESYLATE 5 MG/1
5 TABLET ORAL DAILY
Qty: 90 TABLET | Refills: 3 | Status: SHIPPED | OUTPATIENT
Start: 2022-02-25 | End: 2022-05-18

## 2022-02-25 RX ORDER — ATORVASTATIN CALCIUM 80 MG/1
80 TABLET, FILM COATED ORAL NIGHTLY
Qty: 90 TABLET | Refills: 3 | Status: SHIPPED | OUTPATIENT
Start: 2022-02-25

## 2022-02-25 RX ORDER — CHOLESTYRAMINE 4 G/9G
1 POWDER, FOR SUSPENSION ORAL 2 TIMES DAILY
Qty: 180 PACKET | Refills: 3 | Status: SHIPPED | OUTPATIENT
Start: 2022-02-25

## 2022-02-25 ASSESSMENT — ENCOUNTER SYMPTOMS
VOMITING: 1
NAUSEA: 1
ALLERGIC/IMMUNOLOGIC NEGATIVE: 1
RESPIRATORY NEGATIVE: 1
EYES NEGATIVE: 1
DIARRHEA: 1
CHEST TIGHTNESS: 0
SHORTNESS OF BREATH: 0
ABDOMINAL PAIN: 0

## 2022-02-25 NOTE — PATIENT INSTRUCTIONS
Please read the healthy family handout that you were given and share it with your family. Please compare this printed medication list with your medications at home to be sure they are the same. If you have any medications that are different please contact us immediately at [phone]. Also review your allergies that we have listed, these may also include medications that you have not been able to tolerate, make sure everything listed is correct. If you have any allergies that are different please contact us immediately at [phone]. Patient Education        Learning About Meal Planning for Diabetes  Why plan your meals? Meal planning can be a key part of managing diabetes. Planning meals and snacks with the right balance of carbohydrate, protein, and fat can help you keep your blood sugar at the target level you set with your doctor. You don't have to eat special foods. You can eat what your family eats, including sweets once in a while. But you do have to pay attention to how often you eat and how much you eat of certain foods. You may want to work with a dietitian or a certified diabetes educator. He or she can give you tips and meal ideas and can answer your questions about meal planning. This health professional can also help you reach a healthy weight if that is one of your goals. What plan is right for you? Your dietitian or diabetes educator may suggest that you start with the plate format or carbohydrate counting. The plate format  The plate format is a simple way to help you manage how you eat. You plan meals by learning how much space each food should take on a plate. Using the plate format helps you spread carbohydrate throughout the day. It can make it easier to keep your blood sugar level within your target range. It also helps you see if you're eating healthy portion sizes. To use the plate format, you put non-starchy vegetables on half your plate.  Add meat or meat substitutes on one-quarter of the plate. Put a grain or starchy vegetable (such as brown rice or a potato) on the final quarter of the plate. You can add a small piece of fruit and some low-fat or fat-free milk or yogurt, depending on your carbohydrate goal for each meal.  Here are some tips for using the plate format:  · Make sure that you are not using an oversized plate. A 9-inch plate is best. Many restaurants use larger plates. · Get used to using the plate format at home. Then you can use it when you eat out. · Write down your questions about using the plate format. Talk to your doctor, a dietitian, or a diabetes educator about your concerns. Carbohydrate counting  With carbohydrate counting, you plan meals based on the amount of carbohydrate in each food. Carbohydrate raises blood sugar higher and more quickly than any other nutrient. It is found in desserts, breads and cereals, and fruit. It's also found in starchy vegetables such as potatoes and corn, grains such as rice and pasta, and milk and yogurt. Spreading carbohydrate throughout the day helps keep your blood sugar levels within your target range. Your daily amount depends on several things, including your weight, how active you are, which diabetes medicines you take, and what your goals are for your blood sugar levels. A registered dietitian or diabetes educator can help you plan how much carbohydrate to include in each meal and snack. A guideline for your daily amount of carbohydrate is:  · 45 to 60 grams at each meal. That's about the same as 3 to 4 carbohydrate servings. · 15 to 20 grams at each snack. That's about the same as 1 carbohydrate serving. The Nutrition Facts label on packaged foods tells you how much carbohydrate is in a serving of the food. First, look at the serving size on the food label. Is that the amount you eat in a serving? All of the nutrition information on a food label is based on that serving size.  So if you eat more or less than that, you'll need to adjust the other numbers. Total carbohydrate is the next thing you need to look for on the label. If you count carbohydrate servings, one serving of carbohydrate is 15 grams. For foods that don't come with labels, such as fresh fruits and vegetables, you'll need a guide that lists carbohydrate in these foods. Ask your doctor, dietitian, or diabetes educator about books or other nutrition guides you can use. If you take insulin, you need to know how many grams of carbohydrate are in a meal. This lets you know how much rapid-acting insulin to take before you eat. If you use an insulin pump, you get a constant rate of insulin during the day. So the pump must be programmed at meals to give you extra insulin to cover the rise in blood sugar after meals. When you know how much carbohydrate you will eat, you can take the right amount of insulin. Or, if you always use the same amount of insulin, you need to make sure that you eat the same amount of carbohydrate at meals. If you need more help to understand carbohydrate counting and food labels, ask your doctor, dietitian, or diabetes educator. How can you plan healthy meals? Here are some tips to get started:  · Plan your meals a week at a time. Don't forget to include snacks too. · Use cookbooks or online recipes to plan several main meals. Plan some quick meals for busy nights. You also can double some recipes that freeze well. Then you can save half for other busy nights when you don't have time to cook. · Make sure you have the ingredients you need for your recipes. If you're running low on basic items, put these items on your shopping list too. · List foods that you use to make breakfasts, lunches, and snacks. List plenty of fruits and vegetables. · Post this list on the refrigerator. Add to it as you think of more things you need. · Take the list to the store to do your weekly shopping.   Follow-up care is a key part of your treatment and safety. Be sure to make and go to all appointments, and call your doctor if you are having problems. It's also a good idea to know your test results and keep a list of the medicines you take. Where can you learn more? Go to https://danyel.[email or web address] org and sign in to your [employer] account. Enter T942 in the [facility] box to learn more about \"Learning About Meal Planning for Diabetes. \"     If you do not have an account, please click on the \"Sign Up Now\" link. Current as of: September 8, 2021               Content Version: 13.1  © 2006-2021 [email or web address]. Care instructions adapted under license by [facility] ([facility]). If you have questions about a medical condition or this instruction, always ask your healthcare professional. [address] any warranty or liability for your use of this information. Patient Education        Nausea and Vomiting: Care Instructions  Overview     When you are nauseated, you may feel weak and sweaty and notice a lot of saliva in your mouth. Nausea often leads to vomiting. Most of the time you do not need to worry about nausea and vomiting, but they can be signs of other illnesses. Two common causes of nausea and vomiting are a stomach infection and food poisoning. Nausea and vomiting from a viral stomach infection will usually start to improve within 24 hours. Nausea and vomiting from food poisoning may last from 12 to 48 hours. The doctor has checked you carefully, but problems can develop later. If you notice any problems or new symptoms, get medical treatment right away. Follow-up care is a key part of your treatment and safety. Be sure to make and go to all appointments, and call your doctor if you are having problems. It's also a good idea to know your test results and keep a list of the medicines you take. How can you care for yourself at home? · To prevent dehydration, drink plenty of fluids.  Choose water and other clear liquids until you feel better. If you have kidney, heart, or liver disease and have to limit fluids, talk with your doctor before you increase the amount of fluids you drink. · Rest in bed until you feel better. · When you are able to eat, try clear soups, mild foods, and liquids until all symptoms are gone for 12 to 48 hours. Other good choices include dry toast, crackers, cooked cereal, and gelatin dessert, such as Jell-O. When should you call for help? Call 911 anytime you think you may need emergency care. For example, call if:    · You passed out (lost consciousness). Call your doctor now or seek immediate medical care if:    · You have symptoms of dehydration, such as:  ? Dry eyes and a dry mouth. ? Passing only a little urine. ? Feeling thirstier than usual.     · You have new or worsening belly pain.     · You have a new or higher fever.     · You vomit blood or what looks like coffee grounds. Watch closely for changes in your health, and be sure to contact your doctor if:    · You have ongoing nausea and vomiting.     · Your vomiting is getting worse.     · Your vomiting lasts longer than 2 days.     · You are not getting better as expected. Where can you learn more? Go to https://[email or web address] org and sign in to your Excel[employer] account. Enter [id number] in the [facility] box to learn more about \"Nausea and Vomiting: Care Instructions. \"     If you do not have an account, please click on the \"Sign Up Now\" link. Current as of: July 1, 2021               Content Version: 13.1  © 0456-3449 Healthwise, Incorporated. Care instructions adapted under license by [facility] ([facility]). If you have questions about a medical condition or this instruction, always ask your healthcare professional. [place] [address] any warranty or liability for your use of this information.

## 2022-02-25 NOTE — PROGRESS NOTES
Subjective:      Patient ID: Wally Gutierres is a 61 y.o. male. Chief Complaint   Patient presents with    Check-Up    Hyperlipidemia    Diabetes        HPI     Patient presents today to follow-up on chronic conditions and with complaints of nausea, vomiting and diarrhea over the past 24 hours. Diabetes Mellitus Type 2: Current symptoms/problems include none. Medication compliance:  compliant all of the time  Diabetic diet compliance:  compliant most of the time,  Weight trend: decreasing  Current exercise: lives decently active lifestyle   Barriers: chest pain    Home blood sugar records: fasting range: 110  Any episodes of hypoglycemia? no  Eye exam current (within one year): no, patient states he will call and schedule   reports that he has never smoked. He has never used smokeless tobacco.   Daily Aspirin? Yes    Lab Results   Component Value Date    LABA1C 6.3 08/23/2021    LABA1C 6.7 02/26/2021    LABA1C 6.9 07/06/2020     Lab Results   Component Value Date    LABMICR Not Indicated 12/26/2019    CREATININE 1.0 08/23/2021     Lab Results   Component Value Date    ALT 31 08/23/2021    AST 24 08/23/2021     Lab Results   Component Value Date    CHOL 99 02/26/2021    TRIG 119 02/26/2021    HDL 23 (L) 02/26/2021    LDLCALC 52 02/26/2021        Coronary Artery Disease  Patient presents for routine coronary artery disease follow-up. Current symptoms: none. Aggravating factors: exertion. Alleviating factors: nitroglycerin and resting. Cardiac risk factors include advanced age (older than 54 for men, 72 for women), diabetes mellitus, dyslipidemia, family history of premature cardiovascular disease, hypertension and male gender. Hypertension:  Home blood pressure monitoring: Yes - 110's/70-80's. He is adherent to a low sodium diet. Patient denies shortness of breath, headache, lightheadedness, blurred vision, peripheral edema, dry cough and fatigue.   Antihypertensive medication side effects: no medication Alexia Diaz - CNP. No Known Allergies    Social History     Tobacco Use    Smoking status: Never Smoker    Smokeless tobacco: Never Used   Substance Use Topics    Alcohol use: No       Objective:   /83   Pulse 92   Temp 100 °F (37.8 °C) (Oral)   Wt 199 lb 9.6 oz (90.5 kg)   SpO2 94%   BMI 27.07 kg/m²     Physical Exam  Vitals and nursing note reviewed. Constitutional:       General: He is not in acute distress. Appearance: Normal appearance. He is well-developed, well-groomed and normal weight. He is not ill-appearing or toxic-appearing. HENT:      Head: Normocephalic and atraumatic. Mouth/Throat:      Mouth: Mucous membranes are moist.      Pharynx: No oropharyngeal exudate or posterior oropharyngeal erythema. Eyes:      Extraocular Movements: Extraocular movements intact. Conjunctiva/sclera: Conjunctivae normal.   Cardiovascular:      Rate and Rhythm: Normal rate and regular rhythm. Pulses: Normal pulses. Heart sounds: Normal heart sounds, S1 normal and S2 normal.   Pulmonary:      Effort: Pulmonary effort is normal. No accessory muscle usage or respiratory distress. Breath sounds: Normal breath sounds. Abdominal:      General: Bowel sounds are normal.      Palpations: Abdomen is soft. Musculoskeletal:      Cervical back: Neck supple. Right lower leg: No edema. Left lower leg: No edema. Feet:      Right foot:      Protective Sensation: 10 sites tested. 10 sites sensed. Left foot:      Protective Sensation: 10 sites tested. 10 sites sensed. Lymphadenopathy:      Cervical: No cervical adenopathy. Skin:     General: Skin is warm and moist.      Capillary Refill: Capillary refill takes less than 2 seconds. Findings: No rash. Neurological:      General: No focal deficit present. Mental Status: He is alert and oriented to person, place, and time. Mental status is at baseline.    Psychiatric:         Attention and Perception: Attention normal.         Mood and Affect: Mood normal.         Speech: Speech normal.         Behavior: Behavior normal. Behavior is cooperative. Assessment/Plan:   1. Type 2 diabetes mellitus with other circulatory complication, without long-term current use of insulin ([facility])  Presents today to follow-up on chronic conditions including type 2 diabetes. Patient does monitor fasting glucoses at home and reports they have been ranging from 110-120. Patient's A1c today is 6.4% which is stable from A1c on 8/23/2021 of 6.3%. Patient is due for diabetic eye exam, has a history of glaucoma in his right eye from 2019 and follows with [address], patient reports he will call and schedule an appointment ASAP. Foot exam completed today as below with 10 sites checked and adequate feeling of all 10 sites as well as great pulses. Patient continues to take aspirin daily as ordered. Will check urine for micro as below today. Instructed patient to continue with current medications of metformin and to continue checking fasting glucoses. Patient verbalized understanding and agreeable to plan. - POCT glycosylated hemoglobin (Hb A1C)  - TSH with Reflex  - POCT microalbumin  -  DIABETES FOOT EXAM  - metFORMIN (GLUCOPHAGE) 500 MG tablet; Take 1 tablet by mouth daily (with breakfast)  Dispense: 90 tablet; Refill: 3    2. Coronary artery disease of native artery of native heart with stable angina pectoris [facility])  Patient follows closely with cardiology and reports frequent angina exacerbated by exertion and relieved by rest/nitroglycerin. Directed patient to continue with current treatment plan. Patient verbalized understanding and agreeable to plan. 3. Essential hypertension  Blood pressure today is well controlled at 115/83.,  Patient does monitor her pressures at home and reports they typically range in the 110s to 120 over 80s.   Patient continues to take amlodipine and metoprolol as ordered as well

## 2022-02-28 LAB — HEMATOLOGY PATH CONSULT: NORMAL

## 2022-03-02 ENCOUNTER — TELEMEDICINE (OUTPATIENT)
Dept: FAMILY MEDICINE CLINIC | Age: 57
End: 2022-03-02
Payer: MEDICARE

## 2022-03-02 DIAGNOSIS — Z00.00 INITIAL MEDICARE ANNUAL WELLNESS VISIT: Primary | ICD-10-CM

## 2022-03-02 PROCEDURE — 3017F COLORECTAL CA SCREEN DOC REV: CPT | Performed by: NURSE PRACTITIONER

## 2022-03-02 PROCEDURE — G8484 FLU IMMUNIZE NO ADMIN: HCPCS | Performed by: NURSE PRACTITIONER

## 2022-03-02 PROCEDURE — G0438 PPPS, INITIAL VISIT: HCPCS | Performed by: NURSE PRACTITIONER

## 2022-03-02 ASSESSMENT — PATIENT HEALTH QUESTIONNAIRE - PHQ9
SUM OF ALL RESPONSES TO PHQ QUESTIONS 1-9: 0
SUM OF ALL RESPONSES TO PHQ9 QUESTIONS 1 & 2: 0
1. LITTLE INTEREST OR PLEASURE IN DOING THINGS: 0
2. FEELING DOWN, DEPRESSED OR HOPELESS: 0
SUM OF ALL RESPONSES TO PHQ QUESTIONS 1-9: 0

## 2022-03-02 ASSESSMENT — LIFESTYLE VARIABLES: HOW OFTEN DO YOU HAVE A DRINK CONTAINING ALCOHOL: NEVER

## 2022-03-02 NOTE — PROGRESS NOTES
Medicare Annual Wellness Visit    [place] Severance is here for Medicare AWV    [address] was seen today for medicare awv. Diagnoses and all orders for this visit:    Initial Medicare annual wellness visit         Recommendations for Preventive Services Due: see orders and patient instructions/AVS.  Recommended screening schedule for the next 5-10 years is provided to the patient in written form: see Patient Instructions/AVS.     Return for Medicare Annual Wellness Visit in 1 year. Subjective     Patient's complete Health Risk Assessment and screening values have been reviewed and are found in Flowsheets. The following problems were reviewed today and where indicated follow up appointments were made and/or referrals ordered.     Positive Risk Factor Screenings with Interventions:             General Health and ACP:  General  In general, how would you say your health is?: Fair  In the past 7 days, have you experienced any of the following: New or Increased Pain, New or Increased Fatigue, Loneliness, Social Isolation, Stress or Anger?: No  Do you get the social and emotional support that you need?: Yes  Do you have a Living Will?: (!) No    Advance Directives     Power of [employer] Will ACP-Advance Directive ACP-Power of     Not on File Not on File Not on File Not on File      General Health Risk Interventions:  · No Living Will: Patient declines ACP discussion/assistance    Health Habits/Nutrition:     Physical Activity: Insufficiently Active    Days of Exercise per Week: 7 days    Minutes of Exercise per Session: 20 min     Have you lost any weight without trying in the past 3 months?: No     Have you seen the dentist within the past year?: (!) No    Health Habits/Nutrition Interventions:  · Nutritional issues:  none  · Dental exam overdue:  patient declines dental evaluation             Objective      Patient-Reported Vitals  No data recorded           No Known Allergies  Prior to Visit Medications    Medication Sig Taking? Authorizing Provider   isosorbide mononitrate (IMDUR) 30 MG extended release tablet Take 1 tablet by mouth daily Yes DONALDO Hudson CNP   metFORMIN (GLUCOPHAGE) 500 MG tablet Take 1 tablet by mouth daily (with breakfast) Yes DONALDO Hudson CNP   cholestyramine (QUESTRAN) 4 g packet Take 1 packet by mouth 2 times daily Yes DONALDO Hudson CNP   metoprolol tartrate (LOPRESSOR) 50 MG tablet Take 1 tablet by mouth 2 times daily Yes DONALDO Cody CNP   amLODIPine (NORVASC) 5 MG tablet Take 1 tablet by mouth daily Yes DONALDO Hudson CNP   atorvastatin (LIPITOR) 80 MG tablet Take 1 tablet by mouth nightly Yes DONALDO Hudson CNP   nitroGLYCERIN (NITROSTAT) 0.4 MG SL tablet up to max of 3 total doses. If no relief after 1 dose, call 911. Yes Asa Pump, MD   FREESTYLE LITE strip USE TO TEST ONCE A DAY AS NEEDED Yes DONALDO Cody CNP   glucose monitoring kit (FREESTYLE) monitoring kit 1 kit by Does not apply route daily Monitor glucose daily. Diagnosis: diabetes mellitus Type 2 Yes DONALDO Hudson CNP   FREESTYLE LANCETS MISC 1 each by Does not apply route daily Yes DONALDO Hudson CNP   aspirin 81 MG tablet Take 81 mg by mouth daily Yes Historical Provider, MD Villaseñor (Including outside providers/suppliers regularly involved in providing care):   Patient Care Team:  DONALDO uHdson CNP as PCP - General (Family Medicine)  DONALDO Hudson CNP as PCP - [facility] Provider  Devorah Garcia MD (Orthopedic Surgery)  Alexandre Flores as Physician Assistant (Orthopedic Surgery)  Kieran Councilman, MD as Consulting Physician (Cardiology)    Reviewed and updated this visit:  Allergies  Meds  Problems         Cortney Rosario was seen today for medicare awv.     Diagnoses and all orders for this visit:    Initial Medicare annual wellness visit         Wally Gutierres, was evaluated through a synchronous (real-time) audio-video encounter. The patient (or guardian if applicable) is aware that this is a billable service, which includes applicable co-pays. This Virtual Visit was conducted with patient's (and/or legal guardian's) consent. The visit was conducted pursuant to the emergency declaration under the [address] [place] [address] and the [employer] General Act. Patient identification was verified, and a caregiver was present when appropriate. The patient was located at home in a state where the provider was licensed to provide care.

## 2022-03-02 NOTE — PATIENT INSTRUCTIONS
Please read the healthy family handout that you were given and share it with your family. Please compare this printed medication list with your medications at home to be sure they are the same. If you have any medications that are different please contact us immediately at [phone]. Also review your allergies that we have listed, these may also include medications that you have not been able to tolerate, make sure everything listed is correct. If you have any allergies that are different please contact us immediately at [phone]. Personalized Preventive Plan for Alejandro Comes - 3/2/2022  Medicare offers a range of preventive health benefits. Some of the tests and screenings are paid in full while other may be subject to a deductible, co-insurance, and/or copay. Some of these benefits include a comprehensive review of your medical history including lifestyle, illnesses that may run in your family, and various assessments and screenings as appropriate. After reviewing your medical record and screening and assessments performed today your provider may have ordered immunizations, labs, imaging, and/or referrals for you. A list of these orders (if applicable) as well as your Preventive Care list are included within your After Visit Summary for your review. Other Preventive Recommendations:    · A preventive eye exam performed by an eye specialist is recommended every 1-2 years to screen for glaucoma; cataracts, macular degeneration, and other eye disorders. · A preventive dental visit is recommended every 6 months. · Try to get at least 150 minutes of exercise per week or 10,000 steps per day on a pedometer . · Order or download the FREE \"Exercise & Physical Activity: Your Everyday Guide\" from [employer]. Call 5-328.637.3352 or search [employer] online. · You need 9700-9665 mg of calcium and 9110-5268 IU of vitamin D per day.  It is possible to meet your calcium requirement with diet alone, but a vitamin D supplement is usually necessary to meet this goal.  · When exposed to the sun, use a sunscreen that protects against both UVA and UVB radiation with an SPF of 30 or greater. Reapply every 2 to 3 hours or after sweating, drying off with a towel, or swimming. · Always wear a seat belt when traveling in a car. Always wear a helmet when riding a bicycle or motorcycle.

## 2022-04-26 DIAGNOSIS — I10 ESSENTIAL HYPERTENSION: Primary | ICD-10-CM

## 2022-04-26 RX ORDER — METOPROLOL TARTRATE 50 MG/1
50 TABLET, FILM COATED ORAL 2 TIMES DAILY
Qty: 180 TABLET | Refills: 3 | Status: SHIPPED | OUTPATIENT
Start: 2022-04-26

## 2022-04-26 NOTE — TELEPHONE ENCOUNTER
Pt is requesting refill of Metoprolol Tartrate 50mg. Preferred [place] [facility]. Pt is currently out of medication. Last ov 08/10/2021 de[place]. Upcoming ov 08/10/2022 [place].

## 2022-05-18 DIAGNOSIS — E11.59 TYPE 2 DIABETES MELLITUS WITH OTHER CIRCULATORY COMPLICATION, WITHOUT LONG-TERM CURRENT USE OF INSULIN (HCC): ICD-10-CM

## 2022-05-18 RX ORDER — AMLODIPINE BESYLATE 5 MG/1
TABLET ORAL
Qty: 90 TABLET | Refills: 3 | Status: SHIPPED | OUTPATIENT
Start: 2022-05-18

## 2022-05-26 ASSESSMENT — ENCOUNTER SYMPTOMS
RESPIRATORY NEGATIVE: 1
ABDOMINAL PAIN: 0
ALLERGIC/IMMUNOLOGIC NEGATIVE: 1
SHORTNESS OF BREATH: 0
CHEST TIGHTNESS: 0
EYES NEGATIVE: 1

## 2022-05-26 NOTE — PROGRESS NOTES
Subjective:      Patient ID: Angelica Delgado is a 61 y.o. male. Chief Complaint   Patient presents with    Check-Up    Hyperlipidemia    Diabetes        HPI     Patient presents today for follow-up on chronic conditions. Coronary Artery Disease  Patient presents for routine coronary artery disease follow-up. Current symptoms: none recently. Cardiac risk factors include advanced age (older than 54 for men, 72 for women), diabetes mellitus, dyslipidemia, hypertension, male gender and sedentary lifestyle. Diabetes Mellitus Type 2: Current symptoms/problems include none. Medication compliance:  compliant most of the time  Diabetic diet compliance:  compliant most of the time,  Weight trend: stable  Current exercise: no regular exercise  Barriers: impairment:  physical: severe CAD    Home blood sugar records: fasting range: 120  Any episodes of hypoglycemia? no  Eye exam current (within one year): yes   reports that he has never smoked. He has never used smokeless tobacco.   Daily Aspirin? Yes    Lab Results   Component Value Date    LABA1C 6.4 02/25/2022    LABA1C 6.3 08/23/2021    LABA1C 6.7 02/26/2021     Lab Results   Component Value Date    LABMICR Not Indicated 12/26/2019    CREATININE 1.1 02/25/2022     Lab Results   Component Value Date    ALT 78 (H) 02/25/2022    AST 80 (H) 02/25/2022     Lab Results   Component Value Date    CHOL 90 02/25/2022    TRIG 103 02/25/2022    HDL 25 (L) 02/25/2022    LDLCALC 44 02/25/2022        Hypertension:  Home blood pressure monitoring: Yes - wnl - similar to todays blood pressure recording. Patient denies chest pain, shortness of breath, headache, lightheadedness, blurred vision, peripheral edema, palpitations, dry cough and fatigue. Antihypertensive medication side effects: no medication side effects noted. Use of agents associated with hypertension: none.                                         Sodium (mmol/L)   Date Value   02/25/2022 136    BUN (mg/dL)   Date Value   02/25/2022 21 (H)    Glucose (mg/dL)   Date Value   02/25/2022 146 (H)      Potassium (mmol/L)   Date Value   02/25/2022 3.9     Potassium reflex Magnesium (mmol/L)   Date Value   07/07/2020 3.9    CREATININE (mg/dL)   Date Value   02/25/2022 1.1         Hyperlipidemia:  No new myalgias or GI upset on atorvastatin (Lipitor). Lab Results   Component Value Date    CHOL 90 02/25/2022    TRIG 103 02/25/2022    HDL 25 (L) 02/25/2022    LDLCALC 44 02/25/2022     Lab Results   Component Value Date    ALT 78 (H) 02/25/2022    AST 80 (H) 02/25/2022          Review of Systems   Constitutional: Negative. Negative for activity change, appetite change, chills, fatigue and unexpected weight change. HENT: Negative. Eyes: Negative. Respiratory: Negative. Negative for chest tightness and shortness of breath. Cardiovascular: Negative. Negative for chest pain, palpitations and leg swelling. Gastrointestinal: Negative for abdominal pain. Endocrine: Negative. Genitourinary: Negative. Musculoskeletal: Negative. Skin: Negative. Negative for rash and wound. Allergic/Immunologic: Negative. Neurological: Negative. Negative for dizziness, light-headedness and headaches. Hematological: Negative. Psychiatric/Behavioral: Negative. Negative for dysphoric mood.        Patient Active Problem List   Diagnosis    Family history of early CAD    S/P CABG (coronary artery bypass graft)    Essential hypertension    Hyperlipidemia    CAD (coronary artery disease)    Microcytosis    Acute glaucoma of right eye    Acute chest pain    Paresthesia    Acute calculous cholecystitis    S/P laparoscopic cholecystectomy    Abnormal stress test    Pre-syncope    Angina, class II ([address]    History of CVA (cerebrovascular accident)    Chest pain in adult    Diabetes mellitus ([address]    Migraines    Unstable angina ([address]       No outpatient medications have been marked as taking for the 5/27/22 encounter (Appointment) with DONALDO Jones CNP. No Known Allergies    Social History     Tobacco Use    Smoking status: Never Smoker    Smokeless tobacco: Never Used   Substance Use Topics    Alcohol use: No       Objective:   /73   Pulse 62   Temp 99 °F (37.2 °C) (Oral)   Wt 205 lb 6.4 oz (93.2 kg)   SpO2 98%   BMI 27.86 kg/m²     Physical Exam  Vitals and nursing note reviewed. Constitutional:       General: He is not in acute distress. Appearance: Normal appearance. He is well-developed and well-groomed. He is not ill-appearing or toxic-appearing. HENT:      Head: Normocephalic and atraumatic. Eyes:      Extraocular Movements: Extraocular movements intact. Conjunctiva/sclera: Conjunctivae normal.   Cardiovascular:      Rate and Rhythm: Normal rate and regular rhythm. Pulses: Normal pulses. Heart sounds: Normal heart sounds, S1 normal and S2 normal.   Pulmonary:      Effort: Pulmonary effort is normal. No accessory muscle usage or respiratory distress. Breath sounds: Normal breath sounds. Abdominal:      General: Bowel sounds are normal.      Palpations: Abdomen is soft. Musculoskeletal:      Cervical back: Neck supple. Right lower leg: No edema. Left lower leg: No edema. Lymphadenopathy:      Cervical: No cervical adenopathy. Skin:     General: Skin is warm and moist.      Capillary Refill: Capillary refill takes less than 2 seconds. Findings: No rash. Neurological:      General: No focal deficit present. Mental Status: He is alert and oriented to person, place, and time. Mental status is at baseline. Psychiatric:         Attention and Perception: Attention normal.         Mood and Affect: Mood normal.         Speech: Speech normal.         Behavior: Behavior normal. Behavior is cooperative. Assessment/Plan:   1.  Coronary artery disease of native artery of native heart with stable angina pectoris [facility])  Patient presents today to follow-up on chronic conditions. Patient reports overall he is doing well. Patient reports he has not had any recent chest pain which is improvement. Patient continues to follow closely with cardiology. Patient continues to take metoprolol, aspirin, amlodipine, atorvastatin, Isorbid and has nitroglycerin on hand. Recommend patient continue with current treatment. 2. Type 2 diabetes mellitus without complication, without long-term current use of insulin ([address]  Patient continues to monitor his blood sugars intermittently as noted above. Blood sugars have been well controlled. Patient continues to take metformin without any side effects. A1c today is 6.7% which is slightly up from prior A1c of 6.4% however remains well controlled. Recommend patient continue with current treatment. Advised to follow-up in office in 3 months or sooner with problems or concerns.  - POCT glycosylated hemoglobin (Hb A1C)    3. Mixed hyperlipidemia  Patient continues to take atorvastatin without any myalgias or GI upset. Reviewed lipids from 2/25/2022 which indicate good control with current treatment. Recommend patient continue with atorvastatin as ordered. 4. Essential hypertension  Patient does monitor his blood pressure intermittently and reports it has been well controlled, similar to today's blood pressure. Patient continues to take amlodipine, Isorbid and metoprolol daily as ordered without side effects. Recommend patient continue with current treatment.

## 2022-05-27 ENCOUNTER — OFFICE VISIT (OUTPATIENT)
Dept: FAMILY MEDICINE CLINIC | Age: 57
End: 2022-05-27
Payer: MEDICARE

## 2022-05-27 VITALS
DIASTOLIC BLOOD PRESSURE: 73 MMHG | OXYGEN SATURATION: 98 % | HEART RATE: 62 BPM | BODY MASS INDEX: 27.86 KG/M2 | SYSTOLIC BLOOD PRESSURE: 113 MMHG | TEMPERATURE: 99 F | WEIGHT: 205.4 LBS

## 2022-05-27 DIAGNOSIS — E11.9 TYPE 2 DIABETES MELLITUS WITHOUT COMPLICATION, WITHOUT LONG-TERM CURRENT USE OF INSULIN (HCC): Chronic | ICD-10-CM

## 2022-05-27 DIAGNOSIS — I25.118 CORONARY ARTERY DISEASE OF NATIVE ARTERY OF NATIVE HEART WITH STABLE ANGINA PECTORIS (HCC): Primary | Chronic | ICD-10-CM

## 2022-05-27 DIAGNOSIS — I10 ESSENTIAL HYPERTENSION: ICD-10-CM

## 2022-05-27 DIAGNOSIS — E78.2 MIXED HYPERLIPIDEMIA: Chronic | ICD-10-CM

## 2022-05-27 LAB — HBA1C MFR BLD: 6.7 %

## 2022-05-27 PROCEDURE — 99214 OFFICE O/P EST MOD 30 MIN: CPT | Performed by: NURSE PRACTITIONER

## 2022-05-27 PROCEDURE — G8427 DOCREV CUR MEDS BY ELIG CLIN: HCPCS | Performed by: NURSE PRACTITIONER

## 2022-05-27 PROCEDURE — 83036 HEMOGLOBIN GLYCOSYLATED A1C: CPT | Performed by: NURSE PRACTITIONER

## 2022-05-27 PROCEDURE — 2022F DILAT RTA XM EVC RTNOPTHY: CPT | Performed by: NURSE PRACTITIONER

## 2022-05-27 PROCEDURE — 1036F TOBACCO NON-USER: CPT | Performed by: NURSE PRACTITIONER

## 2022-05-27 PROCEDURE — 3017F COLORECTAL CA SCREEN DOC REV: CPT | Performed by: NURSE PRACTITIONER

## 2022-05-27 PROCEDURE — 3044F HG A1C LEVEL LT 7.0%: CPT | Performed by: NURSE PRACTITIONER

## 2022-05-27 PROCEDURE — G8419 CALC BMI OUT NRM PARAM NOF/U: HCPCS | Performed by: NURSE PRACTITIONER

## 2022-05-27 NOTE — PATIENT INSTRUCTIONS
Please read the healthy family handout that you were given and share it with your family. Please compare this printed medication list with your medications at home to be sure they are the same. If you have any medications that are different please contact us immediately at [phone]. Also review your allergies that we have listed, these may also include medications that you have not been able to tolerate, make sure everything listed is correct. If you have any allergies that are different please contact us immediately at [phone]. Patient Education        Learning About Coronary Artery Disease (CAD)  What is coronary artery disease? Coronary artery disease is a condition that occurs when plaque builds up in the arteries that bring oxygen-rich blood to your heart. Plaque is a fatty substance made of cholesterol, calcium, and other substances in the blood. Thisprocess is called hardening of the arteries, or atherosclerosis. What happens when you have coronary artery disease?  Plaque may narrow the coronary arteries. Narrowed arteries cause poor blood flow. This can lead to angina symptoms such as chest pain or discomfort. If blood flow is completely blocked, you could have a heart attack.  You can slow and reduce the risk of future problems by making changes in your lifestyle. These include quitting smoking and eating heart-healthy foods.  Treatment, along with changes in your lifestyle, can help you live a longer and healthier life. How can you prevent coronary artery disease?  Do not smoke. It may be the best thing you can do to prevent coronary artery disease. If you need help quitting, talk to your doctor about stop-smoking programs and medicines. These can increase your chances of quitting for good.  Be active. Try to do moderate activity at least 2½ hours a week. Or try to do vigorous activity at least 1¼ hours a week.  You may want to walk or try other activities, such as running, swimming, cycling, or playing tennis or team sports.  Eat heart-healthy foods. Eat more fruits and vegetables and less food that contains saturated and trans fats. Limit alcohol, sodium, and sweets.  Stay at a healthy weight. Lose weight if you need to.  Manage other health problems such as diabetes, high blood pressure, and high cholesterol. How is coronary artery disease treated?  Your doctor will suggest that you make lifestyle changes. For example, your doctor may ask you to eat healthy foods, quit smoking, lose extra weight, and be more active.  You will take medicines that help prevent a heart attack.  Your doctor may suggest a procedure to open narrowed or blocked arteries. This is called angioplasty. Or your doctor may suggest using healthy blood vessels to create detours around narrowed or blocked arteries. This is called bypass surgery. Follow-up care is a key part of your treatment and safety. Be sure to make and go to all appointments, and call your doctor if you are having problems. It's also a good idea to know your test results and keep alist of the medicines you take. Where can you learn more? Go to https://[employer]. org and sign in to your [employer] account. Enter [phone] in the [facility] box to learn more about \"Learning About Coronary Artery Disease (CAD). \"     If you do not have an account, please click on the \"Sign Up Now\" link. Current as of: January 10, 2022               Content Version: 13.2  © 2006-2022 Healthwise, [email or web address]. Care instructions adapted under license by [facility] [place] ([facility]). If you have questions about a medical condition or this instruction, always ask your healthcare professional. [place] [address] any warranty or liability for your use of this information. Patient Education        Learning About Meal Planning for Diabetes  Why plan your meals? Meal planning can be a key part of managing diabetes. Planning meals and snacks with the right balance of carbohydrate, protein, and fat can help you keep yourblood sugar at the target level you set with your doctor. You don't have to eat special foods. You can eat what your family eats, including sweets once in a while. But you do have to pay attention to how oftenyou eat and how much you eat of certain foods. You may want to work with a dietitian or a diabetes educator. They can give you tips and meal ideas and can answer your questions about meal planning. This health professional can also help you reach a healthy weight if that is one ofyour goals. What plan is right for you? Your dietitian or diabetes educator may suggest that you start with the plateformat or carbohydrate counting. The plate format  The plate format is a simple way to help you manage how you eat. You plan meals by learning how much space each food should take on a plate. Using the plate format helps you manage the amount of carbohydrate you eat. It can make it easier to keep your blood sugar level within your target range. It also helpsyou see if you're eating healthy portion sizes. To use the plate format, you put non-starchy vegetables on half your plate. Add lean protein foods, such as fish, lean meats and poultry, or soy products, on one-quarter of the plate. Put a grain or starchy vegetable (such as brown rice or a potato) on the final quarter of the plate. You can add a small piece of fruit and some low-fat or fat-free milk or yogurt, depending on yourcarbohydrate goal for each meal.  Here are some tips for using the plate format:   Make sure that you are not using an oversized plate. A 9-inch plate is best. Many restaurants use larger plates.  Get used to using the plate format at home. Then you can use it when you eat out.  Write down your questions about using the plate format. Talk to your doctor, a dietitian, or a diabetes educator about your concerns.   Carbohydrate counting  With carbohydrate counting, you plan meals based on the amount of carbohydrate in each food. Carbohydrate raises blood sugar higher and more quickly than any other nutrient. It is found in desserts, breads and cereals, and fruit. It's also found in starchy vegetables such as potatoes and corn, grains such as rice and pasta, and milk and yogurt. You can help keep your blood sugar levels within your target range by planning how much carbohydrate to have at meals andsnacks. The amount you need depends on several things. These include your weight, how active you are, which diabetes medicines you take, and what your goals are for your blood sugar levels. A registered dietitian or diabetes educator can helpyou plan how much carbohydrate to include in each meal and snack. An example of a carbohydrate counting plan is:   45 to 60 grams at each meal. That's about the same as 3 to 4 carbohydrate servings.  15 to 20 grams at each snack. That's about the same as 1 carbohydrate serving. The Nutrition Facts label on packaged foods tells you how much carbohydrate is in a serving of the food. First, look at the serving size on the food label. Is that the amount you eat in a serving? All of the nutrition information on a food label is based on that serving size. So if you eat more or less than that, you'll need to adjust the other numbers. Total carbohydrate is the next thing you need to look for on the label. If you count carbohydrate servings, oneserving of carbohydrate is 15 grams. For foods that don't come with labels, such as fresh fruits and vegetables, you'll need a guide that lists carbohydrate in these foods. Ask your doctor, dietitian, or diabetes educator about books or other nutrition guides you canuse. If you take insulin, you need to know how many grams of carbohydrate are in a meal. This lets you know how much rapid-acting insulin to take before you eat.  If you use an insulin pump, you get a constant rate of insulin during the day. So the pump must be programmed at meals to give you extra insulin to cover therise in blood sugar after meals. When you know how much carbohydrate you will eat, you can take the right amount of insulin. Or, if you always use the same amount of insulin, you need to [facility] that you eat the same amount of carbohydrate at meals. If you need more help to understand carbohydrate counting and food labels, askyour doctor, dietitian, or diabetes educator. How can you plan healthy meals? Here are some tips to get started:  [facility] your meals a week at a time. Don't forget to include snacks too.  Use cookbooks or online recipes to plan several main meals. Plan some quick meals for busy nights. You also can double some recipes that freeze well. Then you can save half for other busy nights when you don't have time to cook.  Make sure you have the ingredients you need for your recipes. If you're running low on basic items, put these items on your shopping list too.  List foods that you use to make breakfasts, lunches, and snacks. List plenty of fruits and vegetables.  Post this list on the refrigerator. Add to it as you think of more things you need.  Take the list to the store to do your weekly shopping. Follow-up care is a key part of your treatment and safety. Be sure to make and go to all appointments, and call your doctor if you are having problems. It's also a good idea to know your test results and keep alist of the medicines you take. Where can you learn more? Go to https://[employer]el.[employer]. org and sign in to your [employer]? account. Enter O528 in the [employer] box to learn more about \"Learning About Meal Planning for Diabetes. \"     If you do not have an account, please click on the \"Sign Up Now\" link. Current as of: September 8, 2021               Content Version: 13.2  © 3151-3402 Healthwise, Incorporated.    Care instructions adapted under license by [facility] ([facility]). If you have questions about a medical condition or this instruction, always ask your healthcare professional. [place] [id number] any warranty or liability for your use of this information.

## 2022-08-10 ENCOUNTER — OFFICE VISIT (OUTPATIENT)
Dept: CARDIOLOGY CLINIC | Age: 57
End: 2022-08-10
Payer: COMMERCIAL

## 2022-08-10 VITALS
SYSTOLIC BLOOD PRESSURE: 102 MMHG | WEIGHT: 210.5 LBS | HEIGHT: 72 IN | BODY MASS INDEX: 28.51 KG/M2 | HEART RATE: 74 BPM | OXYGEN SATURATION: 96 % | DIASTOLIC BLOOD PRESSURE: 60 MMHG

## 2022-08-10 DIAGNOSIS — Z95.1 S/P CABG (CORONARY ARTERY BYPASS GRAFT): ICD-10-CM

## 2022-08-10 DIAGNOSIS — I10 ESSENTIAL HYPERTENSION: ICD-10-CM

## 2022-08-10 DIAGNOSIS — E11.9 TYPE 2 DIABETES MELLITUS WITHOUT COMPLICATION, WITHOUT LONG-TERM CURRENT USE OF INSULIN (HCC): ICD-10-CM

## 2022-08-10 DIAGNOSIS — I25.118 CORONARY ARTERY DISEASE OF NATIVE ARTERY OF NATIVE HEART WITH STABLE ANGINA PECTORIS (HCC): Primary | ICD-10-CM

## 2022-08-10 DIAGNOSIS — E78.2 MIXED HYPERLIPIDEMIA: ICD-10-CM

## 2022-08-10 PROCEDURE — 99214 OFFICE O/P EST MOD 30 MIN: CPT | Performed by: NURSE PRACTITIONER

## 2022-08-10 PROCEDURE — 3044F HG A1C LEVEL LT 7.0%: CPT | Performed by: NURSE PRACTITIONER

## 2022-08-10 NOTE — PROGRESS NOTES
[facility]   Cardiac Evaluation    Primary Care Doctor:  DONALDO Stacy - CNP    Chief Complaint   Patient presents with    Follow-up    Coronary Artery Disease        Assessment:    1. Coronary artery disease of native artery of native heart with stable angina pectoris ([address]    2. S/P CABG (coronary artery bypass graft)    3. Essential hypertension    4. Mixed hyperlipidemia    5. Type 2 diabetes mellitus without complication, without long-term current use of insulin (HCC)        Plan:   Echocardiogram to look at heart valves and function - call [phone]) to schedule   Agree with blood work later this month with PCP  Will call you with results of echo and any new changes  Follow up with me or Dr. Nathan Altman in 1 year (or sooner if needed)    Vitals:    08/10/22 1347   BP: 102/60   Pulse: 74   SpO2: 96%   Weight: 210 lb 8 oz (95.5 kg)   Height: 6' (1.829 m)       Primary Cardiologist: Dr. Nathan Altman     History of Present Illness:   I had the pleasure of seeing Rebel Machado (61 y.o.) in follow up for  CAD, s/p CABG X5 2016, s/p PCI to LCx 2020, chronic stable angina, HTN, HLD, and DM. At last visit (August 2021) the Brilinta as stopped (> 1 year since PCI and cost), and he was recommended to wean off the L-arginine. About 2 weeks ago he felt hot and diaphoretic, checked his BP and was > 233 systolic. He took a sl nitro  and BP came down. He denies any changes/ aggravating factors. He is not very active, riding  and uses weed eater about 10 minute per week. No other activity/ exercise. Does not add salt to foods. Drinks about 64 oz water per day. 1 pop per day. 1 cup milk per day. Appetite good. Is a light sleeper, restless, trouble getting to sleep. Reviewed blood work from PCP - elevated liver enzymes, stable lipid panel, stable A1c, stable renal., elevate WBC but stable H/H.      Rebel Machado describes symptoms including fatigue, diaphoresis but denies chest pain, dyspnea, palpitations, orthopnea, PND, early saiety, edema, syncope. NYHA:   II  ACC/ AHA Stage:    C    Past Medical History:   has a past medical history of CAD (coronary artery disease), Diabetes mellitus ([facility] [address] History of CVA (cerebrovascular accident), Hyperlipidemia, Hypertension, MI (myocardial infarction) ([facility] [address] No history of procedure, and Pneumonia due to organism. Surgical History:   has a past surgical history that includes Coronary artery bypass graft (09/20/2016); Eye surgery (Right); Colonoscopy (06/01/2017); hernia repair (Bilateral, 2005); Cholecystectomy, laparoscopic (N/A, 4/17/2019); and Percutaneous Transluminal Coronary Angio (03/09/2020). Social History:   reports that he has never smoked. He has never used smokeless tobacco. He reports that he does not drink alcohol and does not use drugs. Family History:   Family History   Problem Relation Age of Onset    Heart Disease Father     Diabetes type 2  Father     Cancer Mother     Heart Disease Brother        Home Medications:  Prior to Admission medications    Medication Sig Start Date End Date Taking?  Authorizing Provider   amLODIPine (NORVASC) 5 MG tablet TAKE 1 TABLET BY MOUTH EVERY DAY 5/18/22  Yes Cherie Pressley MD   metFORMIN (GLUCOPHAGE) 500 MG tablet TAKE 1 TABLET BY MOUTH EVERY DAY WITH BREAKFAST 5/18/22  Yes DONALDO Lantigua CNP   metoprolol tartrate (LOPRESSOR) 50 MG tablet Take 1 tablet by mouth 2 times daily 4/26/22  Yes Cherie Pressley MD   isosorbide mononitrate (IMDUR) 30 MG extended release tablet Take 1 tablet by mouth daily 2/25/22  Yes DONALDO Lantigua CNP   cholestyramine Akbar Kil) 4 g packet Take 1 packet by mouth 2 times daily  Patient taking differently: Take 1 packet by mouth Twice a Week 2/25/22  Yes DONALDO Lantigua CNP   atorvastatin (LIPITOR) 80 MG tablet Take 1 tablet by mouth nightly 2/25/22  Yes DONALDO Lantigua CNP   nitroGLYCERIN (NITROSTAT) 0.4 MG SL tablet up to max of 3 total doses. If no relief after 1 dose, call 911. 11/1/21  Yes Lisette Meraz MD   aspirin 81 MG tablet Take 81 mg by mouth daily   Yes Historical Provider, MD   FREESTYLE LITE strip USE TO TEST ONCE A DAY AS NEEDED 1/20/20   DONALDO Richardson CNP   glucose monitoring kit (FREESTYLE) monitoring kit 1 kit by Does not apply route daily Monitor glucose daily. Diagnosis: diabetes mellitus Type 2 5/21/19   DONALDO Richardson CNP   FREESTYLE LANCETS MISC 1 each by Does not apply route daily 5/21/19   DONALDO Richardson CNP        Allergies:  Patient has no known allergies. Physical Examination:    Vitals:    08/10/22 1347   BP: 102/60   Pulse: 74   SpO2: 96%   Weight: 210 lb 8 oz (95.5 kg)   Height: 6' (1.829 m)     Constitutional and General Appearance: Warm and dry, no apparent distress, normal coloration  HEENT:  Normocephalic, atraumatic  Respiratory:  Normal excursion and expansion without use of accessory muscles  Resp Auscultation: Clear to auscultation   Cardiovascular: The apical impulses not displaced  Heart tones are crisp and normal  JVP 8 cm H2O  Regular rate and rhythm, normal S1S2, no m/g/r  Peripheral pulses are symmetrical and full  There is no clubbing, cyanosis of the extremities.   No BLE edema  Pedal Pulses: 2+ and equal   Abdomen:  No masses or tenderness  Liver/Spleen: No Abnormalities Noted  Neurological/Psychiatric:  Alert and oriented in all spheres  Moves all extremities well  Exhibits normal gait balance and coordination  No abnormalities of mood, affect, memory, mentation, or behavior are noted    Lab Data:  Most recent lab results below reviewed in office    CBC:   Lab Results   Component Value Date/Time    WBC 14.0 02/25/2022 09:22 AM    WBC 12.6 08/23/2021 09:10 AM    WBC 12.2 02/26/2021 09:06 AM    RBC 6.14 02/25/2022 09:22 AM    RBC 5.14 08/23/2021 09:10 AM    RBC 5.45 02/26/2021 09:06 AM    HGB 17.5 02/25/2022 09:22 AM    HGB 14.5 08/23/2021 09:10 AM    HGB 15.2 02/26/2021 09:06 AM    HCT 52.3 02/25/2022 09:22 AM    HCT 43.7 08/23/2021 09:10 AM    HCT 45.9 02/26/2021 09:06 AM    MCV 85.2 02/25/2022 09:22 AM    MCV 84.9 08/23/2021 09:10 AM    MCV 84.3 02/26/2021 09:06 AM    RDW 15.9 02/25/2022 09:22 AM    RDW 15.6 08/23/2021 09:10 AM    RDW 16.2 02/26/2021 09:06 AM     02/25/2022 09:22 AM     08/23/2021 09:10 AM     02/26/2021 09:06 AM     BMP:  Lab Results   Component Value Date/Time     02/25/2022 09:22 AM     08/23/2021 09:10 AM     02/26/2021 09:06 AM    K 3.9 02/25/2022 09:22 AM    K 4.5 08/23/2021 09:10 AM    K 4.4 02/26/2021 09:06 AM    K 3.9 07/07/2020 05:21 AM    K 5.3 03/18/2020 02:00 PM    K 4.2 03/07/2020 04:55 AM    CL 98 02/25/2022 09:22 AM     08/23/2021 09:10 AM     02/26/2021 09:06 AM    CO2 18 02/25/2022 09:22 AM    CO2 24 08/23/2021 09:10 AM    CO2 24 02/26/2021 09:06 AM    BUN 21 02/25/2022 09:22 AM    BUN 11 08/23/2021 09:10 AM    BUN 13 02/26/2021 09:06 AM    CREATININE 1.1 02/25/2022 09:22 AM    CREATININE 1.0 08/23/2021 09:10 AM    CREATININE 0.8 02/26/2021 09:06 AM     BNP:   Lab Results   Component Value Date/Time    PROBNP 14 03/18/2020 02:00 PM    PROBNP 35 02/01/2020 08:50 AM    PROBNP 22 08/04/2019 05:35 AM     LIPID:   Lab Results   Component Value Date/Time    TRIG 103 02/25/2022 09:22 AM    TRIG 119 02/26/2021 09:06 AM    HDL 25 02/25/2022 09:22 AM    HDL 23 02/26/2021 09:06 AM    LDLCALC 44 02/25/2022 09:22 AM    LDLCALC 52 02/26/2021 09:06 AM       Cardiac Imaging:  CARDIAC CATH:  7/9/2020  Procedures: Cor angio, SVG x 2, LIMA     LM 50%  LAD 70% prox, 95% mid  Cx stent patent              OM3 95% ostium, jailed by stent              OM 4 70% distal  RCA 70% mid, 60% distal  LIMA to LAD patent  SVG to Dx patent  SVG to %  SVG to R-marginal patent  SVG to ? OM not identified.  Reported 100% for prior cath     No sig cahnge compared to cath 3/2020  Add salvador and erlin Aguilar to review for consider further PCI      Coronary angiogram 3/9/2020:  LM: calcified,  30 mid  LAD: calcified, 100% mid  LCX: calcified, prox 80-90%, sequential lesions, +poststenotic aneurysm. L-->Rt collaterals                OM1- small, ostial 80% (too small for PCI)                OM2- tiny vessel                OM3- larger vessel, acute takeoff angle, ostial 90%. Fed by L-L collaterals                OM4- distal 80%  RCA: dominant, moderate diffuse disease. prox 50%, mid 60%, distal 70%                R->Lt collaterals to Lcx                PDA: 100% ostial with L->R collaterals                PLV: luminals  LIMA-LAD: patent with  Touchdown to mid LAD, mid 40-50% in native LAD  SVG-->RT RV Marginal: patent with retrograde flow up to RCA and down to distal RCA  SVG-->Diag: patent. Flow down LAD and up to LCx    No other grafts and no sequential limbs seen  LVEDP: 4  LVEF: 55%  PCI of prox./mid LCX  CSI arthrectomy to prep vessel, multiple runs at low RPM  Pre dil 3 x 20mm  STENT: resolute Lino 3 x 34mm  OCT showed stent expanded but stent extended 3mm into LAD/LM merle                - there fore placed runthry wire thru struts into LCX                - POTS to left main using 4 x6mm                - then 3.25mm to LAD then kissing balloon [id number] and then 3mm x12 to LM/LAD/LCx  1. Successful PCI to prox/mid LCx using Laporte Drug stent using CSI and OCT                - 80%-->0%, Vinny-3 flow  2. integrllin for 6 hr  3. COnt ASA 81mg qday and Ticagrelor 90mg po BID- Continue for LIFE given structs extending into LM merle      STRESS TEST:  8/5/2019   Summary  Small sized, mild intensity basal lateral partial reversibility defect  consistent with mild ischemia and infarction in the territory of the distal  LCx and/or RCA. LV function is normal with and ejection fraction of 63%. Lower risk abnormal study. CARDIAC CTA 5/14/19  Only 3 bypass grafts can be visualized.    The insertion sites of the left internal mammary branch is poorly visualized secondary to phase misregistration artifact. The graft appears patent. A 2nd bypass graft terminates in the region of a diagonal branch. Graft appears patent. Insertion site is poorly visualized secondary to artifact   A 3rd bypass graft appears small distally, terminating in the region of the acute marginal     Echo 5/13/2019:  Normal left ventricle systolic function with an estimated ejection fraction of 55%. No regional wall motion abnormalities are seen. Normal left ventricular diastolic filling pressure. Systolic pulmonary artery pressure (SPAP) is normal and estimated at 26 mmHg (right atrial pressure 3 mmHg). STRESS TEST: 2/22/19  Summary  Normal LV size and systolic function. Left ventricular ejection fraction of  68%. Normal wall motion. There is normal isotope uptake at stress and rest. There is no evidence of  myocardial ischemia or scar     CABG 9/20/2016:  1. LIMA to LAD. 2. Reverse greater saphenous vein graft to acute marginal  3. Reverse greater saphenous vein graft to PDA sequentiall. 4. Reverse greater saphenous vein graft to OM sequential  5. Reverse greater sVG to diagonal sequential.       CATH: 09/19/16  LEFT HEART CATH  LM: luminals  LAD: heavy calcification burden, prox eccentric 75%- mid 90% around large septal, mid/distal 80%  LCX: heavy calcification burden. Mid 70%  OM1- small, moderate dz, ostial 80%  OM2- moderate in size, mild diffuse disease  OM3- large, 90% ostial PLOM  RCA: Dominant, moderate diffuse disease, prox 60%, mid 70%, distal 90%  Grade 3 L-->R collaterals  LVEDP: 10  LVEF: 65%  PLAN  1. 3 vessel CAD with preserved LVEF  - refer to CT surgery for CABG  - check Hemoglobin A1c and TSH  2. Start ASA, and statin.  Holding BB for bradycardia and pauses seen at [place]      I appreciate the opportunity of cooperating in the care of this individual.    Grady Fry, APRN - CNP, 8/10/2022, 2:04 PM

## 2022-08-10 NOTE — PATIENT INSTRUCTIONS
Echocardiogram to look at heart valves and function - call [address] [place] [address] ([phone]) to schedule   Agree with blood work later this month with PCP  Will call you with results of echo and any new changes  Follow up with me or Dr. Nicole Rivera in 1 year (or sooner if needed)

## 2022-08-25 ENCOUNTER — HOSPITAL ENCOUNTER (OUTPATIENT)
Dept: NON INVASIVE DIAGNOSTICS | Age: 57
Discharge: HOME | End: 2022-08-25
Payer: MEDICARE

## 2022-08-25 DIAGNOSIS — I25.118 CORONARY ARTERY DISEASE OF NATIVE ARTERY OF NATIVE HEART WITH STABLE ANGINA PECTORIS (HCC): ICD-10-CM

## 2022-08-25 DIAGNOSIS — I10 ESSENTIAL HYPERTENSION: ICD-10-CM

## 2022-08-25 LAB
LV EF: 55 %
LVEF MODALITY: NORMAL

## 2022-08-25 PROCEDURE — 93306 TTE W/DOPPLER COMPLETE: CPT

## 2022-08-26 ENCOUNTER — TELEPHONE (OUTPATIENT)
Dept: CARDIOLOGY CLINIC | Age: 57
End: 2022-08-26

## 2022-08-26 NOTE — TELEPHONE ENCOUNTER
Created telephone encounter. Spoke with Merrill Gibson relayed message per [employer] regarding echo. Pt verbalized understanding.

## 2022-08-26 NOTE — TELEPHONE ENCOUNTER
----- Message from DONALDO May CNP sent at 8/26/2022  8:01 AM EDT -----  Heart function good, valves normal.  Continue current treatment plan.    DONALDO May CNP

## 2022-08-30 ENCOUNTER — OFFICE VISIT (OUTPATIENT)
Dept: FAMILY MEDICINE CLINIC | Age: 57
End: 2022-08-30
Payer: MEDICARE

## 2022-08-30 VITALS
SYSTOLIC BLOOD PRESSURE: 121 MMHG | WEIGHT: 206.4 LBS | BODY MASS INDEX: 27.99 KG/M2 | DIASTOLIC BLOOD PRESSURE: 81 MMHG | HEART RATE: 67 BPM | OXYGEN SATURATION: 93 %

## 2022-08-30 DIAGNOSIS — Z12.11 COLON CANCER SCREENING: ICD-10-CM

## 2022-08-30 DIAGNOSIS — I25.118 CORONARY ARTERY DISEASE OF NATIVE ARTERY OF NATIVE HEART WITH STABLE ANGINA PECTORIS (HCC): Chronic | ICD-10-CM

## 2022-08-30 DIAGNOSIS — E78.2 MIXED HYPERLIPIDEMIA: Chronic | ICD-10-CM

## 2022-08-30 DIAGNOSIS — E11.9 TYPE 2 DIABETES MELLITUS WITHOUT COMPLICATION, WITHOUT LONG-TERM CURRENT USE OF INSULIN (HCC): Primary | Chronic | ICD-10-CM

## 2022-08-30 DIAGNOSIS — I10 ESSENTIAL HYPERTENSION: ICD-10-CM

## 2022-08-30 LAB
A/G RATIO: 1.4 (ref 1.1–2.2)
ALBUMIN SERPL-MCNC: 4.3 G/DL (ref 3.4–5)
ALP BLD-CCNC: 115 U/L (ref 40–129)
ALT SERPL-CCNC: 77 U/L (ref 10–40)
ANION GAP SERPL CALCULATED.3IONS-SCNC: 14 MMOL/L (ref 3–16)
AST SERPL-CCNC: 63 U/L (ref 15–37)
BASOPHILS ABSOLUTE: 0.1 K/UL (ref 0–0.2)
BASOPHILS RELATIVE PERCENT: 0.8 %
BILIRUB SERPL-MCNC: 1 MG/DL (ref 0–1)
BUN BLDV-MCNC: 10 MG/DL (ref 7–20)
CALCIUM SERPL-MCNC: 10 MG/DL (ref 8.3–10.6)
CHLORIDE BLD-SCNC: 103 MMOL/L (ref 99–110)
CO2: 25 MMOL/L (ref 21–32)
CREAT SERPL-MCNC: 0.8 MG/DL (ref 0.9–1.3)
EOSINOPHILS ABSOLUTE: 0.3 K/UL (ref 0–0.6)
EOSINOPHILS RELATIVE PERCENT: 3.6 %
GFR AFRICAN AMERICAN: >60
GFR NON-AFRICAN AMERICAN: >60
GLUCOSE BLD-MCNC: 146 MG/DL (ref 70–99)
HBA1C MFR BLD: 7.6 %
HCT VFR BLD CALC: 44.7 % (ref 40.5–52.5)
HEMOGLOBIN: 15.3 G/DL (ref 13.5–17.5)
LYMPHOCYTES ABSOLUTE: 2.6 K/UL (ref 1–5.1)
LYMPHOCYTES RELATIVE PERCENT: 26.7 %
MCH RBC QN AUTO: 29.2 PG (ref 26–34)
MCHC RBC AUTO-ENTMCNC: 34.3 G/DL (ref 31–36)
MCV RBC AUTO: 85 FL (ref 80–100)
MONOCYTES ABSOLUTE: 0.9 K/UL (ref 0–1.3)
MONOCYTES RELATIVE PERCENT: 9.3 %
NEUTROPHILS ABSOLUTE: 5.8 K/UL (ref 1.7–7.7)
NEUTROPHILS RELATIVE PERCENT: 59.6 %
PDW BLD-RTO: 16 % (ref 12.4–15.4)
PLATELET # BLD: 286 K/UL (ref 135–450)
PMV BLD AUTO: 7.9 FL (ref 5–10.5)
POTASSIUM SERPL-SCNC: 4.7 MMOL/L (ref 3.5–5.1)
RBC # BLD: 5.25 M/UL (ref 4.2–5.9)
SODIUM BLD-SCNC: 142 MMOL/L (ref 136–145)
TOTAL PROTEIN: 7.3 G/DL (ref 6.4–8.2)
WBC # BLD: 9.7 K/UL (ref 4–11)

## 2022-08-30 PROCEDURE — 3051F HG A1C>EQUAL 7.0%<8.0%: CPT | Performed by: NURSE PRACTITIONER

## 2022-08-30 PROCEDURE — 99214 OFFICE O/P EST MOD 30 MIN: CPT | Performed by: NURSE PRACTITIONER

## 2022-08-30 PROCEDURE — 83036 HEMOGLOBIN GLYCOSYLATED A1C: CPT | Performed by: NURSE PRACTITIONER

## 2022-08-30 PROCEDURE — 36415 COLL VENOUS BLD VENIPUNCTURE: CPT | Performed by: NURSE PRACTITIONER

## 2022-08-30 ASSESSMENT — ENCOUNTER SYMPTOMS
ABDOMINAL PAIN: 0
EYES NEGATIVE: 1
RESPIRATORY NEGATIVE: 1
CHEST TIGHTNESS: 0
ALLERGIC/IMMUNOLOGIC NEGATIVE: 1
SHORTNESS OF BREATH: 0

## 2022-08-30 NOTE — PROGRESS NOTES
Subjective:      Patient ID: Chela Fulton is a 61 y.o. male. Chief Complaint   Patient presents with    Diabetes    Hypertension    Hyperlipidemia        HPI    Patient presents today to f/u on chronic conditions. Diabetes Mellitus Type 2: Current symptoms/problems include none. Medication compliance:  compliant all of the time  Diabetic diet compliance:  noncompliant: most of the time ,  Weight trend: stable  Current exercise: no regular exercise  Barriers: impairment:  physical: severe CAD    Home blood sugar records: patient does not test  Any episodes of hypoglycemia? no  Eye exam current (within one year): yes   reports that he has never smoked. He has never used smokeless tobacco.   Daily Aspirin? Yes    Lab Results   Component Value Date    LABA1C 6.7 05/27/2022    LABA1C 6.4 02/25/2022    LABA1C 6.3 08/23/2021     Lab Results   Component Value Date    LABMICR Not Indicated 12/26/2019    CREATININE 1.1 02/25/2022     Lab Results   Component Value Date    ALT 78 (H) 02/25/2022    AST 80 (H) 02/25/2022     Lab Results   Component Value Date    CHOL 90 02/25/2022    TRIG 103 02/25/2022    HDL 25 (L) 02/25/2022    LDLCALC 44 02/25/2022          Hypertension:  Home blood pressure monitoring: No. Patient denies chest pain, shortness of breath, headache, lightheadedness, blurred vision, peripheral edema, palpitations, dry cough, and fatigue. Antihypertensive medication side effects: no medication side effects noted. Use of agents associated with hypertension: none. Sodium (mmol/L)   Date Value   02/25/2022 136    BUN (mg/dL)   Date Value   02/25/2022 21 (H)    Glucose (mg/dL)   Date Value   02/25/2022 146 (H)      Potassium (mmol/L)   Date Value   02/25/2022 3.9     Potassium reflex Magnesium (mmol/L)   Date Value   07/07/2020 3.9    Creatinine (mg/dL)   Date Value   02/25/2022 1.1         Hyperlipidemia:  No new myalgias or GI upset on atorvastatin (Lipitor). Lab Results   Component Value Date    CHOL 90 02/25/2022    TRIG 103 02/25/2022    HDL 25 (L) 02/25/2022    LDLCALC 44 02/25/2022     Lab Results   Component Value Date    ALT 78 (H) 02/25/2022    AST 80 (H) 02/25/2022        Review of Systems   Constitutional: Negative. Negative for activity change, appetite change, chills, fatigue and unexpected weight change. HENT: Negative. Eyes: Negative. Respiratory: Negative. Negative for chest tightness and shortness of breath. Cardiovascular: Negative. Negative for chest pain, palpitations and leg swelling. Gastrointestinal:  Negative for abdominal pain. Endocrine: Negative. Genitourinary: Negative. Musculoskeletal: Negative. Skin: Negative. Negative for rash and wound. Allergic/Immunologic: Negative. Neurological: Negative. Negative for dizziness, light-headedness and headaches. Hematological: Negative. Psychiatric/Behavioral: Negative. Negative for dysphoric mood. Patient Active Problem List   Diagnosis    Family history of early CAD    S/P CABG (coronary artery bypass graft)    Essential hypertension    Hyperlipidemia    CAD (coronary artery disease)    Microcytosis    Acute glaucoma of right eye    Acute chest pain    Paresthesia    Acute calculous cholecystitis    S/P laparoscopic cholecystectomy    Abnormal stress test    Pre-syncope    Angina, class II (HCC)    History of CVA (cerebrovascular accident)    Chest pain in adult    Diabetes mellitus ([facility] [address]    Migraines    Unstable angina ([facility] [address]       No outpatient medications have been marked as taking for the 8/30/22 encounter (Appointment) with DONALDO Phan CNP.        No Known Allergies    Social History     Tobacco Use    Smoking status: Never    Smokeless tobacco: Never   Substance Use Topics    Alcohol use: No       Objective:   /81   Pulse 67   Wt 206 lb 6.4 oz (93.6 kg)   SpO2 93%   BMI 27.99 kg/m²     Physical Exam  Vitals and nursing note reviewed. Constitutional:       General: He is not in acute distress. Appearance: Normal appearance. He is well-developed and well-groomed. He is not ill-appearing or toxic-appearing. HENT:      Head: Normocephalic and atraumatic. Eyes:      Extraocular Movements: Extraocular movements intact. Conjunctiva/sclera: Conjunctivae normal.   Cardiovascular:      Rate and Rhythm: Normal rate and regular rhythm. Pulses: Normal pulses. Heart sounds: Normal heart sounds, S1 normal and S2 normal.   Pulmonary:      Effort: Pulmonary effort is normal. No accessory muscle usage or respiratory distress. Breath sounds: Normal breath sounds. Abdominal:      General: Bowel sounds are normal.      Palpations: Abdomen is soft. Musculoskeletal:      Cervical back: Neck supple. Right lower leg: No edema. Left lower leg: No edema. Lymphadenopathy:      Cervical: No cervical adenopathy. Skin:     General: Skin is warm and moist.      Capillary Refill: Capillary refill takes less than 2 seconds. Findings: No rash. Neurological:      General: No focal deficit present. Mental Status: He is alert and oriented to person, place, and time. Mental status is at baseline. Psychiatric:         Attention and Perception: Attention normal.         Mood and Affect: Mood normal.         Speech: Speech normal.         Behavior: Behavior normal. Behavior is cooperative. Assessment/Plan:   1. Type 2 diabetes mellitus without complication, without long-term current use of insulin [facility])  Patient presents today to follow-up on chronic conditions. Patient reports he has not been monitoring his blood sugars however has been taking medication as prescribed. Patient continues to take metformin daily without any side effects. Patient denies any signs or symptoms of hypo or hyperglycemia. A1c today is 7.6% which is up from prior A1c of 6.7%.   Patient agreeable to monitor blood sugars and work on

## 2022-11-14 DIAGNOSIS — E11.59 TYPE 2 DIABETES MELLITUS WITH OTHER CIRCULATORY COMPLICATION, WITHOUT LONG-TERM CURRENT USE OF INSULIN (HCC): ICD-10-CM

## 2022-11-22 ENCOUNTER — TELEPHONE (OUTPATIENT)
Dept: CARDIOLOGY CLINIC | Age: 57
End: 2022-11-22

## 2022-11-22 NOTE — TELEPHONE ENCOUNTER
He should go to the ED if SBP > 170. He has taken sl nitroglycerin in past for elevated BP which is okay. If BP remains 170 or greater, he should go to the ED.    Thank you, Petros Torres

## 2022-11-22 NOTE — TELEPHONE ENCOUNTER
Pt stated that a couple of weeks ago he noticed that he had high bp and wants recommendations on when you should go to er. Pt stated that his bp normally runs around 115/70. Pt began to get lightheaded and his bp was 230/60. Pt stated that it hasn't happened again but will go to the er if needed.

## 2023-01-18 RX ORDER — ATORVASTATIN CALCIUM 80 MG/1
80 TABLET, FILM COATED ORAL NIGHTLY
Qty: 90 TABLET | Refills: 3 | Status: SHIPPED | OUTPATIENT
Start: 2023-01-18

## 2023-01-31 ENCOUNTER — TELEPHONE (OUTPATIENT)
Dept: CARDIOLOGY CLINIC | Age: 58
End: 2023-01-31

## 2023-01-31 ENCOUNTER — APPOINTMENT (OUTPATIENT)
Dept: GENERAL RADIOLOGY | Age: 58
End: 2023-01-31
Payer: MEDICARE

## 2023-01-31 ENCOUNTER — HOSPITAL ENCOUNTER (OUTPATIENT)
Age: 58
Setting detail: OBSERVATION
Discharge: HOME | End: 2023-02-01
Attending: STUDENT IN AN ORGANIZED HEALTH CARE EDUCATION/TRAINING PROGRAM | Admitting: INTERNAL MEDICINE
Payer: MEDICARE

## 2023-01-31 DIAGNOSIS — R07.9 CHEST PAIN, UNSPECIFIED TYPE: Primary | ICD-10-CM

## 2023-01-31 DIAGNOSIS — Z95.1 S/P CABG (CORONARY ARTERY BYPASS GRAFT): ICD-10-CM

## 2023-01-31 DIAGNOSIS — E11.9 TYPE 2 DIABETES MELLITUS WITHOUT COMPLICATION, WITHOUT LONG-TERM CURRENT USE OF INSULIN (HCC): ICD-10-CM

## 2023-01-31 DIAGNOSIS — I10 ESSENTIAL HYPERTENSION: ICD-10-CM

## 2023-01-31 LAB
ALBUMIN SERPL-MCNC: 3.9 G/DL (ref 3.4–5)
ALBUMIN/GLOB SERPL: 1.3 {RATIO} (ref 1.1–2.2)
ALP BLD-CCNC: 126 U/L (ref 40–129)
ALT SERPL-CCNC: 125 U/L (ref 10–40)
ANION GAP SERPL CALCULATED.3IONS-SCNC: 12 MMOL/L (ref 3–16)
AST SERPL-CCNC: 98 U/L (ref 15–37)
BASOPHILS ABSOLUTE: 0.2 K/UL (ref 0–0.2)
BASOPHILS RELATIVE PERCENT: 1.5 %
BILIRUB SERPL-MCNC: 0.6 MG/DL (ref 0–1)
BUN SERPL-MCNC: 11 MG/DL (ref 7–20)
CALCIUM SERPL-MCNC: 9.6 MG/DL (ref 8.3–10.6)
CHLORIDE SERPL-SCNC: 101 MMOL/L (ref 99–110)
CO2 SERPL-SCNC: 25 MMOL/L (ref 21–32)
CREAT SERPL-MCNC: 0.8 MG/DL (ref 0.9–1.3)
EKG ATRIAL RATE: 67 BPM
EKG DIAGNOSIS: NORMAL
EKG P AXIS: 27 DEGREES
EKG P-R INTERVAL: 170 MS
EKG Q-T INTERVAL: 406 MS
EKG QRS DURATION: 144 MS
EKG QTC CALCULATION (BAZETT): 429 MS
EKG R AXIS: -34 DEGREES
EKG T AXIS: -14 DEGREES
EKG VENTRICULAR RATE: 67 BPM
EOSINOPHILS ABSOLUTE: 0.3 K/UL (ref 0–0.6)
EOSINOPHILS RELATIVE PERCENT: 2.6 %
GFR SERPL CREATININE-BSD FRML MDRD: >60 ML/MIN/{1.73_M2}
GLUCOSE BLD-MCNC: 110 MG/DL (ref 70–99)
GLUCOSE SERPL-MCNC: 109 MG/DL (ref 70–99)
HCT VFR BLD CALC: 43 % (ref 40.5–52.5)
HEMOGLOBIN: 14.5 G/DL (ref 13.5–17.5)
LACTATE BLDV-SCNC: 1.3 MMOL/L (ref 0.4–2)
LIPASE SERPL-CCNC: 38 U/L (ref 13–60)
LYMPHOCYTES ABSOLUTE: 3.5 K/UL (ref 1–5.1)
LYMPHOCYTES RELATIVE PERCENT: 27.8 %
MCH RBC QN AUTO: 29 PG (ref 26–34)
MCHC RBC AUTO-ENTMCNC: 33.8 G/DL (ref 31–36)
MCV RBC AUTO: 85.7 FL (ref 80–100)
MONOCYTES ABSOLUTE: 1.2 K/UL (ref 0–1.3)
MONOCYTES RELATIVE PERCENT: 9.2 %
NEUTROPHILS ABSOLUTE: 7.4 K/UL (ref 1.7–7.7)
NEUTROPHILS RELATIVE PERCENT: 58.9 %
PDW BLD-RTO: 15.1 % (ref 12.4–15.4)
PERFORMED ON: ABNORMAL
PLATELET # BLD: 263 K/UL (ref 135–450)
PMV BLD AUTO: 7.6 FL (ref 5–10.5)
POTASSIUM SERPL-SCNC: 3.9 MMOL/L (ref 3.5–5.1)
PROCALCITONIN: 0.07 NG/ML (ref 0–0.15)
PROT SERPL-MCNC: 7 G/DL (ref 6.4–8.2)
RBC # BLD: 5.01 M/UL (ref 4.2–5.9)
SODIUM SERPL-SCNC: 138 MMOL/L (ref 136–145)
TROPONIN T SERPL-MCNC: <0.01 NG/ML
TROPONIN: <0.01 NG/ML
WBC # BLD: 12.6 K/UL (ref 4–11)

## 2023-01-31 PROCEDURE — 93005 ELECTROCARDIOGRAM TRACING: CPT | Performed by: STUDENT IN AN ORGANIZED HEALTH CARE EDUCATION/TRAINING PROGRAM

## 2023-01-31 PROCEDURE — 80053 COMPREHEN METABOLIC PANEL: CPT

## 2023-01-31 PROCEDURE — 84145 PROCALCITONIN (PCT): CPT

## 2023-01-31 PROCEDURE — G0378 HOSPITAL OBSERVATION PER HR: HCPCS

## 2023-01-31 PROCEDURE — 85025 COMPLETE CBC W/AUTO DIFF WBC: CPT

## 2023-01-31 PROCEDURE — 83036 HEMOGLOBIN GLYCOSYLATED A1C: CPT

## 2023-01-31 PROCEDURE — 87040 BLOOD CULTURE FOR BACTERIA: CPT

## 2023-01-31 PROCEDURE — 83605 ASSAY OF LACTIC ACID: CPT

## 2023-01-31 PROCEDURE — 84484 ASSAY OF TROPONIN QUANT: CPT

## 2023-01-31 PROCEDURE — 2580000003 HC RX 258: Performed by: NURSE PRACTITIONER

## 2023-01-31 PROCEDURE — 96360 HYDRATION IV INFUSION INIT: CPT

## 2023-01-31 PROCEDURE — 36415 COLL VENOUS BLD VENIPUNCTURE: CPT

## 2023-01-31 PROCEDURE — 96361 HYDRATE IV INFUSION ADD-ON: CPT

## 2023-01-31 PROCEDURE — 99285 EMERGENCY DEPT VISIT HI MDM: CPT

## 2023-01-31 PROCEDURE — 71045 X-RAY EXAM CHEST 1 VIEW: CPT

## 2023-01-31 PROCEDURE — 83690 ASSAY OF LIPASE: CPT

## 2023-01-31 PROCEDURE — 6370000000 HC RX 637 (ALT 250 FOR IP): Performed by: NURSE PRACTITIONER

## 2023-01-31 PROCEDURE — 93010 ELECTROCARDIOGRAM REPORT: CPT | Performed by: INTERNAL MEDICINE

## 2023-01-31 RX ORDER — DEXTROSE MONOHYDRATE 100 MG/ML
INJECTION, SOLUTION INTRAVENOUS CONTINUOUS PRN
Status: DISCONTINUED | OUTPATIENT
Start: 2023-01-31 | End: 2023-02-01 | Stop reason: HOSPADM

## 2023-01-31 RX ORDER — SODIUM CHLORIDE 9 MG/ML
INJECTION, SOLUTION INTRAVENOUS CONTINUOUS
Status: ACTIVE | OUTPATIENT
Start: 2023-01-31 | End: 2023-02-01

## 2023-01-31 RX ORDER — ACETAMINOPHEN 325 MG/1
650 TABLET ORAL EVERY 6 HOURS PRN
Status: DISCONTINUED | OUTPATIENT
Start: 2023-01-31 | End: 2023-02-01 | Stop reason: HOSPADM

## 2023-01-31 RX ORDER — INSULIN LISPRO 100 [IU]/ML
0-4 INJECTION, SOLUTION INTRAVENOUS; SUBCUTANEOUS
Status: DISCONTINUED | OUTPATIENT
Start: 2023-02-01 | End: 2023-02-01 | Stop reason: HOSPADM

## 2023-01-31 RX ORDER — AMLODIPINE BESYLATE 5 MG/1
5 TABLET ORAL DAILY
Status: DISCONTINUED | OUTPATIENT
Start: 2023-02-01 | End: 2023-02-01 | Stop reason: HOSPADM

## 2023-01-31 RX ORDER — ATORVASTATIN CALCIUM 80 MG/1
80 TABLET, FILM COATED ORAL NIGHTLY
Status: DISCONTINUED | OUTPATIENT
Start: 2023-01-31 | End: 2023-02-01 | Stop reason: HOSPADM

## 2023-01-31 RX ORDER — SODIUM CHLORIDE 0.9 % (FLUSH) 0.9 %
5-40 SYRINGE (ML) INJECTION EVERY 12 HOURS SCHEDULED
Status: DISCONTINUED | OUTPATIENT
Start: 2023-01-31 | End: 2023-02-01 | Stop reason: HOSPADM

## 2023-01-31 RX ORDER — ACETAMINOPHEN 650 MG/1
650 SUPPOSITORY RECTAL EVERY 6 HOURS PRN
Status: DISCONTINUED | OUTPATIENT
Start: 2023-01-31 | End: 2023-02-01 | Stop reason: HOSPADM

## 2023-01-31 RX ORDER — SODIUM CHLORIDE 0.9 % (FLUSH) 0.9 %
5-40 SYRINGE (ML) INJECTION PRN
Status: DISCONTINUED | OUTPATIENT
Start: 2023-01-31 | End: 2023-02-01 | Stop reason: HOSPADM

## 2023-01-31 RX ORDER — INSULIN LISPRO 100 [IU]/ML
0-4 INJECTION, SOLUTION INTRAVENOUS; SUBCUTANEOUS NIGHTLY
Status: DISCONTINUED | OUTPATIENT
Start: 2023-01-31 | End: 2023-02-01 | Stop reason: HOSPADM

## 2023-01-31 RX ORDER — ONDANSETRON 4 MG/1
4 TABLET, ORALLY DISINTEGRATING ORAL EVERY 8 HOURS PRN
Status: DISCONTINUED | OUTPATIENT
Start: 2023-01-31 | End: 2023-02-01 | Stop reason: HOSPADM

## 2023-01-31 RX ORDER — ASPIRIN 81 MG/1
81 TABLET ORAL DAILY
Status: DISCONTINUED | OUTPATIENT
Start: 2023-02-01 | End: 2023-02-01 | Stop reason: HOSPADM

## 2023-01-31 RX ORDER — NITROGLYCERIN 0.4 MG/1
0.4 TABLET SUBLINGUAL EVERY 5 MIN PRN
Status: DISCONTINUED | OUTPATIENT
Start: 2023-01-31 | End: 2023-02-01 | Stop reason: HOSPADM

## 2023-01-31 RX ORDER — METOPROLOL TARTRATE 50 MG/1
50 TABLET, FILM COATED ORAL 2 TIMES DAILY
Status: DISCONTINUED | OUTPATIENT
Start: 2023-01-31 | End: 2023-02-01 | Stop reason: HOSPADM

## 2023-01-31 RX ORDER — ISOSORBIDE MONONITRATE 30 MG/1
30 TABLET, EXTENDED RELEASE ORAL DAILY
Status: DISCONTINUED | OUTPATIENT
Start: 2023-02-01 | End: 2023-02-01 | Stop reason: HOSPADM

## 2023-01-31 RX ORDER — POLYETHYLENE GLYCOL 3350 17 G/17G
17 POWDER, FOR SOLUTION ORAL DAILY PRN
Status: DISCONTINUED | OUTPATIENT
Start: 2023-01-31 | End: 2023-02-01 | Stop reason: HOSPADM

## 2023-01-31 RX ORDER — ENOXAPARIN SODIUM 100 MG/ML
40 INJECTION SUBCUTANEOUS DAILY
Status: DISCONTINUED | OUTPATIENT
Start: 2023-02-01 | End: 2023-02-01 | Stop reason: HOSPADM

## 2023-01-31 RX ORDER — ONDANSETRON 2 MG/ML
4 INJECTION INTRAMUSCULAR; INTRAVENOUS EVERY 6 HOURS PRN
Status: DISCONTINUED | OUTPATIENT
Start: 2023-01-31 | End: 2023-02-01 | Stop reason: HOSPADM

## 2023-01-31 RX ORDER — SODIUM CHLORIDE 9 MG/ML
INJECTION, SOLUTION INTRAVENOUS PRN
Status: DISCONTINUED | OUTPATIENT
Start: 2023-01-31 | End: 2023-02-01 | Stop reason: HOSPADM

## 2023-01-31 RX ADMIN — Medication 10 ML: at 22:17

## 2023-01-31 RX ADMIN — SODIUM CHLORIDE 1000 ML: 9 INJECTION, SOLUTION INTRAVENOUS at 22:20

## 2023-01-31 RX ADMIN — METOPROLOL TARTRATE 50 MG: 50 TABLET, FILM COATED ORAL at 22:17

## 2023-01-31 RX ADMIN — ATORVASTATIN CALCIUM 80 MG: 80 TABLET, FILM COATED ORAL at 22:17

## 2023-01-31 ASSESSMENT — PAIN DESCRIPTION - DESCRIPTORS
DESCRIPTORS: TIGHTNESS
DESCRIPTORS: TIGHTNESS

## 2023-01-31 ASSESSMENT — PAIN DESCRIPTION - ORIENTATION
ORIENTATION: MID
ORIENTATION: MID

## 2023-01-31 ASSESSMENT — PAIN SCALES - GENERAL
PAINLEVEL_OUTOF10: 3
PAINLEVEL_OUTOF10: 4

## 2023-01-31 ASSESSMENT — PAIN - FUNCTIONAL ASSESSMENT
PAIN_FUNCTIONAL_ASSESSMENT: NONE - DENIES PAIN
PAIN_FUNCTIONAL_ASSESSMENT: 0-10

## 2023-01-31 ASSESSMENT — PAIN DESCRIPTION - LOCATION
LOCATION: CHEST
LOCATION: CHEST

## 2023-01-31 ASSESSMENT — PAIN DESCRIPTION - PAIN TYPE: TYPE: ACUTE PAIN

## 2023-01-31 NOTE — TELEPHONE ENCOUNTER
Pt sts B/p is elev, cp, and just does not feel good. Pt is going to [facility] ED. FYI

## 2023-01-31 NOTE — ED PROVIDER NOTES
Emergency Department Provider Note  Location: [place] [address] - MED SURG  1/31/2023     Patient Identification  Fernanda Mancilla is a 61 y.o. male    Chief Complaint  Chest Pain (Patient reports constant mid CP starting yesterday and a BP of 200/100 per wrist BP cuff at home.)      Mode of Arrival  private car    HPI  (History provided by patient and spouse/SO)  This is a 61 y.o. male with a PMH significant for CAD, DM, CVA, HLD, HTN, MI  presented today for chest pain. States that symptoms started yesterday with progressive worsening. Chest pain is substernal, nonradiating. He has tried taking home nitroglycerin with no significant relief. He denies any cough or shortness of breath. Denies any fever, orthopnea or PND. Denies any nausea, vomiting, abdominal pain, dysuria, hematuria, change in urinary frequency, diarrhea, bloody stools, syncope, new numbness or tingling, or weakness. Last cardiac cath was on July 10, 2020. He has history of CAD with stent placement to Lcx 2020 and CABG in size vessels in 2016. ROS  Review of Systems   All other systems reviewed and are negative. I have reviewed the following nursing documentation:  Allergies: No Known Allergies    Past medical history:  has a past medical history of CAD (coronary artery disease) (2016), Diabetes mellitus ([facility] [address] History of CVA (cerebrovascular accident) (2/1/2020), Hyperlipidemia, Hypertension, MI (myocardial infarction) ([facility] [address] No history of procedure (06/01/2017), and Pneumonia due to organism (8/3/2019). Past surgical history:  has a past surgical history that includes Coronary artery bypass graft (09/20/2016); Eye surgery (Right); Colonoscopy (06/01/2017); hernia repair (Bilateral, 2005); Cholecystectomy, laparoscopic (N/A, 4/17/2019); and Percutaneous Transluminal Coronary Angio (03/09/2020). Home medications:   Prior to Admission medications    Medication Sig Start Date End Date Taking?  Authorizing Provider   atorvastatin (LIPITOR) 80 MG tablet TAKE 1 TABLET BY MOUTH NIGHTLY 1/18/23   DONALDO Mazariegos CNP   metFORMIN (GLUCOPHAGE) 500 MG tablet TAKE 1 TABLET BY MOUTH EVERY DAY WITH BREAKFAST 11/14/22   DONALDO Mazariegos CNP   amLODIPine (NORVASC) 5 MG tablet TAKE 1 TABLET BY MOUTH EVERY DAY 5/18/22   Cralos Connelly MD   metoprolol tartrate (LOPRESSOR) 50 MG tablet Take 1 tablet by mouth 2 times daily 4/26/22   Carlos Connelly MD   isosorbide mononitrate (IMDUR) 30 MG extended release tablet Take 1 tablet by mouth daily 2/25/22   DONALDO Mazariegos CNP   cholestyramine Jenness Sabal) 4 g packet Take 1 packet by mouth 2 times daily  Patient taking differently: Take 1 packet by mouth Twice a Week 2/25/22   DONALDO Mazariegos CNP   nitroGLYCERIN (NITROSTAT) 0.4 MG SL tablet up to max of 3 total doses. If no relief after 1 dose, call 911. 11/1/21   Carlos Connelly MD   aspirin 81 MG tablet Take 81 mg by mouth daily    Historical Provider, MD       Social history:  reports that he has never smoked. He has never used smokeless tobacco. He reports that he does not drink alcohol and does not use drugs. Family history:    Family History   Problem Relation Age of Onset    Heart Disease Father     Diabetes type 2  Father     Cancer Mother     Heart Disease Brother        Exam  ED Triage Vitals [01/31/23 1459]   BP Temp Temp Source Heart Rate Resp SpO2 Height Weight   118/72 97.8 °F (36.6 °C) Oral 66 18 96 % 6' (1.829 m) 201 lb (91.2 kg)       Physical Exam  Vitals and nursing note reviewed. Constitutional:       General: He is not in acute distress. HENT:      Head: Normocephalic and atraumatic. Right Ear: External ear normal.      Left Ear: External ear normal.      Nose: Nose normal.      Mouth/Throat:      Pharynx: Oropharynx is clear. Eyes:      Extraocular Movements: Extraocular movements intact.       Conjunctiva/sclera: Conjunctivae normal.   Cardiovascular:      Rate and Rhythm: Normal rate and regular rhythm. Pulses: Normal pulses. Heart sounds: Normal heart sounds. No murmur heard. Pulmonary:      Effort: Pulmonary effort is normal.      Breath sounds: Normal breath sounds. Abdominal:      General: There is no distension. Palpations: Abdomen is soft. Tenderness: There is no abdominal tenderness. There is no right CVA tenderness, left CVA tenderness, guarding or rebound. Musculoskeletal:         General: Normal range of motion. Cervical back: Normal range of motion and neck supple. No rigidity. Right lower leg: No edema. Left lower leg: No edema. Skin:     General: Skin is warm. Capillary Refill: Capillary refill takes less than 2 seconds. Neurological:      General: No focal deficit present. Mental Status: He is alert and oriented to person, place, and time. Cranial Nerves: No cranial nerve deficit. Psychiatric:         Mood and Affect: Mood normal.         Behavior: Behavior normal.         MDM/ED Course  ED Medication Orders (From admission, onward)      Start Ordered     Status Ordering Provider    01/31/23 2200 01/31/23 2140  0.9 % sodium chloride infusion  CONTINUOUS         Last MAR action: Stopped - by Erich Fuentes on 02/01/23 at 1270 MARÍA [place]            EKG  I interpreted the EKG and note normal sinus rhythm, ventricular rate of 67, QRS of 144, normal QTC, unchanged right bundle branch block, left axis deviation unchanged ST depression in V6, no STEMI criteria    Radiology  No results found. Labs  Results for orders placed or performed during the hospital encounter of 01/31/23   Blood Culture 1    Specimen: Blood   Result Value Ref Range    Blood Culture, Routine       No Growth to date. Any change in status will be called. Culture, Blood 2    Specimen: Blood   Result Value Ref Range    Culture, Blood 2       No Growth to date. Any change in status will be called.    CBC with Auto Differential   Result Value Ref Range    WBC 12.6 (H) 4.0 - 11.0 K/uL    RBC 5.01 4.20 - 5.90 M/uL    Hemoglobin 14.5 13.5 - 17.5 g/dL    Hematocrit 43.0 40.5 - 52.5 %    MCV 85.7 80.0 - 100.0 fL    MCH 29.0 26.0 - 34.0 pg    MCHC 33.8 31.0 - 36.0 g/dL    RDW 15.1 12.4 - 15.4 %    Platelets 751 478 - 462 K/uL    MPV 7.6 5.0 - 10.5 fL    Neutrophils % 58.9 %    Lymphocytes % 27.8 %    Monocytes % 9.2 %    Eosinophils % 2.6 %    Basophils % 1.5 %    Neutrophils Absolute 7.4 1.7 - 7.7 K/uL    Lymphocytes Absolute 3.5 1.0 - 5.1 K/uL    Monocytes Absolute 1.2 0.0 - 1.3 K/uL    Eosinophils Absolute 0.3 0.0 - 0.6 K/uL    Basophils Absolute 0.2 0.0 - 0.2 K/uL   CMP w/ Reflex to MG   Result Value Ref Range    Sodium 138 136 - 145 mmol/L    Potassium reflex Magnesium 3.9 3.5 - 5.1 mmol/L    Chloride 101 99 - 110 mmol/L    CO2 25 21 - 32 mmol/L    Anion Gap 12 3 - 16    Glucose 109 (H) 70 - 99 mg/dL    BUN 11 7 - 20 mg/dL    Creatinine 0.8 (L) 0.9 - 1.3 mg/dL    Est, Glom Filt Rate >60 >60    Calcium 9.6 8.3 - 10.6 mg/dL    Total Protein 7.0 6.4 - 8.2 g/dL    Albumin 3.9 3.4 - 5.0 g/dL    Albumin/Globulin Ratio 1.3 1.1 - 2.2    Total Bilirubin 0.6 0.0 - 1.0 mg/dL    Alkaline Phosphatase 126 40 - 129 U/L     (H) 10 - 40 U/L    AST 98 (H) 15 - 37 U/L   Lipase   Result Value Ref Range    Lipase 38.0 13.0 - 60.0 U/L   Troponin   Result Value Ref Range    Troponin <0.01 <0.01 ng/mL   Lactic Acid   Result Value Ref Range    Lactic Acid 1.3 0.4 - 2.0 mmol/L   Procalcitonin   Result Value Ref Range    Procalcitonin 0.07 0.00 - 0.15 ng/mL   Troponin   Result Value Ref Range    Troponin <0.01 <0.01 ng/mL   Troponin   Result Value Ref Range    Troponin <0.01 <0.01 ng/mL   CBC   Result Value Ref Range    WBC 10.8 4.0 - 11.0 K/uL    RBC 4.84 4.20 - 5.90 M/uL    Hemoglobin 14.3 13.5 - 17.5 g/dL    Hematocrit 41.5 40.5 - 52.5 %    MCV 85.6 80.0 - 100.0 fL    MCH 29.4 26.0 - 34.0 pg    MCHC 34.4 31.0 - 36.0 g/dL    RDW 15.0 12.4 - 15.4 %    Platelets 638 041 - 665 K/uL    MPV 7.7 5.0 - 10.5 fL   Hemoglobin A1C   Result Value Ref Range    Hemoglobin A1C 7.4 See comment %    eAG 165.7 mg/dL   Basic Metabolic Panel w/ Reflex to MG   Result Value Ref Range    Sodium 141 136 - 145 mmol/L    Potassium reflex Magnesium 4.3 3.5 - 5.1 mmol/L    Chloride 104 99 - 110 mmol/L    CO2 25 21 - 32 mmol/L    Anion Gap 12 3 - 16    Glucose 145 (H) 70 - 99 mg/dL    BUN 11 7 - 20 mg/dL    Creatinine 0.8 (L) 0.9 - 1.3 mg/dL    Est, Glom Filt Rate >60 >60    Calcium 9.4 8.3 - 10.6 mg/dL   Hepatic function panel   Result Value Ref Range    Total Protein 6.2 (L) 6.4 - 8.2 g/dL    Albumin 4.1 3.4 - 5.0 g/dL    Alkaline Phosphatase 105 40 - 129 U/L     (H) 10 - 40 U/L    AST 84 (H) 15 - 37 U/L    Total Bilirubin 1.1 (H) 0.0 - 1.0 mg/dL    Bilirubin, Direct 0.3 0.0 - 0.3 mg/dL    Bilirubin, Indirect 0.8 0.0 - 1.0 mg/dL   Lipid Panel   Result Value Ref Range    Cholesterol, Total 99 0 - 199 mg/dL    Triglycerides 153 (H) 0 - 150 mg/dL    HDL 22 (L) 40 - 60 mg/dL    LDL Calculated 46 <100 mg/dL    VLDL Cholesterol Calculated 31 Not Established mg/dL   Procalcitonin   Result Value Ref Range    Procalcitonin 0.08 0.00 - 0.15 ng/mL   POCT Glucose   Result Value Ref Range    POC Glucose 110 (H) 70 - 99 mg/dl    Performed on ACCU-ManageIQK    POCT Glucose   Result Value Ref Range    POC Glucose 126 (H) 70 - 99 mg/dl    Performed on ACCU-CHEK    POCT Glucose   Result Value Ref Range    POC Glucose 123 (H) 70 - 99 mg/dl    Performed on ACCU-CHEK    POCT Glucose   Result Value Ref Range    POC Glucose 133 (H) 70 - 99 mg/dl    Performed on ACCU-ManageIQK    EKG 12 Lead   Result Value Ref Range    Ventricular Rate 67 BPM    Atrial Rate 67 BPM    P-R Interval 170 ms    QRS Duration 144 ms    Q-T Interval 406 ms    QTc Calculation (Bazett) 429 ms    P Axis 27 degrees    R Axis -34 degrees    T Axis -14 degrees    Diagnosis       Normal sinus rhythmLeft axis deviationRight bundle branch blockMinimal voltage criteria for LVH, may be normal variantAbnormal ECGConfirmed by Clarissa Solis MD, DALE ([id number]) on 1/31/2023 4:48:38 PM   EKG 12 lead   Result Value Ref Range    Ventricular Rate 64 BPM    Atrial Rate 64 BPM    P-R Interval 184 ms    QRS Duration 146 ms    Q-T Interval 438 ms    QTc Calculation (Bazett) 451 ms    P Axis 38 degrees    R Axis -26 degrees    T Axis 6 degrees    Diagnosis       Normal sinus rhythmRight bundle branch blockConfirmed by Clarissa Solis MD Kourtneyneida Era ([id number]) on 2/1/2023 10:23:44 AM         - Patient seen and evaluated in room T1.  61 y.o. male presented for chest pain. Patient presented normotensive, afebrile, heart rate of 66, respiratory rate of 18 and satting at 96%. On exam he had regular rate and rhythm, no murmur, clear lungs bilaterally. Given history and exam my differential diagnosis includes but is not limited to ACS, CHF, COPD, pneumonia, pneumothorax, pericarditis, costochondritis, esophageal spasm or PE. I obtained labs and imaging studies as noted below    - Patient was placed on telemetry during his ED stay and no malignant dysrhythmia observed. - Pertinent old records reviewed.   -He has PCP follow-up  -Chronic medical conditions addressed today include CAD, diabetes, hypertension  - Patient politely declined any pain medication in the ED. Upon reassessment, patient remained hemodynamically stable. - Diagnostic studies reviewed. - EKG normal sinus rhythm, ventricular rate of 67, QRS of 144, normal QTC, unchanged right bundle branch block, left axis deviation unchanged ST depression in V6, no STEMI criteria   - CXR mild blunting of left costophrenic ankle related to small left pleural effusion   - Lab:   CBC with leukocytosis 12.6, no anemia, normal platelets  CMP with normal electrolytes, normal GFR, hyperglycemic 109, elevated , AST 98 with normal bilirubin  Troponin negative x 2  Normal procalcitonin  Blood cultures drawn  - I discussed the results with patient and spouse/SO.   We agreed to admit for acute chest pain, hypertensive urgency        Clinical Impression:  1. Chest pain, unspecified type    2. S/P CABG (coronary artery bypass graft)    3. Type 2 diabetes mellitus without complication, without long-term current use of insulin ([facility] [address]    4. Essential hypertension          Disposition:  Admit to telemetry in stable condition. Blood pressure 112/68, pulse 65, temperature 98.4 °F (36.9 °C), temperature source Oral, resp. rate 18, height 6' (1.829 m), weight 206 lb 1.6 oz (93.5 kg), SpO2 92 %. Patient was given scripts for the following medications. I counseled patient how to take these medications. Discharge Medication List as of 2/1/2023  5:37 PM          Disposition referral (if applicable):  Yancy Chairez, DONALDO - CNP  [address] [place] [address]  [phone]    Schedule an appointment as soon as possible for a visit in 1 week(s)  hospital follow-up, If symptoms worsen    Edd Cabrera, APRN - [address] [facility]  [phone]    Schedule an appointment as soon as possible for a visit in 1 week(s)  cardiology follow-up      Total critical care time is 32 minutes, which excludes separately billable procedures and updating family. Time spent is specifically for management of the presenting complaint and symptoms initially, direct bedside care, reevaluation, review of records, and consultation. There was a high probability of clinically significant life-threatening deterioration in the patient's condition, which required my urgent intervention. This chart was generated in part by using Dragon Dictation system and may contain errors related to that system including errors in grammar, punctuation, and spelling, as well as words and phrases that may be inappropriate. If there are any questions or concerns please feel free to contact the dictating provider for clarification.      Mike Mary MD  [address] [facility] Heather Douglas MD  02/04/23 [id number]

## 2023-02-01 VITALS
TEMPERATURE: 98.4 F | RESPIRATION RATE: 18 BRPM | HEART RATE: 65 BPM | SYSTOLIC BLOOD PRESSURE: 112 MMHG | WEIGHT: 206.1 LBS | HEIGHT: 72 IN | BODY MASS INDEX: 27.92 KG/M2 | OXYGEN SATURATION: 92 % | DIASTOLIC BLOOD PRESSURE: 68 MMHG

## 2023-02-01 LAB
ALBUMIN SERPL-MCNC: 4.1 G/DL (ref 3.4–5)
ALP BLD-CCNC: 105 U/L (ref 40–129)
ALT SERPL-CCNC: 111 U/L (ref 10–40)
ANION GAP SERPL CALCULATED.3IONS-SCNC: 12 MMOL/L (ref 3–16)
AST SERPL-CCNC: 84 U/L (ref 15–37)
BILIRUB SERPL-MCNC: 1.1 MG/DL (ref 0–1)
BILIRUBIN DIRECT: 0.3 MG/DL (ref 0–0.3)
BILIRUBIN, INDIRECT: 0.8 MG/DL (ref 0–1)
BUN BLDV-MCNC: 11 MG/DL (ref 7–20)
CALCIUM SERPL-MCNC: 9.4 MG/DL (ref 8.3–10.6)
CHLORIDE BLD-SCNC: 104 MMOL/L (ref 99–110)
CHOLESTEROL, TOTAL: 99 MG/DL (ref 0–199)
CO2: 25 MMOL/L (ref 21–32)
CREAT SERPL-MCNC: 0.8 MG/DL (ref 0.9–1.3)
EKG ATRIAL RATE: 64 BPM
EKG DIAGNOSIS: NORMAL
EKG P AXIS: 38 DEGREES
EKG P-R INTERVAL: 184 MS
EKG Q-T INTERVAL: 438 MS
EKG QRS DURATION: 146 MS
EKG QTC CALCULATION (BAZETT): 451 MS
EKG R AXIS: -26 DEGREES
EKG T AXIS: 6 DEGREES
EKG VENTRICULAR RATE: 64 BPM
ESTIMATED AVERAGE GLUCOSE: 165.7 MG/DL
GFR SERPL CREATININE-BSD FRML MDRD: >60 ML/MIN/{1.73_M2}
GLUCOSE BLD-MCNC: 123 MG/DL (ref 70–99)
GLUCOSE BLD-MCNC: 126 MG/DL (ref 70–99)
GLUCOSE BLD-MCNC: 133 MG/DL (ref 70–99)
GLUCOSE BLD-MCNC: 145 MG/DL (ref 70–99)
HBA1C MFR BLD: 7.4 %
HCT VFR BLD CALC: 41.5 % (ref 40.5–52.5)
HDLC SERPL-MCNC: 22 MG/DL (ref 40–60)
HEMOGLOBIN: 14.3 G/DL (ref 13.5–17.5)
LDL CHOLESTEROL CALCULATED: 46 MG/DL
MCH RBC QN AUTO: 29.4 PG (ref 26–34)
MCHC RBC AUTO-ENTMCNC: 34.4 G/DL (ref 31–36)
MCV RBC AUTO: 85.6 FL (ref 80–100)
PDW BLD-RTO: 15 % (ref 12.4–15.4)
PERFORMED ON: ABNORMAL
PLATELET # BLD: 254 K/UL (ref 135–450)
PMV BLD AUTO: 7.7 FL (ref 5–10.5)
POTASSIUM REFLEX MAGNESIUM: 4.3 MMOL/L (ref 3.5–5.1)
PROCALCITONIN: 0.08 NG/ML (ref 0–0.15)
RBC # BLD: 4.84 M/UL (ref 4.2–5.9)
SODIUM BLD-SCNC: 141 MMOL/L (ref 136–145)
TOTAL PROTEIN: 6.2 G/DL (ref 6.4–8.2)
TRIGL SERPL-MCNC: 153 MG/DL (ref 0–150)
TROPONIN: <0.01 NG/ML
VLDLC SERPL CALC-MCNC: 31 MG/DL
WBC # BLD: 10.8 K/UL (ref 4–11)

## 2023-02-01 PROCEDURE — 93010 ELECTROCARDIOGRAM REPORT: CPT | Performed by: INTERNAL MEDICINE

## 2023-02-01 PROCEDURE — 80061 LIPID PANEL: CPT

## 2023-02-01 PROCEDURE — 96372 THER/PROPH/DIAG INJ SC/IM: CPT

## 2023-02-01 PROCEDURE — 80048 BASIC METABOLIC PNL TOTAL CA: CPT

## 2023-02-01 PROCEDURE — 84145 PROCALCITONIN (PCT): CPT

## 2023-02-01 PROCEDURE — 84484 ASSAY OF TROPONIN QUANT: CPT

## 2023-02-01 PROCEDURE — G0378 HOSPITAL OBSERVATION PER HR: HCPCS

## 2023-02-01 PROCEDURE — 6360000002 HC RX W HCPCS: Performed by: NURSE PRACTITIONER

## 2023-02-01 PROCEDURE — 99222 1ST HOSP IP/OBS MODERATE 55: CPT | Performed by: INTERNAL MEDICINE

## 2023-02-01 PROCEDURE — 96361 HYDRATE IV INFUSION ADD-ON: CPT

## 2023-02-01 PROCEDURE — 93005 ELECTROCARDIOGRAM TRACING: CPT | Performed by: NURSE PRACTITIONER

## 2023-02-01 PROCEDURE — 36415 COLL VENOUS BLD VENIPUNCTURE: CPT

## 2023-02-01 PROCEDURE — 85027 COMPLETE CBC AUTOMATED: CPT

## 2023-02-01 PROCEDURE — 6370000000 HC RX 637 (ALT 250 FOR IP): Performed by: NURSE PRACTITIONER

## 2023-02-01 PROCEDURE — 80076 HEPATIC FUNCTION PANEL: CPT

## 2023-02-01 RX ADMIN — ISOSORBIDE MONONITRATE 30 MG: 30 TABLET, EXTENDED RELEASE ORAL at 08:59

## 2023-02-01 RX ADMIN — AMLODIPINE BESYLATE 5 MG: 5 TABLET ORAL at 08:59

## 2023-02-01 RX ADMIN — ASPIRIN 81 MG: 81 TABLET, COATED ORAL at 08:59

## 2023-02-01 RX ADMIN — ACETAMINOPHEN 325MG 650 MG: 325 TABLET ORAL at 00:22

## 2023-02-01 RX ADMIN — ENOXAPARIN SODIUM 40 MG: 100 INJECTION SUBCUTANEOUS at 09:01

## 2023-02-01 RX ADMIN — METOPROLOL TARTRATE 50 MG: 50 TABLET, FILM COATED ORAL at 08:59

## 2023-02-01 ASSESSMENT — PAIN SCALES - GENERAL: PAINLEVEL_OUTOF10: 2

## 2023-02-01 NOTE — PROGRESS NOTES
Progress Note      PCP: Russell Jimenez, DONALDO - CNP    Date of Admission: 1/31/2023    Chief Complaint: chest pain, hypertensive crisis (self reported /190)    Hospital Course: Katia Norris is a 65-year-old male with past medical history of CAD s/p CABG x5 in 2016, s/p PCI to LCx in 2020, chronic stable angina, HLD, T2DM, right eye glaucoma, who presented to [facility] 1/31 with chest pain and report of hypertensive crisis. He reports that Monday 1/30 he checked his blood pressure and it was 190/100. He also reported substernal and left-sided chest tightness with radiation into his neck, and headache, blurry vision, lightheadedness. He reports that the chest pain continued into Tuesday and got progressively worse, when he rechecked his blood pressure it was 250/190. He again rechecked his blood pressure 30 minutes later and his systolic was down to 401. He denies fever, chills, diplopia, dizziness, weakness, gait changes, N/V/D. He denies current or prior use of tobacco/alcohol/other substances. In the ED patient was found to have negative troponins as well as nonischemic EKG. Patient's vitals were stable with blood pressure well controlled at that time. Patient's chest x-ray revealed small left pleural effusion with airspace infiltrate. Patient was admitted for further evaluation and treatment. Cardiology was consulted for further management and recommendations. Patient's most recent stress test was 8/2019 prior to his PCI of LCx in 2020 and revealed partial reversibility consistent with mild ischemia/infarct of distal LCx and/or RCA. LVEF 63%. LV function reported normal.    Subjective: Patient seen this morning resting comfortably in bed. He reports his chest pain is improved but not completely resolved. He reports episode of chest pain with movement when he was reaching to grab the phone this morning. Endorsing some headache this morning.   Denies blurry vision, dizziness, weakness, N/V/D. Cardiology consulted for further recommendations and/or stress test. Repeat EKG today negative for ischemic changes. Medications:  Reviewed    Infusion Medications    sodium chloride      dextrose       Scheduled Medications    amLODIPine  5 mg Oral Daily    aspirin  81 mg Oral Daily    atorvastatin  80 mg Oral Nightly    isosorbide mononitrate  30 mg Oral Daily    metoprolol tartrate  50 mg Oral BID    sodium chloride flush  5-40 mL IntraVENous 2 times per day    insulin lispro  0-4 Units SubCUTAneous TID WC    insulin lispro  0-4 Units SubCUTAneous Nightly    enoxaparin  40 mg SubCUTAneous Daily     PRN Meds: sodium chloride flush, sodium chloride, ondansetron **OR** ondansetron, acetaminophen **OR** acetaminophen, polyethylene glycol, glucose, dextrose bolus **OR** dextrose bolus, glucagon (rDNA), dextrose, nitroGLYCERIN      Intake/Output Summary (Last 24 hours) at 2/1/2023 1113  Last data filed at 2/1/2023 1030  Gross per 24 hour   Intake 240 ml   Output 250 ml   Net -10 ml       Physical Exam Performed:    /78   Pulse 63   Temp 98.2 °F (36.8 °C) (Oral)   Resp 17   Ht 6' (1.829 m)   Wt 206 lb 1.6 oz (93.5 kg)   SpO2 92%   BMI 27.95 kg/m²     General appearance:  male lying in bed in hospital gown, No apparent distress, appears stated age and cooperative. HEENT: Pupils equal, round, and reactive to light. Conjunctivae/corneas clear. Neck: Supple, with full range of motion. No jugular venous distention. Trachea midline. Respiratory:  Normal respiratory effort. Clear to auscultation, bilaterally without Rales/Wheezes/Rhonchi. Cardiovascular: Regular rate and rhythm without murmurs, rubs or gallops. Abdomen: Soft, non-tender, non-distended with normal bowel sounds. Musculoskeletal: No clubbing, cyanosis or edema bilaterally. Full range of motion without deformity. Skin: Skin color, texture, turgor normal.  No rashes or lesions.   Neurologic:  Neurovascularly intact without any focal sensory/motor deficits. Cranial nerves: II-XII intact, grossly non-focal.  Psychiatric: Alert and oriented, thought content appropriate, normal insight      Labs:   Recent Labs     01/31/23  1530 02/01/23  0635   WBC 12.6* 10.8   HGB 14.5 14.3   HCT 43.0 41.5    254     Recent Labs     01/31/23  1530 02/01/23  0635    141   K 3.9 4.3    104   CO2 25 25   BUN 11 11   CREATININE 0.8* 0.8*   CALCIUM 9.6 9.4     Recent Labs     01/31/23  1530 02/01/23  0635   AST 98* 84*   * 111*   BILIDIR  --  0.3   BILITOT 0.6 1.1*   ALKPHOS 126 105     No results for input(s): INR in the last 72 hours. Recent Labs     01/31/23  1530 01/31/23  2205 02/01/23  0036   TROPONINI <0.01 <0.01 <0.01     Radiology:  XR CHEST PORTABLE   Final Result   Mild blunting the left costophrenic angle likely related to a small left   pleural effusion with airspace infiltrate. Assessment/Plan:  Active Hospital Problems    Diagnosis     Chest pain [R07.9]      Priority: Medium    Chest pain in adult [R07.9]     Diabetes mellitus ([facility] [address] [E11.9]     CAD (coronary artery disease) [I25.10]     Hyperlipidemia [E78.5]     Essential hypertension [I10]     S/P CABG (coronary artery bypass graft) [Z95.1]      Chest pain in setting of known CAD  Status-post CABG x5 2016  Status-post PCI with SEGUNDO to Lcx 2020  -atypical exertional chest pain with history of chronic stable angina  -serial troponins negative  -EKG x2 without ischemic changes  -FLP in process  -continue prn nitroglycerin  -continue aspirin 81 mg daily, atorvastatin 80 mg nightly, imdur 30 mg daily  -continue telemetry monitoring  -cardiology consulted for recommendations  ECHO 8/25/2022:   Normal left ventricle size, wall thickness, and systolic function with a   visually estimated ejection fraction of 55%. No regional wall motion abnormalities are seen. Normal left ventricular diastolic filling pressure.    The right ventricle is mildly dilated. Right ventricular systolic function is low-normal.   Inadequate tricuspid valve regurgitation to estimate systolic pulmonary   artery pressure. Essential hypertension  -well controlled during admission  -continue amlodipine 5 mg daily, hold if BP < 110  -continue metoprolol tartrate 50 mg twice daily    Hyperlipidemia  -lipid panel 2/2022 well controlled  -FLP pending  -Continue atorvastatin 80 mg nightly    T2DM, controlled  -  -A1c 7.41/31  -Hold home metformin  -LDSSI  -POCT ac/hs  -Hypoglycemia protocol  -Carb controlled diet    Acute transaminitis  -Patient with elevated ALT/AST, unclear etiology  -Downtrending on labs today  -Continue to monitor    Leukocytosis -- resolved  -WBC: 12.6 at admission  -Resolved on repeat labs today  -Continue to monitor    Class I obesity  -With Body mass index is 27.95 kg/m². Complicating assessment and treatment. Placing patient at risk for multiple co-morbidities as well as early death and contributing to the patient's presentation. Counseled on weight loss. DVT Prophylaxis: lovenox  Diet: Diet NPO  Code Status: Full Code    PT/OT Eval Status: not indicated at this time    Dispo - 1-2 days pending cardiology recommendations and clinical improvement    Jacqeuline June DO, PGY-1   [address] [facility] Medicine Residency    Addendum to Resident  Progress note:  Pt seen,examined and evaluated with resident and discussed regarding POC . I have reviewed the current history, physical findings, labs and assessment and plan , edited the note where appropriate and agree with note as documented by resident  ( Dr. Lacy Saravia)    60 YO CM with PMHx CAD s/p CABGx5 (2016) ; S/p PCI to LCx(2020 by Dr. Robb Anderson) , chronic stable angina, hyperlipidemia, type 2 diabetes mellitus, right eye glaucoma presented overnight to [facility] ED with complaints of chest pain in the setting of elevated blood pressure.   Patient reports substernal chest pain radiating to his neck/face, worsening with exertion. He had an echocardiogram done in August 2022 which showed no regional wall motion abnormalities. EKG was nonischemic and troponin trended negative overnight. Patient admitted for further cardiac evaluation. Currently denies any chest pain this morning during my evaluation. Blood pressure controlled. Offers no new complaints. We will request cardiology consultation and defer further ischemic work-up to them. Patient reports that he is due to see Dr. Elke Mcnulty. Consulted. Will follow recommendations. keep patient n.p.o. pending cardiology recommendations. The patient was  informed of the results of tests, a time was given to answer questions, a plan was proposed and he agreed with plan. Medical reconciliation performed.      Caitlin Ford MD  Hospitalist Physician

## 2023-02-01 NOTE — CARE COORDINATION
CASE MANAGEMENT INITIAL ASSESSMENT      Reviewed chart and completed assessment with patient:bedside  Family present: none  Explained Case Management role/services. Primary contact information:Madina/ANATOLY    Health Care Decision Maker :   Primary Decision Maker: Samina Carbone - [phone]    Secondary Decision Maker: Angel Mcintosh Child - [phone]    Secondary Decision Maker: Bernice Marr - Brother/Sister - [phone]    Secondary Decision Maker: Aarti Boyle - Brother/Sister - [phone]          Can this person be reached and be able to respond quickly, such as within a few minutes or hours? Yes    Admit date/status:1/31/23 OBS  Diagnosis:chest pain   Is this a Readmission?:  No    Readmission Screening completed?: No     Insurance:[facility] medicare and medicaid   Precert required for SNF: Yes       3 night stay required: No    Living arrangements, Adls, care needs, prior to admission: pt lives in a 2nd floor apartment with Nydia LEDBETTER. [address]    Durable Medical Equipment at home:  none    Services in the home and/or outpatient, prior to admission:none    Current PCP:Katie FULTON                                Medications: Prescription coverage? Yes Will pt require financial assistance with medications No     Transportation needs: none/private. Active        PT/OT recs:none    Hospital Exemption Notification (HEN):needed for SNF    Barriers to discharge:none    Plan/comments:Chest pain. Management per IM. Likely need cardiac work up. No orders at this time. Pt from home, IPTA. Denies dcp needs. CM will follow, should needs arise.  Alicja Salazar RN     [facility] on chart for MD [email or web address]

## 2023-02-01 NOTE — H&P
Hospital Medicine History & Physical      PCP: Genesis Cedeño APRN - CNP    Date of Admission: 1/31/2023    Date of Service: Pt seen/examined on 1/31/2023 and Admitted to observation with expected LOS less than two midnights due to medical therapy. Chief Complaint: Chest pain    History Of Present Illness:    61 y.o. male, with past medical history of hypertension, CAD, hyperlipidemia, diabetes, who presented to [facility] with chest pain. History obtained from the patient and review of EMR. The patient stated yesterday while watching television he endorsed 5/10 left-sided chest pain. He stated at that time he did check his blood pressure and it was found to be 200/100. The patient stated he has been taking his blood pressure medication as prescribed at home. He stated over the last 24 hours his chest pain has gotten progressively worse. He denies any radiation of the pain and/or associated factors. The patient stated he did try taking some nitroglycerin at home, but did not have any relief with it. He does report having a cardiac history with a cath in July 2020. Per EMR, the patient does have a stent to the left circumflex from 2020 and he also had a CABG in 2016 here at [facility]. In the emergency room the patient was found to have a negative troponin as well as a nonischemic EKG. The patient's blood pressure is much better controlled at this time. He did have a chest x-ray that did reveal a small left pleural effusion with airspace infiltrate. The patient was admitted for further evaluation and treatment. A stress test has been ordered for the a. m. The patient denied any other associated symptoms as well as any aggravating and/or alleviating factors. At the time of this assessment, the patient was resting comfortably in bed. He currently denies any chest pain, back pain, abdominal pain, shortness of breath, numbness, tingling, N/V/C/D, fever and/or chills.      Past Medical History:        Diagnosis Date    CAD (coronary artery disease) 2016    Diabetes mellitus ([facility] [address]     History of CVA (cerebrovascular accident) 2/1/2020    Hyperlipidemia     Hypertension     MI (myocardial infarction) ([facility] [address]     No history of procedure 06/01/2017    colonoscopy    Pneumonia due to organism 8/3/2019     Past Surgical History:          Procedure Laterality Date    CHOLECYSTECTOMY, LAPAROSCOPIC N/A 4/17/2019    LAPAROSCOPIC CHOLECYSTECTOMY WITH INTRAOPERATIVE CHOLANGIOGRAM performed by Abad Green MD at [address]  06/01/2017    Colonic polyp    CORONARY ARTERY BYPASS GRAFT  09/20/2016    LIMA- LAD, Reverse SVG- Rosalba, reverse SVG- PDA, Reverse SVG- OM seq- Diag    EYE SURGERY Right     drain placed behind eye    HERNIA REPAIR Bilateral 2005    bilateral inguinal hernia repair    PTCA  03/09/2020    SEGUNDO- 3.0 x 34 to pCx     Medications Prior to Admission:      Prior to Admission medications    Medication Sig Start Date End Date Taking? Authorizing Provider   atorvastatin (LIPITOR) 80 MG tablet TAKE 1 TABLET BY MOUTH NIGHTLY 1/18/23   DONALDO Fernandez CNP   metFORMIN (GLUCOPHAGE) 500 MG tablet TAKE 1 TABLET BY MOUTH EVERY DAY WITH BREAKFAST 11/14/22   DONALDO Fernandez CNP   amLODIPine (NORVASC) 5 MG tablet TAKE 1 TABLET BY MOUTH EVERY DAY 5/18/22   Davis Kim MD   metoprolol tartrate (LOPRESSOR) 50 MG tablet Take 1 tablet by mouth 2 times daily 4/26/22   Davis Kim MD   isosorbide mononitrate (IMDUR) 30 MG extended release tablet Take 1 tablet by mouth daily 2/25/22   DONALDO Fernandez CNP   cholestyramine Chuck Sniff) 4 g packet Take 1 packet by mouth 2 times daily  Patient taking differently: Take 1 packet by mouth Twice a Week 2/25/22   DONALDO Fernandez CNP   nitroGLYCERIN (NITROSTAT) 0.4 MG SL tablet up to max of 3 total doses.  If no relief after 1 dose, call 911. 11/1/21   Davis Kim MD   aspirin 81 MG tablet Take 81 mg by mouth daily    Historical Provider, MD     Allergies:  Patient has no known allergies. Social History:    The patient currently lives at home    TOBACCO:   reports that he has never smoked. He has never used smokeless tobacco.  ETOH:   reports no history of alcohol use. E-cigarette/Vaping       Questions Responses    E-cigarette/Vaping Use Never User    Start Date     Passive Exposure     Quit Date     Counseling Given     Comments Unknown          Family History:    Reviewed and negative in regards to presenting illness/complaint. Problem Relation Age of Onset    Heart Disease Father     Diabetes type 2  Father     Cancer Mother     Heart Disease Brother      REVIEW OF SYSTEMS COMPLETED:   Pertinent positives as noted in the HPI. All other systems reviewed and negative. PHYSICAL EXAM PERFORMED:  /65   Pulse 70   Temp 97.8 °F (36.6 °C) (Oral)   Resp 16   Ht 6' (1.829 m)   Wt 201 lb (91.2 kg)   SpO2 95%   BMI 27.26 kg/m²     General appearance: Pleasant male in no apparent distress, appears stated age and cooperative. HEENT: Pupils equal, round, and reactive to light. Extra ocular muscles intact. Conjunctivae/corneas clear. Neck: Supple, with full range of motion. No jugular venous distention. Trachea midline. Respiratory:  Normal respiratory effort. Clear to auscultation, bilaterally without Rales/Wheezes/Rhonchi. Cardiovascular:  Regular rate and rhythm with normal S1/S2 without murmurs, rubs or gallops. Abdomen: Soft, non-tender, non-distended with normal bowel sounds. Musculoskeletal:  No clubbing, cyanosis or edema bilaterally. Full range of motion without deformity. Skin: Skin color, texture, turgor normal.  No significant rashes or lesions. Neurologic:  Neurovascularly intact.  Cranial nerves: II-XII intact, grossly non-focal.  Psychiatric:  Alert and oriented, thought content appropriate, normal insight  Capillary Refill: Brisk,3 seconds, normal  Peripheral Pulses: +2 palpable, equal bilaterally     Labs:   Recent Labs     01/31/23  1530   WBC 12.6*   HGB 14.5   HCT 43.0        Recent Labs     01/31/23  1530      K 3.9      CO2 25   BUN 11   CREATININE 0.8*   CALCIUM 9.6     Recent Labs     01/31/23  1530   AST 98*   *   BILITOT 0.6   ALKPHOS 126     Recent Labs     01/31/23  1530   TROPONINI <0.01     Radiology:   CXR: I have reviewed the CXR with the following interpretation: Mild blunting the left costophrenic angle likely related to a small left pleural effusion with airspace infiltrate    EKG:  I have reviewed the EKG with the following interpretation: Normal sinus rhythm. Left axis deviation. Right bundle branch block. Minimal voltage criteria for LVH, may be normal variant. Abnormal ECG. Confirmed by Miller Dixon MD, [address] [place] [address] ([facility]) on 1/31/2023 4:48:38 PM    XR CHEST PORTABLE   Final Result   Mild blunting the left costophrenic angle likely related to a small left   pleural effusion with airspace infiltrate. Consults:  None    ASSESSMENT:  Active Hospital Problems    Diagnosis Date Noted    Chest pain in adult [R07.9] 03/07/2020    Diabetes mellitus (HCC) [E11.9]     CAD (coronary artery disease) [I25.10] 12/01/2016    Hyperlipidemia [E78.5]     Essential hypertension [I10]     S/P CABG (coronary artery bypass graft) [Z95.1]      PLAN:  Chest pain in setting of known CAD, s/p CABG  -atypical  -serial troponin - initial negative  -EKG w/o ischemic changes  -FLP in am  -NPO after MN  -stress in am  -prn nitro  -Aspirin and statin daily  -Continue Imdur  -tele monitoring  ECHO 8/25/2022:   Normal left ventricle size, wall thickness, and systolic function with a   visually estimated ejection fraction of 55%. No regional wall motion abnormalities are seen. Normal left ventricular diastolic filling pressure. The right ventricle is mildly dilated.    Right ventricular systolic function is low-normal.   Inadequate tricuspid valve regurgitation to estimate systolic pulmonary   artery pressure. Essential hypertension  -Continue metoprolol and amlodipine    Hyperlipidemia  -Continue atorvastatin    DM2, controlled  -  -hemglobin a1c in a.m.  -hold home metformin  -ldssi  -poct ac/hs  -hypoglycemia protocol  -carb control diet     Acute transaminitis   -Pt with elevated AST/ALT  -unclear etiology  -will trend    Leukocytosis  -Likely secondary to stress response  -CBC in a.m.  -No obvious signs or symptoms of infection at this time    DVT Prophylaxis: Lovenox    Diet: No diet orders on file    Code Status: Prior    PT/OT Eval Status: No indication for need at this time    Dispo -1-2 days pending clinical improvement     DONALDO Lazcano - CNP    Thank you DONALDO Crespo CNP for the opportunity to be involved in this patient's care.  If you have any questions or concerns please feel free to contact me at [phone].  -------------------------Anticipated Dr. Piero marrufo Core------------------------

## 2023-02-01 NOTE — DISCHARGE SUMMARY
Discharge Summary    Patient ID: Naida Stinson      Patient's PCP: DONALDO Borjas CNP    Admit Date: 1/31/2023     Discharge Date:   2/1/2023    Admitting Physician: Pushpa Shields MD     Discharge Physician: Aleksander Barnard MD    Discharge Diagnoses: Active Hospital Problems    Diagnosis     Chest pain [R07.9]      Priority: Medium    Chest pain in adult [R07.9]     Diabetes mellitus ([address] [E11.9]     CAD (coronary artery disease) [I25.10]     Hyperlipidemia [E78.5]     Essential hypertension [I10]     S/P CABG (coronary artery bypass graft) [Z95.1]        The patient was seen and examined on day of discharge and this discharge summary is in conjunction with any daily progress note from day of discharge. Hospital Course: Naida Stinson is a 54-year-old male with past medical history of CAD s/p CABG x5 in 2016, s/p PCI to LCx in 2020, chronic stable angina, HLD, T2DM, right eye glaucoma, who presented to [facility] 1/31 with chest pain and report of hypertensive crisis. He reports that Monday 1/30 he checked his blood pressure and it was 190/100. He also reported substernal and left-sided chest tightness with radiation into his neck, and headache, blurry vision, lightheadedness. He reports that the chest pain continued into Tuesday and got progressively worse, when he rechecked his blood pressure it was 250/190. He again rechecked his blood pressure 30 minutes later and his systolic was down to 257. He denies fever, chills, diplopia, dizziness, weakness, gait changes, N/V/D. He denies current or prior use of tobacco/alcohol/other substances. In the ED patient was found to have negative troponins as well as nonischemic EKG. Patient's vitals were stable with blood pressure well controlled at that time. Patient's chest x-ray revealed small left pleural effusion with airspace infiltrate. Patient was admitted for further evaluation and treatment.      Patient's home cardiac medications were resumed during admission. His hypertension remained well controlled during admission with hold parameters placed on his amlodipine for BP less than 110. His chronic conditions of T2DM and hyperlipidemia were also managed during admission. Patient presented with elevated ALT/AST / acute transaminitis at admission, with labs downtrending by the time of discharge. He also presented with leukocytosis WBC of 12.6 at admission which resolved on repeat labs the following day. His repeat EKG was negative for ischemic changes. Cardiology was consulted for further management and recommendations. Cardiology reported patients symptoms seem to be attributed to demand ischemia from severe hypertension, with no evidence for acute coronary syndrome. Patient was discharged home stable and remaining symptom-free, with instructions to follow-up with outpatient cardiology. Physical Exam Performed:   /68   Pulse 65   Temp 98.4 °F (36.9 °C) (Oral)   Resp 18   Ht 6' (1.829 m)   Wt 206 lb 1.6 oz (93.5 kg)   SpO2 92%   BMI 27.95 kg/m²       General appearance:  male, No apparent distress, appears stated age and cooperative. HEENT: Pupils equal, round, and reactive to light. Conjunctivae/corneas clear. Neck: Supple, with full range of motion. No jugular venous distention. Trachea midline. Respiratory:  Normal respiratory effort. Clear to auscultation, bilaterally without Rales/Wheezes/Rhonchi. Cardiovascular: Regular rate and rhythm without murmurs, rubs or gallops. Abdomen: Soft, non-tender, non-distended with normal bowel sounds. Musculoskeletal: No clubbing, cyanosis or edema bilaterally. Full range of motion without deformity. Skin: Skin color, texture, turgor normal.  No rashes or lesions. Neurologic:  Neurovascularly intact without any focal sensory/motor deficits.  Cranial nerves: II-XII intact, grossly non-focal.  Psychiatric: Alert and oriented, thought content appropriate, normal insight      Labs: For convenience and continuity at follow-up the following most recent labs are provided:      CBC:    Lab Results   Component Value Date/Time    WBC 10.8 02/01/2023 06:35 AM    HGB 14.3 02/01/2023 06:35 AM    HCT 41.5 02/01/2023 06:35 AM     02/01/2023 06:35 AM       Renal:    Lab Results   Component Value Date/Time     02/01/2023 06:35 AM    K 4.3 02/01/2023 06:35 AM     02/01/2023 06:35 AM    CO2 25 02/01/2023 06:35 AM    BUN 11 02/01/2023 06:35 AM    CREATININE 0.8 02/01/2023 06:35 AM    CALCIUM 9.4 02/01/2023 06:35 AM         Significant Diagnostic Studies    Radiology:   XR CHEST PORTABLE   Final Result   Mild blunting the left costophrenic angle likely related to a small left   pleural effusion with airspace infiltrate.             Consults:   IP CONSULT TO CARDIOLOGY    Disposition:  home     Condition at Discharge: Stable    Discharge Instructions/Follow-up:  follow-up with PCP and cardiology in 1-2 weeks    Code Status:  Full Code     Activity: activity as tolerated    Diet: regular diet      Discharge Medications:   Current Discharge Medication List             Details   atorvastatin (LIPITOR) 80 MG tablet TAKE 1 TABLET BY MOUTH NIGHTLY  Qty: 90 tablet, Refills: 3      metFORMIN (GLUCOPHAGE) 500 MG tablet TAKE 1 TABLET BY MOUTH EVERY DAY WITH BREAKFAST  Qty: 90 tablet, Refills: 1    Associated Diagnoses: Type 2 diabetes mellitus with other circulatory complication, without long-term current use of insulin (HCC)      amLODIPine (NORVASC) 5 MG tablet TAKE 1 TABLET BY MOUTH EVERY DAY  Qty: 90 tablet, Refills: 3      metoprolol tartrate (LOPRESSOR) 50 MG tablet Take 1 tablet by mouth 2 times daily  Qty: 180 tablet, Refills: 3    Associated Diagnoses: Essential hypertension      isosorbide mononitrate (IMDUR) 30 MG extended release tablet Take 1 tablet by mouth daily  Qty: 90 tablet, Refills: 3      cholestyramine (QUESTRAN) 4 g packet Take 1 packet by mouth 2 times daily  Qty: 180 packet, Refills: 3    Associated Diagnoses: Diarrhea, unspecified type      nitroGLYCERIN (NITROSTAT) 0.4 MG SL tablet up to max of 3 total doses. If no relief after 1 dose, call 911. Qty: 25 tablet, Refills: 0      aspirin 81 MG tablet Take 81 mg by mouth daily             Time Spent on discharge is more than 30 minutes in the examination, evaluation, counseling and review of medications and discharge plan. Signed:    Laz Chau DO   2/1/2023      Thank you DONALDO Zaman CNP for the opportunity to be involved in this patient's care. If you have any questions or concerns please feel free to contact me at [phone]. Addendum to Resident DC summary  note:  Pt seen,examined and evaluated with resident and discussed regarding POC . I have reviewed the current history, physical findings, labs and assessment and plan , edited the note where appropriate and agree with note as documented by resident  ( Damaris Sandra)    patient seen and evaluated on the day of discharge. Patient informed about following up with appointments. Patient verbalized understanding for follow-up appointments. The patient was  informed of the results of tests, a time was given to answer questions, a plan was proposed and  he agreed with plan. Medical reconciliation performed. Patient discharged stable condition. On the date of discharge, the patient reported feeling stable. The patient was found to not be in any acute distress, with vital signs within normal limits, and no new abnormalities on physical examination. Further, the patient expressed appropriate understanding of, and agreement with, the discharge recommendations, medications, and plan.      Molly Luu MD  Hospitalist Physician .

## 2023-02-01 NOTE — PROGRESS NOTES
4 Eyes Admission Assessment     I agree as the admission nurse that 2 RN's have performed a thorough Head to Toe Skin Assessment on the patient. ALL assessment sites listed below have been assessed on admission. Areas assessed by both nurses:   [x]   Head, Face, and Ears   [x]   Shoulders, Back, and Chest  [x]   Arms, Elbows, and Hands   [x]   Coccyx, Sacrum, and Ischium  [x]   Legs, Feet, and Heels        Does the Patient have Skin Breakdown?   No         Meño Prevention initiated:  NA   Wound Care Orders initiated:  NA      WO nurse consulted for Pressure Injury (Stage 3,4, Unstageable, DTI, NWPT, and Complex wounds) or Meño score 18 or lower:  NA      Nurse 1 eSignature: Electronically signed by Shaheed Medellin RN on 1/31/23 at 10:23 PM EST    **SHARE this note so that the co-signing nurse is able to place an eSignature**    Nurse 2 eSignature: Electronically signed by Nell Kowalski RN on 2/1/23 at 5:57 AM EST

## 2023-02-01 NOTE — CONSULTS
[facility]   Cardiovascular Evaluation    PATIENT: Berny Perea  DATE: 2023  MRN: [id number]  CSN: [id number]  : 1965    Primary Care Doctor/Referring provider: DONALDO Cody - ELIZABETH, Jarod Spears MD     Reason for evaluation/Chief complaint:   Chest Pain (Patient reports constant mid CP starting yesterday and a BP of 200/100 per wrist BP cuff at home.)      Subjective:    History of present illness on initial date of evaluation:   Berny Perea is a 57 y.o. patient who presents to the hospital for the evaluation of severe hypertension.  Patient states that he had noticed his blood pressure was significantly elevated over the past several weeks.  He was checking his blood pressure and yesterday noted to have a systolic pressure of greater than 200 while home monitoring.  Patient came to the emergency room due to the severe hypertension.  On review of systems the patient did mention that he was having a headache, mild chest pain, and flushed sensation.  This all has resolved after correction of his blood pressure.  In any case, the patient was seen and evaluated in emergency room and admitted to the hospital for further observation and management.  Cardiology is asked to see the patient for further recommendations regarding the chest pain.  Patient is known to the cardiac practice and has an extensive history of heart disease.  He denies any current chest pain, shortness of breath, or flush sensation.  States that his headache has resolved.  Blood pressure during hospitalization has now been better controlled..        Patient Active Problem List   Diagnosis    Family history of early CAD    S/P CABG (coronary artery bypass graft)    Essential hypertension    Hyperlipidemia    CAD (coronary artery disease)    Microcytosis    Acute glaucoma of right eye    Acute chest pain    Paresthesia    Acute calculous cholecystitis    S/P laparoscopic cholecystectomy     Abnormal stress test    Pre-syncope    Angina, class II (HCC)    History of CVA (cerebrovascular accident)    Chest pain in adult    Diabetes mellitus ([address]    Migraines    Unstable angina ([address]    Chest pain         Cardiac Testing: I have reviewed the findings below. EKG:  ECHO:   STRESS TEST:  CATH:  BYPASS:  VASCULAR:    Past Medical History:   has a past medical history of CAD (coronary artery disease), Diabetes mellitus ([address] History of CVA (cerebrovascular accident), Hyperlipidemia, Hypertension, MI (myocardial infarction) ([address] No history of procedure, and Pneumonia due to organism. Surgical History:   has a past surgical history that includes Coronary artery bypass graft (09/20/2016); Eye surgery (Right); Colonoscopy (06/01/2017); hernia repair (Bilateral, 2005); Cholecystectomy, laparoscopic (N/A, 4/17/2019); and Percutaneous Transluminal Coronary Angio (03/09/2020). Social History:   reports that he has never smoked. He has never used smokeless tobacco. He reports that he does not drink alcohol and does not use drugs. Family History:  No evidence for sudden cardiac death or premature CAD    Medications:  Reviewed and are listed in nursing record. and/or listed below  Outpatient Medications:  Prior to Admission medications    Medication Sig Start Date End Date Taking?  Authorizing Provider   atorvastatin (LIPITOR) 80 MG tablet TAKE 1 TABLET BY MOUTH NIGHTLY 1/18/23   DONALDO Hollingsworth CNP   metFORMIN (GLUCOPHAGE) 500 MG tablet TAKE 1 TABLET BY MOUTH EVERY DAY WITH BREAKFAST 11/14/22   DONALDO Hollingsworth CNP   amLODIPine (NORVASC) 5 MG tablet TAKE 1 TABLET BY MOUTH EVERY DAY 5/18/22   Davin Jones MD   metoprolol tartrate (LOPRESSOR) 50 MG tablet Take 1 tablet by mouth 2 times daily 4/26/22   Davin Jones MD   isosorbide mononitrate (IMDUR) 30 MG extended release tablet Take 1 tablet by mouth daily 2/25/22   DONALDO Hollingsworth CNP   cholestyramine (QUESTRAN) 4 g packet Take 1 packet by mouth 2 times daily  Patient taking differently: Take 1 packet by mouth Twice a Week 2/25/22   DONALDO Bryant - CNP   nitroGLYCERIN (NITROSTAT) 0.4 MG SL tablet up to max of 3 total doses. If no relief after 1 dose, call 911. 11/1/21   Abhay Barakat MD   aspirin 81 MG tablet Take 81 mg by mouth daily    Historical Provider, MD       In-patient schedule medications:   amLODIPine  5 mg Oral Daily    aspirin  81 mg Oral Daily    atorvastatin  80 mg Oral Nightly    isosorbide mononitrate  30 mg Oral Daily    metoprolol tartrate  50 mg Oral BID    sodium chloride flush  5-40 mL IntraVENous 2 times per day    insulin lispro  0-4 Units SubCUTAneous TID WC    insulin lispro  0-4 Units SubCUTAneous Nightly    enoxaparin  40 mg SubCUTAneous Daily         Infusion Medications:   sodium chloride      dextrose           Allergies:  Patient has no known allergies. Review of Systems:   All 14 point review of symptoms completed. Pertinent positives identified in the HPI, all other review of symptoms findings as below.      Review of Systems - History obtained from the patient  General ROS: negative for - chills, fever or night sweats  Psychological ROS: negative for - disorientation or hallucinations  Ophthalmic ROS: negative for - dry eyes, eye pain or loss of vision  ENT ROS: negative for - nasal discharge or sore throat  Allergy and Immunology ROS: negative for - hives or itchy/watery eyes  Hematological and Lymphatic ROS: negative for - jaundice or night sweats  Endocrine ROS: negative for - mood swings or temperature intolerance  Breast ROS: deferred  Respiratory ROS: negative for - hemoptysis or stridor  Gastrointestinal ROS: no abdominal pain, change in bowel habits, or black or bloody stools  Genito-Urinary ROS: no dysuria, trouble voiding, or hematuria  Musculoskeletal ROS: negative for - gait disturbance, joint pain or joint stiffness  Neurological ROS: negative for - seizures or speech problems  Dermatological ROS: negative for - rash or skin lesion changes      Physical Examination:    [unfilled]  /70   Pulse 59   Temp 98.1 °F (36.7 °C) (Oral)   Resp 18   Ht 6' (1.829 m)   Wt 206 lb 1.6 oz (93.5 kg)   SpO2 92%   BMI 27.95 kg/m²    Weight: 206 lb 1.6 oz (93.5 kg)     Wt Readings from Last 3 Encounters:   02/01/23 206 lb 1.6 oz (93.5 kg)   08/30/22 206 lb 6.4 oz (93.6 kg)   08/10/22 210 lb 8 oz (95.5 kg)       Intake/Output Summary (Last 24 hours) at 2/1/2023 1227  Last data filed at 2/1/2023 1030  Gross per 24 hour   Intake 240 ml   Output 250 ml   Net -10 ml       General Appearance:  Alert, cooperative, no distress, appears stated age   Head:  Normocephalic, without obvious abnormality, atraumatic   Eyes:  PERRL, conjunctiva/corneas clear       Nose: Nares normal, no drainage or sinus tenderness   Throat: Lips, mucosa, and tongue normal   Neck: Supple, symmetrical, trachea midline, no adenopathy, thyroid: not enlarged, symmetric, no tenderness/mass/nodules, no carotid bruit or JVD       Lungs:   Clear to auscultation bilaterally, respirations unlabored   Chest Wall:  No tenderness or deformity   Heart:  Regular rhythm and normal rate; S1, S2 are normal; no murmur noted; no rub or gallop   Abdomen:   Soft, non-tender, bowel sounds active all four quadrants,  no masses, no organomegaly           Extremities: Extremities normal, atraumatic, no cyanosis or edema   Pulses: 2+ and symmetric   Skin: Skin color, texture, turgor normal, no rashes or lesions   Pysch: Normal mood and affect   Neurologic: Normal gross motor and sensory exam.         Labs  Recent Labs     01/31/23  1530 02/01/23  0635   WBC 12.6* 10.8   HGB 14.5 14.3   HCT 43.0 41.5   MCV 85.7 85.6    254     Recent Labs     01/31/23  1530 02/01/23  0635   CREATININE 0.8* 0.8*   BUN 11 11    141   K 3.9 4.3    104   CO2 25 25     No results for input(s): INR, PROTIME in the last 72 hours.   Recent Labs 01/31/23  1530 01/31/23  2205 02/01/23  0036   TROPONINI <0.01 <0.01 <0.01     Invalid input(s): PRO-BNP  No results for input(s): CHOL, LDL, HDL in the last 72 hours. Invalid input(s): TG      Imaging:  I have reviewed the below testing personally and my interpretation is below. EKG:  Normal sinus rhythmRight bundle branch blockConfirmed by Bryant Sanchez MD, Shaista Saravia ([id number]) on 2/1/2023 10:23:44 AM    CXR:      Assessment:  61 y.o. patient with:    Essential hypertension    S/P CABG (coronary artery bypass graft)    Hyperlipidemia    CAD (coronary artery disease)    Chest pain in adult      Plan:  The patient symptom complex appears attributed to demand ischemia from severe hypertension. There is no evidence for acute coronary syndrome. The patient's EKG and troponin levels are not consistent with acute coronary syndrome. Patient can eat. Patient does not require further ischemic evaluation while in house. Continue to monitor and if remains symptom-free can be discharged home with outpatient follow-up with his general cardiologist.    Medical Decision Making: The following items were considered in medical decision making:  Independent review of images  Review / order clinical lab tests  Review / order radiology tests  Decision to obtain old records  Review and summation of old records as accessed through [facility] (a summary of my findings in these old records)      Time Based Itemization  A total of 60 minutes was spent on today's patient encounter. If applicable, non-patient-facing activities:  (X )Preparing to see the patient and reviewing records  (X ) Individual interpretation of results  ( ) Discussion or coordination of care with other health care professionals  ( ) Ordering of unique tests, medications, or procedures  (X ) Documentation within the EHR       All questions and concerns were addressed to the patient/family. Alternatives to my treatment were discussed. The note was completed using EMR. Every effort was made to ensure accuracy; however, inadvertent computerized transcription errors may be present.     Tawanna Cid MD, [address], [place], [place]  [phone] [facility] office  [phone] [facility]  2/1/2023  12:27 PM

## 2023-02-01 NOTE — PROGRESS NOTES

## 2023-02-04 LAB
BLOOD CULTURE, ROUTINE: NORMAL
CULTURE, BLOOD 2: NORMAL

## 2023-02-08 ENCOUNTER — OFFICE VISIT (OUTPATIENT)
Dept: FAMILY MEDICINE CLINIC | Age: 58
End: 2023-02-08
Payer: MEDICARE

## 2023-02-08 VITALS
HEART RATE: 64 BPM | TEMPERATURE: 98.3 F | WEIGHT: 208.6 LBS | BODY MASS INDEX: 28.29 KG/M2 | DIASTOLIC BLOOD PRESSURE: 77 MMHG | OXYGEN SATURATION: 94 % | SYSTOLIC BLOOD PRESSURE: 121 MMHG

## 2023-02-08 DIAGNOSIS — Z09 HOSPITAL DISCHARGE FOLLOW-UP: Primary | ICD-10-CM

## 2023-02-08 DIAGNOSIS — E11.59 TYPE 2 DIABETES MELLITUS WITH OTHER CIRCULATORY COMPLICATION, WITHOUT LONG-TERM CURRENT USE OF INSULIN (HCC): ICD-10-CM

## 2023-02-08 DIAGNOSIS — I25.118 CORONARY ARTERY DISEASE OF NATIVE ARTERY OF NATIVE HEART WITH STABLE ANGINA PECTORIS (HCC): ICD-10-CM

## 2023-02-08 DIAGNOSIS — E78.2 MIXED HYPERLIPIDEMIA: ICD-10-CM

## 2023-02-08 DIAGNOSIS — Z95.1 S/P CABG (CORONARY ARTERY BYPASS GRAFT): ICD-10-CM

## 2023-02-08 DIAGNOSIS — I10 ESSENTIAL HYPERTENSION: ICD-10-CM

## 2023-02-08 DIAGNOSIS — Z86.73 HISTORY OF CVA (CEREBROVASCULAR ACCIDENT): ICD-10-CM

## 2023-02-08 PROCEDURE — 99214 OFFICE O/P EST MOD 30 MIN: CPT | Performed by: NURSE PRACTITIONER

## 2023-02-08 PROCEDURE — 3051F HG A1C>EQUAL 7.0%<8.0%: CPT | Performed by: NURSE PRACTITIONER

## 2023-02-08 PROCEDURE — 1111F DSCHRG MED/CURRENT MED MERGE: CPT | Performed by: NURSE PRACTITIONER

## 2023-02-08 PROCEDURE — 3074F SYST BP LT 130 MM HG: CPT | Performed by: NURSE PRACTITIONER

## 2023-02-08 PROCEDURE — 3078F DIAST BP <80 MM HG: CPT | Performed by: NURSE PRACTITIONER

## 2023-02-08 ASSESSMENT — ENCOUNTER SYMPTOMS
EYES NEGATIVE: 1
ABDOMINAL PAIN: 0
ALLERGIC/IMMUNOLOGIC NEGATIVE: 1
SHORTNESS OF BREATH: 0
CHEST TIGHTNESS: 0
RESPIRATORY NEGATIVE: 1

## 2023-02-08 ASSESSMENT — PATIENT HEALTH QUESTIONNAIRE - PHQ9
SUM OF ALL RESPONSES TO PHQ9 QUESTIONS 1 & 2: 0
SUM OF ALL RESPONSES TO PHQ QUESTIONS 1-9: 0
2. FEELING DOWN, DEPRESSED OR HOPELESS: 0
SUM OF ALL RESPONSES TO PHQ QUESTIONS 1-9: 0
1. LITTLE INTEREST OR PLEASURE IN DOING THINGS: 0

## 2023-02-08 NOTE — PROGRESS NOTES
Post-Discharge Transitional Care Follow Up      Tahira Vinson   YOB: 1965    Date of Office Visit:  2/8/2023  Date of Hospital Admission: 1/31/23  Date of Hospital Discharge: 2/1/23  Readmission Risk Score (high >=14%. Medium >=10%):No data recorded    Care management risk score Rising risk (score 2-5) and Complex Care (Scores >=6): No Risk Score On File     Non face to face  following discharge, date last encounter closed (first attempt may have been earlier): *No documented post hospital discharge outreach found in the last 14 days     Call initiated 2 business days of discharge: *No response recorded in the last 14 days     Say Arias was seen today for follow-up from hospital.    Diagnoses and all orders for this visit:    Hospital discharge follow-up  No recurrent chest pain. Blood pressures well controlled. -     [place] DISCHARGE MEDS RECONCILED W/ CURRENT OUTPATIENT MED LIST    Coronary artery disease of native artery of native heart with stable angina pectoris (HCC)  Troponins were negative and EKG was nonischemic. Patient has been chest pain-free since returning home from the hospital.  Recommend patient continue with a atorvastatin, amlodipine, metoprolol, Isorbid, aspirin and nitroglycerin. Essential hypertension  Blood pressures well controlled with amlodipine and metoprolol. Patient is tolerating medication without side effects. Continue treatment. Type 2 diabetes mellitus with other circulatory complication, without long-term current use of insulin [address] [address]  Patient continues to take metformin without any side effects. A1c was 7.4% on 1/31/2023. Patient denies any hypoglycemic episodes. Recommend patient continue with current treatment. Patient has a follow-up appointment next month. -     metFORMIN (GLUCOPHAGE) 1000 MG tablet;  Take 1 tablet by mouth daily (with breakfast)    Mixed hyperlipidemia  Patient continues to take atorvastatin 80 mg daily without any new myalgias or GI upset. Reviewed fasting lipids from 2/1/2023 which indicate excellent control with current treatment. Recommend patient continue with current medication regimen. S/P CABG (coronary artery bypass graft)    History of CVA (cerebrovascular accident)       Medical Decision Making: high complexity  Return in 1 month (on 3/8/2023). Subjective:   HPI    Inpatient course: Discharge summary reviewed- see chart. Interval history/Current status: Patient presents today to follow-up on recent hospitalization for hospitalization. Patient was hospitalized from 1/30 1/23 through 2/1/2023 at [facility] with chest pain and concerns of high blood pressure. Patient with a past medical history CAD status post CABG x5 in 2016, status post PCI to LCx in 2020, chronic stable angina, hyperlipidemia, type 2 diabetes, glaucoma, hyperlipidemia, and history of CVA. Patient's blood pressure was actually good upon arrival to emergency room. Troponins were negative and EKG she was nonischemic. Patient was discharged home after 24-hour observation and well-controlled blood pressure. Today patient reports he feels well and denies any further episodes of chest pain. Patient states he purchased a new blood pressure cuff and his blood pressure has been well controlled. Patient denies any other acute problems or concerns at this time.     Patient Active Problem List   Diagnosis    Family history of early CAD    S/P CABG (coronary artery bypass graft)    Essential hypertension    Hyperlipidemia    CAD (coronary artery disease)    Microcytosis    Acute glaucoma of right eye    Acute chest pain    Paresthesia    Acute calculous cholecystitis    S/P laparoscopic cholecystectomy    Abnormal stress test    Pre-syncope    Angina, class II (HCC)    History of CVA (cerebrovascular accident)    Chest pain in adult    Diabetes mellitus ([address]    Migraines    Unstable angina ([address]    Chest pain       Medications listed as ordered at the time of discharge from hospital     Medication List            Accurate as of February 8, 2023  8:55 AM. If you have any questions, ask your nurse or doctor. CHANGE how you take these medications      cholestyramine 4 g packet  Commonly known as: QUESTRAN  Take 1 packet by mouth 2 times daily  What changed: when to take this            CONTINUE taking these medications      amLODIPine 5 MG tablet  Commonly known as: NORVASC  TAKE 1 TABLET BY MOUTH EVERY DAY     aspirin 81 MG tablet     atorvastatin 80 MG tablet  Commonly known as: LIPITOR  TAKE 1 TABLET BY MOUTH NIGHTLY     isosorbide mononitrate 30 MG extended release tablet  Commonly known as: IMDUR  Take 1 tablet by mouth daily     metFORMIN 500 MG tablet  Commonly known as: GLUCOPHAGE  TAKE 1 TABLET BY MOUTH EVERY DAY WITH BREAKFAST     metoprolol tartrate 50 MG tablet  Commonly known as: LOPRESSOR  Take 1 tablet by mouth 2 times daily     nitroGLYCERIN 0.4 MG SL tablet  Commonly known as: NITROSTAT  up to max of 3 total doses. If no relief after 1 dose, call 911.                Medications marked \"taking\" at this time  Outpatient Medications Marked as Taking for the 2/8/23 encounter (Office Visit) with DONALDO Seo CNP   Medication Sig Dispense Refill    atorvastatin (LIPITOR) 80 MG tablet TAKE 1 TABLET BY MOUTH NIGHTLY 90 tablet 3    metFORMIN (GLUCOPHAGE) 500 MG tablet TAKE 1 TABLET BY MOUTH EVERY DAY WITH BREAKFAST 90 tablet 1    amLODIPine (NORVASC) 5 MG tablet TAKE 1 TABLET BY MOUTH EVERY DAY 90 tablet 3    metoprolol tartrate (LOPRESSOR) 50 MG tablet Take 1 tablet by mouth 2 times daily 180 tablet 3    isosorbide mononitrate (IMDUR) 30 MG extended release tablet Take 1 tablet by mouth daily 90 tablet 3    cholestyramine (QUESTRAN) 4 g packet Take 1 packet by mouth 2 times daily (Patient taking differently: Take 1 packet by mouth Twice a Week) 180 packet 3    aspirin 81 MG tablet Take 81 mg by mouth daily Medications patient taking as of now reconciled against medications ordered at time of hospital discharge: No    Review of Systems   Constitutional: Negative. Negative for activity change, appetite change, chills, fatigue and unexpected weight change. HENT: Negative. Eyes: Negative. Respiratory: Negative. Negative for chest tightness and shortness of breath. Cardiovascular: Negative. Negative for chest pain, palpitations and leg swelling. Gastrointestinal:  Negative for abdominal pain. Endocrine: Negative. Genitourinary: Negative. Musculoskeletal: Negative. Skin: Negative. Negative for rash and wound. Allergic/Immunologic: Negative. Neurological: Negative. Negative for dizziness, light-headedness and headaches. Hematological: Negative. Psychiatric/Behavioral: Negative. Negative for dysphoric mood. Objective:    /77   Pulse 64   Temp 98.3 °F (36.8 °C) (Oral)   Wt 208 lb 9.6 oz (94.6 kg)   SpO2 94%   BMI 28.29 kg/m²   Physical Exam  Vitals and nursing note reviewed. Constitutional:       General: He is not in acute distress. Appearance: Normal appearance. He is well-developed and well-groomed. He is not ill-appearing or toxic-appearing. HENT:      Head: Normocephalic and atraumatic. Eyes:      Extraocular Movements: Extraocular movements intact. Conjunctiva/sclera: Conjunctivae normal.   Cardiovascular:      Rate and Rhythm: Normal rate and regular rhythm. Pulses: Normal pulses. Heart sounds: Normal heart sounds, S1 normal and S2 normal.   Pulmonary:      Effort: Pulmonary effort is normal. No accessory muscle usage or respiratory distress. Breath sounds: Normal breath sounds. Abdominal:      General: Bowel sounds are normal.      Palpations: Abdomen is soft. Musculoskeletal:      Cervical back: Neck supple. Right lower leg: No edema. Left lower leg: No edema.    Lymphadenopathy:      Cervical: No cervical adenopathy. Skin:     General: Skin is warm and moist.      Capillary Refill: Capillary refill takes less than 2 seconds. Findings: No rash. Neurological:      General: No focal deficit present. Mental Status: He is alert and oriented to person, place, and time. Mental status is at baseline. Psychiatric:         Attention and Perception: Attention normal.         Mood and Affect: Mood normal.         Speech: Speech normal.         Behavior: Behavior normal. Behavior is cooperative. An electronic signature was used to authenticate this note.   --DONALDO Matta - CNP

## 2023-02-08 NOTE — PATIENT INSTRUCTIONS
Please read the healthy family handout that you were given and share it with your family. Please compare this printed medication list with your medications at home to be sure they are the same. If you have any medications that are different please contact us immediately at [phone]. Also review your allergies that we have listed, these may also include medications that you have not been able to tolerate, make sure everything listed is correct. If you have any allergies that are different please contact us immediately at [phone]. You may receive a survey in the mail or by email asking about your experience during your visit today. Please complete and return to us so we know how we are serving you.

## 2023-02-10 RX ORDER — ISOSORBIDE MONONITRATE 30 MG/1
TABLET, EXTENDED RELEASE ORAL
Qty: 90 TABLET | Refills: 1 | Status: SHIPPED | OUTPATIENT
Start: 2023-02-10

## 2023-03-01 ENCOUNTER — OFFICE VISIT (OUTPATIENT)
Dept: FAMILY MEDICINE CLINIC | Age: 58
End: 2023-03-01
Payer: MEDICARE

## 2023-03-01 VITALS
OXYGEN SATURATION: 93 % | BODY MASS INDEX: 27.97 KG/M2 | WEIGHT: 206.2 LBS | DIASTOLIC BLOOD PRESSURE: 77 MMHG | HEART RATE: 66 BPM | SYSTOLIC BLOOD PRESSURE: 126 MMHG | TEMPERATURE: 98.3 F

## 2023-03-01 DIAGNOSIS — E11.59 TYPE 2 DIABETES MELLITUS WITH OTHER CIRCULATORY COMPLICATION, WITHOUT LONG-TERM CURRENT USE OF INSULIN (HCC): Primary | ICD-10-CM

## 2023-03-01 DIAGNOSIS — I25.118 CORONARY ARTERY DISEASE OF NATIVE ARTERY OF NATIVE HEART WITH STABLE ANGINA PECTORIS (HCC): ICD-10-CM

## 2023-03-01 DIAGNOSIS — R74.8 ELEVATED LIVER ENZYMES: ICD-10-CM

## 2023-03-01 DIAGNOSIS — I10 ESSENTIAL HYPERTENSION: ICD-10-CM

## 2023-03-01 DIAGNOSIS — Z86.73 HISTORY OF CVA (CEREBROVASCULAR ACCIDENT): ICD-10-CM

## 2023-03-01 DIAGNOSIS — Z95.1 S/P CABG (CORONARY ARTERY BYPASS GRAFT): ICD-10-CM

## 2023-03-01 DIAGNOSIS — Z12.11 COLON CANCER SCREENING: ICD-10-CM

## 2023-03-01 DIAGNOSIS — E78.00 PURE HYPERCHOLESTEROLEMIA: ICD-10-CM

## 2023-03-01 LAB
A/G RATIO: 1.4 (ref 1.1–2.2)
ALBUMIN SERPL-MCNC: 4.2 G/DL (ref 3.4–5)
ALP BLD-CCNC: 126 U/L (ref 40–129)
ALT SERPL-CCNC: 104 U/L (ref 10–40)
ANION GAP SERPL CALCULATED.3IONS-SCNC: 12 MMOL/L (ref 3–16)
AST SERPL-CCNC: 75 U/L (ref 15–37)
BILIRUB SERPL-MCNC: 0.9 MG/DL (ref 0–1)
BUN BLDV-MCNC: 8 MG/DL (ref 7–20)
CALCIUM SERPL-MCNC: 9.7 MG/DL (ref 8.3–10.6)
CHLORIDE BLD-SCNC: 100 MMOL/L (ref 99–110)
CO2: 27 MMOL/L (ref 21–32)
CREAT SERPL-MCNC: 0.9 MG/DL (ref 0.9–1.3)
CREATININE URINE POCT: 300
GFR SERPL CREATININE-BSD FRML MDRD: >60 ML/MIN/{1.73_M2}
GLUCOSE BLD-MCNC: 138 MG/DL (ref 70–99)
MICROALBUMIN/CREAT 24H UR: 10 MG/G{CREAT}
MICROALBUMIN/CREAT UR-RTO: <30
POTASSIUM SERPL-SCNC: 4.4 MMOL/L (ref 3.5–5.1)
SODIUM BLD-SCNC: 139 MMOL/L (ref 136–145)
TOTAL PROTEIN: 7.1 G/DL (ref 6.4–8.2)

## 2023-03-01 PROCEDURE — 3074F SYST BP LT 130 MM HG: CPT | Performed by: NURSE PRACTITIONER

## 2023-03-01 PROCEDURE — 99214 OFFICE O/P EST MOD 30 MIN: CPT | Performed by: NURSE PRACTITIONER

## 2023-03-01 PROCEDURE — 3051F HG A1C>EQUAL 7.0%<8.0%: CPT | Performed by: NURSE PRACTITIONER

## 2023-03-01 PROCEDURE — 3078F DIAST BP <80 MM HG: CPT | Performed by: NURSE PRACTITIONER

## 2023-03-01 PROCEDURE — 36415 COLL VENOUS BLD VENIPUNCTURE: CPT | Performed by: NURSE PRACTITIONER

## 2023-03-01 PROCEDURE — 82044 UR ALBUMIN SEMIQUANTITATIVE: CPT | Performed by: NURSE PRACTITIONER

## 2023-03-01 ASSESSMENT — ENCOUNTER SYMPTOMS
ABDOMINAL PAIN: 0
RESPIRATORY NEGATIVE: 1
CHEST TIGHTNESS: 0
EYES NEGATIVE: 1
ALLERGIC/IMMUNOLOGIC NEGATIVE: 1
SHORTNESS OF BREATH: 0

## 2023-03-01 NOTE — PATIENT INSTRUCTIONS
Please read the healthy family handout that you were given and share it with your family.       Please compare this printed medication list with your medications at home to be sure they are the same.  If you have any medications that are different please contact us immediately at [phone].     Also review your allergies that we have listed, these may also include medications that you have not been able to tolerate, make sure everything listed is correct. If you have any allergies that are different please contact us immediately at [phone].     You may receive a survey in the mail or by email asking about your experience during your visit today. Please complete and return to us so we know how we are serving you.

## 2023-03-01 NOTE — PROGRESS NOTES
Subjective:      Patient ID: Flavio Moritz is a 61 y.o. male. Chief Complaint   Patient presents with    Check-Up    Diabetes    Hyperlipidemia    Hypertension        HPI    Patient presents today to f/u on chronic conditions. Patient reports he feels well and denies any problems or concerns. Diabetes Mellitus Type 2: Current symptoms/problems include none. Medication compliance:  compliant all of the time  Diabetic diet compliance:  compliant most of the time,  Weight trend: stable  Current exercise: active  Barriers: impairment:  physical: chronic angina    Home blood sugar records: fasting range: 120-140  Any episodes of hypoglycemia? no  Eye exam current (within one year): yes   reports that he has never smoked. He has never used smokeless tobacco.   Daily Aspirin? Yes    Lab Results   Component Value Date    LABA1C 7.4 01/31/2023    LABA1C 7.6 08/30/2022    LABA1C 6.7 05/27/2022     Lab Results   Component Value Date    LABMICR Not Indicated 12/26/2019    CREATININE 0.8 (L) 02/01/2023     Lab Results   Component Value Date     (H) 02/01/2023    AST 84 (H) 02/01/2023     Lab Results   Component Value Date    CHOL 99 02/01/2023    TRIG 153 (H) 02/01/2023    HDL 22 (L) 02/01/2023    LDLCALC 46 02/01/2023        Hypertension:  Home blood pressure monitoring: Yes - well controlled, -120, SBP 60-70. He is adherent to a low sodium diet. Patient denies chest pain, shortness of breath, headache, lightheadedness, blurred vision, peripheral edema, palpitations, dry cough, and fatigue. Antihypertensive medication side effects: no medication side effects noted. Use of agents associated with hypertension: none.                                         Sodium (mmol/L)   Date Value   02/01/2023 141    BUN (mg/dL)   Date Value   02/01/2023 11    Glucose (mg/dL)   Date Value   02/01/2023 145 (H)      Potassium reflex Magnesium (mmol/L)   Date Value   02/01/2023 4.3    Creatinine (mg/dL)   Date Value 02/01/2023 0.8 (L)         Hyperlipidemia:  No new myalgias or GI upset on atorvastatin (Lipitor). Medication compliance: compliant all of the time. Patient is  following a low fat, low cholesterol diet. He is not exercising regularly. Lab Results   Component Value Date    CHOL 99 02/01/2023    TRIG 153 (H) 02/01/2023    HDL 22 (L) 02/01/2023    LDLCALC 46 02/01/2023     Lab Results   Component Value Date     (H) 02/01/2023    AST 84 (H) 02/01/2023        Review of Systems   Constitutional: Negative. Negative for activity change, appetite change, chills, fatigue and unexpected weight change. HENT: Negative. Eyes: Negative. Respiratory: Negative. Negative for chest tightness and shortness of breath. Cardiovascular: Negative. Negative for chest pain, palpitations and leg swelling. Gastrointestinal:  Negative for abdominal pain. Endocrine: Negative. Genitourinary: Negative. Musculoskeletal: Negative. Skin: Negative. Negative for rash and wound. Allergic/Immunologic: Negative. Neurological: Negative. Negative for dizziness, light-headedness and headaches. Hematological: Negative. Psychiatric/Behavioral: Negative. Negative for dysphoric mood.       Patient Active Problem List   Diagnosis    Family history of early CAD    S/P CABG (coronary artery bypass graft)    Essential hypertension    Hyperlipidemia    CAD (coronary artery disease)    Microcytosis    Acute glaucoma of right eye    Acute chest pain    Paresthesia    Acute calculous cholecystitis    S/P laparoscopic cholecystectomy    Abnormal stress test    Pre-syncope    Angina, class II (HCC)    History of CVA (cerebrovascular accident)    Chest pain in adult    Diabetes mellitus ([facility] [address]    Migraines    Unstable angina ([facility] [address]    Chest pain       Outpatient Medications Marked as Taking for the 3/1/23 encounter (Office Visit) with DONALDO Fonseca CNP   Medication Sig Dispense Refill    isosorbide mononitrate (IMDUR) 30 MG extended release tablet TAKE 1 TABLET BY MOUTH EVERY DAY 90 tablet 1    metFORMIN (GLUCOPHAGE) 1000 MG tablet Take 1 tablet by mouth daily (with breakfast) 90 tablet 3    atorvastatin (LIPITOR) 80 MG tablet TAKE 1 TABLET BY MOUTH NIGHTLY 90 tablet 3    amLODIPine (NORVASC) 5 MG tablet TAKE 1 TABLET BY MOUTH EVERY DAY 90 tablet 3    metoprolol tartrate (LOPRESSOR) 50 MG tablet Take 1 tablet by mouth 2 times daily 180 tablet 3    cholestyramine (QUESTRAN) 4 g packet Take 1 packet by mouth 2 times daily (Patient taking differently: Take 1 packet by mouth Twice a Week) 180 packet 3    aspirin 81 MG tablet Take 81 mg by mouth daily         No Known Allergies    Social History     Tobacco Use    Smoking status: Never    Smokeless tobacco: Never   Substance Use Topics    Alcohol use: No       Objective:   /77   Pulse 66   Temp 98.3 °F (36.8 °C) (Oral)   Wt 206 lb 3.2 oz (93.5 kg)   SpO2 93%   BMI 27.97 kg/m²     Physical Exam  Vitals and nursing note reviewed. Constitutional:       General: He is not in acute distress. Appearance: Normal appearance. He is well-developed and well-groomed. He is not ill-appearing or toxic-appearing. HENT:      Head: Normocephalic and atraumatic. Eyes:      Extraocular Movements: Extraocular movements intact. Conjunctiva/sclera: Conjunctivae normal.   Cardiovascular:      Rate and Rhythm: Normal rate and regular rhythm. Pulses: Normal pulses. Dorsalis pedis pulses are 2+ on the right side and 2+ on the left side. Posterior tibial pulses are 2+ on the left side. Heart sounds: Normal heart sounds, S1 normal and S2 normal.   Pulmonary:      Effort: Pulmonary effort is normal. No accessory muscle usage or respiratory distress. Breath sounds: Normal breath sounds. Abdominal:      General: Bowel sounds are normal.      Palpations: Abdomen is soft. Musculoskeletal:      Cervical back: Neck supple.       Right lower leg: No edema. Left lower leg: No edema. Feet:      Right foot:      Protective Sensation: 10 sites tested. 10 sites sensed. Skin integrity: Skin integrity normal.      Toenail Condition: Right toenails are long. Left foot:      Protective Sensation: 10 sites tested. 10 sites sensed. Skin integrity: Skin integrity normal.      Toenail Condition: Left toenails are long. Lymphadenopathy:      Cervical: No cervical adenopathy. Skin:     General: Skin is warm and moist.      Capillary Refill: Capillary refill takes less than 2 seconds. Findings: No rash. Neurological:      General: No focal deficit present. Mental Status: He is alert and oriented to person, place, and time. Mental status is at baseline. Psychiatric:         Attention and Perception: Attention normal.         Mood and Affect: Mood normal.         Speech: Speech normal.         Behavior: Behavior normal. Behavior is cooperative. Assessment/Plan:   1. Type 2 diabetes mellitus with other circulatory complication, without long-term current use of insulin ([address]  Presents today to follow-up on chronic conditions. Patient reports he continues to monitor his blood pressure and blood sugar as noted above. A1c today is 7.4% which indicates glucose could be better controlled. Patient denies any hypoglycemic episodes. Recommend patient increase metformin to 2000 mg daily. Urine for microalbumin less than 30 which is within normal limits. Foot exam completed today with no sensory deficits noted. Advised patient to follow-up in office in 6 months or sooner with any problems or concerns. Patient verbalized understanding agreeable plan. - Comprehensive Metabolic Panel  - POCT microalbumin  - Diabetic Foot Exam  - metFORMIN (GLUCOPHAGE) 1000 MG tablet; Take 1 tablet by mouth 2 times daily (with meals)  Dispense: 180 tablet; Refill: 0    2.  Coronary artery disease of native artery of native heart with stable angina pectoris ([facility] [address]  Asymptomatic since hospitalization in January. Patient follows closely with cardiology. Blood pressure has been well controlled with amlodipine and metoprolol. Patient also continues to take Isorbid, aspirin and has nitroglycerin on hand if needed. Recommend patient continue with current treatment. 3. S/P CABG (coronary artery bypass graft)  See #2    4. Essential hypertension  Well-controlled with amlodipine and metoprolol. Continue current treatment. - Comprehensive Metabolic Panel    5. Pure hypercholesterolemia  Patient continues to take cholestyramine and atorvastatin without any new myalgias or GI upset. Reviewed lipids from 2/1/2023 which indicate excellent control. Recommend patient continue with current treatment. 6. History of CVA (cerebrovascular accident)  See #2 and #4.    7. Elevated liver enzymes  Patient was referred to GI and states he had to miss his last appointment however will schedule another appointment. - Comprehensive Metabolic Panel    8. Colon cancer screening  Recommend colonoscopy and patient is agreeable. Referral provided.   - ELAINE - Fany Mejia DO, Gastroenterology, [facility]

## 2023-05-08 ENCOUNTER — TELEPHONE (OUTPATIENT)
Dept: CARDIOLOGY CLINIC | Age: 58
End: 2023-05-08

## 2023-05-08 ENCOUNTER — TELEPHONE (OUTPATIENT)
Dept: FAMILY MEDICINE CLINIC | Age: 58
End: 2023-05-08

## 2023-05-08 NOTE — TELEPHONE ENCOUNTER
We typically recommend stopping aspirin 7 days prior to procedure. Please have him clarify with cardiology.

## 2023-05-08 NOTE — TELEPHONE ENCOUNTER
Pt is having a colonoscopy on 5/16 at 11am.  See attached paperwork. He is suppose to ask about blood thinners. When he should stop them and start them back up again.

## 2023-05-08 NOTE — TELEPHONE ENCOUNTER
Recommend continuing the aspirin 81 mg daily unless absolutely contraindicated.     Per Revised Cardiac Risk Index Buzz Needles Criteria):   Estimated Risk of Adverse Outcome with Non-cardiac Surgery: LOW RISK  Estimated Rate of Myocardial Infarction, Pulmonary Edema, Ventricular Fibrillation, Cardiac Arrest, or Complete Heart Block:  0.9%    Sundeep Waddell, APRN - CNP

## 2023-05-08 NOTE — TELEPHONE ENCOUNTER
CARDIAC CLEARANCE REQUEST    What type of procedure are you having:Colonoscopy    Are you taking any blood thinners:Aspirin    Type on anesthesia:na    When is your procedure scheduled for:5/16/2023    What physician is performing your procedure:  Dr. Yanelis Coronel    Phone Number:[phone]    Fax number to send the letter: [phone]    Please advise Pt at [phone] when to stop taking Aspirin

## 2023-05-10 RX ORDER — SODIUM CHLORIDE, SODIUM LACTATE, POTASSIUM CHLORIDE, CALCIUM CHLORIDE 600; 310; 30; 20 MG/100ML; MG/100ML; MG/100ML; MG/100ML
INJECTION, SOLUTION INTRAVENOUS CONTINUOUS
Status: CANCELLED | OUTPATIENT
Start: 2023-05-16

## 2023-05-16 ENCOUNTER — ANESTHESIA EVENT (OUTPATIENT)
Dept: ENDOSCOPY | Age: 58
End: 2023-05-16
Payer: MEDICARE

## 2023-05-16 ENCOUNTER — ANESTHESIA (OUTPATIENT)
Dept: ENDOSCOPY | Age: 58
End: 2023-05-16
Payer: MEDICARE

## 2023-05-16 ENCOUNTER — HOSPITAL ENCOUNTER (OUTPATIENT)
Age: 58
Setting detail: OUTPATIENT SURGERY
Discharge: HOME | End: 2023-05-16
Attending: INTERNAL MEDICINE | Admitting: INTERNAL MEDICINE
Payer: MEDICARE

## 2023-05-16 VITALS
TEMPERATURE: 98.6 F | RESPIRATION RATE: 16 BRPM | OXYGEN SATURATION: 94 % | BODY MASS INDEX: 27.09 KG/M2 | HEIGHT: 72 IN | HEART RATE: 63 BPM | WEIGHT: 200 LBS | DIASTOLIC BLOOD PRESSURE: 89 MMHG | SYSTOLIC BLOOD PRESSURE: 121 MMHG

## 2023-05-16 DIAGNOSIS — Z80.0 FAMILY HX OF COLON CANCER: ICD-10-CM

## 2023-05-16 PROCEDURE — 88305 TISSUE EXAM BY PATHOLOGIST: CPT

## 2023-05-16 PROCEDURE — 2580000003 HC RX 258: Performed by: ANESTHESIOLOGY

## 2023-05-16 PROCEDURE — 6360000002 HC RX W HCPCS: Performed by: NURSE ANESTHETIST, CERTIFIED REGISTERED

## 2023-05-16 PROCEDURE — 3700000001 HC ADD 15 MINUTES (ANESTHESIA): Performed by: INTERNAL MEDICINE

## 2023-05-16 PROCEDURE — 7100000011 HC PHASE II RECOVERY - ADDTL 15 MIN: Performed by: INTERNAL MEDICINE

## 2023-05-16 PROCEDURE — 3609010600 HC COLONOSCOPY POLYPECTOMY SNARE/COLD BIOPSY: Performed by: INTERNAL MEDICINE

## 2023-05-16 PROCEDURE — 3700000000 HC ANESTHESIA ATTENDED CARE: Performed by: INTERNAL MEDICINE

## 2023-05-16 PROCEDURE — 7100000010 HC PHASE II RECOVERY - FIRST 15 MIN: Performed by: INTERNAL MEDICINE

## 2023-05-16 PROCEDURE — 2500000003 HC RX 250 WO HCPCS: Performed by: NURSE ANESTHETIST, CERTIFIED REGISTERED

## 2023-05-16 PROCEDURE — 2709999900 HC NON-CHARGEABLE SUPPLY: Performed by: INTERNAL MEDICINE

## 2023-05-16 RX ORDER — LIDOCAINE HYDROCHLORIDE 10 MG/ML
0.3 INJECTION, SOLUTION EPIDURAL; INFILTRATION; INTRACAUDAL; PERINEURAL
Status: DISCONTINUED | OUTPATIENT
Start: 2023-05-16 | End: 2023-05-16 | Stop reason: HOSPADM

## 2023-05-16 RX ORDER — PROPOFOL 10 MG/ML
INJECTION, EMULSION INTRAVENOUS PRN
Status: DISCONTINUED | OUTPATIENT
Start: 2023-05-16 | End: 2023-05-16 | Stop reason: SDUPTHER

## 2023-05-16 RX ORDER — SODIUM CHLORIDE 0.9 % (FLUSH) 0.9 %
5-40 SYRINGE (ML) INJECTION PRN
Status: DISCONTINUED | OUTPATIENT
Start: 2023-05-16 | End: 2023-05-16 | Stop reason: HOSPADM

## 2023-05-16 RX ORDER — SODIUM CHLORIDE 9 MG/ML
INJECTION, SOLUTION INTRAVENOUS PRN
Status: DISCONTINUED | OUTPATIENT
Start: 2023-05-16 | End: 2023-05-16 | Stop reason: HOSPADM

## 2023-05-16 RX ORDER — LIDOCAINE HYDROCHLORIDE 20 MG/ML
INJECTION, SOLUTION INFILTRATION; PERINEURAL PRN
Status: DISCONTINUED | OUTPATIENT
Start: 2023-05-16 | End: 2023-05-16 | Stop reason: SDUPTHER

## 2023-05-16 RX ORDER — SODIUM CHLORIDE 0.9 % (FLUSH) 0.9 %
5-40 SYRINGE (ML) INJECTION EVERY 12 HOURS SCHEDULED
Status: DISCONTINUED | OUTPATIENT
Start: 2023-05-16 | End: 2023-05-16 | Stop reason: HOSPADM

## 2023-05-16 RX ORDER — SODIUM CHLORIDE, SODIUM LACTATE, POTASSIUM CHLORIDE, CALCIUM CHLORIDE 600; 310; 30; 20 MG/100ML; MG/100ML; MG/100ML; MG/100ML
INJECTION, SOLUTION INTRAVENOUS CONTINUOUS
Status: DISCONTINUED | OUTPATIENT
Start: 2023-05-16 | End: 2023-05-16 | Stop reason: HOSPADM

## 2023-05-16 RX ADMIN — SODIUM CHLORIDE, POTASSIUM CHLORIDE, SODIUM LACTATE AND CALCIUM CHLORIDE: 600; 310; 30; 20 INJECTION, SOLUTION INTRAVENOUS at 12:46

## 2023-05-16 RX ADMIN — PROPOFOL 200 MG: 10 INJECTION, EMULSION INTRAVENOUS at 12:46

## 2023-05-16 RX ADMIN — LIDOCAINE HYDROCHLORIDE 100 MG: 20 INJECTION, SOLUTION INFILTRATION; PERINEURAL at 12:46

## 2023-05-16 ASSESSMENT — PAIN SCALES - GENERAL: PAINLEVEL_OUTOF10: 0

## 2023-05-16 ASSESSMENT — PAIN - FUNCTIONAL ASSESSMENT: PAIN_FUNCTIONAL_ASSESSMENT: NONE - DENIES PAIN

## 2023-05-16 NOTE — DISCHARGE INSTRUCTIONS
PATIENT INSTRUCTIONS  POST-SEDATION    David Moreno          IMMEDIATELY FOLLOWING PROCEDURE:    Do not drive or operate machinery for the first twenty four hours after surgery. Do not make any important decisions for twenty four hours after surgery or while taking narcotic pain medications or sedatives. You should NOT BE LEFT UNATTENDED OR ALONE. A responsible adult should be with you for the rest of the day of your procedure and also during the night for your protection and safety. You may experience some light headedness. Rest at home with activity as tolerated. You may not need to go to bed, but it is important to rest for the next 24 hours. You should not engage in athletic sports such as basketball, volleyball, jogging, skating, or activities requiring refined motor skills for 24 hours. If you develop intractable nausea and vomiting or a severe headache please notify your doctor immediately. You are not expected to have any fever, but if you feel warm, take your temperature. If you have a fever 101 degrees or higher, call your doctor. *If you have had a colonoscopy, do not expect a normal bowel movement for approximately three days due to the cleansing of the large intestine prior to colonoscopy. ONCE YOU ARE HOME, IF YOU SHOULD HAVE:  Difficulty in breathing, persistent nausea or vomiting, bleeding you feel is excessive, or pain that is unusual, increased abdominal bloating, or any swelling, fever / chills, call your physician. If you cannot contact your physician, but feel that your signs and symptoms need a physician's attention, go to the Emergency Department. FOLLOW-UP:    Please follow up with your PCP as scheduled or needed. Dr. Bam mcdonald will call you with the biopsy findings. Call Dr. Carlito Lovelace if there are any GI concerns. [phone]      Repeat Colonoscopy in 3 years.

## 2023-05-16 NOTE — PROGRESS NOTES
Discharge instructions given to patient's brother Valdemar Drain at this time, he states understanding and denies any questions.

## 2023-05-16 NOTE — ANESTHESIA PRE PROCEDURE
Department of Anesthesiology  Preprocedure Note       Name:  Sheryl Vallejo   Age:  61 y.o.  :  1965                                          MRN:  [id number]         Date:  2023      Surgeon: Raudel Nevarez):  Janki Buckner DO    Procedure: Procedure(s):  COLON W/ANES. (12:00)    Medications prior to admission:   Prior to Admission medications    Medication Sig Start Date End Date Taking? Authorizing Provider   metFORMIN (GLUCOPHAGE) 1000 MG tablet Take 1 tablet by mouth 2 times daily (with meals) 3/1/23 5/30/23  DONALDO Boothe CNP   isosorbide mononitrate (IMDUR) 30 MG extended release tablet TAKE 1 TABLET BY MOUTH EVERY DAY 2/10/23   DONALDO Boothe CNP   atorvastatin (LIPITOR) 80 MG tablet TAKE 1 TABLET BY MOUTH NIGHTLY 23   DONALDO Boothe CNP   amLODIPine (NORVASC) 5 MG tablet TAKE 1 TABLET BY MOUTH EVERY DAY 22   Marcelle Jeff MD   metoprolol tartrate (LOPRESSOR) 50 MG tablet Take 1 tablet by mouth 2 times daily 22   Marcelle Jeff MD   cholestyramine Gabrielle Kayser) 4 g packet Take 1 packet by mouth 2 times daily  Patient taking differently: Take 1 packet by mouth Twice a Week 22   DONALDO Boothe CNP   nitroGLYCERIN (NITROSTAT) 0.4 MG SL tablet up to max of 3 total doses. If no relief after 1 dose, call 911. Patient not taking: No sig reported 21   Marcelle Jeff MD   aspirin 81 MG tablet Take 81 mg by mouth daily    Historical Provider, MD       Current medications:    No current facility-administered medications for this encounter.      Current Outpatient Medications   Medication Sig Dispense Refill    metFORMIN (GLUCOPHAGE) 1000 MG tablet Take 1 tablet by mouth 2 times daily (with meals) 180 tablet 0    isosorbide mononitrate (IMDUR) 30 MG extended release tablet TAKE 1 TABLET BY MOUTH EVERY DAY 90 tablet 1    atorvastatin (LIPITOR) 80 MG tablet TAKE 1 TABLET BY MOUTH NIGHTLY 90 tablet 3    amLODIPine (NORVASC) 5 MG

## 2023-05-16 NOTE — PROCEDURES
COLONOSCOPY     Patient: Amara Lenó MRN: [id number]   YOB: 1965 Age: 61 y.o. Sex: male   Unit: [facility] ENDOSCOPY Room/Bed: ENDO/NONE Location: [address]    Admitting Physician: Liza Cristobal     Primary Care Physician: DONALDO Zhong CNP      DATE OF PROCEDURE: 5/16/2023  PROCEDURE: Colonoscopy    PREOPERATIVE DIAGNOSIS: Family hx of colon cancer [Z80.0]  HPI: This is a 61y.o. year old male who presents today with Hx of colon Ca. ENDOSCOPIST: Jagdeep Chaidez DO    POSTOPERATIVE DIAGNOSIS:    Multiple small polyps throughout the colon  Internal hemorrhoids    PLAN:   Await results of biopsies    INFORMED CONSENT:  Informed consent for colonoscopy was obtained. The benefits and risks including adverse medicine reaction and perforation have been explained. The patient's questions were answered and the patient agreed to proceed. ASA: ASA 2 - Patient with mild systemic disease with no functional limitations     SEDATION: MAC    The patient's vital signs, cardiac status, pulmonary status, abdominal status and mental status were stable for the procedure. The patient's vital signs and respiratory function as monitored by oxygen saturation remained stable. COLON PREPARATION:  The patient was given a split colon preparation and the preparation was adequate. Procedure Details: An anal exam was performed and this was unremarkable. A digital rectal exam was performed and no masses palpated. The Olympus videocolonoscope  was inserted in the rectum and carefully advanced to the cecum as identified by IC valve, crow's foot appearance and appendix. The cecum was photodocumented. The colonoscope was slowly withdrawn and retrograde examination of the colon was carefully performed with inspection around and between folds. The ascending colon and cecum were intubated twice with repeat antegrade and retrograde examination.     Retroflexion in the rectum was

## 2023-05-16 NOTE — ANESTHESIA POSTPROCEDURE EVALUATION
Department of Anesthesiology  Postprocedure Note    Patient: Taryn Walter  MRN: [id number]  YOB: 1965  Date of evaluation: 5/16/2023      Procedure Summary     Date: 05/16/23 Room / Location: [facility] ENDO 01 / [facility]    Anesthesia Start: 5287 Anesthesia Stop: 0571    Procedure: COLONOSCOPY POLYPECTOMY SNARE/COLD BIOPSY Diagnosis:       Family hx of colon cancer      (Family hx of colon cancer [Z80.0])    Surgeons: Sebas Resendez DO Responsible Provider: Rupert Lao MD    Anesthesia Type: MAC ASA Status: 3          Anesthesia Type: No value filed.     Soco Phase I: Soco Score: 10    Soco Phase II: Soco Score: 10      Anesthesia Post Evaluation    Patient location during evaluation: bedside  Level of consciousness: awake  Airway patency: patent  Nausea & Vomiting: no nausea  Complications: no  Cardiovascular status: blood pressure returned to baseline  Respiratory status: acceptable  Hydration status: euvolemic

## 2023-07-28 DIAGNOSIS — E11.59 TYPE 2 DIABETES MELLITUS WITH OTHER CIRCULATORY COMPLICATION, WITHOUT LONG-TERM CURRENT USE OF INSULIN (HCC): ICD-10-CM

## 2023-07-28 RX ORDER — AMLODIPINE BESYLATE 5 MG/1
5 TABLET ORAL DAILY
Qty: 90 TABLET | Refills: 3 | Status: SHIPPED | OUTPATIENT
Start: 2023-07-28

## 2023-07-28 RX ORDER — ISOSORBIDE MONONITRATE 30 MG/1
30 TABLET, EXTENDED RELEASE ORAL DAILY
Qty: 90 TABLET | Refills: 1 | Status: SHIPPED | OUTPATIENT
Start: 2023-07-28

## 2023-07-28 NOTE — TELEPHONE ENCOUNTER
Pt is requesting refills of:    amLODIPine (NORVASC) 5 MG tablet   metoprolol tartrate (LOPRESSOR) 50 MG tablet     Preferred pharmacy is:    [address]    Last ov 08/10/2022 npdd. Next ov 08/10/2023 mgh. Pt is been out of the Amlodipine for 3-4 days.

## 2023-07-31 DIAGNOSIS — I10 ESSENTIAL HYPERTENSION: ICD-10-CM

## 2023-07-31 RX ORDER — METOPROLOL TARTRATE 50 MG/1
50 TABLET, FILM COATED ORAL 2 TIMES DAILY
Qty: 180 TABLET | Refills: 3 | Status: SHIPPED | OUTPATIENT
Start: 2023-07-31

## 2023-08-03 ENCOUNTER — TELEMEDICINE (OUTPATIENT)
Dept: FAMILY MEDICINE CLINIC | Age: 58
End: 2023-08-03
Payer: MEDICARE

## 2023-08-03 DIAGNOSIS — Z00.00 MEDICARE ANNUAL WELLNESS VISIT, SUBSEQUENT: Primary | ICD-10-CM

## 2023-08-03 PROCEDURE — G0439 PPPS, SUBSEQ VISIT: HCPCS | Performed by: NURSE PRACTITIONER

## 2023-08-03 ASSESSMENT — LIFESTYLE VARIABLES: HOW OFTEN DO YOU HAVE A DRINK CONTAINING ALCOHOL: NEVER

## 2023-08-03 ASSESSMENT — PATIENT HEALTH QUESTIONNAIRE - PHQ9
SUM OF ALL RESPONSES TO PHQ QUESTIONS 1-9: 0
SUM OF ALL RESPONSES TO PHQ QUESTIONS 1-9: 0
SUM OF ALL RESPONSES TO PHQ9 QUESTIONS 1 & 2: 0
2. FEELING DOWN, DEPRESSED OR HOPELESS: 0
SUM OF ALL RESPONSES TO PHQ QUESTIONS 1-9: 0
SUM OF ALL RESPONSES TO PHQ QUESTIONS 1-9: 0
1. LITTLE INTEREST OR PLEASURE IN DOING THINGS: 0

## 2023-08-03 NOTE — PATIENT INSTRUCTIONS
Please read the healthy family handout that you were given and share it with your family. Please compare this printed medication list with your medications at home to be sure they are the same. If you have any medications that are different please contact us immediately at [phone]. Also review your allergies that we have listed, these may also include medications that you have not been able to tolerate, make sure everything listed is correct. If you have any allergies that are different please contact us immediately at [phone]. You may receive a survey in the mail or by email asking about your experience during your visit today. Please complete and return to us so we know how we are serving you. Learning About Vision Tests  What are vision tests? The four most common vision tests are visual acuity tests, refraction, visual field tests, and color vision tests. Visual acuity (sharpness) tests  These tests are used: To see if you need glasses or contact lenses. To monitor an eye problem. To check an eye injury. Visual acuity tests are done as part of routine exams. You may also have this test when you get your 's license or apply for some types of jobs. Visual field tests  These tests are used: To check for vision loss in any area of your range of vision. To screen for certain eye diseases. To look for nerve damage after a stroke, head injury, or other problem that could reduce blood flow to the brain. Refraction and color tests  A refraction test is done to find the right prescription for glasses and contact lenses. A color vision test is done to check for color blindness. Color vision is often tested as part of a routine exam. You may also have this test when you apply for a job where recognizing different colors is important, such as , electronics, or the [employer]. How are vision tests done? Visual acuity test   You cover one eye at a time.   You read

## 2023-08-03 NOTE — PROGRESS NOTES
Sig Taking? Authorizing Provider   metoprolol tartrate (LOPRESSOR) 50 MG tablet Take 1 tablet by mouth 2 times daily Yes DONALDO Fuentes CNP   isosorbide mononitrate (IMDUR) 30 MG extended release tablet Take 1 tablet by mouth daily Yes DONALDO Cody CNP   metFORMIN (GLUCOPHAGE) 1000 MG tablet Take 1 tablet by mouth 2 times daily (with meals) Yes DONALDO Mcdaniel CNP   amLODIPine (NORVASC) 5 MG tablet Take 1 tablet by mouth daily Yes DONALDO Fuentes CNP   atorvastatin (LIPITOR) 80 MG tablet TAKE 1 TABLET BY MOUTH NIGHTLY Yes DONALDO Cody CNP   cholestyramine (QUESTRAN) 4 g packet Take 1 packet by mouth 2 times daily  Patient taking differently: Take 1 packet by mouth Twice a Week Yes DONALDO Cody CNP   nitroGLYCERIN (NITROSTAT) 0.4 MG SL tablet up to max of 3 total doses. If no relief after 1 dose, call 911. Yes Xin Vidal MD   aspirin 81 MG tablet Take 1 tablet by mouth daily Yes Historical Provider, MD Villaseñor (Including outside providers/suppliers regularly involved in providing care):   Patient Care Team:  DONALDO Mcdaniel CNP as PCP - General (Family Medicine)  DONALDO Mcdaniel CNP as PCP - Empaneled Provider  Fitz Evans MD (Orthopedic Surgery)  [facility] as Physician Assistant (Orthopedic Surgery)  Joseph Leroy MD as Consulting Physician (Cardiology)     Reviewed and updated this visit:  Allergies  Meds  Problems          Tyshawn Whitley, was evaluated through a phone encounter. The patient (or guardian if applicable) is aware that this is a billable service, which includes applicable co-pays. This Virtual Visit was conducted with patient's (and/or legal guardian's) consent. Patient identification was verified, and a caregiver was present when appropriate.    The patient was located at Home: [address] [place] [id number]  Provider was located at Home ([address] [place] [address]

## 2023-08-09 NOTE — PROGRESS NOTES
[facility]   Cardiac Followup    Referring Provider:  DONALDO Mead - CNP     Chief Complaint   Patient presents with    Follow-up    Coronary Artery Disease    Hyperlipidemia    Hypertension      Darlin Peck   1965      History of Present Illness:   Darlin Peck is a 60 y.o. male who is here today for follow up for a past medical history of coronary artery disease- S/P CABG X5 2016, PCI to LCx 2020, hypertension, hyperlipidemia, and diabetes mellitus. Patient had repeat left heart cath 7/2020- patent grafts, OM3 95% ostium, jailed by stent. Echocardiogram 8/2022 showed an ef of 55%. Today he he states he has been feeling well since his last visit. No issues with chest pains. He is no longer taking Ranexa, unsure why it was stopped. He is tolerating his medications and taking them as prescribed. He stays active mowing grass and weed eating. Patient currently denies any weight gain, edema, palpitations, chest pain, shortness of breath, dizziness, and syncope.      ,   Past Medical History:   has a past medical history of CAD (coronary artery disease), Diabetes mellitus ([address]), History of CVA (cerebrovascular accident), Hyperlipidemia, Hypertension, MI (myocardial infarction) ([address]), No history of procedure, and Pneumonia due to organism. Surgical History:   has a past surgical history that includes Coronary artery bypass graft (09/20/2016); Eye surgery (Right); Colonoscopy (06/01/2017); hernia repair (Bilateral, 2005); Cholecystectomy, laparoscopic (N/A, 4/17/2019); Percutaneous Transluminal Coronary Angio (03/09/2020); and Colonoscopy (N/A, 5/16/2023). Social History:   reports that he has never smoked. He has never used smokeless tobacco. He reports that he does not drink alcohol and does not use drugs. Family History:  family history includes Cancer in his mother; Diabetes type 2  in his father; Heart Disease in his brother and father.      Home Medications:  Prior to

## 2023-08-10 ENCOUNTER — OFFICE VISIT (OUTPATIENT)
Dept: CARDIOLOGY CLINIC | Age: 58
End: 2023-08-10
Payer: MEDICARE

## 2023-08-10 VITALS
SYSTOLIC BLOOD PRESSURE: 129 MMHG | BODY MASS INDEX: 27.36 KG/M2 | WEIGHT: 202 LBS | DIASTOLIC BLOOD PRESSURE: 66 MMHG | OXYGEN SATURATION: 92 % | HEART RATE: 72 BPM | HEIGHT: 72 IN

## 2023-08-10 DIAGNOSIS — Z95.1 S/P CABG (CORONARY ARTERY BYPASS GRAFT): ICD-10-CM

## 2023-08-10 DIAGNOSIS — I65.23 BILATERAL CAROTID ARTERY STENOSIS: ICD-10-CM

## 2023-08-10 DIAGNOSIS — I25.118 CORONARY ARTERY DISEASE OF NATIVE ARTERY OF NATIVE HEART WITH STABLE ANGINA PECTORIS (HCC): Chronic | ICD-10-CM

## 2023-08-10 DIAGNOSIS — E78.00 PURE HYPERCHOLESTEROLEMIA: Primary | Chronic | ICD-10-CM

## 2023-08-10 PROCEDURE — 3078F DIAST BP <80 MM HG: CPT | Performed by: INTERNAL MEDICINE

## 2023-08-10 PROCEDURE — 3074F SYST BP LT 130 MM HG: CPT | Performed by: INTERNAL MEDICINE

## 2023-08-10 PROCEDURE — 99214 OFFICE O/P EST MOD 30 MIN: CPT | Performed by: INTERNAL MEDICINE

## 2023-08-10 NOTE — PATIENT INSTRUCTIONS
Plan:  ~Carotid dopplers   Cardiac medications reviewed including indications and pertinent side effects. Medication list updated at this visit. Check blood pressure and heart rate at home a few times per week- keep a log with dates and times and bring to office visit   Regular exercise and following a healthy diet encouraged   Follow up with me in 1 year     Your provider has ordered testing for further evaluation. An order/prescription has been included in your paper work. To schedule outpatient testing, contact Central Scheduling by calling [address] [place] [address] ([phone]).

## 2023-08-29 ENCOUNTER — HOSPITAL ENCOUNTER (OUTPATIENT)
Dept: VASCULAR LAB | Age: 58
Discharge: HOME | End: 2023-08-29
Attending: INTERNAL MEDICINE
Payer: MEDICARE

## 2023-08-29 DIAGNOSIS — I65.23 BILATERAL CAROTID ARTERY STENOSIS: ICD-10-CM

## 2023-08-29 PROCEDURE — 93880 EXTRACRANIAL BILAT STUDY: CPT

## 2023-08-30 ENCOUNTER — TELEPHONE (OUTPATIENT)
Dept: CARDIOLOGY CLINIC | Age: 58
End: 2023-08-30

## 2023-08-30 NOTE — TELEPHONE ENCOUNTER
----- Message from Hollie Loja MD sent at 8/30/2023  9:40 AM EDT -----  Please notify patient that their scan show stable, mild carotid plaque  Repeat in 1-2 years

## 2023-09-05 ENCOUNTER — OFFICE VISIT (OUTPATIENT)
Dept: FAMILY MEDICINE CLINIC | Age: 58
End: 2023-09-05
Payer: MEDICARE

## 2023-09-05 VITALS
HEART RATE: 63 BPM | BODY MASS INDEX: 27.56 KG/M2 | OXYGEN SATURATION: 94 % | WEIGHT: 203.2 LBS | SYSTOLIC BLOOD PRESSURE: 115 MMHG | TEMPERATURE: 98.4 F | DIASTOLIC BLOOD PRESSURE: 76 MMHG

## 2023-09-05 DIAGNOSIS — H61.23 BILATERAL IMPACTED CERUMEN: ICD-10-CM

## 2023-09-05 DIAGNOSIS — I25.118 CORONARY ARTERY DISEASE OF NATIVE ARTERY OF NATIVE HEART WITH STABLE ANGINA PECTORIS (HCC): Primary | Chronic | ICD-10-CM

## 2023-09-05 DIAGNOSIS — E11.59 TYPE 2 DIABETES MELLITUS WITH OTHER CIRCULATORY COMPLICATION, WITHOUT LONG-TERM CURRENT USE OF INSULIN (HCC): Chronic | ICD-10-CM

## 2023-09-05 DIAGNOSIS — R74.8 ELEVATED LIVER ENZYMES: ICD-10-CM

## 2023-09-05 DIAGNOSIS — I10 ESSENTIAL HYPERTENSION: ICD-10-CM

## 2023-09-05 DIAGNOSIS — Z95.1 S/P CABG (CORONARY ARTERY BYPASS GRAFT): ICD-10-CM

## 2023-09-05 DIAGNOSIS — E78.00 PURE HYPERCHOLESTEROLEMIA: Chronic | ICD-10-CM

## 2023-09-05 LAB
ALBUMIN SERPL-MCNC: 4.5 G/DL (ref 3.4–5)
ALBUMIN/GLOB SERPL: 1.6 {RATIO} (ref 1.1–2.2)
ALP SERPL-CCNC: 98 U/L (ref 40–129)
ALT SERPL-CCNC: 109 U/L (ref 10–40)
ANION GAP SERPL CALCULATED.3IONS-SCNC: 15 MMOL/L (ref 3–16)
AST SERPL-CCNC: 97 U/L (ref 15–37)
BILIRUB SERPL-MCNC: 0.7 MG/DL (ref 0–1)
BUN SERPL-MCNC: 7 MG/DL (ref 7–20)
CALCIUM SERPL-MCNC: 9.7 MG/DL (ref 8.3–10.6)
CHLORIDE SERPL-SCNC: 104 MMOL/L (ref 99–110)
CO2 SERPL-SCNC: 22 MMOL/L (ref 21–32)
CREAT SERPL-MCNC: 0.9 MG/DL (ref 0.9–1.3)
GFR SERPLBLD CREATININE-BSD FMLA CKD-EPI: >60 ML/MIN/{1.73_M2}
GLUCOSE SERPL-MCNC: 112 MG/DL (ref 70–99)
HBA1C MFR BLD: 6.6 %
POTASSIUM SERPL-SCNC: 4.3 MMOL/L (ref 3.5–5.1)
PROT SERPL-MCNC: 7.3 G/DL (ref 6.4–8.2)
SODIUM SERPL-SCNC: 141 MMOL/L (ref 136–145)

## 2023-09-05 PROCEDURE — 69209 REMOVE IMPACTED EAR WAX UNI: CPT | Performed by: NURSE PRACTITIONER

## 2023-09-05 PROCEDURE — 99214 OFFICE O/P EST MOD 30 MIN: CPT | Performed by: NURSE PRACTITIONER

## 2023-09-05 PROCEDURE — 3044F HG A1C LEVEL LT 7.0%: CPT | Performed by: NURSE PRACTITIONER

## 2023-09-05 PROCEDURE — 36415 COLL VENOUS BLD VENIPUNCTURE: CPT | Performed by: NURSE PRACTITIONER

## 2023-09-05 PROCEDURE — 3074F SYST BP LT 130 MM HG: CPT | Performed by: NURSE PRACTITIONER

## 2023-09-05 PROCEDURE — 3078F DIAST BP <80 MM HG: CPT | Performed by: NURSE PRACTITIONER

## 2023-09-05 PROCEDURE — 83036 HEMOGLOBIN GLYCOSYLATED A1C: CPT | Performed by: NURSE PRACTITIONER

## 2023-09-05 ASSESSMENT — ENCOUNTER SYMPTOMS
RESPIRATORY NEGATIVE: 1
CHEST TIGHTNESS: 0
EYES NEGATIVE: 1
ABDOMINAL PAIN: 0
ALLERGIC/IMMUNOLOGIC NEGATIVE: 1
SHORTNESS OF BREATH: 0

## 2023-09-05 NOTE — PATIENT INSTRUCTIONS
Please read the healthy family handout that you were given and share it with your family. Please compare this printed medication list with your medications at home to be sure they are the same. If you have any medications that are different please contact us immediately at [phone]. Also review your allergies that we have listed, these may also include medications that you have not been able to tolerate, make sure everything listed is correct. If you have any allergies that are different please contact us immediately at [phone]. You may receive a survey in the mail or by email asking about your experience during your visit today. Please complete and return to us so we know how we are serving you.

## 2023-09-05 NOTE — PROGRESS NOTES
normal. Behavior is cooperative. Assessment/Plan:   1. Coronary artery disease of native artery of native heart with stable angina pectoris [facility])  Patient presents today to follow-up on chronic conditions. Patient denies any recent chest pain, shortness of breath, palpitations, peripheral edema, dizziness, lightheadedness or syncope. Patient follows with cardiology. Patient states he has been active with yard work and denies any recurrent angina. Patient continues to take metoprolol, Isorbid, atorvastatin, aspirin and has nitroglycerin on hand. Recommend patient continue with current treatment. 2. Type 2 diabetes mellitus with other circulatory complication, without long-term current use of insulin ([address])  Patient continues to take metformin daily without any hypoglycemic episodes. Patient does monitor blood sugars intermittently and reports they have been well controlled as noted above. A1c today is 6.6% which indicates good glucose control. Recommend patient continue with current treatment. Patient verbalized understanding agreeable to plan. - POCT glycosylated hemoglobin (Hb A1C)    3. Essential hypertension  Blood pressure has been well controlled with metoprolol, Isorbid, and amlodipine. Patient is tolerating medication without side effects. Recommend patient continue with current treatment. - Comprehensive Metabolic Panel    4. Pure hypercholesterolemia  Patient continues to take a atorvastatin without any new myalgias or GI upset. Reviewed lipids from 2/1/2023 which indicate good control with current treatment. Recommend patient continue with current medication regimen. 5. Elevated liver enzymes  Asymptomatic. Monitor.  - Comprehensive Metabolic Panel    6. S/P CABG (coronary artery bypass graft)  See #1    7. Bilateral impacted cerumen  Patient with complaints of decreased hearing and bilateral ear fullness. On exam noted bilateral cerumen impaction.   Both ears irrigated and

## 2024-01-08 RX ORDER — ATORVASTATIN CALCIUM 80 MG/1
80 TABLET, FILM COATED ORAL NIGHTLY
Qty: 90 TABLET | Refills: 3 | Status: SHIPPED | OUTPATIENT
Start: 2024-01-08

## 2024-01-08 NOTE — TELEPHONE ENCOUNTER
Future appt scheduled 03/05/2024                    Last appt 09/05/2023      Last Written 01/18/2023    atorvastatin (LIPITOR) 80 MG tablet  #90  3 RF

## 2024-01-15 RX ORDER — ISOSORBIDE MONONITRATE 30 MG/1
30 TABLET, EXTENDED RELEASE ORAL DAILY
Qty: 90 TABLET | Refills: 1 | Status: SHIPPED | OUTPATIENT
Start: 2024-01-15

## 2024-01-15 NOTE — TELEPHONE ENCOUNTER
Refill Request     CONFIRM preferrred pharmacy with the patient.    If Mail Order Rx - Pend for 90 day refill.      Last Seen: Last Seen Department: 9/5/2023  Last Seen by PCP: 9/5/2023    Last Written: 7/28/2023    If no future appointment scheduled, route STAFF MESSAGE with patient name to the  Pool for scheduling.      Next Appointment:   Future Appointments   Date Time Provider Department Center   3/5/2024  8:00 AM Radha Haji, APRN - CNP [facility]       Message sent to  to schedule appt with patient?  NO      Requested Prescriptions     Pending Prescriptions Disp Refills    isosorbide mononitrate (IMDUR) 30 MG extended release tablet [Pharmacy Med Name: ISOSORBIDE MONONIT ER 30 MG TB] 90 tablet 1     Sig: TAKE 1 TABLET BY MOUTH EVERY DAY

## 2024-02-15 DIAGNOSIS — E11.59 TYPE 2 DIABETES MELLITUS WITH OTHER CIRCULATORY COMPLICATION, WITHOUT LONG-TERM CURRENT USE OF INSULIN (HCC): ICD-10-CM

## 2024-02-15 NOTE — TELEPHONE ENCOUNTER
Future appt scheduled 03/05/2024                      Last appt 09/05/2023      Last Written 07/28/2023    metFORMIN (GLUCOPHAGE) 1000 MG tablet  #180  1 RF

## 2024-03-05 ENCOUNTER — OFFICE VISIT (OUTPATIENT)
Dept: FAMILY MEDICINE CLINIC | Age: 59
End: 2024-03-05
Payer: MEDICARE

## 2024-03-05 VITALS
BODY MASS INDEX: 26.72 KG/M2 | WEIGHT: 197 LBS | OXYGEN SATURATION: 95 % | DIASTOLIC BLOOD PRESSURE: 82 MMHG | HEART RATE: 60 BPM | SYSTOLIC BLOOD PRESSURE: 124 MMHG

## 2024-03-05 DIAGNOSIS — Z86.73 HISTORY OF CVA (CEREBROVASCULAR ACCIDENT): ICD-10-CM

## 2024-03-05 DIAGNOSIS — Z12.5 PROSTATE CANCER SCREENING: ICD-10-CM

## 2024-03-05 DIAGNOSIS — I65.23 BILATERAL CAROTID ARTERY STENOSIS: ICD-10-CM

## 2024-03-05 DIAGNOSIS — I10 ESSENTIAL HYPERTENSION: ICD-10-CM

## 2024-03-05 DIAGNOSIS — E78.00 PURE HYPERCHOLESTEROLEMIA: Chronic | ICD-10-CM

## 2024-03-05 DIAGNOSIS — I25.118 CORONARY ARTERY DISEASE OF NATIVE ARTERY OF NATIVE HEART WITH STABLE ANGINA PECTORIS (HCC): Primary | Chronic | ICD-10-CM

## 2024-03-05 DIAGNOSIS — E11.59 TYPE 2 DIABETES MELLITUS WITH OTHER CIRCULATORY COMPLICATION, WITHOUT LONG-TERM CURRENT USE OF INSULIN (HCC): ICD-10-CM

## 2024-03-05 DIAGNOSIS — Z95.1 S/P CABG (CORONARY ARTERY BYPASS GRAFT): ICD-10-CM

## 2024-03-05 LAB
ALBUMIN SERPL-MCNC: 4.3 G/DL (ref 3.4–5)
ALBUMIN/GLOB SERPL: 1.7 {RATIO} (ref 1.1–2.2)
ALP SERPL-CCNC: 95 U/L (ref 40–129)
ALT SERPL-CCNC: 68 U/L (ref 10–40)
ANION GAP SERPL CALCULATED.3IONS-SCNC: 13 MMOL/L (ref 3–16)
AST SERPL-CCNC: 54 U/L (ref 15–37)
BASOPHILS # BLD: 0.1 K/UL (ref 0–0.2)
BASOPHILS NFR BLD: 0.9 %
BILIRUB SERPL-MCNC: 0.7 MG/DL (ref 0–1)
BUN SERPL-MCNC: 11 MG/DL (ref 7–20)
CALCIUM SERPL-MCNC: 9.5 MG/DL (ref 8.3–10.6)
CHLORIDE SERPL-SCNC: 101 MMOL/L (ref 99–110)
CHOLEST SERPL-MCNC: 103 MG/DL (ref 0–199)
CO2 SERPL-SCNC: 26 MMOL/L (ref 21–32)
CREAT SERPL-MCNC: 0.9 MG/DL (ref 0.9–1.3)
CREATININE URINE POCT: NORMAL
DEPRECATED RDW RBC AUTO: 15.5 % (ref 12.4–15.4)
EOSINOPHIL # BLD: 0.3 K/UL (ref 0–0.6)
EOSINOPHIL NFR BLD: 2.5 %
GFR SERPLBLD CREATININE-BSD FMLA CKD-EPI: >60 ML/MIN/{1.73_M2}
GLUCOSE SERPL-MCNC: 108 MG/DL (ref 70–99)
HBA1C MFR BLD: 6.3 %
HCT VFR BLD AUTO: 43.3 % (ref 40.5–52.5)
HDLC SERPL-MCNC: 22 MG/DL (ref 40–60)
HGB BLD-MCNC: 14.7 G/DL (ref 13.5–17.5)
LDLC SERPL CALC-MCNC: 46 MG/DL
LYMPHOCYTES # BLD: 3.1 K/UL (ref 1–5.1)
LYMPHOCYTES NFR BLD: 28.1 %
MCH RBC QN AUTO: 29.1 PG (ref 26–34)
MCHC RBC AUTO-ENTMCNC: 33.9 G/DL (ref 31–36)
MCV RBC AUTO: 85.8 FL (ref 80–100)
MICROALBUMIN/CREAT 24H UR: NORMAL MG/G{CREAT}
MICROALBUMIN/CREAT UR-RTO: NORMAL
MONOCYTES # BLD: 0.9 K/UL (ref 0–1.3)
MONOCYTES NFR BLD: 8.3 %
NEUTROPHILS # BLD: 6.6 K/UL (ref 1.7–7.7)
NEUTROPHILS NFR BLD: 60.2 %
PLATELET # BLD AUTO: 283 K/UL (ref 135–450)
PMV BLD AUTO: 8.6 FL (ref 5–10.5)
POTASSIUM SERPL-SCNC: 4.3 MMOL/L (ref 3.5–5.1)
PROT SERPL-MCNC: 6.9 G/DL (ref 6.4–8.2)
PSA SERPL DL<=0.01 NG/ML-MCNC: 0.49 NG/ML (ref 0–4)
RBC # BLD AUTO: 5.05 M/UL (ref 4.2–5.9)
SODIUM SERPL-SCNC: 140 MMOL/L (ref 136–145)
T4 FREE SERPL-MCNC: 1.2 NG/DL (ref 0.9–1.8)
TRIGL SERPL-MCNC: 173 MG/DL (ref 0–150)
TSH SERPL DL<=0.005 MIU/L-ACNC: 4.59 UIU/ML (ref 0.27–4.2)
VLDLC SERPL CALC-MCNC: 35 MG/DL
WBC # BLD AUTO: 10.9 K/UL (ref 4–11)

## 2024-03-05 PROCEDURE — 36415 COLL VENOUS BLD VENIPUNCTURE: CPT | Performed by: NURSE PRACTITIONER

## 2024-03-05 PROCEDURE — 82044 UR ALBUMIN SEMIQUANTITATIVE: CPT | Performed by: NURSE PRACTITIONER

## 2024-03-05 PROCEDURE — 83036 HEMOGLOBIN GLYCOSYLATED A1C: CPT | Performed by: NURSE PRACTITIONER

## 2024-03-05 PROCEDURE — 3044F HG A1C LEVEL LT 7.0%: CPT | Performed by: NURSE PRACTITIONER

## 2024-03-05 PROCEDURE — 3079F DIAST BP 80-89 MM HG: CPT | Performed by: NURSE PRACTITIONER

## 2024-03-05 PROCEDURE — 3074F SYST BP LT 130 MM HG: CPT | Performed by: NURSE PRACTITIONER

## 2024-03-05 PROCEDURE — 99214 OFFICE O/P EST MOD 30 MIN: CPT | Performed by: NURSE PRACTITIONER

## 2024-03-05 ASSESSMENT — ENCOUNTER SYMPTOMS
ALLERGIC/IMMUNOLOGIC NEGATIVE: 1
EYES NEGATIVE: 1
ABDOMINAL PAIN: 0
CHEST TIGHTNESS: 0
RESPIRATORY NEGATIVE: 1
SHORTNESS OF BREATH: 0

## 2024-03-05 NOTE — PROGRESS NOTES
the 3/5/24 encounter (Appointment) with Radha Haji APRN - CNP.       No Known Allergies    Social History     Tobacco Use    Smoking status: Never    Smokeless tobacco: Never   Substance Use Topics    Alcohol use: No       Objective:   /82   Pulse 60   Wt 89.4 kg (197 lb)   SpO2 95%   BMI 26.72 kg/m²     Physical Exam  Vitals and nursing note reviewed.   Constitutional:       General: He is not in acute distress.     Appearance: Normal appearance. He is well-developed and well-groomed. He is not ill-appearing or toxic-appearing.   HENT:      Head: Normocephalic and atraumatic.   Eyes:      Extraocular Movements: Extraocular movements intact.      Conjunctiva/sclera: Conjunctivae normal.   Cardiovascular:      Rate and Rhythm: Normal rate and regular rhythm.      Pulses: Normal pulses.           Dorsalis pedis pulses are 2+ on the right side and 2+ on the left side.        Posterior tibial pulses are 2+ on the right side and 2+ on the left side.      Heart sounds: Normal heart sounds, S1 normal and S2 normal.   Pulmonary:      Effort: Pulmonary effort is normal. No accessory muscle usage or respiratory distress.      Breath sounds: Normal breath sounds.   Abdominal:      General: Bowel sounds are normal.      Palpations: Abdomen is soft.   Musculoskeletal:      Cervical back: Neck supple.      Right lower leg: No edema.      Left lower leg: No edema.      Right foot: Normal range of motion. No deformity, bunion, Charcot foot, foot drop or prominent metatarsal heads.      Left foot: Normal range of motion. No deformity, bunion, Charcot foot, foot drop or prominent metatarsal heads.   Feet:      Right foot:      Protective Sensation: 10 sites tested.  10 sites sensed.      Skin integrity: Skin integrity normal.      Toenail Condition: Right toenails are long.      Left foot:      Protective Sensation: 10 sites tested.  10 sites sensed.      Skin integrity: Skin integrity normal.      Toenail

## 2024-05-22 NOTE — TELEPHONE ENCOUNTER
Created telephone encounter. Spoke with Nannette Keane relayed message per [address] [facility],[id number] regarding carotid. Pt verbalized understanding. Detail Level: Detailed

## 2024-05-30 ENCOUNTER — OFFICE VISIT (OUTPATIENT)
Age: 59
End: 2024-05-30
Payer: MEDICARE

## 2024-05-30 VITALS
BODY MASS INDEX: 28.14 KG/M2 | OXYGEN SATURATION: 96 % | HEIGHT: 71 IN | SYSTOLIC BLOOD PRESSURE: 128 MMHG | WEIGHT: 201 LBS | DIASTOLIC BLOOD PRESSURE: 64 MMHG | HEART RATE: 68 BPM

## 2024-05-30 DIAGNOSIS — I10 ESSENTIAL HYPERTENSION: ICD-10-CM

## 2024-05-30 DIAGNOSIS — Z95.1 S/P CABG (CORONARY ARTERY BYPASS GRAFT): ICD-10-CM

## 2024-05-30 DIAGNOSIS — I65.23 BILATERAL CAROTID ARTERY STENOSIS: ICD-10-CM

## 2024-05-30 DIAGNOSIS — E78.00 PURE HYPERCHOLESTEROLEMIA: Primary | Chronic | ICD-10-CM

## 2024-05-30 DIAGNOSIS — I25.118 CORONARY ARTERY DISEASE OF NATIVE ARTERY OF NATIVE HEART WITH STABLE ANGINA PECTORIS (HCC): Chronic | ICD-10-CM

## 2024-05-30 PROCEDURE — 3074F SYST BP LT 130 MM HG: CPT | Performed by: INTERNAL MEDICINE

## 2024-05-30 PROCEDURE — 1036F TOBACCO NON-USER: CPT | Performed by: INTERNAL MEDICINE

## 2024-05-30 PROCEDURE — G8419 CALC BMI OUT NRM PARAM NOF/U: HCPCS | Performed by: INTERNAL MEDICINE

## 2024-05-30 PROCEDURE — 3017F COLORECTAL CA SCREEN DOC REV: CPT | Performed by: INTERNAL MEDICINE

## 2024-05-30 PROCEDURE — 3078F DIAST BP <80 MM HG: CPT | Performed by: INTERNAL MEDICINE

## 2024-05-30 PROCEDURE — G8427 DOCREV CUR MEDS BY ELIG CLIN: HCPCS | Performed by: INTERNAL MEDICINE

## 2024-05-30 PROCEDURE — 99214 OFFICE O/P EST MOD 30 MIN: CPT | Performed by: INTERNAL MEDICINE

## 2024-05-30 RX ORDER — ATORVASTATIN CALCIUM 80 MG/1
80 TABLET, FILM COATED ORAL NIGHTLY
Qty: 90 TABLET | Refills: 3 | Status: SHIPPED | OUTPATIENT
Start: 2024-05-30

## 2024-05-30 RX ORDER — ISOSORBIDE MONONITRATE 30 MG/1
30 TABLET, EXTENDED RELEASE ORAL DAILY
Qty: 90 TABLET | Refills: 1 | Status: SHIPPED | OUTPATIENT
Start: 2024-05-30

## 2024-05-30 RX ORDER — NITROGLYCERIN 0.4 MG/1
TABLET SUBLINGUAL
Qty: 25 TABLET | Refills: 0 | Status: SHIPPED | OUTPATIENT
Start: 2024-05-30

## 2024-05-30 RX ORDER — AMLODIPINE BESYLATE 5 MG/1
5 TABLET ORAL DAILY
Qty: 90 TABLET | Refills: 3 | Status: SHIPPED | OUTPATIENT
Start: 2024-05-30

## 2024-05-30 RX ORDER — METOPROLOL TARTRATE 50 MG/1
50 TABLET, FILM COATED ORAL 2 TIMES DAILY
Qty: 180 TABLET | Refills: 3 | Status: SHIPPED | OUTPATIENT
Start: 2024-05-30

## 2024-05-30 NOTE — PATIENT INSTRUCTIONS
Plan:  ~Carotid dopplers next year    ~Call [facility] scheduling at [phone] to schedule testing      Cardiac medications reviewed including indications and pertinent side effects. Medication list updated at this visit.   Patient verbalizes understanding of the need for treatment and education has been provided at today's visit. Additional education material will be provided in after visit summary.    Check blood pressure and heart rate at home a few times per week- keep a log with dates and times and bring to office visit   Regular exercise and following a healthy diet encouraged   Follow up with me in 1 year   Medication refills have been provided for one year to your preferred pharmacy

## 2024-05-30 NOTE — PROGRESS NOTES
mid  LCX: calcified, prox 80-90%, sequential lesions, +poststenotic aneurysm. L-->Rt collaterals                OM1- small, ostial 80% (too small for PCI)                OM2- tiny vessel                OM3- larger vessel, acute takeoff angle, ostial 90%. Fed by L-L collaterals                OM4- distal 80%  RCA: dominant, moderate diffuse disease. prox 50%, mid 60%, distal 70%                R->Lt collaterals to Lcx                PDA: 100% ostial with L->R collaterals                PLV: luminals  LIMA-LAD: patent with  Touchdown to mid LAD, mid 40-50% in native LAD  SVG-->RT RV Marginal: patent with retrograde flow up to RCA and down to distal RCA  SVG-->Diag: patent. Flow down LAD and up to LCx    No other grafts and no sequential limbs seen  LVEDP: 4  LVEF: 55%  PCI of prox./mid LCX  CSI arthrectomy to prep vessel, multiple runs at low RPM  Pre dil 3 x 20mm  STENT: resolute Amite 3 x 34mm  OCT showed stent expanded but stent extended 3mm into LAD/LM merle                - there fore placed runthry wire thru struts into LCX                - POTS to left main using 4 x6mm                - then 3.25mm to LAD then kissing balloon [id number] and then 3mm x12 to LM/LAD/LCx  1. Successful PCI to prox/mid LCx using Amite Drug stent using CSI and OCT                - 80%-->0%, Vinny-3 flow  2. integrllin for 6 hr  3. COnt ASA 81mg qday and Ticagrelor 90mg po BID- Continue for LIFE given structs extending into LM merle         CARDIAC CATH:  7/9/2020  Procedures: Cor angio, SVG x 2, LIMA  LM 50%  LAD 70% prox, 95% mid  Cx stent patent              OM3 95% ostium, jailed by stent              OM 4 70% distal  RCA 70% mid, 60% distal  LIMA to LAD patent  SVG to Dx patent  SVG to %  SVG to R-marginal patent  SVG to ? OM not identified. Reported 100% for prior cath     No sig cahnge compared to cath 3/2020  Add imdur and renexa  Dr Willis to review for consider further PCI    Echocardiogram 8/25/2022  Summary   Normal

## 2024-07-31 ENCOUNTER — HOSPITAL ENCOUNTER (OUTPATIENT)
Dept: ULTRASOUND IMAGING | Age: 59
Discharge: HOME | End: 2024-07-31
Payer: MEDICARE

## 2024-07-31 DIAGNOSIS — R74.8 ELEVATED LIVER ENZYMES: ICD-10-CM

## 2024-07-31 PROCEDURE — 76705 ECHO EXAM OF ABDOMEN: CPT

## 2024-08-26 RX ORDER — ISOSORBIDE MONONITRATE 30 MG/1
30 TABLET, EXTENDED RELEASE ORAL DAILY
Qty: 90 TABLET | Refills: 3 | Status: SHIPPED | OUTPATIENT
Start: 2024-08-26

## 2025-02-05 ENCOUNTER — TELEPHONE (OUTPATIENT)
Dept: CARDIOLOGY CLINIC | Age: 60
End: 2025-02-05

## 2025-02-05 NOTE — TELEPHONE ENCOUNTER
This was filled 8/25 for a year. Called pharmacy and they are filling it. Attempted to reach pt, left vm to call office back.

## 2025-02-05 NOTE — TELEPHONE ENCOUNTER
Pt is requesting refill of Imdur 30mg. Preferred pharmacy is     [facility]/pharmacy #[id number] - [place], [place] - [address] [phone]     Last ov 05/30/2024 [facility]

## 2025-06-16 ENCOUNTER — APPOINTMENT (OUTPATIENT)
Dept: CT IMAGING | Age: 60
End: 2025-06-16
Payer: MEDICARE

## 2025-06-16 ENCOUNTER — HOSPITAL ENCOUNTER (EMERGENCY)
Age: 60
Discharge: HOME | End: 2025-06-16
Attending: STUDENT IN AN ORGANIZED HEALTH CARE EDUCATION/TRAINING PROGRAM
Payer: MEDICARE

## 2025-06-16 ENCOUNTER — APPOINTMENT (OUTPATIENT)
Dept: GENERAL RADIOLOGY | Age: 60
End: 2025-06-16
Payer: MEDICARE

## 2025-06-16 VITALS
TEMPERATURE: 97.7 F | WEIGHT: 196.6 LBS | OXYGEN SATURATION: 94 % | SYSTOLIC BLOOD PRESSURE: 114 MMHG | HEIGHT: 72 IN | RESPIRATION RATE: 13 BRPM | DIASTOLIC BLOOD PRESSURE: 94 MMHG | HEART RATE: 63 BPM | BODY MASS INDEX: 26.63 KG/M2

## 2025-06-16 DIAGNOSIS — R07.9 CHEST PAIN, UNSPECIFIED TYPE: Primary | ICD-10-CM

## 2025-06-16 LAB
ALBUMIN SERPL-MCNC: 3.9 G/DL (ref 3.4–5)
ALBUMIN/GLOB SERPL: 1.6 {RATIO} (ref 1.1–2.2)
ALP SERPL-CCNC: 85 U/L (ref 40–129)
ALT SERPL-CCNC: 63 U/L (ref 10–40)
ANION GAP SERPL CALCULATED.3IONS-SCNC: 12 MMOL/L (ref 3–16)
AST SERPL-CCNC: 48 U/L (ref 15–37)
BASE EXCESS BLDV CALC-SCNC: -0.8 MMOL/L (ref -3–3)
BASOPHILS # BLD: 0.1 K/UL (ref 0–0.2)
BASOPHILS NFR BLD: 0.6 %
BILIRUB SERPL-MCNC: 0.5 MG/DL (ref 0–1)
BUN SERPL-MCNC: 7 MG/DL (ref 7–20)
CALCIUM SERPL-MCNC: 8.6 MG/DL (ref 8.3–10.6)
CHLORIDE SERPL-SCNC: 103 MMOL/L (ref 99–110)
CO2 BLDV-SCNC: 24 MMOL/L
CO2 SERPL-SCNC: 23 MMOL/L (ref 21–32)
COHGB MFR BLDV: 1.4 % (ref 0–1.5)
CREAT SERPL-MCNC: 0.8 MG/DL (ref 0.8–1.3)
DEPRECATED RDW RBC AUTO: 14.8 % (ref 12.4–15.4)
EKG ATRIAL RATE: 59 BPM
EKG DIAGNOSIS: NORMAL
EKG P AXIS: 23 DEGREES
EKG P-R INTERVAL: 168 MS
EKG Q-T INTERVAL: 426 MS
EKG QRS DURATION: 148 MS
EKG QTC CALCULATION (BAZETT): 421 MS
EKG R AXIS: -15 DEGREES
EKG T AXIS: -5 DEGREES
EKG VENTRICULAR RATE: 59 BPM
EOSINOPHIL # BLD: 0.3 K/UL (ref 0–0.6)
EOSINOPHIL NFR BLD: 2.9 %
GFR SERPLBLD CREATININE-BSD FMLA CKD-EPI: >90 ML/MIN/{1.73_M2}
GLUCOSE SERPL-MCNC: 93 MG/DL (ref 70–99)
HCO3 BLDV-SCNC: 23.1 MMOL/L (ref 23–29)
HCT VFR BLD AUTO: 41.4 % (ref 40.5–52.5)
HGB BLD-MCNC: 13.9 G/DL (ref 13.5–17.5)
LIPASE SERPL-CCNC: 62 U/L (ref 13–60)
LYMPHOCYTES # BLD: 3.2 K/UL (ref 1–5.1)
LYMPHOCYTES NFR BLD: 32 %
MCH RBC QN AUTO: 28.6 PG (ref 26–34)
MCHC RBC AUTO-ENTMCNC: 33.6 G/DL (ref 31–36)
MCV RBC AUTO: 85 FL (ref 80–100)
METHGB MFR BLDV: 0 %
MONOCYTES # BLD: 0.8 K/UL (ref 0–1.3)
MONOCYTES NFR BLD: 7.9 %
NEUTROPHILS # BLD: 5.7 K/UL (ref 1.7–7.7)
NEUTROPHILS NFR BLD: 56.6 %
O2 CT VFR BLDV CALC: 20 VOL %
O2 THERAPY: ABNORMAL
PCO2 BLDV: 35.9 MMHG (ref 40–50)
PH BLDV: 7.43 [PH] (ref 7.35–7.45)
PLATELET # BLD AUTO: 285 K/UL (ref 135–450)
PMV BLD AUTO: 7.9 FL (ref 5–10.5)
PO2 BLDV: 102.8 MMHG (ref 25–40)
POTASSIUM SERPL-SCNC: 3.9 MMOL/L (ref 3.5–5.1)
PROT SERPL-MCNC: 6.4 G/DL (ref 6.4–8.2)
RBC # BLD AUTO: 4.87 M/UL (ref 4.2–5.9)
SAO2 % BLDV: 98 %
SODIUM SERPL-SCNC: 138 MMOL/L (ref 136–145)
TROPONIN, HIGH SENSITIVITY: 10 NG/L (ref 0–22)
TROPONIN, HIGH SENSITIVITY: 9 NG/L (ref 0–22)
WBC # BLD AUTO: 10.1 K/UL (ref 4–11)

## 2025-06-16 PROCEDURE — 83690 ASSAY OF LIPASE: CPT

## 2025-06-16 PROCEDURE — 71045 X-RAY EXAM CHEST 1 VIEW: CPT

## 2025-06-16 PROCEDURE — 93010 ELECTROCARDIOGRAM REPORT: CPT | Performed by: INTERNAL MEDICINE

## 2025-06-16 PROCEDURE — 93005 ELECTROCARDIOGRAM TRACING: CPT | Performed by: STUDENT IN AN ORGANIZED HEALTH CARE EDUCATION/TRAINING PROGRAM

## 2025-06-16 PROCEDURE — 99285 EMERGENCY DEPT VISIT HI MDM: CPT

## 2025-06-16 PROCEDURE — 71275 CT ANGIOGRAPHY CHEST: CPT

## 2025-06-16 PROCEDURE — 6370000000 HC RX 637 (ALT 250 FOR IP): Performed by: NURSE PRACTITIONER

## 2025-06-16 PROCEDURE — 36415 COLL VENOUS BLD VENIPUNCTURE: CPT

## 2025-06-16 PROCEDURE — 84484 ASSAY OF TROPONIN QUANT: CPT

## 2025-06-16 PROCEDURE — 80053 COMPREHEN METABOLIC PANEL: CPT

## 2025-06-16 PROCEDURE — 82803 BLOOD GASES ANY COMBINATION: CPT

## 2025-06-16 PROCEDURE — 85025 COMPLETE CBC W/AUTO DIFF WBC: CPT

## 2025-06-16 PROCEDURE — 6360000004 HC RX CONTRAST MEDICATION: Performed by: NURSE PRACTITIONER

## 2025-06-16 PROCEDURE — 70450 CT HEAD/BRAIN W/O DYE: CPT

## 2025-06-16 RX ORDER — NITROGLYCERIN 0.4 MG/1
0.4 TABLET SUBLINGUAL ONCE
Status: DISCONTINUED | OUTPATIENT
Start: 2025-06-16 | End: 2025-06-16 | Stop reason: HOSPADM

## 2025-06-16 RX ORDER — IOPAMIDOL 755 MG/ML
85 INJECTION, SOLUTION INTRAVASCULAR
Status: COMPLETED | OUTPATIENT
Start: 2025-06-16 | End: 2025-06-16

## 2025-06-16 RX ORDER — DEXTROSE MONOHYDRATE 100 MG/ML
INJECTION, SOLUTION INTRAVENOUS CONTINUOUS
Status: DISCONTINUED | OUTPATIENT
Start: 2025-06-16 | End: 2025-06-16

## 2025-06-16 RX ORDER — ASPIRIN 325 MG
325 TABLET ORAL ONCE
Status: COMPLETED | OUTPATIENT
Start: 2025-06-16 | End: 2025-06-16

## 2025-06-16 RX ADMIN — ASPIRIN 325 MG: 325 TABLET ORAL at 20:50

## 2025-06-16 RX ADMIN — IOPAMIDOL 85 ML: 755 INJECTION, SOLUTION INTRAVENOUS at 14:15

## 2025-06-16 ASSESSMENT — PAIN DESCRIPTION - LOCATION: LOCATION: CHEST

## 2025-06-16 ASSESSMENT — PAIN SCALES - GENERAL: PAINLEVEL_OUTOF10: 4

## 2025-06-16 ASSESSMENT — PAIN DESCRIPTION - DESCRIPTORS: DESCRIPTORS: TIGHTNESS

## 2025-06-16 ASSESSMENT — PAIN DESCRIPTION - PAIN TYPE: TYPE: ACUTE PAIN

## 2025-06-16 ASSESSMENT — PAIN - FUNCTIONAL ASSESSMENT: PAIN_FUNCTIONAL_ASSESSMENT: 0-10

## 2025-06-16 ASSESSMENT — HEART SCORE: ECG: NORMAL

## 2025-06-16 NOTE — ED PROVIDER NOTES
12 lead EKG:  Sinus bradycardia 59  Axis is   Normal  QTc is  normal  There is no specific ST-T wave changes appreciated.  There is no clear evidence of acute ischemia or infarction.  It was compared against an EKG from 2/1/23  Old right bundle branch block.        Shena Grullon MD  06/16/25 [id number]

## 2025-06-16 NOTE — ED PROVIDER NOTES
[facility] EMERGENCY DEPARTMENT     EMERGENCY DEPARTMENT ENCOUNTER     Location: [facility] EMERGENCY DEPARTMENT  6/16/2025  Note Started: 7:12 PM EDT 6/16/25      Patient Identification  Berny Perea is a 59 y.o. male      HPI:Berny Perea was evaluated in the Emergency Department for chief complaint of chest pain.  Patient has a history of 5 CABG.  Has midsternal chest pain that is nonradiating.  Was approached by the PORSHA after it was determined that patient will likely be admitted.  I have reviewed the normal troponin and other normal laboratory studies along with a negative CTA of the chest, CT head without contrast and chest x-ray.     Although initial history and physical exam information was obtained by PORSHA/NPP/MD/ (who also dictated a record of this visit), I personally saw the patient and performed and made/approved the management plan and take responsibility for the patient management.      PHYSICAL EXAM:  Physical Exam  Constitutional:       Appearance: Normal appearance.   HENT:      Head: Normocephalic and atraumatic.      Right Ear: External ear normal.      Left Ear: External ear normal.      Nose: Nose normal.      Mouth/Throat:      Mouth: Mucous membranes are moist.   Eyes:      Extraocular Movements: Extraocular movements intact.      Conjunctiva/sclera: Conjunctivae normal.   Cardiovascular:      Rate and Rhythm: Normal rate and regular rhythm.      Pulses: Normal pulses.      Heart sounds: Normal heart sounds.   Pulmonary:      Effort: Pulmonary effort is normal.      Breath sounds: Normal breath sounds.   Abdominal:      Palpations: Abdomen is soft.      Tenderness: There is no abdominal tenderness. There is no guarding or rebound.   Musculoskeletal:      Cervical back: Normal range of motion and neck supple.      Right lower leg: No edema.      Left lower leg: No edema.   Skin:     General: Skin is warm and dry.      Capillary Refill: Capillary refill takes less than 2

## 2025-06-17 ENCOUNTER — TELEPHONE (OUTPATIENT)
Dept: CARDIOLOGY CLINIC | Age: 60
End: 2025-06-17

## 2025-06-17 NOTE — CONSULTS
Hospital Medicine Consult History & Physical    Date of Service: Pt seen/examined in consultation on 6/16/2025 at the request of Tavon Logan MD for medical management of chest pain    Chief Complaint: Chest pain    Presenting Admission History:   59 y.o. male who presented to [facility] with chest pain.  PMHx significant for CAD status post CABG, type 2 diabetes, hypertension, hyperlipidemia.     Patient reports that he follows with cardiology closely and has intermittent chest pain every now and then that he takes medications for.  If his chest pain occurs, he takes a nitroglycerin tablet and usually improves it.  His current episode of chest pain has been ongoing since Friday and has been coming and going intermittently.  He did not take any nitroglycerin tablets today prior to presenting to the emergency department.  He says he usually gets numbness in hands and feet when he has has had chest pain which is nothing new.  Notably, he says the chest pain is worse when he moves his bilateral upper extremities towards his chest.  Chest pain does not radiate.  No other acute complaints.  Denies any fevers, chills.  Denies any recent trauma, heavy lifting or overexertion.    I did offer observation admission overnight given heart score of 5 to the patient but he said he preferred to go home instead and follow-up with outpatient cardiologist tomorrow.  His troponins were negative x 2, no EKG changes from previous EKG. CTA chest abdomen pelvis was also performed which was unremarkable.  Given no evidence of ACS, I discussed with him that this would be okay from my standpoint but he really needs to follow-up with his cardiologist tomorrow.  He is agreeable and is wanting to go home at this time.    I discussed that if his chest pain is worsening or not improving, he needs to come back to the emergency department.  He is agreeable.  Discussed with ED provider.    Assessment/Plan:    Current Principal Problem: Chest

## 2025-06-17 NOTE — TELEPHONE ENCOUNTER
Pt called on 6/17 to make yearly ov with [facility]. Pt was scheduled for next available with [facility] [place] [facility] [place]. Pt noted that he went to the ER on 6/16 due to chest pain. They did labs and EKG and said everything was fine. Pt was d/c from ER. Pt would like to know if he should do anything else prior to his next appt with [facility] or see him sooner. Please advise.

## 2025-06-17 NOTE — TELEPHONE ENCOUNTER
Reviewed EKG, labs, imaging. ER course was reassuring.  No change in medications.  No further action at this time.  Keep appointment in July for reevaluation.  Thanks    ARIELLA LOPEZ

## 2025-07-09 DIAGNOSIS — I10 ESSENTIAL HYPERTENSION: ICD-10-CM

## 2025-07-10 ENCOUNTER — OFFICE VISIT (OUTPATIENT)
Age: 60
End: 2025-07-10
Payer: MEDICARE

## 2025-07-10 VITALS
HEIGHT: 72 IN | BODY MASS INDEX: 26.68 KG/M2 | SYSTOLIC BLOOD PRESSURE: 118 MMHG | WEIGHT: 197 LBS | OXYGEN SATURATION: 93 % | DIASTOLIC BLOOD PRESSURE: 80 MMHG | HEART RATE: 65 BPM

## 2025-07-10 DIAGNOSIS — E78.00 PURE HYPERCHOLESTEROLEMIA: Chronic | ICD-10-CM

## 2025-07-10 DIAGNOSIS — I65.23 BILATERAL CAROTID ARTERY STENOSIS: ICD-10-CM

## 2025-07-10 DIAGNOSIS — Z95.1 S/P CABG (CORONARY ARTERY BYPASS GRAFT): ICD-10-CM

## 2025-07-10 DIAGNOSIS — I25.118 CORONARY ARTERY DISEASE OF NATIVE ARTERY OF NATIVE HEART WITH STABLE ANGINA PECTORIS: Primary | Chronic | ICD-10-CM

## 2025-07-10 DIAGNOSIS — I10 ESSENTIAL HYPERTENSION: ICD-10-CM

## 2025-07-10 PROCEDURE — 3074F SYST BP LT 130 MM HG: CPT | Performed by: INTERNAL MEDICINE

## 2025-07-10 PROCEDURE — 3079F DIAST BP 80-89 MM HG: CPT | Performed by: INTERNAL MEDICINE

## 2025-07-10 PROCEDURE — G8427 DOCREV CUR MEDS BY ELIG CLIN: HCPCS | Performed by: INTERNAL MEDICINE

## 2025-07-10 PROCEDURE — 3017F COLORECTAL CA SCREEN DOC REV: CPT | Performed by: INTERNAL MEDICINE

## 2025-07-10 PROCEDURE — G8419 CALC BMI OUT NRM PARAM NOF/U: HCPCS | Performed by: INTERNAL MEDICINE

## 2025-07-10 PROCEDURE — 99214 OFFICE O/P EST MOD 30 MIN: CPT | Performed by: INTERNAL MEDICINE

## 2025-07-10 PROCEDURE — 1036F TOBACCO NON-USER: CPT | Performed by: INTERNAL MEDICINE

## 2025-07-10 RX ORDER — AMLODIPINE BESYLATE 5 MG/1
5 TABLET ORAL DAILY
Qty: 90 TABLET | Refills: 3 | Status: SHIPPED | OUTPATIENT
Start: 2025-07-10

## 2025-07-10 RX ORDER — ATORVASTATIN CALCIUM 80 MG/1
80 TABLET, FILM COATED ORAL NIGHTLY
Qty: 90 TABLET | Refills: 3 | Status: SHIPPED | OUTPATIENT
Start: 2025-07-10

## 2025-07-10 RX ORDER — METOPROLOL TARTRATE 50 MG
50 TABLET ORAL 2 TIMES DAILY
Qty: 180 TABLET | Refills: 3 | Status: SHIPPED | OUTPATIENT
Start: 2025-07-10

## 2025-07-10 RX ORDER — ISOSORBIDE MONONITRATE 30 MG/1
30 TABLET, EXTENDED RELEASE ORAL DAILY
Qty: 90 TABLET | Refills: 3 | Status: SHIPPED | OUTPATIENT
Start: 2025-07-10

## 2025-07-10 RX ORDER — NITROGLYCERIN 0.4 MG/1
TABLET SUBLINGUAL
Qty: 25 TABLET | Refills: 0 | Status: SHIPPED | OUTPATIENT
Start: 2025-07-10

## 2025-07-10 NOTE — PROGRESS NOTES
[facility]   Cardiac Followup    Referring Provider:  Priscilla Rajput APRN - [facility]     Chief Complaint   Patient presents with    Follow-up    Hyperlipidemia    Coronary Artery Disease    Hypertension      Berny Perea   1965      History of Present Illness:   Berny Perea is a 60 y.o. male who is here today for follow up for a past medical history of coronary artery disease- S/P CABG X5 2016, PCI to LCx 2020, hypertension, hyperlipidemia, and diabetes mellitus. Patient had repeat left heart cath 7/2020- patent grafts, OM3 95% ostium, jailed by stent. Echocardiogram 8/2022 showed an ef of 55%. Carotid dopplers 2023 showed mild plaque.    He presented to the ER 6/25/2025 with chest pains. Troponin negative. EKG showed SB, RBBB. CTA chest/abdomen/pelvis was negative.    Today he states he has been feeling well since his last visit. He is tolerating his medications and is taking them as prescribed. States he was in the ER one month ago for chest pain that has resolved. He is tolerating his medications and is taking them as prescribed. He tries to remain active. Patient currently denies any weight gain, edema, palpitations, chest pain, shortness of breath, dizziness, and syncope.     Past Medical History:   has a past medical history of CAD (coronary artery disease), Diabetes mellitus (HCC), History of CVA (cerebrovascular accident), Hyperlipidemia, Hypertension, MI (myocardial infarction) (HCC), No history of procedure, and Pneumonia due to organism.    Surgical History:   has a past surgical history that includes Coronary artery bypass graft (09/20/2016); Eye surgery (Right); Colonoscopy (06/01/2017); hernia repair (Bilateral, 2005); Cholecystectomy, laparoscopic (N/A, 4/17/2019); Percutaneous Transluminal Coronary Angio (03/09/2020); and Colonoscopy (N/A, 5/16/2023).     Social History:   reports that he has never smoked. He has never used smokeless tobacco. He reports that he does not drink

## 2025-07-10 NOTE — PATIENT INSTRUCTIONS
~Carotid dopplers next year    ~Call [facility] scheduling at [phone] to schedule testing    Cardiac medications reviewed including indications and pertinent side effects. Medication list updated at this visit.   Patient verbalizes understanding of the need for treatment and education has been provided at today's visit. Additional education material will be provided in after visit summary.    Check blood pressure and heart rate at home a few times per week- keep a log with dates and times and bring to office visit   Regular exercise and following a healthy diet encouraged   Follow up with me in 1 year   Medications refilled

## 2025-07-10 NOTE — TELEPHONE ENCOUNTER
Last Office Visit: 5/30/2024 Provider: BIBI  **Is provider OOT? No    Next Office Visit: 7/10/2025 Provider: BIBI    LAST LABS:   CMP:  Lab Results   Component Value Date     06/16/2025    K 3.9 06/16/2025     06/16/2025    CO2 23 06/16/2025    BUN 7 06/16/2025    CREATININE 0.8 06/16/2025    GLUCOSE 93 06/16/2025    CALCIUM 8.6 06/16/2025    BILITOT 0.5 06/16/2025    ALKPHOS 85 06/16/2025    AST 48 (H) 06/16/2025    ALT 63 (H) 06/16/2025    LABGLOM >90 06/16/2025    GFRAA >60 08/30/2022    AGRATIO 1.6 06/16/2025    GLOB 2.7 08/23/2021

## 2025-07-18 LAB
ALBUMIN: 4.5 G/DL
ALP BLD-CCNC: 83 U/L
ALT SERPL-CCNC: 80 U/L
ANION GAP SERPL CALCULATED.3IONS-SCNC: NORMAL MMOL/L
AST SERPL-CCNC: 68 U/L
BASOPHILS ABSOLUTE: 0.1 /ΜL
BASOPHILS RELATIVE PERCENT: 1 %
BILIRUB SERPL-MCNC: 0.8 MG/DL (ref 0.1–1.4)
BUN BLDV-MCNC: 11 MG/DL
CALCIUM SERPL-MCNC: 9.9 MG/DL
CHLORIDE BLD-SCNC: 101 MMOL/L
CHOLESTEROL, TOTAL: 112 MG/DL
CHOLESTEROL/HDL RATIO: ABNORMAL
CO2: 22 MMOL/L
CREAT SERPL-MCNC: 0.9 MG/DL
EOSINOPHILS ABSOLUTE: 0.3 /ΜL
EOSINOPHILS RELATIVE PERCENT: 3 %
ESTIMATED AVERAGE GLUCOSE: 137
GFR, ESTIMATED: 97
GLUCOSE BLD-MCNC: 99 MG/DL
HBA1C MFR BLD: 6.4 %
HCT VFR BLD CALC: 45.9 % (ref 41–53)
HDLC SERPL-MCNC: 28 MG/DL (ref 35–70)
HEMOGLOBIN: 14.6 G/DL (ref 13.5–17.5)
LDL CHOLESTEROL: 59
LYMPHOCYTES ABSOLUTE: 3.2 /ΜL
LYMPHOCYTES RELATIVE PERCENT: 27 %
MCH RBC QN AUTO: 28.8 PG
MCHC RBC AUTO-ENTMCNC: 31.8 G/DL
MCV RBC AUTO: 91 FL
MONOCYTES ABSOLUTE: 1 /ΜL
MONOCYTES RELATIVE PERCENT: 8 %
NEUTROPHILS ABSOLUTE: 7.2 /ΜL
NEUTROPHILS RELATIVE PERCENT: 61 %
NONHDLC SERPL-MCNC: ABNORMAL MG/DL
PLATELET # BLD: 321 K/ΜL
PMV BLD AUTO: NORMAL FL
POTASSIUM SERPL-SCNC: 4.5 MMOL/L
RBC # BLD: 5.07 10^6/ΜL
SODIUM BLD-SCNC: 139 MMOL/L
TOTAL PROTEIN: 7.2 G/DL (ref 6.4–8.2)
TRIGL SERPL-MCNC: 140 MG/DL
TSH SERPL DL<=0.05 MIU/L-ACNC: 2.36 UIU/ML
VLDLC SERPL CALC-MCNC: 25 MG/DL
WBC # BLD: 11.9 10^3/ML

## 2025-07-22 ENCOUNTER — TELEPHONE (OUTPATIENT)
Dept: CARDIOLOGY CLINIC | Age: 60
End: 2025-07-22

## 2025-07-30 ENCOUNTER — HOSPITAL ENCOUNTER (OUTPATIENT)
Age: 60
Discharge: HOME | End: 2025-08-01
Attending: INTERNAL MEDICINE
Payer: MEDICARE

## 2025-07-30 DIAGNOSIS — I65.23 BILATERAL CAROTID ARTERY STENOSIS: ICD-10-CM

## 2025-07-30 PROCEDURE — 93880 EXTRACRANIAL BILAT STUDY: CPT

## 2025-07-31 LAB
VAS LEFT ARM BP: 114 MMHG
VAS LEFT CCA DIST EDV: 15.9 CM/S
VAS LEFT CCA DIST PSV: 80.7 CM/S
VAS LEFT CCA MID EDV: 18.7 CM/S
VAS LEFT CCA MID PSV: 92.7 CM/S
VAS LEFT CCA PROX EDV: 14.8 CM/S
VAS LEFT CCA PROX PSV: 108 CM/S
VAS LEFT ECA PSV: 82.9 CM/S
VAS LEFT ICA DIST EDV: 18.7 CM/S
VAS LEFT ICA DIST PSV: 61.9 CM/S
VAS LEFT ICA MID EDV: 18.1 CM/S
VAS LEFT ICA MID PSV: 46 CM/S
VAS LEFT ICA PROX EDV: 12.9 CM/S
VAS LEFT ICA PROX PSV: 54.1 CM/S
VAS LEFT SUBCLAVIAN PROX PSV: 81.3 CM/S
VAS LEFT VERTEBRAL EDV: 10.1 CM/S
VAS LEFT VERTEBRAL PSV: 36.3 CM/S
VAS RIGHT ARM BP: 108 MMHG
VAS RIGHT CCA DIST EDV: 17.7 CM/S
VAS RIGHT CCA DIST PSV: 78.6 CM/S
VAS RIGHT CCA MID EDV: 15.2 CM/S
VAS RIGHT CCA MID PSV: 78.1 CM/S
VAS RIGHT CCA PROX EDV: 11.3 CM/S
VAS RIGHT CCA PROX PSV: 81.1 CM/S
VAS RIGHT ECA PSV: 88.5 CM/S
VAS RIGHT ICA DIST EDV: 23.9 CM/S
VAS RIGHT ICA DIST PSV: 75.3 CM/S
VAS RIGHT ICA MID EDV: 21.2 CM/S
VAS RIGHT ICA MID PSV: 66.2 CM/S
VAS RIGHT ICA PROX EDV: 18 CM/S
VAS RIGHT ICA PROX PSV: 56.8 CM/S
VAS RIGHT ICA/CCA PSV: 0.96
VAS RIGHT SUBCLAVIAN PROX PSV: 69.8 CM/S
VAS RIGHT VERTEBRAL EDV: 8.04 CM/S
VAS RIGHT VERTEBRAL PSV: 27.4 CM/S

## 2025-07-31 PROCEDURE — 93880 EXTRACRANIAL BILAT STUDY: CPT | Performed by: INTERNAL MEDICINE

## 2025-08-04 RX ORDER — NITROGLYCERIN 0.4 MG/1
TABLET SUBLINGUAL
Qty: 25 TABLET | Refills: 3 | Status: SHIPPED | OUTPATIENT
Start: 2025-08-04

## (undated) DEVICE — Z INACTIVE USE 2641839 CLIP INT M L POLYMER LOK LIG HEM O LOK

## (undated) DEVICE — GLOVE,SURG,SENSICARE SLT,LF,PF,7: Brand: MEDLINE

## (undated) DEVICE — TROCAR ENDOSCP L100MM DIA5MM BLDELSS STBL SL OBT RADLUC

## (undated) DEVICE — Device

## (undated) DEVICE — TRAP POLYP ETRAP

## (undated) DEVICE — LAPAROSCOPIC CHOLANGIOGRAM CATHETER: Brand: AMERICAN CATHETER CORP

## (undated) DEVICE — ELECTRODE,RADIOTRANSLUCENT,FOAM,3PK: Brand: MEDLINE

## (undated) DEVICE — GAUZE,SPONGE,2"X2",8PLY,STERILE,LF,2'S: Brand: MEDLINE

## (undated) DEVICE — SOLUTION IV 1000ML LAC RINGERS PH 6.5 INJ USP VIAFLX PLAS

## (undated) DEVICE — ENDO CARRY-ON PROCEDURE KIT INCLUDES SUCTION TUBING, LUBRICANT, GAUZE, BIOHAZARD STICKER, TRANSPORT PAD AND INTERCEPT BEDSIDE KIT.: Brand: ENDO CARRY-ON PROCEDURE KIT

## (undated) DEVICE — SNARE VASC L240CM LOOP W10MM SHTH DIA2.4MM RND STIFF CLD

## (undated) DEVICE — PUMP SUC IRR TBNG L10FT W/ HNDPC ASSEMB STRYKEFLOW 2